# Patient Record
Sex: FEMALE | Race: WHITE | Employment: OTHER | URBAN - NONMETROPOLITAN AREA
[De-identification: names, ages, dates, MRNs, and addresses within clinical notes are randomized per-mention and may not be internally consistent; named-entity substitution may affect disease eponyms.]

---

## 2017-02-23 DIAGNOSIS — E11.9 TYPE 2 DIABETES MELLITUS WITHOUT COMPLICATION (HCC): ICD-10-CM

## 2017-02-23 DIAGNOSIS — F41.9 ANXIETY: ICD-10-CM

## 2017-02-23 DIAGNOSIS — I10 ESSENTIAL HYPERTENSION: ICD-10-CM

## 2017-02-23 DIAGNOSIS — F32.A DEPRESSION: ICD-10-CM

## 2017-02-24 RX ORDER — AMLODIPINE BESYLATE 10 MG/1
TABLET ORAL
Qty: 30 TABLET | Refills: 0 | Status: SHIPPED | OUTPATIENT
Start: 2017-02-24 | End: 2017-03-03 | Stop reason: SDUPTHER

## 2017-02-24 RX ORDER — PRAVASTATIN SODIUM 20 MG
TABLET ORAL
Qty: 30 TABLET | Refills: 0 | Status: SHIPPED | OUTPATIENT
Start: 2017-02-24 | End: 2017-03-03 | Stop reason: SDUPTHER

## 2017-02-24 RX ORDER — FLUOXETINE 10 MG/1
CAPSULE ORAL
Qty: 30 CAPSULE | Refills: 0 | Status: SHIPPED | OUTPATIENT
Start: 2017-02-24 | End: 2017-03-03 | Stop reason: SDUPTHER

## 2017-02-24 RX ORDER — LOSARTAN POTASSIUM 50 MG/1
TABLET ORAL
Qty: 30 TABLET | Refills: 0 | Status: SHIPPED | OUTPATIENT
Start: 2017-02-24 | End: 2017-03-03 | Stop reason: SDUPTHER

## 2017-03-03 ENCOUNTER — OFFICE VISIT (OUTPATIENT)
Dept: FAMILY MEDICINE CLINIC | Age: 57
End: 2017-03-03
Payer: MEDICARE

## 2017-03-03 VITALS
SYSTOLIC BLOOD PRESSURE: 122 MMHG | DIASTOLIC BLOOD PRESSURE: 78 MMHG | BODY MASS INDEX: 36.62 KG/M2 | RESPIRATION RATE: 16 BRPM | OXYGEN SATURATION: 98 % | HEART RATE: 117 BPM | TEMPERATURE: 98.4 F | HEIGHT: 62 IN | WEIGHT: 199 LBS

## 2017-03-03 DIAGNOSIS — E11.9 TYPE 2 DIABETES MELLITUS WITHOUT COMPLICATION, WITHOUT LONG-TERM CURRENT USE OF INSULIN (HCC): ICD-10-CM

## 2017-03-03 DIAGNOSIS — I10 ESSENTIAL HYPERTENSION: ICD-10-CM

## 2017-03-03 DIAGNOSIS — R74.8 ELEVATED LIVER ENZYMES: ICD-10-CM

## 2017-03-03 DIAGNOSIS — G62.9 NEUROPATHY: Primary | ICD-10-CM

## 2017-03-03 DIAGNOSIS — Z13.9 SCREENING: ICD-10-CM

## 2017-03-03 DIAGNOSIS — Z12.31 SCREENING MAMMOGRAM, ENCOUNTER FOR: ICD-10-CM

## 2017-03-03 DIAGNOSIS — F41.9 ANXIETY: ICD-10-CM

## 2017-03-03 DIAGNOSIS — F51.01 PRIMARY INSOMNIA: ICD-10-CM

## 2017-03-03 DIAGNOSIS — K21.9 GASTROESOPHAGEAL REFLUX DISEASE WITHOUT ESOPHAGITIS: ICD-10-CM

## 2017-03-03 DIAGNOSIS — E83.52 HYPERCALCEMIA: Primary | ICD-10-CM

## 2017-03-03 DIAGNOSIS — E53.8 B12 DEFICIENCY: ICD-10-CM

## 2017-03-03 DIAGNOSIS — R09.89 BILATERAL CAROTID BRUITS: ICD-10-CM

## 2017-03-03 LAB
ALBUMIN SERPL-MCNC: 5 G/DL (ref 3.5–5.2)
ALP BLD-CCNC: 123 U/L (ref 35–104)
ALT SERPL-CCNC: 138 U/L (ref 5–33)
ANION GAP SERPL CALCULATED.3IONS-SCNC: 15 MMOL/L (ref 7–19)
AST SERPL-CCNC: 62 U/L (ref 5–32)
BASOPHILS ABSOLUTE: 0 K/UL (ref 0–0.2)
BASOPHILS RELATIVE PERCENT: 0.4 % (ref 0–1)
BILIRUB SERPL-MCNC: 0.3 MG/DL (ref 0.2–1.2)
BILIRUBIN URINE: NEGATIVE
BLOOD, URINE: NEGATIVE
BUN BLDV-MCNC: 18 MG/DL (ref 6–20)
CALCIUM SERPL-MCNC: 12.2 MG/DL (ref 8.6–10)
CHLORIDE BLD-SCNC: 98 MMOL/L (ref 98–111)
CHOLESTEROL, TOTAL: 237 MG/DL (ref 160–199)
CLARITY: ABNORMAL
CO2: 25 MMOL/L (ref 22–29)
COLOR: YELLOW
CREAT SERPL-MCNC: 0.9 MG/DL (ref 0.5–0.9)
CREATININE URINE: 98.1 MG/DL (ref 4.2–622)
EOSINOPHILS ABSOLUTE: 0.2 K/UL (ref 0–0.6)
EOSINOPHILS RELATIVE PERCENT: 1.8 % (ref 0–5)
GFR NON-AFRICAN AMERICAN: >60
GLOBULIN: 3.2 G/DL
GLUCOSE BLD-MCNC: 138 MG/DL (ref 74–109)
GLUCOSE URINE: NEGATIVE MG/DL
HBA1C MFR BLD: 7.2 %
HCT VFR BLD CALC: 44.2 % (ref 37–47)
HDLC SERPL-MCNC: 41 MG/DL (ref 65–121)
HEMOGLOBIN: 14.3 G/DL (ref 12–16)
KETONES, URINE: NEGATIVE MG/DL
LDL CHOLESTEROL CALCULATED: ABNORMAL MG/DL
LDL CHOLESTEROL DIRECT: 126 MG/DL
LEUKOCYTE ESTERASE, URINE: NEGATIVE
LYMPHOCYTES ABSOLUTE: 3.3 K/UL (ref 1.1–4.5)
LYMPHOCYTES RELATIVE PERCENT: 36 % (ref 20–40)
MCH RBC QN AUTO: 30 PG (ref 27–31)
MCHC RBC AUTO-ENTMCNC: 32.4 G/DL (ref 33–37)
MCV RBC AUTO: 92.7 FL (ref 81–99)
MICROALBUMIN UR-MCNC: 5.7 MG/DL (ref 0–19)
MICROALBUMIN/CREAT UR-RTO: 58.1 MG/G
MONOCYTES ABSOLUTE: 0.7 K/UL (ref 0–0.9)
MONOCYTES RELATIVE PERCENT: 7.3 % (ref 0–10)
NEUTROPHILS ABSOLUTE: 5 K/UL (ref 1.5–7.5)
NEUTROPHILS RELATIVE PERCENT: 54.4 % (ref 50–65)
NITRITE, URINE: NEGATIVE
PDW BLD-RTO: 13.8 % (ref 11.5–14.5)
PH UA: 5.5
PLATELET # BLD: 220 K/UL (ref 130–400)
PMV BLD AUTO: 13 FL (ref 7.4–10.4)
POTASSIUM SERPL-SCNC: 4.8 MMOL/L (ref 3.5–5)
PROTEIN UA: NEGATIVE MG/DL
RBC # BLD: 4.77 M/UL (ref 4.2–5.4)
SODIUM BLD-SCNC: 138 MMOL/L (ref 136–145)
SPECIFIC GRAVITY UA: 1.02
TOTAL PROTEIN: 8.2 G/DL (ref 6.6–8.7)
TRIGL SERPL-MCNC: 511 MG/DL (ref 150–199)
TSH SERPL DL<=0.05 MIU/L-ACNC: 1.46 UIU/ML (ref 0.27–4.2)
UROBILINOGEN, URINE: 0.2 E.U./DL
WBC # BLD: 9.2 K/UL (ref 4.8–10.8)

## 2017-03-03 PROCEDURE — 3014F SCREEN MAMMO DOC REV: CPT | Performed by: NURSE PRACTITIONER

## 2017-03-03 PROCEDURE — G8484 FLU IMMUNIZE NO ADMIN: HCPCS | Performed by: NURSE PRACTITIONER

## 2017-03-03 PROCEDURE — 99214 OFFICE O/P EST MOD 30 MIN: CPT | Performed by: NURSE PRACTITIONER

## 2017-03-03 PROCEDURE — G8417 CALC BMI ABV UP PARAM F/U: HCPCS | Performed by: NURSE PRACTITIONER

## 2017-03-03 PROCEDURE — 3017F COLORECTAL CA SCREEN DOC REV: CPT | Performed by: NURSE PRACTITIONER

## 2017-03-03 PROCEDURE — G8427 DOCREV CUR MEDS BY ELIG CLIN: HCPCS | Performed by: NURSE PRACTITIONER

## 2017-03-03 PROCEDURE — 1036F TOBACCO NON-USER: CPT | Performed by: NURSE PRACTITIONER

## 2017-03-03 PROCEDURE — 3046F HEMOGLOBIN A1C LEVEL >9.0%: CPT | Performed by: NURSE PRACTITIONER

## 2017-03-03 RX ORDER — LOSARTAN POTASSIUM 50 MG/1
TABLET ORAL
Qty: 30 TABLET | Refills: 2 | Status: SHIPPED | OUTPATIENT
Start: 2017-03-03 | End: 2017-06-30 | Stop reason: SDUPTHER

## 2017-03-03 RX ORDER — PANTOPRAZOLE SODIUM 40 MG/1
40 TABLET, DELAYED RELEASE ORAL
Qty: 30 TABLET | Refills: 11 | Status: SHIPPED | OUTPATIENT
Start: 2017-03-03 | End: 2017-03-08 | Stop reason: ALTCHOICE

## 2017-03-03 RX ORDER — PRAVASTATIN SODIUM 20 MG
TABLET ORAL
Qty: 30 TABLET | Refills: 2 | Status: SHIPPED | OUTPATIENT
Start: 2017-03-03 | End: 2017-03-08 | Stop reason: ALTCHOICE

## 2017-03-03 RX ORDER — FLUOXETINE 10 MG/1
10 CAPSULE ORAL DAILY
Qty: 30 CAPSULE | Refills: 5 | Status: SHIPPED | OUTPATIENT
Start: 2017-03-03 | End: 2017-06-30 | Stop reason: DRUGHIGH

## 2017-03-03 RX ORDER — GABAPENTIN 100 MG/1
CAPSULE ORAL
Qty: 90 CAPSULE | Refills: 3 | Status: SHIPPED | OUTPATIENT
Start: 2017-03-03 | End: 2017-06-30 | Stop reason: SDUPTHER

## 2017-03-03 RX ORDER — AMLODIPINE BESYLATE 10 MG/1
TABLET ORAL
Qty: 30 TABLET | Refills: 2 | Status: SHIPPED | OUTPATIENT
Start: 2017-03-03 | End: 2017-06-30 | Stop reason: SDUPTHER

## 2017-03-03 RX ORDER — TRAZODONE HYDROCHLORIDE 50 MG/1
TABLET ORAL
Qty: 60 TABLET | Refills: 1 | Status: SHIPPED | OUTPATIENT
Start: 2017-03-03 | End: 2017-05-04 | Stop reason: SDUPTHER

## 2017-03-03 ASSESSMENT — ENCOUNTER SYMPTOMS
BACK PAIN: 1
SHORTNESS OF BREATH: 0
BLURRED VISION: 0

## 2017-03-06 ENCOUNTER — HOSPITAL ENCOUNTER (OUTPATIENT)
Dept: GENERAL RADIOLOGY | Age: 57
Discharge: HOME | End: 2017-03-06
Payer: MEDICARE

## 2017-03-06 DIAGNOSIS — E83.52 HYPERCALCEMIA: ICD-10-CM

## 2017-03-06 DIAGNOSIS — R09.89 BILATERAL CAROTID BRUITS: ICD-10-CM

## 2017-03-06 DIAGNOSIS — Z13.9 SCREENING: ICD-10-CM

## 2017-03-06 LAB
HAV IGM SER IA-ACNC: NORMAL
HEPATITIS B CORE IGM ANTIBODY: NORMAL
HEPATITIS B SURFACE ANTIGEN INTERPRETATION: NORMAL
HEPATITIS C ANTIBODY INTERPRETATION: NORMAL
PARATHYROID HORMONE INTACT: 49.1 PG/ML (ref 15–65)

## 2017-03-06 PROCEDURE — 93880 EXTRACRANIAL BILAT STUDY: CPT

## 2017-03-08 ENCOUNTER — OFFICE VISIT (OUTPATIENT)
Dept: FAMILY MEDICINE CLINIC | Age: 57
End: 2017-03-08
Payer: MEDICARE

## 2017-03-08 VITALS
DIASTOLIC BLOOD PRESSURE: 78 MMHG | HEART RATE: 100 BPM | SYSTOLIC BLOOD PRESSURE: 122 MMHG | OXYGEN SATURATION: 99 % | WEIGHT: 200 LBS | TEMPERATURE: 99 F | BODY MASS INDEX: 36.8 KG/M2 | RESPIRATION RATE: 20 BRPM | HEIGHT: 62 IN

## 2017-03-08 DIAGNOSIS — E78.2 MIXED HYPERLIPIDEMIA: ICD-10-CM

## 2017-03-08 DIAGNOSIS — K22.70 BARRETT'S ESOPHAGUS WITHOUT DYSPLASIA: ICD-10-CM

## 2017-03-08 DIAGNOSIS — E11.65 TYPE 2 DIABETES MELLITUS WITH HYPERGLYCEMIA, WITHOUT LONG-TERM CURRENT USE OF INSULIN (HCC): ICD-10-CM

## 2017-03-08 DIAGNOSIS — R74.8 ELEVATED LIVER ENZYMES: ICD-10-CM

## 2017-03-08 DIAGNOSIS — I65.22 LEFT CAROTID STENOSIS: Primary | ICD-10-CM

## 2017-03-08 DIAGNOSIS — E83.52 SERUM CALCIUM ELEVATED: ICD-10-CM

## 2017-03-08 DIAGNOSIS — K21.9 GASTROESOPHAGEAL REFLUX DISEASE WITHOUT ESOPHAGITIS: ICD-10-CM

## 2017-03-08 LAB — VITAMIN D 25-HYDROXY: 10.9 NG/ML

## 2017-03-08 PROCEDURE — 99214 OFFICE O/P EST MOD 30 MIN: CPT | Performed by: NURSE PRACTITIONER

## 2017-03-08 PROCEDURE — 1036F TOBACCO NON-USER: CPT | Performed by: NURSE PRACTITIONER

## 2017-03-08 PROCEDURE — G8417 CALC BMI ABV UP PARAM F/U: HCPCS | Performed by: NURSE PRACTITIONER

## 2017-03-08 PROCEDURE — 3017F COLORECTAL CA SCREEN DOC REV: CPT | Performed by: NURSE PRACTITIONER

## 2017-03-08 PROCEDURE — G8484 FLU IMMUNIZE NO ADMIN: HCPCS | Performed by: NURSE PRACTITIONER

## 2017-03-08 PROCEDURE — G8427 DOCREV CUR MEDS BY ELIG CLIN: HCPCS | Performed by: NURSE PRACTITIONER

## 2017-03-08 PROCEDURE — 3014F SCREEN MAMMO DOC REV: CPT | Performed by: NURSE PRACTITIONER

## 2017-03-08 PROCEDURE — 3045F PR MOST RECENT HEMOGLOBIN A1C LEVEL 7.0-9.0%: CPT | Performed by: NURSE PRACTITIONER

## 2017-03-08 PROCEDURE — G8599 NO ASA/ANTIPLAT THER USE RNG: HCPCS | Performed by: NURSE PRACTITIONER

## 2017-03-08 RX ORDER — RANITIDINE 150 MG/1
TABLET ORAL
Qty: 30 TABLET | Refills: 2 | Status: SHIPPED | OUTPATIENT
Start: 2017-03-08 | End: 2017-06-30 | Stop reason: SDUPTHER

## 2017-03-08 RX ORDER — ROSUVASTATIN CALCIUM 20 MG/1
20 TABLET, COATED ORAL NIGHTLY
Qty: 30 TABLET | Refills: 3 | Status: SHIPPED | OUTPATIENT
Start: 2017-03-08 | End: 2017-03-08 | Stop reason: CLARIF

## 2017-03-08 RX ORDER — RANITIDINE 150 MG/1
TABLET ORAL
Qty: 30 TABLET | Refills: 2 | Status: SHIPPED | OUTPATIENT
Start: 2017-03-08 | End: 2017-03-08 | Stop reason: ALTCHOICE

## 2017-03-08 RX ORDER — ROSUVASTATIN CALCIUM 20 MG/1
20 TABLET, COATED ORAL NIGHTLY
Qty: 30 TABLET | Refills: 3 | Status: SHIPPED | OUTPATIENT
Start: 2017-03-08 | End: 2017-12-06 | Stop reason: SDUPTHER

## 2017-03-08 RX ORDER — DEXLANSOPRAZOLE 60 MG/1
60 CAPSULE, DELAYED RELEASE ORAL
Qty: 30 CAPSULE | Refills: 2 | Status: SHIPPED | OUTPATIENT
Start: 2017-03-08 | End: 2018-05-03 | Stop reason: ALTCHOICE

## 2017-03-08 RX ORDER — DEXLANSOPRAZOLE 60 MG/1
60 CAPSULE, DELAYED RELEASE ORAL
Qty: 30 CAPSULE | Refills: 2 | Status: SHIPPED | OUTPATIENT
Start: 2017-03-08 | End: 2017-03-08 | Stop reason: ALTCHOICE

## 2017-03-08 ASSESSMENT — ENCOUNTER SYMPTOMS: TROUBLE SWALLOWING: 0

## 2017-03-11 ASSESSMENT — ENCOUNTER SYMPTOMS
SHORTNESS OF BREATH: 0
BLURRED VISION: 0
ABDOMINAL PAIN: 0

## 2017-03-14 RX ORDER — ERGOCALCIFEROL (VITAMIN D2) 1250 MCG
50000 CAPSULE ORAL WEEKLY
Qty: 4 CAPSULE | Refills: 1 | Status: SHIPPED | OUTPATIENT
Start: 2017-03-14 | End: 2017-03-15 | Stop reason: SDUPTHER

## 2017-03-15 RX ORDER — ERGOCALCIFEROL (VITAMIN D2) 1250 MCG
50000 CAPSULE ORAL WEEKLY
Qty: 4 CAPSULE | Refills: 1 | Status: SHIPPED | OUTPATIENT
Start: 2017-03-15 | End: 2017-05-04 | Stop reason: SDUPTHER

## 2017-03-20 ENCOUNTER — OFFICE VISIT (OUTPATIENT)
Dept: VASCULAR SURGERY | Age: 57
End: 2017-03-20
Payer: MEDICARE

## 2017-03-20 VITALS
HEART RATE: 77 BPM | OXYGEN SATURATION: 95 % | DIASTOLIC BLOOD PRESSURE: 78 MMHG | SYSTOLIC BLOOD PRESSURE: 128 MMHG | TEMPERATURE: 98.8 F | WEIGHT: 199.96 LBS | HEIGHT: 62 IN | BODY MASS INDEX: 36.8 KG/M2 | RESPIRATION RATE: 18 BRPM

## 2017-03-20 DIAGNOSIS — I65.23 BILATERAL CAROTID ARTERY STENOSIS: Primary | ICD-10-CM

## 2017-03-20 DIAGNOSIS — R74.8 ELEVATED LIVER ENZYMES: ICD-10-CM

## 2017-03-20 DIAGNOSIS — E83.52 SERUM CALCIUM ELEVATED: ICD-10-CM

## 2017-03-20 DIAGNOSIS — E11.65 TYPE 2 DIABETES MELLITUS WITH HYPERGLYCEMIA, WITHOUT LONG-TERM CURRENT USE OF INSULIN (HCC): ICD-10-CM

## 2017-03-20 LAB
ALBUMIN SERPL-MCNC: 4.6 G/DL (ref 3.5–5.2)
ALP BLD-CCNC: 107 U/L (ref 35–104)
ALT SERPL-CCNC: 54 U/L (ref 5–33)
ANION GAP SERPL CALCULATED.3IONS-SCNC: 17 MMOL/L (ref 7–19)
AST SERPL-CCNC: 38 U/L (ref 5–32)
BILIRUB SERPL-MCNC: <0.2 MG/DL (ref 0.2–1.2)
BILIRUBIN DIRECT: 0.1 MG/DL (ref 0–0.3)
BILIRUBIN, INDIRECT: 0.1 MG/DL (ref 0.1–1)
BUN BLDV-MCNC: 17 MG/DL (ref 6–20)
CALCIUM SERPL-MCNC: 9.8 MG/DL (ref 8.6–10)
CHLORIDE BLD-SCNC: 101 MMOL/L (ref 98–111)
CO2: 23 MMOL/L (ref 22–29)
CREAT SERPL-MCNC: 0.8 MG/DL (ref 0.5–0.9)
GFR NON-AFRICAN AMERICAN: >60
GLUCOSE BLD-MCNC: 118 MG/DL (ref 74–109)
POTASSIUM SERPL-SCNC: 4.2 MMOL/L (ref 3.5–5)
SODIUM BLD-SCNC: 141 MMOL/L (ref 136–145)
TOTAL PROTEIN: 8 G/DL (ref 6.6–8.7)
VITAMIN D 25-HYDROXY: 14.2 NG/ML

## 2017-03-20 PROCEDURE — G8484 FLU IMMUNIZE NO ADMIN: HCPCS | Performed by: SURGERY

## 2017-03-20 PROCEDURE — 99204 OFFICE O/P NEW MOD 45 MIN: CPT | Performed by: SURGERY

## 2017-03-20 PROCEDURE — 1036F TOBACCO NON-USER: CPT | Performed by: SURGERY

## 2017-03-20 PROCEDURE — G8417 CALC BMI ABV UP PARAM F/U: HCPCS | Performed by: SURGERY

## 2017-03-20 PROCEDURE — G8599 NO ASA/ANTIPLAT THER USE RNG: HCPCS | Performed by: SURGERY

## 2017-03-20 PROCEDURE — G8427 DOCREV CUR MEDS BY ELIG CLIN: HCPCS | Performed by: SURGERY

## 2017-03-20 PROCEDURE — 3014F SCREEN MAMMO DOC REV: CPT | Performed by: SURGERY

## 2017-03-20 PROCEDURE — 3017F COLORECTAL CA SCREEN DOC REV: CPT | Performed by: SURGERY

## 2017-03-20 RX ORDER — PANTOPRAZOLE SODIUM 40 MG/1
40 TABLET, DELAYED RELEASE ORAL DAILY
COMMUNITY
End: 2017-03-25 | Stop reason: SDUPTHER

## 2017-05-04 DIAGNOSIS — F51.01 PRIMARY INSOMNIA: ICD-10-CM

## 2017-05-04 RX ORDER — ERGOCALCIFEROL 1.25 MG/1
CAPSULE ORAL
Qty: 4 CAPSULE | Refills: 3 | Status: SHIPPED | OUTPATIENT
Start: 2017-05-04 | End: 2017-12-06 | Stop reason: SDUPTHER

## 2017-05-04 RX ORDER — TRAZODONE HYDROCHLORIDE 50 MG/1
TABLET ORAL
Qty: 60 TABLET | Refills: 3 | Status: SHIPPED | OUTPATIENT
Start: 2017-05-04 | End: 2017-12-06 | Stop reason: SDUPTHER

## 2017-05-18 ENCOUNTER — OFFICE VISIT (OUTPATIENT)
Dept: GASTROENTEROLOGY | Facility: CLINIC | Age: 57
End: 2017-05-18

## 2017-05-18 VITALS
HEIGHT: 63 IN | WEIGHT: 193 LBS | BODY MASS INDEX: 34.2 KG/M2 | HEART RATE: 75 BPM | SYSTOLIC BLOOD PRESSURE: 102 MMHG | TEMPERATURE: 97.5 F | OXYGEN SATURATION: 97 % | DIASTOLIC BLOOD PRESSURE: 68 MMHG

## 2017-05-18 DIAGNOSIS — K22.70 BARRETT'S ESOPHAGUS WITHOUT DYSPLASIA: ICD-10-CM

## 2017-05-18 DIAGNOSIS — K21.9 GASTROESOPHAGEAL REFLUX DISEASE WITHOUT ESOPHAGITIS: ICD-10-CM

## 2017-05-18 DIAGNOSIS — T28.1XXA ESOPHAGEAL BURN, INITIAL ENCOUNTER: ICD-10-CM

## 2017-05-18 DIAGNOSIS — K63.5 COLON POLYPS: Primary | ICD-10-CM

## 2017-05-18 PROBLEM — R12 HEARTBURN: Status: ACTIVE | Noted: 2017-05-18

## 2017-05-18 PROBLEM — R12 HEARTBURN: Status: RESOLVED | Noted: 2017-05-18 | Resolved: 2017-05-18

## 2017-05-18 PROCEDURE — 99203 OFFICE O/P NEW LOW 30 MIN: CPT | Performed by: NURSE PRACTITIONER

## 2017-05-18 RX ORDER — LOSARTAN POTASSIUM 50 MG/1
TABLET ORAL
Refills: 2 | Status: ON HOLD | COMMUNITY
Start: 2017-04-22 | End: 2018-03-28

## 2017-05-18 RX ORDER — FLUOXETINE 10 MG/1
CAPSULE ORAL
Refills: 2 | Status: ON HOLD | COMMUNITY
Start: 2017-04-22 | End: 2018-03-28

## 2017-05-18 RX ORDER — ROSUVASTATIN CALCIUM 20 MG/1
TABLET, COATED ORAL
Refills: 3 | Status: ON HOLD | COMMUNITY
Start: 2017-04-05 | End: 2018-03-28

## 2017-05-18 RX ORDER — RANITIDINE 150 MG/1
TABLET ORAL
Refills: 2 | Status: ON HOLD | COMMUNITY
Start: 2017-05-04 | End: 2018-03-28

## 2017-05-18 RX ORDER — AMLODIPINE BESYLATE 10 MG/1
TABLET ORAL
Refills: 2 | Status: ON HOLD | COMMUNITY
Start: 2017-04-22 | End: 2018-03-28

## 2017-05-18 RX ORDER — PANTOPRAZOLE SODIUM 40 MG/1
TABLET, DELAYED RELEASE ORAL
Refills: 5 | Status: ON HOLD | COMMUNITY
Start: 2017-04-22 | End: 2018-03-28

## 2017-05-18 RX ORDER — SODIUM, POTASSIUM,MAG SULFATES 17.5-3.13G
2 SOLUTION, RECONSTITUTED, ORAL ORAL ONCE
Qty: 2 BOTTLE | Refills: 0 | Status: SHIPPED | OUTPATIENT
Start: 2017-05-18 | End: 2017-05-18

## 2017-05-18 RX ORDER — GABAPENTIN 100 MG/1
400 CAPSULE ORAL NIGHTLY
Refills: 3 | Status: ON HOLD | COMMUNITY
Start: 2017-04-05 | End: 2020-08-21

## 2017-06-30 ENCOUNTER — OFFICE VISIT (OUTPATIENT)
Dept: FAMILY MEDICINE CLINIC | Age: 57
End: 2017-06-30
Payer: MEDICARE

## 2017-06-30 VITALS
TEMPERATURE: 98 F | OXYGEN SATURATION: 99 % | SYSTOLIC BLOOD PRESSURE: 130 MMHG | HEART RATE: 96 BPM | BODY MASS INDEX: 36.44 KG/M2 | DIASTOLIC BLOOD PRESSURE: 86 MMHG | RESPIRATION RATE: 16 BRPM | WEIGHT: 198 LBS

## 2017-06-30 DIAGNOSIS — I10 ESSENTIAL HYPERTENSION: ICD-10-CM

## 2017-06-30 DIAGNOSIS — F32.89 OTHER DEPRESSION: ICD-10-CM

## 2017-06-30 DIAGNOSIS — E78.2 MIXED HYPERLIPIDEMIA: ICD-10-CM

## 2017-06-30 DIAGNOSIS — K21.9 GASTROESOPHAGEAL REFLUX DISEASE WITHOUT ESOPHAGITIS: ICD-10-CM

## 2017-06-30 DIAGNOSIS — K22.70 BARRETT'S ESOPHAGUS WITHOUT DYSPLASIA: ICD-10-CM

## 2017-06-30 DIAGNOSIS — E11.9 TYPE 2 DIABETES MELLITUS WITHOUT COMPLICATION, WITHOUT LONG-TERM CURRENT USE OF INSULIN (HCC): ICD-10-CM

## 2017-06-30 DIAGNOSIS — F41.9 ANXIETY: ICD-10-CM

## 2017-06-30 DIAGNOSIS — E55.9 VITAMIN D DEFICIENCY: Primary | ICD-10-CM

## 2017-06-30 DIAGNOSIS — Z12.31 SCREENING MAMMOGRAM, ENCOUNTER FOR: ICD-10-CM

## 2017-06-30 DIAGNOSIS — G62.9 NEUROPATHY: ICD-10-CM

## 2017-06-30 DIAGNOSIS — Z28.21 REFUSED PNEUMOCOCCAL VACCINATION: ICD-10-CM

## 2017-06-30 PROCEDURE — 99214 OFFICE O/P EST MOD 30 MIN: CPT | Performed by: NURSE PRACTITIONER

## 2017-06-30 PROCEDURE — 3014F SCREEN MAMMO DOC REV: CPT | Performed by: NURSE PRACTITIONER

## 2017-06-30 PROCEDURE — 3046F HEMOGLOBIN A1C LEVEL >9.0%: CPT | Performed by: NURSE PRACTITIONER

## 2017-06-30 PROCEDURE — 1036F TOBACCO NON-USER: CPT | Performed by: NURSE PRACTITIONER

## 2017-06-30 PROCEDURE — G8427 DOCREV CUR MEDS BY ELIG CLIN: HCPCS | Performed by: NURSE PRACTITIONER

## 2017-06-30 PROCEDURE — 3017F COLORECTAL CA SCREEN DOC REV: CPT | Performed by: NURSE PRACTITIONER

## 2017-06-30 PROCEDURE — G8599 NO ASA/ANTIPLAT THER USE RNG: HCPCS | Performed by: NURSE PRACTITIONER

## 2017-06-30 PROCEDURE — G8417 CALC BMI ABV UP PARAM F/U: HCPCS | Performed by: NURSE PRACTITIONER

## 2017-06-30 RX ORDER — FLUOXETINE HYDROCHLORIDE 20 MG/1
CAPSULE ORAL
Qty: 30 CAPSULE | Refills: 5 | Status: SHIPPED | OUTPATIENT
Start: 2017-06-30 | End: 2017-12-06 | Stop reason: SDUPTHER

## 2017-06-30 RX ORDER — LOSARTAN POTASSIUM 50 MG/1
TABLET ORAL
Qty: 30 TABLET | Refills: 5 | Status: SHIPPED | OUTPATIENT
Start: 2017-06-30 | End: 2018-01-10 | Stop reason: SDUPTHER

## 2017-06-30 RX ORDER — AMLODIPINE BESYLATE 10 MG/1
TABLET ORAL
Qty: 30 TABLET | Refills: 5 | Status: SHIPPED | OUTPATIENT
Start: 2017-06-30 | End: 2017-12-06 | Stop reason: SDUPTHER

## 2017-06-30 RX ORDER — PHENTERMINE HYDROCHLORIDE 37.5 MG/1
37.5 TABLET ORAL
Qty: 30 TABLET | Refills: 0 | Status: SHIPPED | OUTPATIENT
Start: 2017-06-30 | End: 2017-07-30

## 2017-06-30 RX ORDER — DEXLANSOPRAZOLE 60 MG/1
60 CAPSULE, DELAYED RELEASE ORAL
Qty: 30 CAPSULE | Refills: 2 | Status: CANCELLED | OUTPATIENT
Start: 2017-06-30

## 2017-06-30 RX ORDER — ROSUVASTATIN CALCIUM 20 MG/1
20 TABLET, COATED ORAL NIGHTLY
Qty: 30 TABLET | Refills: 3 | Status: CANCELLED | OUTPATIENT
Start: 2017-06-30

## 2017-06-30 RX ORDER — RANITIDINE 150 MG/1
TABLET ORAL
Qty: 30 TABLET | Refills: 5 | Status: SHIPPED | OUTPATIENT
Start: 2017-06-30 | End: 2017-12-06 | Stop reason: SDUPTHER

## 2017-06-30 RX ORDER — PANTOPRAZOLE SODIUM 40 MG/1
TABLET, DELAYED RELEASE ORAL
Qty: 60 TABLET | Refills: 1 | Status: SHIPPED | OUTPATIENT
Start: 2017-06-30 | End: 2017-08-25 | Stop reason: SDUPTHER

## 2017-06-30 RX ORDER — LOSARTAN POTASSIUM 50 MG/1
TABLET ORAL
Qty: 30 TABLET | Refills: 5 | Status: SHIPPED | OUTPATIENT
Start: 2017-06-30 | End: 2017-12-06 | Stop reason: SDUPTHER

## 2017-06-30 RX ORDER — GABAPENTIN 100 MG/1
CAPSULE ORAL
Qty: 90 CAPSULE | Refills: 3 | Status: SHIPPED | OUTPATIENT
Start: 2017-06-30 | End: 2017-12-06 | Stop reason: SDUPTHER

## 2017-06-30 ASSESSMENT — ENCOUNTER SYMPTOMS
SHORTNESS OF BREATH: 0
NAUSEA: 0
TROUBLE SWALLOWING: 0
BLURRED VISION: 0
DIARRHEA: 0

## 2017-07-20 ENCOUNTER — HOSPITAL ENCOUNTER (OUTPATIENT)
Dept: WOMENS IMAGING | Age: 57
Discharge: HOME | End: 2017-07-20
Payer: MEDICARE

## 2017-07-20 DIAGNOSIS — Z12.31 SCREENING MAMMOGRAM, ENCOUNTER FOR: ICD-10-CM

## 2017-07-20 PROCEDURE — 77063 BREAST TOMOSYNTHESIS BI: CPT

## 2017-07-24 ENCOUNTER — ANESTHESIA (OUTPATIENT)
Dept: GASTROENTEROLOGY | Facility: HOSPITAL | Age: 57
End: 2017-07-24

## 2017-07-24 ENCOUNTER — ANESTHESIA EVENT (OUTPATIENT)
Dept: GASTROENTEROLOGY | Facility: HOSPITAL | Age: 57
End: 2017-07-24

## 2017-07-24 ENCOUNTER — HOSPITAL ENCOUNTER (OUTPATIENT)
Facility: HOSPITAL | Age: 57
Setting detail: HOSPITAL OUTPATIENT SURGERY
Discharge: HOME | End: 2017-07-24
Attending: INTERNAL MEDICINE | Admitting: INTERNAL MEDICINE

## 2017-07-24 VITALS
OXYGEN SATURATION: 97 % | WEIGHT: 183 LBS | TEMPERATURE: 98.1 F | SYSTOLIC BLOOD PRESSURE: 106 MMHG | HEIGHT: 62 IN | HEART RATE: 71 BPM | RESPIRATION RATE: 18 BRPM | DIASTOLIC BLOOD PRESSURE: 66 MMHG | BODY MASS INDEX: 33.68 KG/M2

## 2017-07-24 DIAGNOSIS — K22.70 BARRETT'S ESOPHAGUS WITHOUT DYSPLASIA: ICD-10-CM

## 2017-07-24 DIAGNOSIS — T28.1XXA ESOPHAGEAL BURN, INITIAL ENCOUNTER: ICD-10-CM

## 2017-07-24 DIAGNOSIS — K63.5 COLON POLYPS: ICD-10-CM

## 2017-07-24 DIAGNOSIS — K21.9 GASTROESOPHAGEAL REFLUX DISEASE WITHOUT ESOPHAGITIS: ICD-10-CM

## 2017-07-24 PROCEDURE — 43239 EGD BIOPSY SINGLE/MULTIPLE: CPT | Performed by: INTERNAL MEDICINE

## 2017-07-24 PROCEDURE — 88305 TISSUE EXAM BY PATHOLOGIST: CPT | Performed by: INTERNAL MEDICINE

## 2017-07-24 PROCEDURE — 45385 COLONOSCOPY W/LESION REMOVAL: CPT | Performed by: INTERNAL MEDICINE

## 2017-07-24 PROCEDURE — 25010000002 PROPOFOL 10 MG/ML EMULSION: Performed by: NURSE ANESTHETIST, CERTIFIED REGISTERED

## 2017-07-24 RX ORDER — SODIUM CHLORIDE 9 MG/ML
500 INJECTION, SOLUTION INTRAVENOUS CONTINUOUS PRN
Status: DISCONTINUED | OUTPATIENT
Start: 2017-07-24 | End: 2017-07-24 | Stop reason: HOSPADM

## 2017-07-24 RX ORDER — PROPOFOL 10 MG/ML
VIAL (ML) INTRAVENOUS AS NEEDED
Status: DISCONTINUED | OUTPATIENT
Start: 2017-07-24 | End: 2017-07-24 | Stop reason: SURG

## 2017-07-24 RX ORDER — SODIUM CHLORIDE 0.9 % (FLUSH) 0.9 %
3 SYRINGE (ML) INJECTION AS NEEDED
Status: DISCONTINUED | OUTPATIENT
Start: 2017-07-24 | End: 2017-07-24 | Stop reason: HOSPADM

## 2017-07-24 RX ORDER — LIDOCAINE HYDROCHLORIDE 20 MG/ML
INJECTION, SOLUTION INFILTRATION; PERINEURAL AS NEEDED
Status: DISCONTINUED | OUTPATIENT
Start: 2017-07-24 | End: 2017-07-24 | Stop reason: SURG

## 2017-07-24 RX ORDER — LIDOCAINE HYDROCHLORIDE 10 MG/ML
0.5 INJECTION, SOLUTION INFILTRATION; PERINEURAL ONCE AS NEEDED
Status: DISCONTINUED | OUTPATIENT
Start: 2017-07-24 | End: 2017-07-24 | Stop reason: HOSPADM

## 2017-07-24 RX ADMIN — SODIUM CHLORIDE 500 ML: 9 INJECTION, SOLUTION INTRAVENOUS at 07:56

## 2017-07-24 RX ADMIN — LIDOCAINE HYDROCHLORIDE 100 MG: 20 INJECTION, SOLUTION INFILTRATION; PERINEURAL at 08:43

## 2017-07-24 RX ADMIN — PROPOFOL 200 MG: 10 INJECTION, EMULSION INTRAVENOUS at 08:50

## 2017-07-24 RX ADMIN — PROPOFOL 200 MG: 10 INJECTION, EMULSION INTRAVENOUS at 08:43

## 2017-07-24 NOTE — H&P
"    Chief Complaint:   Barretts and colon cancer screening    Subjective     HPI:   Patient has also history of Garza's.  Her last endoscopy was October 2008.  She is on PPI therapy.    She also is here for colon cancer screening.  Her last colonoscopy was in October 2006.    Past Medical History:   Past Medical History:   Diagnosis Date   • Garza's esophagus    • Depression    • Diabetes mellitus    • GERD (gastroesophageal reflux disease)    • Hypertension    • Kidney stone        Past Surgical History:  [unfilled]    Family History:  History reviewed. No pertinent family history.    Social History:   reports that she has quit smoking. She has never used smokeless tobacco. She reports that she does not drink alcohol or use illicit drugs.    Medications:   Prescriptions Prior to Admission   Medication Sig Dispense Refill Last Dose   • amLODIPine (NORVASC) 10 MG tablet TK 1 T PO QD  2 7/23/2017 at pm   • FLUoxetine (PROzac) 10 MG capsule TK 1 C PO QD  2 7/23/2017 at Unknown time   • gabapentin (NEURONTIN) 100 MG capsule Daily.  3 7/23/2017 at Unknown time   • losartan (COZAAR) 50 MG tablet TK 1 T PO QD  2 7/23/2017 at Unknown time   • metFORMIN (GLUCOPHAGE) 500 MG tablet TK 1 T PO BID WF  2 7/23/2017 at Unknown time   • pantoprazole (PROTONIX) 40 MG EC tablet TK 1 T PO BEFORE BREAKFAST AND 1 T PO Q EVENING BEFORE SUPPER FOR 2 WEEKS THEN 1 T PO Q MORNING  5 7/23/2017 at Unknown time   • rosuvastatin (CRESTOR) 20 MG tablet TK 1 T PO Q NIGHT FOR CHOLESTEROL  3 7/23/2017 at Unknown time   • SITagliptin (JANUVIA) 100 MG tablet Take  by mouth.   7/23/2017 at Unknown time   • raNITIdine (ZANTAC) 150 MG tablet TK 1 T PO QD PRF BREAKTHROUGH REFLUX  2 7/20/2017       Allergies:  Sulfa antibiotics    ROS:    General: Weight stable  Resp: No SOA  Cardiovascular: No CP      Objective     /86 (Patient Position: Sitting)  Pulse 101  Temp 98.1 °F (36.7 °C) (Temporal Artery )   Resp 18  Ht 62\" (157.5 cm)  Wt 183 lb (83 " kg)  BMI 33.47 kg/m2    Physical Exam   Constitutional: Pt is oriented to person, place, and in no distress.  Eyes: No icterus  ENMT: Unremarkable   Cardiovascular: Normal rate, regular rhythm.    Pulmonary/Chest: No distress.  No audible wheezes  Abdominal: Soft.   Skin: Skin is warm and dry.   Psychiatric: Mood, memory, affect and judgment appear normal.     Assessment/Plan     Diagnosis:  There is esophagus  Colon cancer screening    Anticipated Surgical Procedure:  Endoscopy and colonoscopy    The risks, benefits, and alternatives of endoscopy were reviewed with the patient today.  Risks including perforation, with or without dilation, possibly requiring surgery.  Additional risks include risk of bleeding.  There is also the risk of a drug reaction or problems with anesthesia.  This will be discussed with the further by the anesthesia team on the day of the procedure. The benefits include the diagnosis and management of disease of the upper digestive tract.  Alternatives to endoscopy include upper GI series, laboratory testing, radiographic evaluation, or no intervention.  The patient verbalizes understanding and agrees.    The risks, benefits, and alternatives of colonoscopy were reviewed with the patient today.  Risks including perforation of the colon possibly requiring surgery or colostomy.  Additional risks include risk of bleeding from biopsies or removal of colon tissue.  There is also the risk of a drug reaction or problems with anesthesia.  This will be discussed with the further by the anesthesia team on the day of the procedure.  Lastly there is a possibility of missing a colon polyp or cancer.  The benefits include the diagnosis and management of disease of the colon and rectum.  Alternatives to colonoscopy include barium enema, laboratory testing, radiographic evaluation, or no intervention.  The patient verbalizes understanding and agrees.    Much of this encounter note is an electronic  transcription/translation of spoken language to printed text. The electronic translation of spoken language may permit erroneous, or at times, nonsensical words or phrases to be inadvertently transcribed; although I have reviewed the note for such errors, some may still exist.

## 2017-07-24 NOTE — PLAN OF CARE
Problem: Patient Care Overview (Adult)  Goal: Plan of Care Review  Outcome: Ongoing (interventions implemented as appropriate)    07/24/17 0910   Coping/Psychosocial Response Interventions   Plan Of Care Reviewed With patient   Patient Care Overview   Progress no change   Outcome Evaluation   Outcome Summary/Follow up Plan pt tlerated procedure well.

## 2017-07-24 NOTE — ANESTHESIA PREPROCEDURE EVALUATION
Anesthesia Evaluation     Patient summary reviewed   NPO Solid Status: > 8 hours  NPO Liquid Status: > 2 hours     Airway   Mallampati: II  TM distance: >3 FB  Neck ROM: full  no difficulty expected  Dental    (+) upper dentures and lower dentures    Pulmonary - negative pulmonary ROS and normal exam   Cardiovascular - normal exam  Exercise tolerance: good (4-7 METS)    (+) hypertension well controlled,       Neuro/Psych  GI/Hepatic/Renal/Endo    (+)  GERD, diabetes mellitus well controlled,     Musculoskeletal     Abdominal  - normal exam   Substance History      OB/GYN          Other                                        Anesthesia Plan    ASA 2     MAC     intravenous induction   Anesthetic plan and risks discussed with patient.    Plan discussed with CRNA.

## 2017-07-24 NOTE — PLAN OF CARE
Problem: Patient Care Overview (Adult)  Goal: Plan of Care Review  Outcome: Outcome(s) achieved Date Met:  07/24/17 07/24/17 0928   Coping/Psychosocial Response Interventions   Plan Of Care Reviewed With patient;family   Patient Care Overview   Progress progress toward functional goals as expected   Outcome Evaluation   Outcome Summary/Follow up Plan d/c criteria met

## 2017-07-24 NOTE — PLAN OF CARE
Problem: GI Endoscopy (Adult)  Goal: Signs and Symptoms of Listed Potential Problems Will be Absent or Manageable (GI Endoscopy)  Outcome: Outcome(s) achieved Date Met:  07/24/17

## 2017-07-24 NOTE — PLAN OF CARE
Problem: Patient Care Overview (Adult)  Goal: Adult Individualization and Mutuality  Outcome: Ongoing (interventions implemented as appropriate)    07/24/17 0715   Individualization   Patient Specific Preferences none

## 2017-07-24 NOTE — ANESTHESIA POSTPROCEDURE EVALUATION
Patient: Carlee Vee    Procedure Summary     Date Anesthesia Start Anesthesia Stop Room / Location    07/24/17 0835 0912 [facility] ENDOSCOPY 6 / [facility]       Procedure Diagnosis Surgeon Provider    ESOPHAGOGASTRODUODENOSCOPY WITH ANESTHESIA (N/A Esophagus); COLONOSCOPY WITH ANESTHESIA (N/A ) Colon polyps; Gastroesophageal reflux disease without esophagitis; Garza's esophagus without dysplasia; Esophageal burn, initial encounter  (Colon polyps [K63.5]; Gastroesophageal reflux disease without esophagitis [K21.9]; Garza's esophagus without dysplasia [K22.70]; Esophageal burn, initial encounter [T28.1XXA]) MD Chrissie Aragon CRNA          Anesthesia Type: MAC  Last vitals  BP   106/66 (07/24/17 0930)    Temp        Pulse   71 (07/24/17 0930)   Resp   18 (07/24/17 0930)    SpO2   97 % (07/24/17 0930)      Post Anesthesia Care and Evaluation    Patient location during evaluation: PHASE II  Patient participation: complete - patient participated  Level of consciousness: awake and alert  Pain management: adequate  Airway patency: patent  Anesthetic complications: No anesthetic complications  PONV Status: none  Cardiovascular status: acceptable  Respiratory status: acceptable  Hydration status: acceptable

## 2017-07-25 LAB
CYTO UR: NORMAL
LAB AP CASE REPORT: NORMAL
LAB AP CLINICAL INFORMATION: NORMAL
Lab: NORMAL
PATH REPORT.FINAL DX SPEC: NORMAL
PATH REPORT.GROSS SPEC: NORMAL

## 2017-07-28 ENCOUNTER — TELEPHONE (OUTPATIENT)
Dept: GASTROENTEROLOGY | Facility: CLINIC | Age: 57
End: 2017-07-28

## 2017-11-21 DIAGNOSIS — K21.9 GASTROESOPHAGEAL REFLUX DISEASE WITHOUT ESOPHAGITIS: ICD-10-CM

## 2017-11-21 RX ORDER — PANTOPRAZOLE SODIUM 40 MG/1
40 TABLET, DELAYED RELEASE ORAL DAILY
Qty: 30 TABLET | Refills: 0 | Status: SHIPPED | OUTPATIENT
Start: 2017-11-21 | End: 2017-12-06 | Stop reason: SDUPTHER

## 2017-12-06 ENCOUNTER — OFFICE VISIT (OUTPATIENT)
Dept: FAMILY MEDICINE CLINIC | Age: 57
End: 2017-12-06
Payer: MEDICARE

## 2017-12-06 VITALS
WEIGHT: 198 LBS | TEMPERATURE: 98 F | DIASTOLIC BLOOD PRESSURE: 82 MMHG | HEIGHT: 62 IN | OXYGEN SATURATION: 98 % | SYSTOLIC BLOOD PRESSURE: 128 MMHG | HEART RATE: 111 BPM | RESPIRATION RATE: 16 BRPM | BODY MASS INDEX: 36.44 KG/M2

## 2017-12-06 DIAGNOSIS — K22.70 BARRETT'S ESOPHAGUS WITHOUT DYSPLASIA: ICD-10-CM

## 2017-12-06 DIAGNOSIS — E55.9 VITAMIN D DEFICIENCY: ICD-10-CM

## 2017-12-06 DIAGNOSIS — F51.01 PRIMARY INSOMNIA: ICD-10-CM

## 2017-12-06 DIAGNOSIS — E78.2 MIXED HYPERLIPIDEMIA: ICD-10-CM

## 2017-12-06 DIAGNOSIS — F41.9 ANXIETY: ICD-10-CM

## 2017-12-06 DIAGNOSIS — E11.9 TYPE 2 DIABETES MELLITUS WITHOUT COMPLICATION, WITHOUT LONG-TERM CURRENT USE OF INSULIN (HCC): Primary | ICD-10-CM

## 2017-12-06 DIAGNOSIS — G62.9 NEUROPATHY: ICD-10-CM

## 2017-12-06 DIAGNOSIS — F32.89 OTHER DEPRESSION: ICD-10-CM

## 2017-12-06 DIAGNOSIS — I10 ESSENTIAL HYPERTENSION: ICD-10-CM

## 2017-12-06 DIAGNOSIS — K21.9 GASTROESOPHAGEAL REFLUX DISEASE WITHOUT ESOPHAGITIS: ICD-10-CM

## 2017-12-06 PROCEDURE — 3017F COLORECTAL CA SCREEN DOC REV: CPT | Performed by: NURSE PRACTITIONER

## 2017-12-06 PROCEDURE — 3045F PR MOST RECENT HEMOGLOBIN A1C LEVEL 7.0-9.0%: CPT | Performed by: NURSE PRACTITIONER

## 2017-12-06 PROCEDURE — G8599 NO ASA/ANTIPLAT THER USE RNG: HCPCS | Performed by: NURSE PRACTITIONER

## 2017-12-06 PROCEDURE — G8417 CALC BMI ABV UP PARAM F/U: HCPCS | Performed by: NURSE PRACTITIONER

## 2017-12-06 PROCEDURE — G8484 FLU IMMUNIZE NO ADMIN: HCPCS | Performed by: NURSE PRACTITIONER

## 2017-12-06 PROCEDURE — 1036F TOBACCO NON-USER: CPT | Performed by: NURSE PRACTITIONER

## 2017-12-06 PROCEDURE — 99214 OFFICE O/P EST MOD 30 MIN: CPT | Performed by: NURSE PRACTITIONER

## 2017-12-06 PROCEDURE — G8427 DOCREV CUR MEDS BY ELIG CLIN: HCPCS | Performed by: NURSE PRACTITIONER

## 2017-12-06 PROCEDURE — 3014F SCREEN MAMMO DOC REV: CPT | Performed by: NURSE PRACTITIONER

## 2017-12-06 RX ORDER — RANITIDINE 150 MG/1
TABLET ORAL
Qty: 30 TABLET | Refills: 5 | Status: SHIPPED | OUTPATIENT
Start: 2017-12-06 | End: 2018-05-03 | Stop reason: ALTCHOICE

## 2017-12-06 RX ORDER — PHENTERMINE HYDROCHLORIDE 37.5 MG/1
37.5 TABLET ORAL
Qty: 30 TABLET | Refills: 0 | Status: SHIPPED | OUTPATIENT
Start: 2017-12-06 | End: 2018-01-05

## 2017-12-06 RX ORDER — GLUCOSAMINE HCL/CHONDROITIN SU 500-400 MG
CAPSULE ORAL
Qty: 100 STRIP | Refills: 3 | Status: SHIPPED | OUTPATIENT
Start: 2017-12-06 | End: 2018-07-14 | Stop reason: SDUPTHER

## 2017-12-06 RX ORDER — DEXLANSOPRAZOLE 60 MG/1
60 CAPSULE, DELAYED RELEASE ORAL
Qty: 30 CAPSULE | Refills: 2 | Status: CANCELLED | OUTPATIENT
Start: 2017-12-06

## 2017-12-06 RX ORDER — LANCETS 30 GAUGE
1 EACH MISCELLANEOUS 2 TIMES DAILY
Qty: 100 EACH | Refills: 3 | Status: SHIPPED | OUTPATIENT
Start: 2017-12-06

## 2017-12-06 RX ORDER — TRAZODONE HYDROCHLORIDE 50 MG/1
TABLET ORAL
Qty: 60 TABLET | Refills: 5 | Status: SHIPPED | OUTPATIENT
Start: 2017-12-06 | End: 2018-07-14 | Stop reason: ALTCHOICE

## 2017-12-06 RX ORDER — BLOOD-GLUCOSE METER
KIT MISCELLANEOUS
Qty: 1 KIT | Refills: 0 | Status: SHIPPED | OUTPATIENT
Start: 2017-12-06

## 2017-12-06 RX ORDER — ROSUVASTATIN CALCIUM 20 MG/1
20 TABLET, COATED ORAL NIGHTLY
Qty: 30 TABLET | Refills: 5 | Status: SHIPPED | OUTPATIENT
Start: 2017-12-06 | End: 2018-07-07 | Stop reason: SDUPTHER

## 2017-12-06 RX ORDER — AMLODIPINE BESYLATE 10 MG/1
TABLET ORAL
Qty: 30 TABLET | Refills: 5 | Status: SHIPPED | OUTPATIENT
Start: 2017-12-06 | End: 2018-05-03 | Stop reason: ALTCHOICE

## 2017-12-06 RX ORDER — LOSARTAN POTASSIUM 50 MG/1
TABLET ORAL
Qty: 30 TABLET | Refills: 5 | Status: SHIPPED | OUTPATIENT
Start: 2017-12-06 | End: 2018-05-03 | Stop reason: ALTCHOICE

## 2017-12-06 RX ORDER — FLUOXETINE HYDROCHLORIDE 20 MG/1
CAPSULE ORAL
Qty: 30 CAPSULE | Refills: 5 | Status: SHIPPED | OUTPATIENT
Start: 2017-12-06 | End: 2018-06-28 | Stop reason: SDUPTHER

## 2017-12-06 RX ORDER — ERGOCALCIFEROL 1.25 MG/1
CAPSULE ORAL
Qty: 4 CAPSULE | Refills: 3 | Status: SHIPPED | OUTPATIENT
Start: 2017-12-06 | End: 2018-05-17 | Stop reason: SDUPTHER

## 2017-12-06 RX ORDER — GABAPENTIN 100 MG/1
CAPSULE ORAL
Qty: 120 CAPSULE | Refills: 2 | Status: SHIPPED | OUTPATIENT
Start: 2017-12-06 | End: 2018-03-12 | Stop reason: SDUPTHER

## 2017-12-06 RX ORDER — PANTOPRAZOLE SODIUM 40 MG/1
40 TABLET, DELAYED RELEASE ORAL DAILY
Qty: 30 TABLET | Refills: 5 | Status: SHIPPED | OUTPATIENT
Start: 2017-12-06 | End: 2018-06-28 | Stop reason: SDUPTHER

## 2017-12-06 RX ORDER — LOSARTAN POTASSIUM 50 MG/1
TABLET ORAL
Qty: 30 TABLET | Refills: 5 | Status: CANCELLED | OUTPATIENT
Start: 2017-12-06

## 2017-12-06 NOTE — PROGRESS NOTES
Subjective:      Patient ID: Deedee Singh is a 61 y.o. female. Chief Complaint   Patient presents with    Medication Refill     Patient is here today for medication refills. Patient is not fasting for labs.  Diabetes     Patient is requesting a new glucose monitor        HPI   Pt is here to f/u on chronic health conditions and for med refills. Pt states that she only completed 1mo of phentermine previously and lost over 15 pounds. She is hoping to restart this med tx plan to aide in wt loss goals. Treatment Adherence:   Medication compliance:  compliant all of the time  Diet compliance:  compliant most of the time  Weight trend: stable  Current exercise: no regular exercise  Barriers: time constraints    Diabetes Mellitus Type 2: Current symptoms/problems include none. Home blood sugar records: trend: stable  Any episodes of hypoglycemia? no  Eye exam current (within one year): no--ordered again  Tobacco history: She  reports that she quit smoking about 3 years ago. Her smoking use included Cigarettes. She started smoking about 34 years ago. She has a 10.00 pack-year smoking history. She has never used smokeless tobacco.   Daily Aspirin? Yes    Hypertension:  Home blood pressure monitoring: No.  She is not adherent to a low sodium diet. Patient denies chest pain. Antihypertensive medication side effects: no medication side effects noted. Use of agents associated with hypertension: none. Hyperlipidemia:  No new myalgias or GI upset on rosuvastatin (Crestor).        Lab Results   Component Value Date    LABA1C 7.2 (H) 03/03/2017    LABA1C 6.3 02/19/2016     Lab Results   Component Value Date    LABMICR 5.70 03/03/2017    CREATININE 0.8 03/20/2017     Lab Results   Component Value Date    ALT 54 (H) 03/20/2017    AST 38 (H) 03/20/2017     Lab Results   Component Value Date    CHOL 237 (H) 03/03/2017    TRIG 511 (H) 03/03/2017    HDL 41 (L) 03/03/2017    LDLCALC - (AA) 03/03/2017    LDLDIRECT 126 Constitutional: She is oriented to person, place, and time. She appears well-developed and well-nourished. No distress. overweight   HENT:   Head: Normocephalic and atraumatic. Right Ear: External ear normal.   Left Ear: External ear normal.   Nose: Nose normal.   Mouth/Throat: Oropharynx is clear and moist. No oropharyngeal exudate. Eyes: Conjunctivae and EOM are normal. Pupils are equal, round, and reactive to light. Neck: Normal range of motion. Neck supple. No tracheal deviation present. No thyromegaly present. Cardiovascular: Normal rate, regular rhythm and normal heart sounds. Pulmonary/Chest: Effort normal and breath sounds normal. No respiratory distress. Abdominal: Soft. Bowel sounds are normal. There is no tenderness. Lymphadenopathy:     She has no cervical adenopathy. Neurological: She is alert and oriented to person, place, and time. Skin: Skin is warm and dry. She is not diaphoretic. Psychiatric: She has a normal mood and affect. Her behavior is normal.   Nursing note and vitals reviewed. /82 (Site: Left Arm, Position: Sitting, Cuff Size: Small Adult)   Pulse 111   Temp 98 °F (36.7 °C) (Temporal)   Resp 16   Ht 5' 2.25\" (1.581 m)   Wt 198 lb (89.8 kg)   SpO2 98%   BMI 35.92 kg/m²     Assessment:      1. Type 2 diabetes mellitus without complication, without long-term current use of insulin ([facility])  metFORMIN (GLUCOPHAGE) 500 MG tablet    SITagliptin (JANUVIA) 100 MG tablet    Comprehensive Metabolic Panel    Hemoglobin A1C    Lipid Panel    Microalbumin / Creatinine Urine Ratio    External Referral To Ophthalmology   2. Gastroesophageal reflux disease without esophagitis  pantoprazole (PROTONIX) 40 MG tablet    ranitidine (ZANTAC) 150 MG tablet   3. Essential hypertension  amLODIPine (NORVASC) 10 MG tablet    losartan (COZAAR) 50 MG tablet    Urinalysis    CBC Auto Differential    TSH without Reflex    T4, Free   4. Anxiety  FLUoxetine (PROZAC) 20 MG capsule   5. Other depression  FLUoxetine (PROZAC) 20 MG capsule   6. Neuropathy (HCC)  gabapentin (NEURONTIN) 100 MG capsule   7. Primary insomnia  traZODone (DESYREL) 50 MG tablet   8. Hull's esophagus without dysplasia     9. Mixed hyperlipidemia  rosuvastatin (CRESTOR) 20 MG tablet   10. Vitamin D deficiency  Vitamin D 25 Hydroxy           Plan:    Waist circumference  [facility]  Fasting lab  Continue current med tx plan and ov in 1mo r/t wt loss med.

## 2017-12-07 ASSESSMENT — ENCOUNTER SYMPTOMS
BACK PAIN: 1
ABDOMINAL PAIN: 0
SHORTNESS OF BREATH: 0

## 2017-12-13 ENCOUNTER — TELEPHONE (OUTPATIENT)
Dept: FAMILY MEDICINE CLINIC | Age: 57
End: 2017-12-13

## 2017-12-13 NOTE — TELEPHONE ENCOUNTER
I have tried to obtain PA. Insurance wants patient to try Marie and Alden Moreno (both) first. Do you want to write a letter to insurance company?

## 2017-12-13 NOTE — TELEPHONE ENCOUNTER
Alicia Enriquez has been denied coverage by patient's prescription insurance (see medication denial in media).

## 2017-12-13 NOTE — TELEPHONE ENCOUNTER
With those 2 being approved, pt can stop Januvia and start Jardiance 10mg 1 po every morning #30R3. Pt may have increased urination, increased vaginal itching/yeast infections, and may note bp changes with this med. If she wishes to f/u to discuss prior to change, f/u ov needed specific to this med change. Pt needs to be checking glucose daily and call glucose log in approx 2wks of new treatment.

## 2017-12-26 ENCOUNTER — TELEPHONE (OUTPATIENT)
Dept: FAMILY MEDICINE CLINIC | Age: 57
End: 2017-12-26

## 2017-12-27 NOTE — TELEPHONE ENCOUNTER
I have changed Januvia to [place] on 12-13-17 note. Please see this and verify pt is made aware of change.

## 2018-01-02 DIAGNOSIS — E11.9 TYPE 2 DIABETES MELLITUS WITHOUT COMPLICATION, WITHOUT LONG-TERM CURRENT USE OF INSULIN (HCC): ICD-10-CM

## 2018-01-02 DIAGNOSIS — E55.9 VITAMIN D DEFICIENCY: ICD-10-CM

## 2018-01-02 DIAGNOSIS — I10 ESSENTIAL HYPERTENSION: ICD-10-CM

## 2018-01-02 LAB
ALBUMIN SERPL-MCNC: 4.7 G/DL (ref 3.5–5.2)
ALP BLD-CCNC: 132 U/L (ref 35–104)
ALT SERPL-CCNC: 59 U/L (ref 5–33)
ANION GAP SERPL CALCULATED.3IONS-SCNC: 18 MMOL/L (ref 7–19)
AST SERPL-CCNC: 32 U/L (ref 5–32)
BACTERIA: ABNORMAL /HPF
BASOPHILS ABSOLUTE: 0.1 K/UL (ref 0–0.2)
BASOPHILS RELATIVE PERCENT: 0.9 % (ref 0–1)
BILIRUB SERPL-MCNC: 0.3 MG/DL (ref 0.2–1.2)
BILIRUBIN URINE: ABNORMAL
BLOOD, URINE: ABNORMAL
BUN BLDV-MCNC: 17 MG/DL (ref 6–20)
CALCIUM SERPL-MCNC: 9.7 MG/DL (ref 8.6–10)
CHLORIDE BLD-SCNC: 97 MMOL/L (ref 98–111)
CHOLESTEROL, TOTAL: 210 MG/DL (ref 160–199)
CLARITY: ABNORMAL
CO2: 23 MMOL/L (ref 22–29)
COLOR: ABNORMAL
CREAT SERPL-MCNC: 1 MG/DL (ref 0.5–0.9)
CREATININE URINE: 238.1 MG/DL (ref 4.2–622)
CRYSTALS, UA: ABNORMAL /HPF
EOSINOPHILS ABSOLUTE: 0.2 K/UL (ref 0–0.6)
EOSINOPHILS RELATIVE PERCENT: 2.3 % (ref 0–5)
EPITHELIAL CELLS, UA: 5 /HPF (ref 0–5)
GFR NON-AFRICAN AMERICAN: 57
GLUCOSE BLD-MCNC: 315 MG/DL (ref 74–109)
GLUCOSE URINE: >=1000 MG/DL
HBA1C MFR BLD: 11.4 %
HCT VFR BLD CALC: 43.3 % (ref 37–47)
HDLC SERPL-MCNC: 44 MG/DL (ref 65–121)
HEMOGLOBIN: 14.4 G/DL (ref 12–16)
HYALINE CASTS: 7 /HPF (ref 0–8)
KETONES, URINE: NEGATIVE MG/DL
LDL CHOLESTEROL CALCULATED: 86 MG/DL
LEUKOCYTE ESTERASE, URINE: ABNORMAL
LYMPHOCYTES ABSOLUTE: 3.1 K/UL (ref 1.1–4.5)
LYMPHOCYTES RELATIVE PERCENT: 32.1 % (ref 20–40)
MCH RBC QN AUTO: 30.2 PG (ref 27–31)
MCHC RBC AUTO-ENTMCNC: 33.3 G/DL (ref 33–37)
MCV RBC AUTO: 90.8 FL (ref 81–99)
MICROALBUMIN UR-MCNC: 18.4 MG/DL (ref 0–19)
MICROALBUMIN/CREAT UR-RTO: 77.3 MG/G
MONOCYTES ABSOLUTE: 0.6 K/UL (ref 0–0.9)
MONOCYTES RELATIVE PERCENT: 6.5 % (ref 0–10)
NEUTROPHILS ABSOLUTE: 5.6 K/UL (ref 1.5–7.5)
NEUTROPHILS RELATIVE PERCENT: 58 % (ref 50–65)
NITRITE, URINE: NEGATIVE
PDW BLD-RTO: 13.4 % (ref 11.5–14.5)
PH UA: 5.5
PLATELET # BLD: 201 K/UL (ref 130–400)
PMV BLD AUTO: 12.8 FL (ref 9.4–12.3)
POTASSIUM SERPL-SCNC: 4 MMOL/L (ref 3.5–5)
PROTEIN UA: 30 MG/DL
RBC # BLD: 4.77 M/UL (ref 4.2–5.4)
RBC UA: ABNORMAL /HPF (ref 0–2)
SODIUM BLD-SCNC: 138 MMOL/L (ref 136–145)
SPECIFIC GRAVITY UA: 1.04
T4 FREE: 1.3 NG/DL (ref 0.9–1.7)
TOTAL PROTEIN: 8.1 G/DL (ref 6.6–8.7)
TRIGL SERPL-MCNC: 399 MG/DL (ref 0–149)
TSH SERPL DL<=0.05 MIU/L-ACNC: 3.44 UIU/ML (ref 0.27–4.2)
UROBILINOGEN, URINE: 0.2 E.U./DL
VITAMIN D 25-HYDROXY: 19.1 NG/ML
WBC # BLD: 9.7 K/UL (ref 4.8–10.8)
WBC UA: 307 /HPF (ref 0–5)

## 2018-01-04 LAB
ORGANISM: ABNORMAL
URINE CULTURE, ROUTINE: ABNORMAL
URINE CULTURE, ROUTINE: ABNORMAL

## 2018-01-09 DIAGNOSIS — R31.9 URINARY TRACT INFECTION WITH HEMATURIA, SITE UNSPECIFIED: Primary | ICD-10-CM

## 2018-01-09 DIAGNOSIS — N39.0 URINARY TRACT INFECTION WITH HEMATURIA, SITE UNSPECIFIED: Primary | ICD-10-CM

## 2018-01-09 RX ORDER — LEVOFLOXACIN 250 MG/1
250 TABLET ORAL DAILY
Qty: 3 TABLET | Refills: 0 | Status: SHIPPED | OUTPATIENT
Start: 2018-01-09 | End: 2018-01-12

## 2018-01-10 ENCOUNTER — OFFICE VISIT (OUTPATIENT)
Dept: FAMILY MEDICINE CLINIC | Age: 58
End: 2018-01-10
Payer: MEDICARE

## 2018-01-10 VITALS
HEART RATE: 120 BPM | OXYGEN SATURATION: 98 % | DIASTOLIC BLOOD PRESSURE: 82 MMHG | SYSTOLIC BLOOD PRESSURE: 130 MMHG | WEIGHT: 177 LBS | BODY MASS INDEX: 32.11 KG/M2 | TEMPERATURE: 98 F | RESPIRATION RATE: 18 BRPM

## 2018-01-10 DIAGNOSIS — R74.8 ELEVATED LIVER ENZYMES: ICD-10-CM

## 2018-01-10 DIAGNOSIS — E11.65 TYPE 2 DIABETES MELLITUS WITH HYPERGLYCEMIA, WITH LONG-TERM CURRENT USE OF INSULIN (HCC): Primary | ICD-10-CM

## 2018-01-10 DIAGNOSIS — N76.0 ACUTE VAGINITIS: ICD-10-CM

## 2018-01-10 DIAGNOSIS — Z79.4 TYPE 2 DIABETES MELLITUS WITH HYPERGLYCEMIA, WITH LONG-TERM CURRENT USE OF INSULIN (HCC): Primary | ICD-10-CM

## 2018-01-10 DIAGNOSIS — E55.9 VITAMIN D DEFICIENCY: ICD-10-CM

## 2018-01-10 PROCEDURE — 3046F HEMOGLOBIN A1C LEVEL >9.0%: CPT | Performed by: NURSE PRACTITIONER

## 2018-01-10 PROCEDURE — 3017F COLORECTAL CA SCREEN DOC REV: CPT | Performed by: NURSE PRACTITIONER

## 2018-01-10 PROCEDURE — 1036F TOBACCO NON-USER: CPT | Performed by: NURSE PRACTITIONER

## 2018-01-10 PROCEDURE — G8484 FLU IMMUNIZE NO ADMIN: HCPCS | Performed by: NURSE PRACTITIONER

## 2018-01-10 PROCEDURE — 99214 OFFICE O/P EST MOD 30 MIN: CPT | Performed by: NURSE PRACTITIONER

## 2018-01-10 PROCEDURE — G8417 CALC BMI ABV UP PARAM F/U: HCPCS | Performed by: NURSE PRACTITIONER

## 2018-01-10 PROCEDURE — 3014F SCREEN MAMMO DOC REV: CPT | Performed by: NURSE PRACTITIONER

## 2018-01-10 PROCEDURE — G8427 DOCREV CUR MEDS BY ELIG CLIN: HCPCS | Performed by: NURSE PRACTITIONER

## 2018-01-10 RX ORDER — FLUCONAZOLE 150 MG/1
TABLET ORAL
Qty: 5 TABLET | Refills: 0 | Status: SHIPPED | OUTPATIENT
Start: 2018-01-10 | End: 2018-03-16

## 2018-01-10 NOTE — PROGRESS NOTES
Subjective:      Patient ID: Stuart Fay is a 61 y.o. female. Chief Complaint   Patient presents with   [facility]     Patient is here today to discuss her lab results from 1/2/18. HPI    Pt is here today after receiving call about elevated glucose on her last lab. Pt is type 2 diabetic which was initially found on routine lab approx 2yrs ago. At that time, her glucose was found to be very high and on a weekend, she was sent to hospital.  Since that time she has been on insulin and then had wt loss and diet changes, improved a1c, stopped insulin and continued oral meds only with stable a1c. Quite some time had passed since pt had lab and now pt's lab is as follows:  No visits with results within 1 Day(s) from this visit.    Latest known visit with results is:   Orders Only on 01/02/2018   Component Date Value Ref Range Status    Color, UA 01/02/2018 DK YELLOW  Straw/Yellow Final    Clarity, UA 01/02/2018 TURBID* Clear Final    Glucose, Ur 01/02/2018 >=1000* Negative mg/dL Final    Bilirubin Urine 01/02/2018 SMALL* Negative Final    Ketones, Urine 01/02/2018 Negative  Negative mg/dL Final    Specific Gravity, UA 01/02/2018 1.036  1.005 - 1.030 Final    Blood, Urine 01/02/2018 MODERATE* Negative Final    pH, UA 01/02/2018 5.5  5.0 - 8.0 Final    Protein, UA 01/02/2018 30* Negative mg/dL Final    Urobilinogen, Urine 01/02/2018 0.2  <2.0 E.U./dL Final    Nitrite, Urine 01/02/2018 Negative  Negative Final    Leukocyte Esterase, Urine 01/02/2018 MODERATE* Negative Final    WBC 01/02/2018 9.7  4.8 - 10.8 K/uL Final    RBC 01/02/2018 4.77  4.20 - 5.40 M/uL Final    Hemoglobin 01/02/2018 14.4  12.0 - 16.0 g/dL Final    Hematocrit 01/02/2018 43.3  37.0 - 47.0 % Final    MCV 01/02/2018 90.8  81.0 - 99.0 fL Final    MCH 01/02/2018 30.2  27.0 - 31.0 pg Final    MCHC 01/02/2018 33.3  33.0 - 37.0 g/dL Final    RDW 01/02/2018 13.4  11.5 - 14.5 % Final    Platelets 16/17/1747 201  130 - 400 K/uL Final    MPV 01/02/2018 12.8* 9.4 - 12.3 fL Final    Neutrophils % 01/02/2018 58.0  50.0 - 65.0 % Final    Lymphocytes % 01/02/2018 32.1  20.0 - 40.0 % Final    Monocytes % 01/02/2018 6.5  0.0 - 10.0 % Final    Eosinophils % 01/02/2018 2.3  0.0 - 5.0 % Final    Basophils % 01/02/2018 0.9  0.0 - 1.0 % Final    Neutrophils # 01/02/2018 5.6  1.5 - 7.5 K/uL Final    Lymphocytes # 01/02/2018 3.1  1.1 - 4.5 K/uL Final    Monocytes # 01/02/2018 0.60  0.00 - 0.90 K/uL Final    Eosinophils # 01/02/2018 0.20  0.00 - 0.60 K/uL Final    Basophils # 01/02/2018 0.10  0.00 - 0.20 K/uL Final    Sodium 01/02/2018 138  136 - 145 mmol/L Final    Potassium 01/02/2018 4.0  3.5 - 5.0 mmol/L Final    Chloride 01/02/2018 97* 98 - 111 mmol/L Final    CO2 01/02/2018 23  22 - 29 mmol/L Final    Anion Gap 01/02/2018 18  7 - 19 mmol/L Final    Glucose 01/02/2018 315* 74 - 109 mg/dL Final    BUN 01/02/2018 17  6 - 20 mg/dL Final    CREATININE 01/02/2018 1.0* 0.5 - 0.9 mg/dL Final    GFR Non- 01/02/2018 57* >60 Final    Comment: This calculation may be inaccurate for patients under the age of 25 years. For ages 25 and older, a GFR >60 mL/min/1.73m2 (not corrected for weight) is  valid for stable renal function.  Calcium 01/02/2018 9.7  8.6 - 10.0 mg/dL Final    Total Protein 01/02/2018 8.1  6.6 - 8.7 g/dL Final    Alb 01/02/2018 4.7  3.5 - 5.2 g/dL Final    Total Bilirubin 01/02/2018 0.3  0.2 - 1.2 mg/dL Final    Alkaline Phosphatase 01/02/2018 132* 35 - 104 U/L Final    ALT 01/02/2018 59* 5 - 33 U/L Final    AST 01/02/2018 32  5 - 32 U/L Final    Hemoglobin A1C 01/02/2018 11.4* See comment % Final    Comment: HbA1c levels >6% are an indication of hyperglycemia during the preceding 2  to 3 months or longer. HbA1c levels may reach 20% or higher in poorly controlled diabetes. Therapeutic action is suggested at levels above 8%.     Diabetes patients with HbA1c levels below 7% meet the goal of the - 5 /HPF Final    Epi Cells 01/02/2018 5  0 - 5 /HPF Final    Comment: Urinalysis microscopic performed using the  automated methodology (AUWI analyzer). Lab reviewed with patient in detail     Pt denies having any low glucose episodes. She does state that cost of Januvia and lack of insurance coverage kept her from taking it regularly. Insurance approved Jardiance and pt was changed over the last 2wks. Pt states she has had vaginal itching and urinating more and has not been taking it regularly. We have spent a great deal of time discussing this med today. Pt feels she can restart DM diet,stating that she hasn't been following it. Risk factors--hyperlipidemia, htn      Pt is asking for refill on phentermine. I have declined this refill in relation to pt's unstable glucose, elevated liver enzymes and mildly changed renal function. Review of Systems   Constitutional: Negative for unexpected weight change. Respiratory: Negative for shortness of breath. Cardiovascular: Negative for chest pain and leg swelling. Endocrine: Positive for polydipsia and polyuria. Musculoskeletal: Positive for back pain. Neurological: Negative for dizziness. Psychiatric/Behavioral: Negative for sleep disturbance (controlled with med).      Past Medical History:   Diagnosis Date    Back pain 6/5/2014    Carotid artery stenosis     Depression 6/5/2014    Diabetes ([facility] [address]     GERD (gastroesophageal reflux disease)     Hyperlipidemia     Hypertension 6/5/2014    Type II or unspecified type diabetes mellitus without mention of complication, not stated as uncontrolled      Current Outpatient Prescriptions on File Prior to Visit   Medication Sig Dispense Refill    pantoprazole (PROTONIX) 40 MG tablet Take 1 tablet by mouth daily 30 tablet 5    FLUoxetine (PROZAC) 20 MG capsule TAKE 1 CAPSULE BY MOUTH EVERY DAY 30 capsule 5    ranitidine (ZANTAC) 150 MG tablet 1 po daily as needed for break through

## 2018-01-13 ASSESSMENT — ENCOUNTER SYMPTOMS
BACK PAIN: 1
SHORTNESS OF BREATH: 0

## 2018-01-22 ENCOUNTER — TELEPHONE (OUTPATIENT)
Dept: FAMILY MEDICINE CLINIC | Age: 58
End: 2018-01-22

## 2018-01-30 ENCOUNTER — TELEPHONE (OUTPATIENT)
Dept: FAMILY MEDICINE CLINIC | Age: 58
End: 2018-01-30

## 2018-03-12 DIAGNOSIS — G62.9 NEUROPATHY: ICD-10-CM

## 2018-03-12 DIAGNOSIS — I65.23 BILATERAL CAROTID ARTERY STENOSIS: Primary | ICD-10-CM

## 2018-03-12 RX ORDER — GABAPENTIN 100 MG/1
CAPSULE ORAL
Qty: 120 CAPSULE | Refills: 2 | OUTPATIENT
Start: 2018-03-12 | End: 2018-05-03 | Stop reason: SDUPTHER

## 2018-03-16 ENCOUNTER — HOSPITAL ENCOUNTER (OUTPATIENT)
Dept: GENERAL RADIOLOGY | Age: 58
Discharge: HOME | End: 2018-03-16
Payer: MEDICARE

## 2018-03-16 ENCOUNTER — OFFICE VISIT (OUTPATIENT)
Dept: FAMILY MEDICINE CLINIC | Age: 58
End: 2018-03-16
Payer: MEDICARE

## 2018-03-16 VITALS
OXYGEN SATURATION: 94 % | HEART RATE: 112 BPM | RESPIRATION RATE: 16 BRPM | DIASTOLIC BLOOD PRESSURE: 50 MMHG | SYSTOLIC BLOOD PRESSURE: 98 MMHG | BODY MASS INDEX: 32.11 KG/M2 | WEIGHT: 177 LBS | TEMPERATURE: 98 F

## 2018-03-16 DIAGNOSIS — Z79.4 UNCONTROLLED TYPE 2 DIABETES MELLITUS WITH HYPERGLYCEMIA, WITH LONG-TERM CURRENT USE OF INSULIN (HCC): ICD-10-CM

## 2018-03-16 DIAGNOSIS — R10.12 LEFT UPPER QUADRANT PAIN: ICD-10-CM

## 2018-03-16 DIAGNOSIS — R10.9 LEFT FLANK PAIN: ICD-10-CM

## 2018-03-16 DIAGNOSIS — J18.9 PNEUMONIA OF LEFT LOWER LOBE DUE TO INFECTIOUS ORGANISM: Primary | ICD-10-CM

## 2018-03-16 DIAGNOSIS — E11.65 UNCONTROLLED TYPE 2 DIABETES MELLITUS WITH HYPERGLYCEMIA, WITH LONG-TERM CURRENT USE OF INSULIN (HCC): ICD-10-CM

## 2018-03-16 DIAGNOSIS — E55.9 VITAMIN D DEFICIENCY: ICD-10-CM

## 2018-03-16 LAB
ALBUMIN SERPL-MCNC: 3.7 G/DL (ref 3.5–5.2)
ALP BLD-CCNC: 135 U/L (ref 35–104)
ALT SERPL-CCNC: 63 U/L (ref 5–33)
AMYLASE: 11 U/L (ref 28–100)
ANION GAP SERPL CALCULATED.3IONS-SCNC: 21 MMOL/L (ref 7–19)
AST SERPL-CCNC: 74 U/L (ref 5–32)
BASOPHILS ABSOLUTE: 0.1 K/UL (ref 0–0.2)
BASOPHILS RELATIVE PERCENT: 0.4 % (ref 0–1)
BILIRUB SERPL-MCNC: 0.5 MG/DL (ref 0.2–1.2)
BILIRUBIN URINE: NEGATIVE
BLOOD, URINE: NEGATIVE
BUN BLDV-MCNC: 11 MG/DL (ref 6–20)
CALCIUM SERPL-MCNC: 9.8 MG/DL (ref 8.6–10)
CHLORIDE BLD-SCNC: 89 MMOL/L (ref 98–111)
CLARITY: CLEAR
CO2: 21 MMOL/L (ref 22–29)
COLOR: YELLOW
CREAT SERPL-MCNC: 0.7 MG/DL (ref 0.5–0.9)
D DIMER: 1.03 UG/ML FEU (ref 0–0.48)
EOSINOPHILS ABSOLUTE: 0.2 K/UL (ref 0–0.6)
EOSINOPHILS RELATIVE PERCENT: 1.3 % (ref 0–5)
GFR NON-AFRICAN AMERICAN: >60
GLUCOSE BLD-MCNC: 538 MG/DL (ref 74–109)
GLUCOSE URINE: >=1000 MG/DL
HCT VFR BLD CALC: 37.3 % (ref 37–47)
HEMOGLOBIN: 12.3 G/DL (ref 12–16)
KETONES, URINE: ABNORMAL MG/DL
LEUKOCYTE ESTERASE, URINE: NEGATIVE
LIPASE: 14 U/L (ref 13–60)
LYMPHOCYTES ABSOLUTE: 1.6 K/UL (ref 1.1–4.5)
LYMPHOCYTES RELATIVE PERCENT: 14.1 % (ref 20–40)
MCH RBC QN AUTO: 30.4 PG (ref 27–31)
MCHC RBC AUTO-ENTMCNC: 33 G/DL (ref 33–37)
MCV RBC AUTO: 92.1 FL (ref 81–99)
MONOCYTES ABSOLUTE: 1 K/UL (ref 0–0.9)
MONOCYTES RELATIVE PERCENT: 8.6 % (ref 0–10)
NEUTROPHILS ABSOLUTE: 8.5 K/UL (ref 1.5–7.5)
NEUTROPHILS RELATIVE PERCENT: 75.1 % (ref 50–65)
NITRITE, URINE: NEGATIVE
PDW BLD-RTO: 12.9 % (ref 11.5–14.5)
PH UA: 6
PLATELET # BLD: 265 K/UL (ref 130–400)
PMV BLD AUTO: 12.6 FL (ref 9.4–12.3)
POTASSIUM SERPL-SCNC: 4.3 MMOL/L (ref 3.5–5)
PROTEIN UA: NEGATIVE MG/DL
RBC # BLD: 4.05 M/UL (ref 4.2–5.4)
SEDIMENTATION RATE, ERYTHROCYTE: 92 MM/HR (ref 0–25)
SODIUM BLD-SCNC: 131 MMOL/L (ref 136–145)
SPECIFIC GRAVITY UA: 1.04
TOTAL PROTEIN: 7.6 G/DL (ref 6.6–8.7)
UROBILINOGEN, URINE: 0.2 E.U./DL
VITAMIN D 25-HYDROXY: 24.5 NG/ML
WBC # BLD: 11.3 K/UL (ref 4.8–10.8)

## 2018-03-16 PROCEDURE — 1036F TOBACCO NON-USER: CPT | Performed by: NURSE PRACTITIONER

## 2018-03-16 PROCEDURE — 3014F SCREEN MAMMO DOC REV: CPT | Performed by: NURSE PRACTITIONER

## 2018-03-16 PROCEDURE — G8427 DOCREV CUR MEDS BY ELIG CLIN: HCPCS | Performed by: NURSE PRACTITIONER

## 2018-03-16 PROCEDURE — 96372 THER/PROPH/DIAG INJ SC/IM: CPT | Performed by: NURSE PRACTITIONER

## 2018-03-16 PROCEDURE — G8599 NO ASA/ANTIPLAT THER USE RNG: HCPCS | Performed by: NURSE PRACTITIONER

## 2018-03-16 PROCEDURE — 3046F HEMOGLOBIN A1C LEVEL >9.0%: CPT | Performed by: NURSE PRACTITIONER

## 2018-03-16 PROCEDURE — G8417 CALC BMI ABV UP PARAM F/U: HCPCS | Performed by: NURSE PRACTITIONER

## 2018-03-16 PROCEDURE — 3017F COLORECTAL CA SCREEN DOC REV: CPT | Performed by: NURSE PRACTITIONER

## 2018-03-16 PROCEDURE — G8484 FLU IMMUNIZE NO ADMIN: HCPCS | Performed by: NURSE PRACTITIONER

## 2018-03-16 PROCEDURE — 99214 OFFICE O/P EST MOD 30 MIN: CPT | Performed by: NURSE PRACTITIONER

## 2018-03-16 PROCEDURE — 71046 X-RAY EXAM CHEST 2 VIEWS: CPT

## 2018-03-16 RX ORDER — CEFTRIAXONE 1 G/1
1 INJECTION, POWDER, FOR SOLUTION INTRAMUSCULAR; INTRAVENOUS ONCE
Qty: 1 G | Refills: 0 | Status: SHIPPED | OUTPATIENT
Start: 2018-03-16 | End: 2018-03-16

## 2018-03-16 RX ORDER — HYDROCODONE BITARTRATE AND ACETAMINOPHEN 5; 325 MG/1; MG/1
1 TABLET ORAL EVERY 6 HOURS PRN
Qty: 20 TABLET | Refills: 0 | Status: SHIPPED | OUTPATIENT
Start: 2018-03-16 | End: 2018-03-21

## 2018-03-16 ASSESSMENT — ENCOUNTER SYMPTOMS
DIARRHEA: 0
VOMITING: 0
NAUSEA: 0
BACK PAIN: 1
ABDOMINAL PAIN: 1
COUGH: 1
SHORTNESS OF BREATH: 1

## 2018-03-16 NOTE — PROGRESS NOTES
breath. Cardiovascular: Positive for chest pain. Gastrointestinal: Positive for abdominal pain (LUQ). Negative for anorexia, diarrhea, nausea and vomiting. Genitourinary: Positive for flank pain. Negative for dysuria. Musculoskeletal: Positive for back pain and myalgias. Neurological: Negative for tingling, weakness, numbness and headaches. .  Allergies   Allergen Reactions    Sulfa Antibiotics Hives     Current Outpatient Prescriptions on File Prior to Visit   Medication Sig Dispense Refill    gabapentin (NEURONTIN) 100 MG capsule 3-4 po nightly for nerve pain  NEVER abruptly stop. 120 capsule 2    Insulin Pen Needle ([employer] PEN NEEDLES) 31G X 6 MM MISC 1 each by Does not apply route daily 100 each 3    insulin glargine (BASAGLAR KWIKPEN) 100 UNIT/ML injection pen 5 units nightly for glucose  Call glucose log in 1wk. 5 pen 3    Insulin Syringe-Needle U-100 ([employer] INSULIN SYRINGE .5CC/30G) 30G X 1/2\" 0.5 ML MISC 1 each by Does not apply route nightly 100 each 3    pantoprazole (PROTONIX) 40 MG tablet Take 1 tablet by mouth daily 30 tablet 5    FLUoxetine (PROZAC) 20 MG capsule TAKE 1 CAPSULE BY MOUTH EVERY DAY 30 capsule 5    ranitidine (ZANTAC) 150 MG tablet 1 po daily as needed for break through reflux 30 tablet 5    amLODIPine (NORVASC) 10 MG tablet TAKE 1 TABLET BY MOUTH EVERY DAY 30 tablet 5    vitamin D (ERGOCALCIFEROL) 96170 units CAPS capsule TAKE 1 CAPSULE BY MOUTH 1 TIME A WEEK 4 capsule 3    rosuvastatin (CRESTOR) 20 MG tablet Take 1 tablet by mouth nightly For cholesterol 30 tablet 5    Glucose Blood (BLOOD GLUCOSE TEST STRIPS) STRP For bid testing Type 2 Dm Dispense brand per patient request or insurance benefits.  100 strip 3    glucose monitoring kit (FREESTYLE) monitoring kit For bid testing Type 2 dm 1 kit 0    Lancets MISC 1 each by Does not apply route 2 times daily Dispense brand per insurance benefits and patient request. 100 each 3    dexlansoprazole (DEXILANT) 60 MG

## 2018-03-16 NOTE — PATIENT INSTRUCTIONS
Patient Education        Abdominal Pain: Care Instructions  Your Care Instructions    Abdominal pain has many possible causes. Some aren't serious and get better on their own in a few days. Others need more testing and treatment. If your pain continues or gets worse, you need to be rechecked and may need more tests to find out what is wrong. You may need surgery to correct the problem. Don't ignore new symptoms, such as fever, nausea and vomiting, urination problems, pain that gets worse, and dizziness. These may be signs of a more serious problem. Your doctor may have recommended a follow-up visit in the next 8 to 12 hours. If you are not getting better, you may need more tests or treatment. The doctor has checked you carefully, but problems can develop later. If you notice any problems or new symptoms, get medical treatment right away. Follow-up care is a key part of your treatment and safety. Be sure to make and go to all appointments, and call your doctor if you are having problems. It's also a good idea to know your test results and keep a list of the medicines you take. How can you care for yourself at home? · Rest until you feel better. · To prevent dehydration, drink plenty of fluids, enough so that your urine is light yellow or clear like water. Choose water and other caffeine-free clear liquids until you feel better. If you have kidney, heart, or liver disease and have to limit fluids, talk with your doctor before you increase the amount of fluids you drink. · If your stomach is upset, eat mild foods, such as rice, dry toast or crackers, bananas, and applesauce. Try eating several small meals instead of two or three large ones. · Wait until 48 hours after all symptoms have gone away before you have spicy foods, alcohol, and drinks that contain caffeine. · Do not eat foods that are high in fat. · Avoid anti-inflammatory medicines such as aspirin, ibuprofen (Advil, Motrin), and naproxen (Aleve). These can cause stomach upset. Talk to your doctor if you take daily aspirin for another health problem. When should you call for help? Call 911 anytime you think you may need emergency care. For example, call if:  ? · You passed out (lost consciousness). ? · You pass maroon or very bloody stools. ? · You vomit blood or what looks like coffee grounds. ? · You have new, severe belly pain. ?Call your doctor now or seek immediate medical care if:  ? · Your pain gets worse, especially if it becomes focused in one area of your belly. ? · You have a new or higher fever. ? · Your stools are black and look like tar, or they have streaks of blood. ? · You have unexpected vaginal bleeding. ? · You have symptoms of a urinary tract infection. These may include:  ¨ Pain when you urinate. ¨ Urinating more often than usual.  ¨ Blood in your urine. ? · You are dizzy or lightheaded, or you feel like you may faint. ? Watch closely for changes in your health, and be sure to contact your doctor if:  ? · You are not getting better after 1 day (24 hours). Where can you learn more? Go to [email or web address]. org and sign in to your [facility] account. Enter X383 in the [employer] box to learn more about \"Abdominal Pain: Care Instructions. \"     If you do not have an account, please click on the \"Sign Up Now\" link. Current as of: March 20, 2017  Content Version: 11.5  © 7051-5119 [employer]. Care instructions adapted under license by [facility] ([facility]). If you have questions about a medical condition or this instruction, always ask your healthcare professional. [place] [address] any warranty or liability for your use of this information. Patient Education        Pleurisy: Care Instructions  Your Care Instructions  Pleurisy is inflammation of the tissue that lines the inside of the chest and covers the lungs (pleura).  Pleurisy is often caused by an infection, usually a virus. It also can be caused by other health problems, such as pneumonia or lupus. Pleurisy can cause sharp chest pain that gets worse when you cough or take a deep breath. You may need more tests to find out what is causing your pleurisy. Treatment depends on the cause. Pleurisy may come and go for a few days, or it may continue if the cause has not been treated. Home treatment can help ease symptoms. Follow-up care is a key part of your treatment and safety. Be sure to make and go to all appointments, and call your doctor if you are having problems. It's also a good idea to know your test results and keep a list of the medicines you take. How can you care for yourself at home? · Take an over-the-counter pain medicine, such as acetaminophen (Tylenol), ibuprofen (Advil, Motrin), or naproxen (Aleve). Read and follow all instructions on the label. · Do not take two or more pain medicines at the same time unless the doctor told you to. Many pain medicines have acetaminophen, which is Tylenol. Too much acetaminophen (Tylenol) can be harmful. · If your doctor prescribed antibiotics, take them as directed. Do not stop taking them just because you feel better. You need to take the full course of antibiotics. · Take cough medicine as directed if your doctor recommends it. · Avoid activities that make the pain worse. When should you call for help? Call 911 anytime you think you may need emergency care. For example, call if:  ? · You have severe trouble breathing. ? · You have severe chest pain. ? · You passed out (lost consciousness). ?Call your doctor now or seek immediate medical care if:  ? · You have a new or higher fever. ? Watch closely for changes in your health, and be sure to contact your doctor if:  ? · You begin to cough up yellow or green mucus. ? · You cough up blood. ? · Your symptoms are not better in 3 or 4 days. Where can you learn more?   Go to or liver disease and have to limit fluids, talk with your doctor before you increase the amount of fluids you drink. · Take care of your cough so you can rest. A cough that brings up mucus from your lungs is common with pneumonia. It is one way your body gets rid of the infection. But if coughing keeps you from resting or causes severe fatigue and chest-wall pain, talk to your doctor. He or she may suggest that you take a medicine to reduce the cough. · Use a vaporizer or humidifier to add moisture to your bedroom. Follow the directions for cleaning the machine. · Do not smoke or allow others to smoke around you. Smoke will make your cough last longer. If you need help quitting, talk to your doctor about stop-smoking programs and medicines. These can increase your chances of quitting for good. · Take an over-the-counter pain medicine, such as acetaminophen (Tylenol), ibuprofen (Advil, Motrin), or naproxen (Aleve). Read and follow all instructions on the label. · Do not take two or more pain medicines at the same time unless the doctor told you to. Many pain medicines have acetaminophen, which is Tylenol. Too much acetaminophen (Tylenol) can be harmful. · If you were given a spirometer to measure how well your lungs are working, use it as instructed. This can help your doctor tell how your recovery is going. · To prevent pneumonia in the future, talk to your doctor about getting a flu vaccine (once a year) and a pneumococcal vaccine (one time only for most people). When should you call for help? Call 911 anytime you think you may need emergency care. For example, call if:  ? · You have severe trouble breathing. ?Call your doctor now or seek immediate medical care if:  ? · You cough up dark brown or bloody mucus (sputum). ? · You have new or worse trouble breathing. ? · You are dizzy or lightheaded, or you feel like you may faint. ? Watch closely for changes in your health, and be sure to contact your

## 2018-03-17 ENCOUNTER — NURSE ONLY (OUTPATIENT)
Dept: FAMILY MEDICINE CLINIC | Age: 58
End: 2018-03-17
Payer: MEDICARE

## 2018-03-17 DIAGNOSIS — J18.9 PNEUMONIA OF LEFT LOWER LOBE DUE TO INFECTIOUS ORGANISM: Primary | ICD-10-CM

## 2018-03-17 PROCEDURE — 96372 THER/PROPH/DIAG INJ SC/IM: CPT | Performed by: NURSE PRACTITIONER

## 2018-03-19 ENCOUNTER — TELEPHONE (OUTPATIENT)
Dept: FAMILY MEDICINE CLINIC | Age: 58
End: 2018-03-19

## 2018-03-19 ENCOUNTER — NURSE ONLY (OUTPATIENT)
Dept: FAMILY MEDICINE CLINIC | Age: 58
End: 2018-03-19
Payer: MEDICARE

## 2018-03-19 PROCEDURE — 96372 THER/PROPH/DIAG INJ SC/IM: CPT | Performed by: NURSE PRACTITIONER

## 2018-03-19 NOTE — TELEPHONE ENCOUNTER
----- Message from MOOK White sent at 3/19/2018 11:34 AM CDT -----  Please call and check on patient and recheck CMP and CBC at next visit.  Charu Nunez

## 2018-03-19 NOTE — PROGRESS NOTES
Rocephin injection given. Pt states is getting better. Pt aware to be seen if doesn't continue to get better.

## 2018-03-20 ENCOUNTER — TELEPHONE (OUTPATIENT)
Dept: FAMILY MEDICINE CLINIC | Age: 58
End: 2018-03-20

## 2018-03-20 ENCOUNTER — NURSE ONLY (OUTPATIENT)
Dept: FAMILY MEDICINE CLINIC | Age: 58
End: 2018-03-20
Payer: MEDICARE

## 2018-03-20 DIAGNOSIS — E11.65 TYPE 2 DIABETES MELLITUS WITH HYPERGLYCEMIA, WITH LONG-TERM CURRENT USE OF INSULIN (HCC): ICD-10-CM

## 2018-03-20 DIAGNOSIS — Z79.4 TYPE 2 DIABETES MELLITUS WITH HYPERGLYCEMIA, WITH LONG-TERM CURRENT USE OF INSULIN (HCC): ICD-10-CM

## 2018-03-20 DIAGNOSIS — J18.9 PNEUMONIA OF LEFT LOWER LOBE DUE TO INFECTIOUS ORGANISM: Primary | ICD-10-CM

## 2018-03-20 DIAGNOSIS — R74.8 ELEVATED LIVER ENZYMES: ICD-10-CM

## 2018-03-20 LAB
ALBUMIN SERPL-MCNC: 3.4 G/DL (ref 3.5–5.2)
ALP BLD-CCNC: 160 U/L (ref 35–104)
ALT SERPL-CCNC: 37 U/L (ref 5–33)
ANION GAP SERPL CALCULATED.3IONS-SCNC: 21 MMOL/L (ref 7–19)
AST SERPL-CCNC: 16 U/L (ref 5–32)
BILIRUB SERPL-MCNC: 0.3 MG/DL (ref 0.2–1.2)
BUN BLDV-MCNC: 10 MG/DL (ref 6–20)
CALCIUM SERPL-MCNC: 10.1 MG/DL (ref 8.6–10)
CHLORIDE BLD-SCNC: 89 MMOL/L (ref 98–111)
CO2: 23 MMOL/L (ref 22–29)
CREAT SERPL-MCNC: 0.6 MG/DL (ref 0.5–0.9)
GFR NON-AFRICAN AMERICAN: >60
GLUCOSE BLD-MCNC: 376 MG/DL (ref 74–109)
HBA1C MFR BLD: 13.8 %
POTASSIUM SERPL-SCNC: 4.7 MMOL/L (ref 3.5–5)
SODIUM BLD-SCNC: 133 MMOL/L (ref 136–145)
TOTAL PROTEIN: 7.9 G/DL (ref 6.6–8.7)

## 2018-03-20 PROCEDURE — 96372 THER/PROPH/DIAG INJ SC/IM: CPT | Performed by: FAMILY MEDICINE

## 2018-03-21 ENCOUNTER — OFFICE VISIT (OUTPATIENT)
Dept: FAMILY MEDICINE CLINIC | Age: 58
End: 2018-03-21
Payer: MEDICARE

## 2018-03-21 ENCOUNTER — HOSPITAL ENCOUNTER (OUTPATIENT)
Dept: GENERAL RADIOLOGY | Age: 58
Discharge: HOME | End: 2018-03-21
Payer: MEDICARE

## 2018-03-21 VITALS
RESPIRATION RATE: 16 BRPM | DIASTOLIC BLOOD PRESSURE: 72 MMHG | SYSTOLIC BLOOD PRESSURE: 118 MMHG | HEART RATE: 102 BPM | BODY MASS INDEX: 31.57 KG/M2 | TEMPERATURE: 98.7 F | OXYGEN SATURATION: 96 % | WEIGHT: 174 LBS

## 2018-03-21 DIAGNOSIS — J18.9 PNEUMONIA OF LEFT LOWER LOBE DUE TO INFECTIOUS ORGANISM: ICD-10-CM

## 2018-03-21 DIAGNOSIS — J18.9 PNEUMONIA OF LEFT LOWER LOBE DUE TO INFECTIOUS ORGANISM: Primary | ICD-10-CM

## 2018-03-21 PROCEDURE — 96372 THER/PROPH/DIAG INJ SC/IM: CPT | Performed by: NURSE PRACTITIONER

## 2018-03-21 PROCEDURE — 3046F HEMOGLOBIN A1C LEVEL >9.0%: CPT | Performed by: NURSE PRACTITIONER

## 2018-03-21 PROCEDURE — 3017F COLORECTAL CA SCREEN DOC REV: CPT | Performed by: NURSE PRACTITIONER

## 2018-03-21 PROCEDURE — G8484 FLU IMMUNIZE NO ADMIN: HCPCS | Performed by: NURSE PRACTITIONER

## 2018-03-21 PROCEDURE — G8417 CALC BMI ABV UP PARAM F/U: HCPCS | Performed by: NURSE PRACTITIONER

## 2018-03-21 PROCEDURE — 99213 OFFICE O/P EST LOW 20 MIN: CPT | Performed by: NURSE PRACTITIONER

## 2018-03-21 PROCEDURE — 71046 X-RAY EXAM CHEST 2 VIEWS: CPT

## 2018-03-21 PROCEDURE — 1036F TOBACCO NON-USER: CPT | Performed by: NURSE PRACTITIONER

## 2018-03-21 PROCEDURE — 3014F SCREEN MAMMO DOC REV: CPT | Performed by: NURSE PRACTITIONER

## 2018-03-21 PROCEDURE — G8427 DOCREV CUR MEDS BY ELIG CLIN: HCPCS | Performed by: NURSE PRACTITIONER

## 2018-03-21 PROCEDURE — G8599 NO ASA/ANTIPLAT THER USE RNG: HCPCS | Performed by: NURSE PRACTITIONER

## 2018-03-21 RX ORDER — DOXYCYCLINE HYCLATE 100 MG
100 TABLET ORAL 2 TIMES DAILY
Qty: 20 TABLET | Refills: 0 | Status: SHIPPED | OUTPATIENT
Start: 2018-03-21 | End: 2018-03-31

## 2018-03-21 RX ORDER — ALBUTEROL SULFATE 2.5 MG/3ML
2.5 SOLUTION RESPIRATORY (INHALATION) EVERY 6 HOURS PRN
Qty: 120 EACH | Refills: 0 | Status: SHIPPED | OUTPATIENT
Start: 2018-03-21 | End: 2018-05-03 | Stop reason: ALTCHOICE

## 2018-03-21 ASSESSMENT — ENCOUNTER SYMPTOMS
SHORTNESS OF BREATH: 1
DIARRHEA: 0
CONSTIPATION: 0

## 2018-03-21 NOTE — PROGRESS NOTES
Patient reports bg to be 201 fasting this morning.
deviation present. Cardiovascular: Normal rate, regular rhythm and normal heart sounds. Pulmonary/Chest: Effort normal. No respiratory distress. She has decreased breath sounds in the left lower field. She has no wheezes. She has no rales. Lymphadenopathy:     She has no cervical adenopathy. Neurological: She is alert and oriented to person, place, and time. Skin: Skin is warm and dry. She is not diaphoretic. Psychiatric: She has a normal mood and affect. Her behavior is normal.   Nursing note and vitals reviewed. /72 (Site: Left Arm, Position: Sitting, Cuff Size: Medium Adult)   Pulse 102   Temp 98.7 °F (37.1 °C) (Temporal)   Resp 16   Wt 174 lb (78.9 kg)   SpO2 96%   BMI 31.57 kg/m²      ASSESSMENT:      ICD-10-CM ICD-9-CM    1. Pneumonia of left lower lobe due to infectious organism (HCC) J18.1 486 XR CHEST STANDARD (2 VW)      doxycycline hyclate (VIBRA-TABS) 100 MG tablet      albuterol (PROVENTIL) (2.5 MG/3ML) 0.083% nebulizer solution   2. Uncontrolled type 2 diabetes mellitus without complication, with long-term current use of insulin ([facility] E11.65 250.02     Z79.4 V58.67        PLAN:    Viridiana Foreman: Pneumonia (Patient is here today for a follow up.  )  Pt will begin increasing Basaglar by 2units nightly and stop at 16 units nightly. Repeat CXR today and back to clinic area  Pt needs to complete Rocephin as ordered by Carrington Silva  Pt to start/add doxycycline today--sunburn risk with this med  Pt to go to ER if any worsened SOA or pain  F/u in office in 24hrs for eval if not feeling improvement (and pending review of cxr) and DM f/u with glucose log in 1wk. Diagnosis and orders for this visit are above.

## 2018-03-22 ENCOUNTER — NURSE ONLY (OUTPATIENT)
Dept: FAMILY MEDICINE CLINIC | Age: 58
End: 2018-03-22
Payer: MEDICARE

## 2018-03-22 VITALS
OXYGEN SATURATION: 96 % | WEIGHT: 174 LBS | TEMPERATURE: 97.5 F | RESPIRATION RATE: 20 BRPM | BODY MASS INDEX: 31.57 KG/M2

## 2018-03-22 DIAGNOSIS — J18.1 UNRESOLVED LOBAR PNEUMONIA (HCC): Primary | ICD-10-CM

## 2018-03-22 PROCEDURE — 96372 THER/PROPH/DIAG INJ SC/IM: CPT | Performed by: NURSE PRACTITIONER

## 2018-03-23 ENCOUNTER — HOSPITAL ENCOUNTER (OUTPATIENT)
Dept: GENERAL RADIOLOGY | Age: 58
Discharge: HOME | End: 2018-03-23
Payer: MEDICARE

## 2018-03-23 ENCOUNTER — APPOINTMENT (OUTPATIENT)
Dept: CT IMAGING | Facility: HOSPITAL | Age: 58
End: 2018-03-23

## 2018-03-23 ENCOUNTER — HOSPITAL ENCOUNTER (INPATIENT)
Facility: HOSPITAL | Age: 58
LOS: 13 days | Discharge: HOME | End: 2018-04-05
Attending: FAMILY MEDICINE | Admitting: INTERNAL MEDICINE

## 2018-03-23 ENCOUNTER — OFFICE VISIT (OUTPATIENT)
Dept: FAMILY MEDICINE CLINIC | Age: 58
End: 2018-03-23
Payer: MEDICARE

## 2018-03-23 VITALS
HEART RATE: 107 BPM | OXYGEN SATURATION: 96 % | SYSTOLIC BLOOD PRESSURE: 116 MMHG | DIASTOLIC BLOOD PRESSURE: 70 MMHG | WEIGHT: 173 LBS | TEMPERATURE: 98.2 F | RESPIRATION RATE: 16 BRPM | BODY MASS INDEX: 31.39 KG/M2

## 2018-03-23 DIAGNOSIS — J18.9 PNEUMONIA OF LEFT LOWER LOBE DUE TO INFECTIOUS ORGANISM: Primary | ICD-10-CM

## 2018-03-23 DIAGNOSIS — J18.9 PNEUMONIA OF LEFT LOWER LOBE DUE TO INFECTIOUS ORGANISM: ICD-10-CM

## 2018-03-23 DIAGNOSIS — J86.9 EMPYEMA OF PLEURA (HCC): Primary | ICD-10-CM

## 2018-03-23 DIAGNOSIS — R06.00 DYSPNEA, UNSPECIFIED TYPE: ICD-10-CM

## 2018-03-23 DIAGNOSIS — E11.65 TYPE 2 DIABETES MELLITUS WITH HYPERGLYCEMIA, WITH LONG-TERM CURRENT USE OF INSULIN (HCC): ICD-10-CM

## 2018-03-23 DIAGNOSIS — Z79.4 TYPE 2 DIABETES MELLITUS WITH HYPERGLYCEMIA, WITH LONG-TERM CURRENT USE OF INSULIN (HCC): ICD-10-CM

## 2018-03-23 DIAGNOSIS — Z74.09 IMPAIRED MOBILITY: ICD-10-CM

## 2018-03-23 DIAGNOSIS — R50.9 FEVER, UNSPECIFIED FEVER CAUSE: ICD-10-CM

## 2018-03-23 LAB
ALBUMIN SERPL-MCNC: 3.7 G/DL (ref 3.5–5)
ALBUMIN/GLOB SERPL: 1.1 G/DL (ref 1.1–2.5)
ALP SERPL-CCNC: 155 U/L (ref 24–120)
ALT SERPL W P-5'-P-CCNC: 39 U/L (ref 0–54)
ANION GAP SERPL CALCULATED.3IONS-SCNC: 14 MMOL/L (ref 4–13)
ARTERIAL PATENCY WRIST A: ABNORMAL
ARTICHOKE IGE QN: 157 MG/DL (ref 0–99)
AST SERPL-CCNC: 22 U/L (ref 7–45)
ATMOSPHERIC PRESS: 754 MMHG
BASE EXCESS BLDA CALC-SCNC: 5.3 MMOL/L (ref 0–2)
BASOPHILS # BLD AUTO: 0.06 10*3/MM3 (ref 0–0.2)
BASOPHILS NFR BLD AUTO: 0.5 % (ref 0–2)
BDY SITE: ABNORMAL
BILIRUB SERPL-MCNC: 0.2 MG/DL (ref 0.1–1)
BODY TEMPERATURE: 37 C
BUN BLD-MCNC: 16 MG/DL (ref 5–21)
BUN/CREAT SERPL: 22.2 (ref 7–25)
CALCIUM SPEC-SCNC: 9.3 MG/DL (ref 8.4–10.4)
CHLORIDE SERPL-SCNC: 93 MMOL/L (ref 98–110)
CHOLEST SERPL-MCNC: 221 MG/DL (ref 130–200)
CO2 SERPL-SCNC: 30 MMOL/L (ref 24–31)
CREAT BLD-MCNC: 0.72 MG/DL (ref 0.5–1.4)
D-LACTATE SERPL-SCNC: 1.2 MMOL/L (ref 0.5–2)
D-LACTATE SERPL-SCNC: 1.5 MMOL/L (ref 0.5–2)
DEPRECATED RDW RBC AUTO: 42.9 FL (ref 40–54)
EOSINOPHIL # BLD AUTO: 0.12 10*3/MM3 (ref 0–0.7)
EOSINOPHIL NFR BLD AUTO: 1 % (ref 0–4)
ERYTHROCYTE [DISTWIDTH] IN BLOOD BY AUTOMATED COUNT: 12.8 % (ref 12–15)
GFR SERPL CREATININE-BSD FRML MDRD: 83 ML/MIN/1.73
GLOBULIN UR ELPH-MCNC: 3.5 GM/DL
GLUCOSE BLD-MCNC: 265 MG/DL
GLUCOSE BLD-MCNC: 275 MG/DL (ref 70–100)
GLUCOSE BLDC GLUCOMTR-MCNC: 255 MG/DL (ref 70–130)
GLUCOSE BLDC GLUCOMTR-MCNC: 284 MG/DL (ref 70–130)
HCO3 BLDA-SCNC: 29.3 MMOL/L (ref 20–26)
HCT VFR BLD AUTO: 37.3 % (ref 37–47)
HDLC SERPL-MCNC: 31 MG/DL
HGB BLD-MCNC: 12.2 G/DL (ref 12–16)
IMM GRANULOCYTES # BLD: 0.12 10*3/MM3 (ref 0–0.03)
IMM GRANULOCYTES NFR BLD: 1 % (ref 0–5)
INR PPP: 1.07 (ref 0.91–1.09)
L PNEUMO1 AG UR QL IA: NEGATIVE
LDLC/HDLC SERPL: 4.05 {RATIO}
LYMPHOCYTES # BLD AUTO: 2.68 10*3/MM3 (ref 0.72–4.86)
LYMPHOCYTES NFR BLD AUTO: 22.1 % (ref 15–45)
Lab: ABNORMAL
MAGNESIUM SERPL-MCNC: 1.4 MG/DL (ref 1.4–2.2)
MCH RBC QN AUTO: 29.7 PG (ref 28–32)
MCHC RBC AUTO-ENTMCNC: 32.7 G/DL (ref 33–36)
MCV RBC AUTO: 90.8 FL (ref 82–98)
MODALITY: ABNORMAL
MONOCYTES # BLD AUTO: 0.74 10*3/MM3 (ref 0.19–1.3)
MONOCYTES NFR BLD AUTO: 6.1 % (ref 4–12)
NEUTROPHILS # BLD AUTO: 8.41 10*3/MM3 (ref 1.87–8.4)
NEUTROPHILS NFR BLD AUTO: 69.3 % (ref 39–78)
NRBC BLD MANUAL-RTO: 0 /100 WBC (ref 0–0)
PCO2 BLDA: 39.6 MM HG (ref 35–45)
PH BLDA: 7.48 PH UNITS (ref 7.35–7.45)
PLATELET # BLD AUTO: 350 10*3/MM3 (ref 130–400)
PMV BLD AUTO: 11.2 FL (ref 6–12)
PO2 BLDA: 58.5 MM HG (ref 83–108)
POTASSIUM BLD-SCNC: 3.9 MMOL/L (ref 3.5–5.3)
PROT SERPL-MCNC: 7.2 G/DL (ref 6.3–8.7)
PROTHROMBIN TIME: 14.2 SECONDS (ref 11.9–14.6)
RBC # BLD AUTO: 4.11 10*6/MM3 (ref 4.2–5.4)
S PNEUM AG SPEC QL LA: NEGATIVE
SAO2 % BLDCOA: 91.8 % (ref 94–99)
SODIUM BLD-SCNC: 137 MMOL/L (ref 135–145)
TRIGL SERPL-MCNC: 322 MG/DL (ref 0–149)
TROPONIN I SERPL-MCNC: <0.012 NG/ML (ref 0–0.03)
VENTILATOR MODE: ABNORMAL
WBC NRBC COR # BLD: 12.13 10*3/MM3 (ref 4.8–10.8)

## 2018-03-23 PROCEDURE — 94640 AIRWAY INHALATION TREATMENT: CPT

## 2018-03-23 PROCEDURE — 87899 AGENT NOS ASSAY W/OPTIC: CPT | Performed by: NURSE PRACTITIONER

## 2018-03-23 PROCEDURE — 25010000002 PIPERACILLIN SOD-TAZOBACTAM PER 1 G: Performed by: INTERNAL MEDICINE

## 2018-03-23 PROCEDURE — 99215 OFFICE O/P EST HI 40 MIN: CPT | Performed by: NURSE PRACTITIONER

## 2018-03-23 PROCEDURE — G8427 DOCREV CUR MEDS BY ELIG CLIN: HCPCS | Performed by: NURSE PRACTITIONER

## 2018-03-23 PROCEDURE — 83735 ASSAY OF MAGNESIUM: CPT | Performed by: NURSE PRACTITIONER

## 2018-03-23 PROCEDURE — 94799 UNLISTED PULMONARY SVC/PX: CPT

## 2018-03-23 PROCEDURE — 25010000002 VANCOMYCIN PER 500 MG: Performed by: INTERNAL MEDICINE

## 2018-03-23 PROCEDURE — 63710000001 INSULIN LISPRO (HUMAN) PER 5 UNITS: Performed by: NURSE PRACTITIONER

## 2018-03-23 PROCEDURE — 82803 BLOOD GASES ANY COMBINATION: CPT

## 2018-03-23 PROCEDURE — 85610 PROTHROMBIN TIME: CPT | Performed by: NURSE PRACTITIONER

## 2018-03-23 PROCEDURE — G8484 FLU IMMUNIZE NO ADMIN: HCPCS | Performed by: NURSE PRACTITIONER

## 2018-03-23 PROCEDURE — 82962 GLUCOSE BLOOD TEST: CPT

## 2018-03-23 PROCEDURE — 25010000002 IOPAMIDOL 61 % SOLUTION: Performed by: INTERNAL MEDICINE

## 2018-03-23 PROCEDURE — G8417 CALC BMI ABV UP PARAM F/U: HCPCS | Performed by: NURSE PRACTITIONER

## 2018-03-23 PROCEDURE — 85025 COMPLETE CBC W/AUTO DIFF WBC: CPT | Performed by: NURSE PRACTITIONER

## 2018-03-23 PROCEDURE — 71260 CT THORAX DX C+: CPT

## 2018-03-23 PROCEDURE — 3017F COLORECTAL CA SCREEN DOC REV: CPT | Performed by: NURSE PRACTITIONER

## 2018-03-23 PROCEDURE — 84484 ASSAY OF TROPONIN QUANT: CPT | Performed by: NURSE PRACTITIONER

## 2018-03-23 PROCEDURE — 80053 COMPREHEN METABOLIC PANEL: CPT | Performed by: NURSE PRACTITIONER

## 2018-03-23 PROCEDURE — G8599 NO ASA/ANTIPLAT THER USE RNG: HCPCS | Performed by: NURSE PRACTITIONER

## 2018-03-23 PROCEDURE — 82962 GLUCOSE BLOOD TEST: CPT | Performed by: NURSE PRACTITIONER

## 2018-03-23 PROCEDURE — 3014F SCREEN MAMMO DOC REV: CPT | Performed by: NURSE PRACTITIONER

## 2018-03-23 PROCEDURE — 36600 WITHDRAWAL OF ARTERIAL BLOOD: CPT

## 2018-03-23 PROCEDURE — 83605 ASSAY OF LACTIC ACID: CPT | Performed by: INTERNAL MEDICINE

## 2018-03-23 PROCEDURE — 80061 LIPID PANEL: CPT | Performed by: NURSE PRACTITIONER

## 2018-03-23 PROCEDURE — 1036F TOBACCO NON-USER: CPT | Performed by: NURSE PRACTITIONER

## 2018-03-23 PROCEDURE — 71046 X-RAY EXAM CHEST 2 VIEWS: CPT

## 2018-03-23 PROCEDURE — 63710000001 INSULIN DETEMIR PER 5 UNITS: Performed by: NURSE PRACTITIONER

## 2018-03-23 PROCEDURE — 3046F HEMOGLOBIN A1C LEVEL >9.0%: CPT | Performed by: NURSE PRACTITIONER

## 2018-03-23 PROCEDURE — 83605 ASSAY OF LACTIC ACID: CPT | Performed by: NURSE PRACTITIONER

## 2018-03-23 PROCEDURE — 25010000002 ENOXAPARIN PER 10 MG: Performed by: NURSE PRACTITIONER

## 2018-03-23 RX ORDER — VANCOMYCIN HYDROCHLORIDE 1 G/200ML
1000 INJECTION, SOLUTION INTRAVENOUS EVERY 12 HOURS
Status: DISCONTINUED | OUTPATIENT
Start: 2018-03-23 | End: 2018-03-26

## 2018-03-23 RX ORDER — HYDROCODONE BITARTRATE AND ACETAMINOPHEN 5; 325 MG/1; MG/1
1 TABLET ORAL EVERY 6 HOURS PRN
Status: DISCONTINUED | OUTPATIENT
Start: 2018-03-23 | End: 2018-03-27

## 2018-03-23 RX ORDER — DOXYCYCLINE HYCLATE 100 MG/1
100 CAPSULE ORAL 2 TIMES DAILY
Status: ON HOLD | COMMUNITY
End: 2018-03-28

## 2018-03-23 RX ORDER — ASPIRIN 81 MG/1
81 TABLET ORAL DAILY
COMMUNITY

## 2018-03-23 RX ORDER — DEXTROSE MONOHYDRATE 25 G/50ML
25 INJECTION, SOLUTION INTRAVENOUS
Status: DISCONTINUED | OUTPATIENT
Start: 2018-03-23 | End: 2018-04-05 | Stop reason: HOSPADM

## 2018-03-23 RX ORDER — BISACODYL 10 MG
10 SUPPOSITORY, RECTAL RECTAL DAILY PRN
Status: DISCONTINUED | OUTPATIENT
Start: 2018-03-23 | End: 2018-04-05 | Stop reason: HOSPADM

## 2018-03-23 RX ORDER — SODIUM CHLORIDE 9 MG/ML
75 INJECTION, SOLUTION INTRAVENOUS CONTINUOUS
Status: DISCONTINUED | OUTPATIENT
Start: 2018-03-23 | End: 2018-03-27

## 2018-03-23 RX ORDER — INSULIN GLARGINE 100 [IU]/ML
16 INJECTION, SOLUTION SUBCUTANEOUS NIGHTLY
COMMUNITY
End: 2018-04-05 | Stop reason: HOSPADM

## 2018-03-23 RX ORDER — GUAIFENESIN 600 MG/1
1200 TABLET, EXTENDED RELEASE ORAL 2 TIMES DAILY
Status: DISCONTINUED | OUTPATIENT
Start: 2018-03-23 | End: 2018-04-05 | Stop reason: HOSPADM

## 2018-03-23 RX ORDER — PANTOPRAZOLE SODIUM 40 MG/1
40 TABLET, DELAYED RELEASE ORAL
Status: DISCONTINUED | OUTPATIENT
Start: 2018-03-24 | End: 2018-04-05 | Stop reason: HOSPADM

## 2018-03-23 RX ORDER — FLUOXETINE 10 MG/1
10 CAPSULE ORAL DAILY
Status: DISCONTINUED | OUTPATIENT
Start: 2018-03-23 | End: 2018-04-05 | Stop reason: HOSPADM

## 2018-03-23 RX ORDER — SODIUM CHLORIDE 0.9 % (FLUSH) 0.9 %
1-10 SYRINGE (ML) INJECTION AS NEEDED
Status: DISCONTINUED | OUTPATIENT
Start: 2018-03-23 | End: 2018-03-27

## 2018-03-23 RX ORDER — AMLODIPINE BESYLATE 10 MG/1
10 TABLET ORAL DAILY
Status: DISCONTINUED | OUTPATIENT
Start: 2018-03-23 | End: 2018-03-29

## 2018-03-23 RX ORDER — HYDROCODONE BITARTRATE AND ACETAMINOPHEN 5; 325 MG/1; MG/1
1 TABLET ORAL EVERY 6 HOURS PRN
COMMUNITY
End: 2018-05-07

## 2018-03-23 RX ORDER — ROSUVASTATIN CALCIUM 10 MG/1
10 TABLET, COATED ORAL NIGHTLY
Status: DISCONTINUED | OUTPATIENT
Start: 2018-03-23 | End: 2018-04-05 | Stop reason: HOSPADM

## 2018-03-23 RX ORDER — IPRATROPIUM BROMIDE AND ALBUTEROL SULFATE 2.5; .5 MG/3ML; MG/3ML
3 SOLUTION RESPIRATORY (INHALATION)
Status: DISCONTINUED | OUTPATIENT
Start: 2018-03-23 | End: 2018-03-27 | Stop reason: SDUPTHER

## 2018-03-23 RX ORDER — TRAZODONE HYDROCHLORIDE 50 MG/1
100 TABLET ORAL NIGHTLY PRN
COMMUNITY
End: 2023-03-08

## 2018-03-23 RX ORDER — ACETAMINOPHEN 325 MG/1
650 TABLET ORAL EVERY 6 HOURS PRN
Status: DISCONTINUED | OUTPATIENT
Start: 2018-03-23 | End: 2018-04-05 | Stop reason: HOSPADM

## 2018-03-23 RX ORDER — NICOTINE POLACRILEX 4 MG
15 LOZENGE BUCCAL
Status: DISCONTINUED | OUTPATIENT
Start: 2018-03-23 | End: 2018-04-05 | Stop reason: HOSPADM

## 2018-03-23 RX ORDER — GABAPENTIN 400 MG/1
400 CAPSULE ORAL NIGHTLY
Status: DISCONTINUED | OUTPATIENT
Start: 2018-03-23 | End: 2018-04-05 | Stop reason: HOSPADM

## 2018-03-23 RX ORDER — TRAZODONE HYDROCHLORIDE 100 MG/1
100 TABLET ORAL NIGHTLY PRN
Status: DISCONTINUED | OUTPATIENT
Start: 2018-03-23 | End: 2018-04-05 | Stop reason: HOSPADM

## 2018-03-23 RX ORDER — LOSARTAN POTASSIUM 50 MG/1
50 TABLET ORAL
Status: DISCONTINUED | OUTPATIENT
Start: 2018-03-23 | End: 2018-03-30

## 2018-03-23 RX ORDER — HYDROCODONE BITARTRATE AND ACETAMINOPHEN 7.5; 325 MG/1; MG/1
1 TABLET ORAL EVERY 4 HOURS PRN
Status: DISCONTINUED | OUTPATIENT
Start: 2018-03-23 | End: 2018-03-27

## 2018-03-23 RX ADMIN — INSULIN DETEMIR 16 UNITS: 100 INJECTION, SOLUTION SUBCUTANEOUS at 21:58

## 2018-03-23 RX ADMIN — INSULIN LISPRO 6 UNITS: 100 INJECTION, SOLUTION INTRAVENOUS; SUBCUTANEOUS at 21:57

## 2018-03-23 RX ADMIN — IPRATROPIUM BROMIDE AND ALBUTEROL SULFATE 3 ML: 2.5; .5 SOLUTION RESPIRATORY (INHALATION) at 20:28

## 2018-03-23 RX ADMIN — LOSARTAN POTASSIUM 50 MG: 50 TABLET ORAL at 18:05

## 2018-03-23 RX ADMIN — GABAPENTIN 400 MG: 400 CAPSULE ORAL at 21:58

## 2018-03-23 RX ADMIN — ROSUVASTATIN CALCIUM 10 MG: 10 TABLET, FILM COATED ORAL at 22:47

## 2018-03-23 RX ADMIN — IOPAMIDOL 100 ML: 612 INJECTION, SOLUTION INTRAVENOUS at 18:15

## 2018-03-23 RX ADMIN — GUAIFENESIN 1200 MG: 600 TABLET, EXTENDED RELEASE ORAL at 22:47

## 2018-03-23 RX ADMIN — FLUOXETINE 10 MG: 10 CAPSULE ORAL at 18:05

## 2018-03-23 RX ADMIN — TRAZODONE HYDROCHLORIDE 100 MG: 100 TABLET, FILM COATED ORAL at 22:47

## 2018-03-23 RX ADMIN — AMLODIPINE BESYLATE 10 MG: 10 TABLET ORAL at 18:04

## 2018-03-23 RX ADMIN — ENOXAPARIN SODIUM 40 MG: 40 INJECTION SUBCUTANEOUS at 18:05

## 2018-03-23 RX ADMIN — VANCOMYCIN HYDROCHLORIDE 1000 MG: 1 INJECTION, SOLUTION INTRAVENOUS at 22:01

## 2018-03-23 RX ADMIN — SODIUM CHLORIDE 2346 ML: 9 INJECTION, SOLUTION INTRAVENOUS at 21:04

## 2018-03-23 RX ADMIN — INSULIN LISPRO 6 UNITS: 100 INJECTION, SOLUTION INTRAVENOUS; SUBCUTANEOUS at 18:05

## 2018-03-23 RX ADMIN — SODIUM CHLORIDE 75 ML/HR: 9 INJECTION, SOLUTION INTRAVENOUS at 16:50

## 2018-03-23 RX ADMIN — TAZOBACTAM SODIUM AND PIPERACILLIN SODIUM 3.38 G: 375; 3 INJECTION, SOLUTION INTRAVENOUS at 18:19

## 2018-03-23 NOTE — PROGRESS NOTES
SUBJECTIVE:    Patient ID: Pam Leonard is a 61 y.o. female. Pam Leonard is here today for Shortness of Breath (not any better) and Cough (some better)  . HPI:   HPI       Patient is here today for follow-up on left-sided pain, shortness of breath, and low-grade fevers. Patient was seen in the office approximately one week ago by another provider was diagnosed with left lung pneumonia and started on Rocephin 1 g IM ×7 days patient did have last Rocephin yesterday. Patient was seen as follow-up 2 days ago in the office and I did take care of her that day. Patient reported that that morning she was feeling better and was able to get up and get herself ready and then did verbalize improvement. Patient's vitals been stable and she did have a repeat chest x-ray that day which was received later after the visit not showing any improvement in lung status. Patient was started on doxycycline orally at that time and she is continuing it at this time. Patient is reporting that the left-sided pain had begun improving earlier in the week but it is not improving any further at this time. Chest x-ray has been performed today and is noted as follows:    Narrative   History:   26-year-old female with left lower lobe pneumonia. Reference:   Chest radiograph 2 days prior. Findings:   Frontal and lateral chest radiograph performed. There is persistent left lower lobe airspace disease which could be   atelectasis or pneumonia. There is somewhat loculated collection   versus mass in the posterior left thorax. A mass cannot be excluded.       Impression   Possible loculated left pleural effusion, unresolved for one week. Possibility of an underlying mass is not excluded. CT chest with   contrast is recommended.    Signed by Dr Meghan Santoyo on 3/23/2018 1:09 PM           Reports temp 100.x at home last night and every night since onset  low nicotine E-cigarette use daily  Pt reports she is using insulin as ordered but with her appetite down she has not been eating meals and is not using mealtime insulin related to that. Reports glucose this am was 500. Call has been placed to [facility] hospitalist while pt in office. See plan for details. Past Medical History:   Diagnosis Date    Back pain 6/5/2014    Carotid artery stenosis     Depression 6/5/2014    Diabetes ([address]     GERD (gastroesophageal reflux disease)     Hyperlipidemia     Hypertension 6/5/2014    Type II or unspecified type diabetes mellitus without mention of complication, not stated as uncontrolled      Prior to Visit Medications    Medication Sig Taking? Authorizing Provider   doxycycline hyclate (VIBRA-TABS) 100 MG tablet Take 1 tablet by mouth 2 times daily for 10 days Yes MOOK Diaz   albuterol (PROVENTIL) (2.5 MG/3ML) 0.083% nebulizer solution Take 3 mLs by nebulization every 6 hours as needed for Wheezing or Shortness of Breath Yes MOOK Diaz   insulin aspart (NOVOLOG) 100 UNIT/ML injection vial Per sliding scale Yes MOOK Kolb   gabapentin (NEURONTIN) 100 MG capsule 3-4 po nightly for nerve pain  NEVER abruptly stop. Yes MOOK Diaz   Insulin Pen Needle ([employer] PEN NEEDLES) 31G X 6 MM MISC 1 each by Does not apply route daily Yes MOOK Diaz   insulin glargine (BASAGLAR KWIKPEN) 100 UNIT/ML injection pen 5 units nightly for glucose  Call glucose log in 1wk. Patient taking differently: Inject 16 Units into the skin nightly 5 units nightly for glucose  Call glucose log in 1wk.  Yes MOOK Diaz   Insulin Syringe-Needle U-100 ([employer] INSULIN SYRINGE .5CC/30G) 30G X 1/2\" 0.5 ML MISC 1 each by Does not apply route nightly Yes MOOK Diaz   pantoprazole (PROTONIX) 40 MG tablet Take 1 tablet by mouth daily Yes MOOK Diaz   FLUoxetine (PROZAC) 20 MG capsule TAKE 1 CAPSULE BY MOUTH EVERY DAY Yes MOOK Alvarez   ranitidine (ZANTAC) 150 MG tablet 1 po daily as needed for break through

## 2018-03-24 ENCOUNTER — APPOINTMENT (OUTPATIENT)
Dept: CT IMAGING | Facility: HOSPITAL | Age: 58
End: 2018-03-24

## 2018-03-24 ENCOUNTER — APPOINTMENT (OUTPATIENT)
Dept: ULTRASOUND IMAGING | Facility: HOSPITAL | Age: 58
End: 2018-03-24

## 2018-03-24 LAB
ANION GAP SERPL CALCULATED.3IONS-SCNC: 11 MMOL/L (ref 4–13)
BASOPHILS # BLD AUTO: 0.05 10*3/MM3 (ref 0–0.2)
BASOPHILS NFR BLD AUTO: 0.5 % (ref 0–2)
BUN BLD-MCNC: 12 MG/DL (ref 5–21)
BUN/CREAT SERPL: 19.4 (ref 7–25)
CALCIUM SPEC-SCNC: 8.5 MG/DL (ref 8.4–10.4)
CHLORIDE SERPL-SCNC: 101 MMOL/L (ref 98–110)
CO2 SERPL-SCNC: 29 MMOL/L (ref 24–31)
CREAT BLD-MCNC: 0.62 MG/DL (ref 0.5–1.4)
DEPRECATED RDW RBC AUTO: 43.9 FL (ref 40–54)
EOSINOPHIL # BLD AUTO: 0.19 10*3/MM3 (ref 0–0.7)
EOSINOPHIL NFR BLD AUTO: 2 % (ref 0–4)
ERYTHROCYTE [DISTWIDTH] IN BLOOD BY AUTOMATED COUNT: 12.9 % (ref 12–15)
GFR SERPL CREATININE-BSD FRML MDRD: 99 ML/MIN/1.73
GLUCOSE BLD-MCNC: 189 MG/DL (ref 70–100)
GLUCOSE BLDC GLUCOMTR-MCNC: 202 MG/DL (ref 70–130)
GLUCOSE BLDC GLUCOMTR-MCNC: 270 MG/DL (ref 70–130)
GLUCOSE BLDC GLUCOMTR-MCNC: 315 MG/DL (ref 70–130)
GLUCOSE BLDC GLUCOMTR-MCNC: 344 MG/DL (ref 70–130)
HCT VFR BLD AUTO: 32.7 % (ref 37–47)
HGB BLD-MCNC: 10.6 G/DL (ref 12–16)
IMM GRANULOCYTES # BLD: 0.13 10*3/MM3 (ref 0–0.03)
IMM GRANULOCYTES NFR BLD: 1.4 % (ref 0–5)
INR PPP: 1.06 (ref 0.91–1.09)
LDH SERPL-CCNC: 339 U/L (ref 265–665)
LYMPHOCYTES # BLD AUTO: 2.27 10*3/MM3 (ref 0.72–4.86)
LYMPHOCYTES NFR BLD AUTO: 24.4 % (ref 15–45)
MCH RBC QN AUTO: 29.8 PG (ref 28–32)
MCHC RBC AUTO-ENTMCNC: 32.4 G/DL (ref 33–36)
MCV RBC AUTO: 91.9 FL (ref 82–98)
MONOCYTES # BLD AUTO: 0.62 10*3/MM3 (ref 0.19–1.3)
MONOCYTES NFR BLD AUTO: 6.7 % (ref 4–12)
NEUTROPHILS # BLD AUTO: 6.04 10*3/MM3 (ref 1.87–8.4)
NEUTROPHILS NFR BLD AUTO: 65 % (ref 39–78)
NRBC BLD MANUAL-RTO: 0 /100 WBC (ref 0–0)
PLATELET # BLD AUTO: 269 10*3/MM3 (ref 130–400)
PMV BLD AUTO: 11.5 FL (ref 6–12)
POTASSIUM BLD-SCNC: 3.6 MMOL/L (ref 3.5–5.3)
PROTHROMBIN TIME: 14.1 SECONDS (ref 11.9–14.6)
RBC # BLD AUTO: 3.56 10*6/MM3 (ref 4.2–5.4)
SODIUM BLD-SCNC: 141 MMOL/L (ref 135–145)
WBC NRBC COR # BLD: 9.3 10*3/MM3 (ref 4.8–10.8)

## 2018-03-24 PROCEDURE — 88112 CYTOPATH CELL ENHANCE TECH: CPT | Performed by: INTERNAL MEDICINE

## 2018-03-24 PROCEDURE — 63710000001 INSULIN LISPRO (HUMAN) PER 5 UNITS: Performed by: NURSE PRACTITIONER

## 2018-03-24 PROCEDURE — 75989 ABSCESS DRAINAGE UNDER X-RAY: CPT

## 2018-03-24 PROCEDURE — 25010000003 HYDROXYZINE PER 25 MG: Performed by: NURSE PRACTITIONER

## 2018-03-24 PROCEDURE — 83615 LACTATE (LD) (LDH) ENZYME: CPT | Performed by: INTERNAL MEDICINE

## 2018-03-24 PROCEDURE — 84157 ASSAY OF PROTEIN OTHER: CPT | Performed by: INTERNAL MEDICINE

## 2018-03-24 PROCEDURE — 63710000001 INSULIN DETEMIR PER 5 UNITS: Performed by: NURSE PRACTITIONER

## 2018-03-24 PROCEDURE — 83986 ASSAY PH BODY FLUID NOS: CPT | Performed by: INTERNAL MEDICINE

## 2018-03-24 PROCEDURE — 94799 UNLISTED PULMONARY SVC/PX: CPT

## 2018-03-24 PROCEDURE — 25010000002 PIPERACILLIN SOD-TAZOBACTAM PER 1 G: Performed by: INTERNAL MEDICINE

## 2018-03-24 PROCEDURE — 85610 PROTHROMBIN TIME: CPT | Performed by: NURSE PRACTITIONER

## 2018-03-24 PROCEDURE — 87015 SPECIMEN INFECT AGNT CONCNTJ: CPT | Performed by: INTERNAL MEDICINE

## 2018-03-24 PROCEDURE — 87205 SMEAR GRAM STAIN: CPT | Performed by: INTERNAL MEDICINE

## 2018-03-24 PROCEDURE — 80048 BASIC METABOLIC PNL TOTAL CA: CPT | Performed by: NURSE PRACTITIONER

## 2018-03-24 PROCEDURE — 88312 SPECIAL STAINS GROUP 1: CPT | Performed by: INTERNAL MEDICINE

## 2018-03-24 PROCEDURE — 25010000002 HYDROMORPHONE PER 4 MG: Performed by: NURSE PRACTITIONER

## 2018-03-24 PROCEDURE — 0W9B3ZZ DRAINAGE OF LEFT PLEURAL CAVITY, PERCUTANEOUS APPROACH: ICD-10-PCS | Performed by: INTERNAL MEDICINE

## 2018-03-24 PROCEDURE — 25010000002 ENOXAPARIN PER 10 MG: Performed by: NURSE PRACTITIONER

## 2018-03-24 PROCEDURE — 85025 COMPLETE CBC W/AUTO DIFF WBC: CPT | Performed by: NURSE PRACTITIONER

## 2018-03-24 PROCEDURE — 76604 US EXAM CHEST: CPT

## 2018-03-24 PROCEDURE — 82962 GLUCOSE BLOOD TEST: CPT

## 2018-03-24 PROCEDURE — 88305 TISSUE EXAM BY PATHOLOGIST: CPT | Performed by: INTERNAL MEDICINE

## 2018-03-24 PROCEDURE — 87070 CULTURE OTHR SPECIMN AEROBIC: CPT | Performed by: INTERNAL MEDICINE

## 2018-03-24 PROCEDURE — 25010000002 VANCOMYCIN PER 500 MG: Performed by: INTERNAL MEDICINE

## 2018-03-24 RX ORDER — HYDROMORPHONE HCL 110MG/55ML
0.5 PATIENT CONTROLLED ANALGESIA SYRINGE INTRAVENOUS
Status: DISCONTINUED | OUTPATIENT
Start: 2018-03-24 | End: 2018-03-27

## 2018-03-24 RX ORDER — HYDROXYZINE HYDROCHLORIDE 25 MG/1
25 TABLET, FILM COATED ORAL 3 TIMES DAILY PRN
Status: DISCONTINUED | OUTPATIENT
Start: 2018-03-24 | End: 2018-03-26

## 2018-03-24 RX ORDER — HYDROXYZINE HYDROCHLORIDE 50 MG/ML
50 INJECTION, SOLUTION INTRAMUSCULAR ONCE
Status: COMPLETED | OUTPATIENT
Start: 2018-03-24 | End: 2018-03-24

## 2018-03-24 RX ADMIN — TAZOBACTAM SODIUM AND PIPERACILLIN SODIUM 3.38 G: 375; 3 INJECTION, SOLUTION INTRAVENOUS at 17:49

## 2018-03-24 RX ADMIN — VANCOMYCIN HYDROCHLORIDE 1000 MG: 1 INJECTION, SOLUTION INTRAVENOUS at 19:57

## 2018-03-24 RX ADMIN — GABAPENTIN 400 MG: 400 CAPSULE ORAL at 21:14

## 2018-03-24 RX ADMIN — TAZOBACTAM SODIUM AND PIPERACILLIN SODIUM 3.38 G: 375; 3 INJECTION, SOLUTION INTRAVENOUS at 08:56

## 2018-03-24 RX ADMIN — IPRATROPIUM BROMIDE AND ALBUTEROL SULFATE 3 ML: 2.5; .5 SOLUTION RESPIRATORY (INHALATION) at 20:16

## 2018-03-24 RX ADMIN — HYDROMORPHONE HYDROCHLORIDE 0.5 MG: 2 INJECTION, SOLUTION INTRAMUSCULAR; INTRAVENOUS; SUBCUTANEOUS at 19:57

## 2018-03-24 RX ADMIN — GUAIFENESIN 1200 MG: 600 TABLET, EXTENDED RELEASE ORAL at 21:14

## 2018-03-24 RX ADMIN — TAZOBACTAM SODIUM AND PIPERACILLIN SODIUM 3.38 G: 375; 3 INJECTION, SOLUTION INTRAVENOUS at 01:36

## 2018-03-24 RX ADMIN — IPRATROPIUM BROMIDE AND ALBUTEROL SULFATE 3 ML: 2.5; .5 SOLUTION RESPIRATORY (INHALATION) at 11:53

## 2018-03-24 RX ADMIN — IPRATROPIUM BROMIDE AND ALBUTEROL SULFATE 3 ML: 2.5; .5 SOLUTION RESPIRATORY (INHALATION) at 08:28

## 2018-03-24 RX ADMIN — TAZOBACTAM SODIUM AND PIPERACILLIN SODIUM 3.38 G: 375; 3 INJECTION, SOLUTION INTRAVENOUS at 23:39

## 2018-03-24 RX ADMIN — INSULIN DETEMIR 16 UNITS: 100 INJECTION, SOLUTION SUBCUTANEOUS at 21:14

## 2018-03-24 RX ADMIN — LOSARTAN POTASSIUM 50 MG: 50 TABLET ORAL at 15:53

## 2018-03-24 RX ADMIN — HYDROXYZINE HYDROCHLORIDE 50 MG: 50 INJECTION, SOLUTION INTRAMUSCULAR at 12:22

## 2018-03-24 RX ADMIN — GUAIFENESIN 1200 MG: 600 TABLET, EXTENDED RELEASE ORAL at 15:52

## 2018-03-24 RX ADMIN — AMLODIPINE BESYLATE 10 MG: 10 TABLET ORAL at 15:52

## 2018-03-24 RX ADMIN — HYDROMORPHONE HYDROCHLORIDE 0.5 MG: 2 INJECTION, SOLUTION INTRAMUSCULAR; INTRAVENOUS; SUBCUTANEOUS at 11:26

## 2018-03-24 RX ADMIN — INSULIN LISPRO 7 UNITS: 100 INJECTION, SOLUTION INTRAVENOUS; SUBCUTANEOUS at 21:13

## 2018-03-24 RX ADMIN — FLUOXETINE 10 MG: 10 CAPSULE ORAL at 15:53

## 2018-03-24 RX ADMIN — VANCOMYCIN HYDROCHLORIDE 1000 MG: 1 INJECTION, SOLUTION INTRAVENOUS at 08:56

## 2018-03-24 RX ADMIN — IPRATROPIUM BROMIDE AND ALBUTEROL SULFATE 3 ML: 2.5; .5 SOLUTION RESPIRATORY (INHALATION) at 15:31

## 2018-03-24 RX ADMIN — ROSUVASTATIN CALCIUM 10 MG: 10 TABLET, FILM COATED ORAL at 21:14

## 2018-03-24 RX ADMIN — INSULIN LISPRO 7 UNITS: 100 INJECTION, SOLUTION INTRAVENOUS; SUBCUTANEOUS at 17:48

## 2018-03-24 RX ADMIN — SODIUM CHLORIDE 75 ML/HR: 9 INJECTION, SOLUTION INTRAVENOUS at 15:54

## 2018-03-24 RX ADMIN — ENOXAPARIN SODIUM 40 MG: 40 INJECTION SUBCUTANEOUS at 17:49

## 2018-03-25 LAB
ANION GAP SERPL CALCULATED.3IONS-SCNC: 10 MMOL/L (ref 4–13)
BASOPHILS # BLD MANUAL: 0.08 10*3/MM3 (ref 0–0.2)
BASOPHILS NFR BLD AUTO: 1 % (ref 0–2)
BUN BLD-MCNC: 11 MG/DL (ref 5–21)
BUN/CREAT SERPL: 15.7 (ref 7–25)
CALCIUM SPEC-SCNC: 8.6 MG/DL (ref 8.4–10.4)
CHLORIDE SERPL-SCNC: 101 MMOL/L (ref 98–110)
CO2 SERPL-SCNC: 28 MMOL/L (ref 24–31)
CREAT BLD-MCNC: 0.7 MG/DL (ref 0.5–1.4)
DEPRECATED RDW RBC AUTO: 45 FL (ref 40–54)
EOSINOPHIL # BLD MANUAL: 0.08 10*3/MM3 (ref 0–0.7)
EOSINOPHIL NFR BLD MANUAL: 1 % (ref 0–4)
ERYTHROCYTE [DISTWIDTH] IN BLOOD BY AUTOMATED COUNT: 13.2 % (ref 12–15)
GFR SERPL CREATININE-BSD FRML MDRD: 86 ML/MIN/1.73
GLUCOSE BLD-MCNC: 285 MG/DL (ref 70–100)
GLUCOSE BLDC GLUCOMTR-MCNC: 264 MG/DL (ref 70–130)
GLUCOSE BLDC GLUCOMTR-MCNC: 334 MG/DL (ref 70–130)
GLUCOSE BLDC GLUCOMTR-MCNC: 342 MG/DL (ref 70–130)
GLUCOSE BLDC GLUCOMTR-MCNC: 440 MG/DL (ref 70–130)
HCT VFR BLD AUTO: 30.7 % (ref 37–47)
HGB BLD-MCNC: 10 G/DL (ref 12–16)
LYMPHOCYTES # BLD MANUAL: 1.67 10*3/MM3 (ref 0.72–4.86)
LYMPHOCYTES NFR BLD MANUAL: 21 % (ref 15–45)
LYMPHOCYTES NFR BLD MANUAL: 7 % (ref 4–12)
MCH RBC QN AUTO: 30.3 PG (ref 28–32)
MCHC RBC AUTO-ENTMCNC: 32.6 G/DL (ref 33–36)
MCV RBC AUTO: 93 FL (ref 82–98)
MONOCYTES # BLD AUTO: 0.56 10*3/MM3 (ref 0.19–1.3)
MYELOCYTES NFR BLD MANUAL: 1 % (ref 0–0)
NEUTROPHILS # BLD AUTO: 5.33 10*3/MM3 (ref 1.87–8.4)
NEUTROPHILS NFR BLD MANUAL: 67 % (ref 39–78)
PLATELET # BLD AUTO: 271 10*3/MM3 (ref 130–400)
PMV BLD AUTO: 11.3 FL (ref 6–12)
POTASSIUM BLD-SCNC: 3.8 MMOL/L (ref 3.5–5.3)
RBC # BLD AUTO: 3.3 10*6/MM3 (ref 4.2–5.4)
RBC MORPH BLD: NORMAL
SMALL PLATELETS BLD QL SMEAR: ADEQUATE
SODIUM BLD-SCNC: 139 MMOL/L (ref 135–145)
VANCOMYCIN TROUGH SERPL-MCNC: 9.56 MCG/ML (ref 10–20)
VARIANT LYMPHS NFR BLD MANUAL: 2 % (ref 0–5)
WBC MORPH BLD: NORMAL
WBC NRBC COR # BLD: 7.95 10*3/MM3 (ref 4.8–10.8)

## 2018-03-25 PROCEDURE — 25010000002 HYDROMORPHONE PER 4 MG: Performed by: NURSE PRACTITIONER

## 2018-03-25 PROCEDURE — 63710000001 INSULIN LISPRO (HUMAN) PER 5 UNITS: Performed by: NURSE PRACTITIONER

## 2018-03-25 PROCEDURE — 94799 UNLISTED PULMONARY SVC/PX: CPT

## 2018-03-25 PROCEDURE — 80202 ASSAY OF VANCOMYCIN: CPT | Performed by: INTERNAL MEDICINE

## 2018-03-25 PROCEDURE — 85025 COMPLETE CBC W/AUTO DIFF WBC: CPT | Performed by: NURSE PRACTITIONER

## 2018-03-25 PROCEDURE — 25010000002 VANCOMYCIN PER 500 MG: Performed by: INTERNAL MEDICINE

## 2018-03-25 PROCEDURE — 82962 GLUCOSE BLOOD TEST: CPT

## 2018-03-25 PROCEDURE — 99223 1ST HOSP IP/OBS HIGH 75: CPT | Performed by: THORACIC SURGERY (CARDIOTHORACIC VASCULAR SURGERY)

## 2018-03-25 PROCEDURE — 63710000001 INSULIN LISPRO (HUMAN) PER 5 UNITS: Performed by: INTERNAL MEDICINE

## 2018-03-25 PROCEDURE — 85007 BL SMEAR W/DIFF WBC COUNT: CPT | Performed by: NURSE PRACTITIONER

## 2018-03-25 PROCEDURE — 80048 BASIC METABOLIC PNL TOTAL CA: CPT | Performed by: NURSE PRACTITIONER

## 2018-03-25 PROCEDURE — 25010000002 PIPERACILLIN SOD-TAZOBACTAM PER 1 G: Performed by: INTERNAL MEDICINE

## 2018-03-25 PROCEDURE — 63710000001 INSULIN DETEMIR PER 5 UNITS: Performed by: NURSE PRACTITIONER

## 2018-03-25 PROCEDURE — 25010000002 ENOXAPARIN PER 10 MG: Performed by: NURSE PRACTITIONER

## 2018-03-25 RX ADMIN — GABAPENTIN 400 MG: 400 CAPSULE ORAL at 21:01

## 2018-03-25 RX ADMIN — INSULIN DETEMIR 20 UNITS: 100 INJECTION, SOLUTION SUBCUTANEOUS at 21:01

## 2018-03-25 RX ADMIN — TAZOBACTAM SODIUM AND PIPERACILLIN SODIUM 3.38 G: 375; 3 INJECTION, SOLUTION INTRAVENOUS at 18:14

## 2018-03-25 RX ADMIN — ENOXAPARIN SODIUM 40 MG: 40 INJECTION SUBCUTANEOUS at 18:14

## 2018-03-25 RX ADMIN — INSULIN LISPRO 7 UNITS: 100 INJECTION, SOLUTION INTRAVENOUS; SUBCUTANEOUS at 18:14

## 2018-03-25 RX ADMIN — INSULIN LISPRO 7 UNITS: 100 INJECTION, SOLUTION INTRAVENOUS; SUBCUTANEOUS at 12:32

## 2018-03-25 RX ADMIN — AMLODIPINE BESYLATE 10 MG: 10 TABLET ORAL at 09:44

## 2018-03-25 RX ADMIN — HYDROMORPHONE HYDROCHLORIDE 0.5 MG: 2 INJECTION, SOLUTION INTRAMUSCULAR; INTRAVENOUS; SUBCUTANEOUS at 21:01

## 2018-03-25 RX ADMIN — PANTOPRAZOLE SODIUM 40 MG: 40 TABLET, DELAYED RELEASE ORAL at 06:18

## 2018-03-25 RX ADMIN — IPRATROPIUM BROMIDE AND ALBUTEROL SULFATE 3 ML: 2.5; .5 SOLUTION RESPIRATORY (INHALATION) at 12:12

## 2018-03-25 RX ADMIN — FLUOXETINE 10 MG: 10 CAPSULE ORAL at 09:44

## 2018-03-25 RX ADMIN — SODIUM CHLORIDE 75 ML/HR: 9 INJECTION, SOLUTION INTRAVENOUS at 11:25

## 2018-03-25 RX ADMIN — GUAIFENESIN 1200 MG: 600 TABLET, EXTENDED RELEASE ORAL at 09:44

## 2018-03-25 RX ADMIN — VANCOMYCIN HYDROCHLORIDE 1000 MG: 1 INJECTION, SOLUTION INTRAVENOUS at 09:45

## 2018-03-25 RX ADMIN — IPRATROPIUM BROMIDE AND ALBUTEROL SULFATE 3 ML: 2.5; .5 SOLUTION RESPIRATORY (INHALATION) at 19:44

## 2018-03-25 RX ADMIN — GUAIFENESIN 1200 MG: 600 TABLET, EXTENDED RELEASE ORAL at 21:01

## 2018-03-25 RX ADMIN — LOSARTAN POTASSIUM 50 MG: 50 TABLET ORAL at 09:44

## 2018-03-25 RX ADMIN — ROSUVASTATIN CALCIUM 10 MG: 10 TABLET, FILM COATED ORAL at 21:01

## 2018-03-25 RX ADMIN — IPRATROPIUM BROMIDE AND ALBUTEROL SULFATE 3 ML: 2.5; .5 SOLUTION RESPIRATORY (INHALATION) at 08:15

## 2018-03-25 RX ADMIN — INSULIN LISPRO 6 UNITS: 100 INJECTION, SOLUTION INTRAVENOUS; SUBCUTANEOUS at 09:44

## 2018-03-25 RX ADMIN — IPRATROPIUM BROMIDE AND ALBUTEROL SULFATE 3 ML: 2.5; .5 SOLUTION RESPIRATORY (INHALATION) at 16:26

## 2018-03-25 RX ADMIN — VANCOMYCIN HYDROCHLORIDE 1000 MG: 1 INJECTION, SOLUTION INTRAVENOUS at 20:58

## 2018-03-25 RX ADMIN — INSULIN LISPRO 24 UNITS: 100 INJECTION, SOLUTION INTRAVENOUS; SUBCUTANEOUS at 21:01

## 2018-03-25 RX ADMIN — TAZOBACTAM SODIUM AND PIPERACILLIN SODIUM 3.38 G: 375; 3 INJECTION, SOLUTION INTRAVENOUS at 09:45

## 2018-03-26 ENCOUNTER — APPOINTMENT (OUTPATIENT)
Dept: GENERAL RADIOLOGY | Facility: HOSPITAL | Age: 58
End: 2018-03-26

## 2018-03-26 PROBLEM — J86.9 EMPYEMA OF PLEURA (HCC): Status: ACTIVE | Noted: 2018-03-23

## 2018-03-26 LAB
ABO GROUP BLD: NORMAL
ALBUMIN SERPL-MCNC: 3.3 G/DL (ref 3.5–5)
ALBUMIN/GLOB SERPL: 1 G/DL (ref 1.1–2.5)
ALP SERPL-CCNC: 177 U/L (ref 24–120)
ALT SERPL W P-5'-P-CCNC: 42 U/L (ref 0–54)
ANION GAP SERPL CALCULATED.3IONS-SCNC: 12 MMOL/L (ref 4–13)
APTT PPP: 32 SECONDS (ref 24.1–34.8)
AST SERPL-CCNC: 48 U/L (ref 7–45)
BILIRUB SERPL-MCNC: 0.1 MG/DL (ref 0.1–1)
BLD GP AB SCN SERPL QL: NEGATIVE
BUN BLD-MCNC: 7 MG/DL (ref 5–21)
BUN/CREAT SERPL: 11.7 (ref 7–25)
CALCIUM SPEC-SCNC: 8.5 MG/DL (ref 8.4–10.4)
CHLORIDE SERPL-SCNC: 102 MMOL/L (ref 98–110)
CO2 SERPL-SCNC: 25 MMOL/L (ref 24–31)
CREAT BLD-MCNC: 0.6 MG/DL (ref 0.5–1.4)
DEPRECATED RDW RBC AUTO: 43.9 FL (ref 40–54)
ERYTHROCYTE [DISTWIDTH] IN BLOOD BY AUTOMATED COUNT: 13.1 % (ref 12–15)
GFR SERPL CREATININE-BSD FRML MDRD: 103 ML/MIN/1.73
GLOBULIN UR ELPH-MCNC: 3.4 GM/DL
GLUCOSE BLD-MCNC: 275 MG/DL (ref 70–100)
GLUCOSE BLDC GLUCOMTR-MCNC: 196 MG/DL (ref 70–130)
GLUCOSE BLDC GLUCOMTR-MCNC: 199 MG/DL (ref 70–130)
GLUCOSE BLDC GLUCOMTR-MCNC: 251 MG/DL (ref 70–130)
GLUCOSE BLDC GLUCOMTR-MCNC: 262 MG/DL (ref 70–130)
HCT VFR BLD AUTO: 31.5 % (ref 37–47)
HGB BLD-MCNC: 10.4 G/DL (ref 12–16)
INR PPP: 1.06 (ref 0.91–1.09)
MCH RBC QN AUTO: 30.3 PG (ref 28–32)
MCHC RBC AUTO-ENTMCNC: 33 G/DL (ref 33–36)
MCV RBC AUTO: 91.8 FL (ref 82–98)
PLATELET # BLD AUTO: 291 10*3/MM3 (ref 130–400)
PMV BLD AUTO: 11.6 FL (ref 6–12)
POTASSIUM BLD-SCNC: 3.4 MMOL/L (ref 3.5–5.3)
PROT SERPL-MCNC: 6.7 G/DL (ref 6.3–8.7)
PROTHROMBIN TIME: 14.1 SECONDS (ref 11.9–14.6)
RBC # BLD AUTO: 3.43 10*6/MM3 (ref 4.2–5.4)
RH BLD: POSITIVE
SODIUM BLD-SCNC: 139 MMOL/L (ref 135–145)
WBC NRBC COR # BLD: 8.05 10*3/MM3 (ref 4.8–10.8)

## 2018-03-26 PROCEDURE — 82962 GLUCOSE BLOOD TEST: CPT

## 2018-03-26 PROCEDURE — 93010 ELECTROCARDIOGRAM REPORT: CPT | Performed by: INTERNAL MEDICINE

## 2018-03-26 PROCEDURE — 80053 COMPREHEN METABOLIC PANEL: CPT | Performed by: NURSE PRACTITIONER

## 2018-03-26 PROCEDURE — 63710000001 INSULIN DETEMIR PER 5 UNITS: Performed by: NURSE PRACTITIONER

## 2018-03-26 PROCEDURE — 85730 THROMBOPLASTIN TIME PARTIAL: CPT | Performed by: NURSE PRACTITIONER

## 2018-03-26 PROCEDURE — 85610 PROTHROMBIN TIME: CPT | Performed by: NURSE PRACTITIONER

## 2018-03-26 PROCEDURE — 86900 BLOOD TYPING SEROLOGIC ABO: CPT | Performed by: NURSE PRACTITIONER

## 2018-03-26 PROCEDURE — 63710000001 INSULIN LISPRO (HUMAN) PER 5 UNITS: Performed by: INTERNAL MEDICINE

## 2018-03-26 PROCEDURE — 94799 UNLISTED PULMONARY SVC/PX: CPT

## 2018-03-26 PROCEDURE — 86920 COMPATIBILITY TEST SPIN: CPT

## 2018-03-26 PROCEDURE — 86850 RBC ANTIBODY SCREEN: CPT | Performed by: NURSE PRACTITIONER

## 2018-03-26 PROCEDURE — 85027 COMPLETE CBC AUTOMATED: CPT | Performed by: NURSE PRACTITIONER

## 2018-03-26 PROCEDURE — 93005 ELECTROCARDIOGRAM TRACING: CPT | Performed by: NURSE PRACTITIONER

## 2018-03-26 PROCEDURE — 25010000002 HYDROMORPHONE PER 4 MG: Performed by: NURSE PRACTITIONER

## 2018-03-26 PROCEDURE — 86901 BLOOD TYPING SEROLOGIC RH(D): CPT | Performed by: NURSE PRACTITIONER

## 2018-03-26 PROCEDURE — 86901 BLOOD TYPING SEROLOGIC RH(D): CPT

## 2018-03-26 PROCEDURE — 86900 BLOOD TYPING SEROLOGIC ABO: CPT

## 2018-03-26 PROCEDURE — 25010000002 PIPERACILLIN SOD-TAZOBACTAM PER 1 G: Performed by: INTERNAL MEDICINE

## 2018-03-26 PROCEDURE — 25010000002 VANCOMYCIN 10 G RECONSTITUTED SOLUTION

## 2018-03-26 PROCEDURE — 71045 X-RAY EXAM CHEST 1 VIEW: CPT

## 2018-03-26 RX ORDER — ALBUTEROL SULFATE 1.25 MG/3ML
1.25 SOLUTION RESPIRATORY (INHALATION)
Status: DISCONTINUED | OUTPATIENT
Start: 2018-03-26 | End: 2018-03-28

## 2018-03-26 RX ORDER — HYDROXYZINE HYDROCHLORIDE 25 MG/1
50 TABLET, FILM COATED ORAL 3 TIMES DAILY PRN
Status: DISCONTINUED | OUTPATIENT
Start: 2018-03-26 | End: 2018-04-05 | Stop reason: HOSPADM

## 2018-03-26 RX ADMIN — ROSUVASTATIN CALCIUM 10 MG: 10 TABLET, FILM COATED ORAL at 22:02

## 2018-03-26 RX ADMIN — HYDROMORPHONE HYDROCHLORIDE 0.5 MG: 2 INJECTION, SOLUTION INTRAMUSCULAR; INTRAVENOUS; SUBCUTANEOUS at 14:28

## 2018-03-26 RX ADMIN — INSULIN LISPRO 12 UNITS: 100 INJECTION, SOLUTION INTRAVENOUS; SUBCUTANEOUS at 22:03

## 2018-03-26 RX ADMIN — INSULIN LISPRO 4 UNITS: 100 INJECTION, SOLUTION INTRAVENOUS; SUBCUTANEOUS at 09:34

## 2018-03-26 RX ADMIN — LOSARTAN POTASSIUM 50 MG: 50 TABLET ORAL at 09:29

## 2018-03-26 RX ADMIN — TAZOBACTAM SODIUM AND PIPERACILLIN SODIUM 3.38 G: 375; 3 INJECTION, SOLUTION INTRAVENOUS at 17:54

## 2018-03-26 RX ADMIN — GUAIFENESIN 1200 MG: 600 TABLET, EXTENDED RELEASE ORAL at 22:02

## 2018-03-26 RX ADMIN — GABAPENTIN 400 MG: 400 CAPSULE ORAL at 22:03

## 2018-03-26 RX ADMIN — GUAIFENESIN 1200 MG: 600 TABLET, EXTENDED RELEASE ORAL at 09:29

## 2018-03-26 RX ADMIN — HYDROXYZINE HYDROCHLORIDE 50 MG: 25 TABLET ORAL at 22:09

## 2018-03-26 RX ADMIN — PANTOPRAZOLE SODIUM 40 MG: 40 TABLET, DELAYED RELEASE ORAL at 05:29

## 2018-03-26 RX ADMIN — INSULIN LISPRO 4 UNITS: 100 INJECTION, SOLUTION INTRAVENOUS; SUBCUTANEOUS at 17:54

## 2018-03-26 RX ADMIN — ALBUTEROL SULFATE 1.25 MG: 1.25 SOLUTION RESPIRATORY (INHALATION) at 22:15

## 2018-03-26 RX ADMIN — INSULIN DETEMIR 25 UNITS: 100 INJECTION, SOLUTION SUBCUTANEOUS at 22:03

## 2018-03-26 RX ADMIN — METFORMIN HYDROCHLORIDE 500 MG: 500 TABLET ORAL at 09:29

## 2018-03-26 RX ADMIN — SODIUM CHLORIDE 75 ML/HR: 9 INJECTION, SOLUTION INTRAVENOUS at 01:12

## 2018-03-26 RX ADMIN — ALBUTEROL SULFATE 1.25 MG: 1.25 SOLUTION RESPIRATORY (INHALATION) at 11:56

## 2018-03-26 RX ADMIN — INSULIN LISPRO 12 UNITS: 100 INJECTION, SOLUTION INTRAVENOUS; SUBCUTANEOUS at 12:20

## 2018-03-26 RX ADMIN — HYDROMORPHONE HYDROCHLORIDE 0.5 MG: 2 INJECTION, SOLUTION INTRAMUSCULAR; INTRAVENOUS; SUBCUTANEOUS at 22:03

## 2018-03-26 RX ADMIN — TAZOBACTAM SODIUM AND PIPERACILLIN SODIUM 3.38 G: 375; 3 INJECTION, SOLUTION INTRAVENOUS at 01:14

## 2018-03-26 RX ADMIN — VANCOMYCIN HYDROCHLORIDE 1250 MG: 1 INJECTION, POWDER, LYOPHILIZED, FOR SOLUTION INTRAVENOUS at 09:30

## 2018-03-26 RX ADMIN — FLUOXETINE 10 MG: 10 CAPSULE ORAL at 09:29

## 2018-03-26 RX ADMIN — VANCOMYCIN HYDROCHLORIDE 1250 MG: 1 INJECTION, POWDER, LYOPHILIZED, FOR SOLUTION INTRAVENOUS at 22:02

## 2018-03-26 RX ADMIN — TAZOBACTAM SODIUM AND PIPERACILLIN SODIUM 3.38 G: 375; 3 INJECTION, SOLUTION INTRAVENOUS at 09:30

## 2018-03-26 RX ADMIN — METFORMIN HYDROCHLORIDE 500 MG: 500 TABLET ORAL at 17:54

## 2018-03-26 RX ADMIN — IPRATROPIUM BROMIDE AND ALBUTEROL SULFATE 3 ML: 2.5; .5 SOLUTION RESPIRATORY (INHALATION) at 07:50

## 2018-03-26 RX ADMIN — ALBUTEROL SULFATE 1.25 MG: 1.25 SOLUTION RESPIRATORY (INHALATION) at 15:30

## 2018-03-26 RX ADMIN — AMLODIPINE BESYLATE 10 MG: 10 TABLET ORAL at 09:29

## 2018-03-26 RX ADMIN — SODIUM CHLORIDE 75 ML/HR: 9 INJECTION, SOLUTION INTRAVENOUS at 22:02

## 2018-03-27 ENCOUNTER — APPOINTMENT (OUTPATIENT)
Dept: GENERAL RADIOLOGY | Facility: HOSPITAL | Age: 58
End: 2018-03-27

## 2018-03-27 ENCOUNTER — ANESTHESIA (OUTPATIENT)
Dept: PERIOP | Facility: HOSPITAL | Age: 58
End: 2018-03-27

## 2018-03-27 ENCOUNTER — ANESTHESIA EVENT (OUTPATIENT)
Dept: PERIOP | Facility: HOSPITAL | Age: 58
End: 2018-03-27

## 2018-03-27 LAB
ALBUMIN SERPL-MCNC: 3.3 G/DL (ref 3.5–5)
ALBUMIN/GLOB SERPL: 0.9 G/DL (ref 1.1–2.5)
ALP SERPL-CCNC: 185 U/L (ref 24–120)
ALT SERPL W P-5'-P-CCNC: 45 U/L (ref 0–54)
ANION GAP SERPL CALCULATED.3IONS-SCNC: 10 MMOL/L (ref 4–13)
ANION GAP SERPL CALCULATED.3IONS-SCNC: 11 MMOL/L (ref 4–13)
ARTERIAL PATENCY WRIST A: ABNORMAL
AST SERPL-CCNC: 46 U/L (ref 7–45)
ATMOSPHERIC PRESS: 753 MMHG
BASE EXCESS BLDA CALC-SCNC: -0.4 MMOL/L (ref 0–2)
BASOPHILS # BLD AUTO: 0.05 10*3/MM3 (ref 0–0.2)
BASOPHILS NFR BLD AUTO: 0.6 % (ref 0–2)
BDY SITE: ABNORMAL
BILIRUB SERPL-MCNC: 0.1 MG/DL (ref 0.1–1)
BODY TEMPERATURE: 37 C
BUN BLD-MCNC: 7 MG/DL (ref 5–21)
BUN BLD-MCNC: 8 MG/DL (ref 5–21)
BUN/CREAT SERPL: 12.1 (ref 7–25)
BUN/CREAT SERPL: 14 (ref 7–25)
CA-I BLD-MCNC: 4.49 MG/DL (ref 4.6–5.4)
CALCIUM SPEC-SCNC: 8.3 MG/DL (ref 8.4–10.4)
CALCIUM SPEC-SCNC: 8.5 MG/DL (ref 8.4–10.4)
CHLORIDE SERPL-SCNC: 103 MMOL/L (ref 98–110)
CHLORIDE SERPL-SCNC: 103 MMOL/L (ref 98–110)
CO2 SERPL-SCNC: 25 MMOL/L (ref 24–31)
CO2 SERPL-SCNC: 28 MMOL/L (ref 24–31)
COHGB MFR BLD: 1.3 % (ref 0–5)
CREAT BLD-MCNC: 0.57 MG/DL (ref 0.5–1.4)
CREAT BLD-MCNC: 0.58 MG/DL (ref 0.5–1.4)
CYTO UR: NORMAL
DEPRECATED RDW RBC AUTO: 43.7 FL (ref 40–54)
DEPRECATED RDW RBC AUTO: 46.5 FL (ref 40–54)
EOSINOPHIL # BLD AUTO: 0.25 10*3/MM3 (ref 0–0.7)
EOSINOPHIL NFR BLD AUTO: 2.8 % (ref 0–4)
ERYTHROCYTE [DISTWIDTH] IN BLOOD BY AUTOMATED COUNT: 13.2 % (ref 12–15)
ERYTHROCYTE [DISTWIDTH] IN BLOOD BY AUTOMATED COUNT: 14 % (ref 12–15)
GFR SERPL CREATININE-BSD FRML MDRD: 107 ML/MIN/1.73
GFR SERPL CREATININE-BSD FRML MDRD: 109 ML/MIN/1.73
GLOBULIN UR ELPH-MCNC: 3.5 GM/DL
GLUCOSE BLD-MCNC: 163 MG/DL (ref 70–100)
GLUCOSE BLD-MCNC: 243 MG/DL (ref 70–100)
GLUCOSE BLDC GLUCOMTR-MCNC: 174 MG/DL (ref 70–130)
GLUCOSE BLDC GLUCOMTR-MCNC: 200 MG/DL (ref 70–130)
GLUCOSE BLDC GLUCOMTR-MCNC: 224 MG/DL (ref 70–130)
GLUCOSE BLDC GLUCOMTR-MCNC: 234 MG/DL (ref 70–130)
HCO3 BLDA-SCNC: 24.9 MMOL/L (ref 20–26)
HCT VFR BLD AUTO: 31.5 % (ref 37–47)
HCT VFR BLD AUTO: 35.6 % (ref 37–47)
HCT VFR BLD CALC: 30.4 % (ref 38–51)
HGB BLD-MCNC: 10.4 G/DL (ref 12–16)
HGB BLD-MCNC: 11.6 G/DL (ref 12–16)
HGB BLDA-MCNC: 9.9 G/DL (ref 13.5–17.5)
IMM GRANULOCYTES # BLD: 0.07 10*3/MM3 (ref 0–0.03)
IMM GRANULOCYTES NFR BLD: 0.8 % (ref 0–5)
INR PPP: 1.04 (ref 0.91–1.09)
LAB AP CASE REPORT: NORMAL
LDH FLD-CCNC: 1788 IU/L
LYMPHOCYTES # BLD AUTO: 2.73 10*3/MM3 (ref 0.72–4.86)
LYMPHOCYTES NFR BLD AUTO: 31.1 % (ref 15–45)
Lab: ABNORMAL
Lab: ABNORMAL
Lab: NORMAL
MCH RBC QN AUTO: 29.3 PG (ref 28–32)
MCH RBC QN AUTO: 30.1 PG (ref 28–32)
MCHC RBC AUTO-ENTMCNC: 32.6 G/DL (ref 33–36)
MCHC RBC AUTO-ENTMCNC: 33 G/DL (ref 33–36)
MCV RBC AUTO: 89.9 FL (ref 82–98)
MCV RBC AUTO: 91.3 FL (ref 82–98)
METHGB BLD QL: 1.1 % (ref 0–3)
MODALITY: ABNORMAL
MONOCYTES # BLD AUTO: 0.58 10*3/MM3 (ref 0.19–1.3)
MONOCYTES NFR BLD AUTO: 6.6 % (ref 4–12)
NEUTROPHILS # BLD AUTO: 5.11 10*3/MM3 (ref 1.87–8.4)
NEUTROPHILS NFR BLD AUTO: 58.1 % (ref 39–78)
NOTIFIED BY: ABNORMAL
NOTIFIED WHO: ABNORMAL
NRBC BLD MANUAL-RTO: 0 /100 WBC (ref 0–0)
OXYHGB MFR BLDV: 83.2 % (ref 94–99)
PATH REPORT.FINAL DX SPEC: NORMAL
PATH REPORT.GROSS SPEC: NORMAL
PCO2 BLDA: 43 MM HG (ref 35–45)
PH BLDA: 7.37 PH UNITS (ref 7.35–7.45)
PLATELET # BLD AUTO: 319 10*3/MM3 (ref 130–400)
PLATELET # BLD AUTO: 353 10*3/MM3 (ref 130–400)
PMV BLD AUTO: 11.3 FL (ref 6–12)
PMV BLD AUTO: 11.3 FL (ref 6–12)
PO2 BLDA: 50.5 MM HG (ref 83–108)
POTASSIUM BLD-SCNC: 3.3 MMOL/L (ref 3.5–5.3)
POTASSIUM BLD-SCNC: 4.1 MMOL/L (ref 3.5–5.3)
POTASSIUM BLDA-SCNC: 3.6 MMOL/L (ref 3.5–5.2)
PROT FLD-MCNC: 5.9 G/DL
PROT SERPL-MCNC: 6.8 G/DL (ref 6.3–8.7)
PROTHROMBIN TIME: 13.9 SECONDS (ref 11.9–14.6)
RBC # BLD AUTO: 3.45 10*6/MM3 (ref 4.2–5.4)
RBC # BLD AUTO: 3.96 10*6/MM3 (ref 4.2–5.4)
SAO2 % BLDCOA: 85.2 % (ref 94–99)
SODIUM BLD-SCNC: 138 MMOL/L (ref 135–145)
SODIUM BLD-SCNC: 142 MMOL/L (ref 135–145)
SODIUM BLDA-SCNC: 139 MMOL/L (ref 136–145)
VENTILATOR MODE: ABNORMAL
WBC NRBC COR # BLD: 14.82 10*3/MM3 (ref 4.8–10.8)
WBC NRBC COR # BLD: 8.79 10*3/MM3 (ref 4.8–10.8)

## 2018-03-27 PROCEDURE — 87102 FUNGUS ISOLATION CULTURE: CPT | Performed by: THORACIC SURGERY (CARDIOTHORACIC VASCULAR SURGERY)

## 2018-03-27 PROCEDURE — 85027 COMPLETE CBC AUTOMATED: CPT | Performed by: THORACIC SURGERY (CARDIOTHORACIC VASCULAR SURGERY)

## 2018-03-27 PROCEDURE — 36430 TRANSFUSION BLD/BLD COMPNT: CPT

## 2018-03-27 PROCEDURE — 25010000003 MORPHINE PER 100 MG: Performed by: THORACIC SURGERY (CARDIOTHORACIC VASCULAR SURGERY)

## 2018-03-27 PROCEDURE — 25010000002 SUCCINYLCHOLINE PER 20 MG: Performed by: NURSE ANESTHETIST, CERTIFIED REGISTERED

## 2018-03-27 PROCEDURE — 25010000002 PROPOFOL 10 MG/ML EMULSION: Performed by: NURSE ANESTHETIST, CERTIFIED REGISTERED

## 2018-03-27 PROCEDURE — C1751 CATH, INF, PER/CENT/MIDLINE: HCPCS | Performed by: NURSE ANESTHETIST, CERTIFIED REGISTERED

## 2018-03-27 PROCEDURE — 0BNL0ZZ RELEASE LEFT LUNG, OPEN APPROACH: ICD-10-PCS | Performed by: THORACIC SURGERY (CARDIOTHORACIC VASCULAR SURGERY)

## 2018-03-27 PROCEDURE — 94799 UNLISTED PULMONARY SVC/PX: CPT

## 2018-03-27 PROCEDURE — 25010000002 FUROSEMIDE PER 20 MG: Performed by: THORACIC SURGERY (CARDIOTHORACIC VASCULAR SURGERY)

## 2018-03-27 PROCEDURE — C1729 CATH, DRAINAGE: HCPCS | Performed by: THORACIC SURGERY (CARDIOTHORACIC VASCULAR SURGERY)

## 2018-03-27 PROCEDURE — 25010000002 MIDAZOLAM PER 1 MG: Performed by: ANESTHESIOLOGY

## 2018-03-27 PROCEDURE — 87070 CULTURE OTHR SPECIMN AEROBIC: CPT | Performed by: FAMILY MEDICINE

## 2018-03-27 PROCEDURE — 25010000002 PIPERACILLIN SOD-TAZOBACTAM PER 1 G: Performed by: INTERNAL MEDICINE

## 2018-03-27 PROCEDURE — 63710000001 INSULIN DETEMIR PER 5 UNITS: Performed by: NURSE PRACTITIONER

## 2018-03-27 PROCEDURE — 83050 HGB METHEMOGLOBIN QUAN: CPT

## 2018-03-27 PROCEDURE — 82805 BLOOD GASES W/O2 SATURATION: CPT

## 2018-03-27 PROCEDURE — 82375 ASSAY CARBOXYHB QUANT: CPT

## 2018-03-27 PROCEDURE — 80053 COMPREHEN METABOLIC PANEL: CPT | Performed by: NURSE PRACTITIONER

## 2018-03-27 PROCEDURE — 87075 CULTR BACTERIA EXCEPT BLOOD: CPT | Performed by: THORACIC SURGERY (CARDIOTHORACIC VASCULAR SURGERY)

## 2018-03-27 PROCEDURE — 25010000002 FUROSEMIDE PER 20 MG: Performed by: NURSE ANESTHETIST, CERTIFIED REGISTERED

## 2018-03-27 PROCEDURE — 88305 TISSUE EXAM BY PATHOLOGIST: CPT | Performed by: THORACIC SURGERY (CARDIOTHORACIC VASCULAR SURGERY)

## 2018-03-27 PROCEDURE — 82962 GLUCOSE BLOOD TEST: CPT

## 2018-03-27 PROCEDURE — 85025 COMPLETE CBC W/AUTO DIFF WBC: CPT | Performed by: NURSE PRACTITIONER

## 2018-03-27 PROCEDURE — 63710000001 INSULIN LISPRO (HUMAN) PER 5 UNITS: Performed by: INTERNAL MEDICINE

## 2018-03-27 PROCEDURE — 25010000002 HYDROMORPHONE PER 4 MG: Performed by: NURSE ANESTHETIST, CERTIFIED REGISTERED

## 2018-03-27 PROCEDURE — 25010000002 VANCOMYCIN 10 G RECONSTITUTED SOLUTION

## 2018-03-27 PROCEDURE — 71045 X-RAY EXAM CHEST 1 VIEW: CPT

## 2018-03-27 PROCEDURE — 85610 PROTHROMBIN TIME: CPT | Performed by: NURSE PRACTITIONER

## 2018-03-27 PROCEDURE — P9016 RBC LEUKOCYTES REDUCED: HCPCS

## 2018-03-27 PROCEDURE — 86900 BLOOD TYPING SEROLOGIC ABO: CPT

## 2018-03-27 PROCEDURE — 0B9L00Z DRAINAGE OF LEFT LUNG WITH DRAINAGE DEVICE, OPEN APPROACH: ICD-10-PCS | Performed by: THORACIC SURGERY (CARDIOTHORACIC VASCULAR SURGERY)

## 2018-03-27 PROCEDURE — 32100 EXPLORATION OF CHEST: CPT | Performed by: THORACIC SURGERY (CARDIOTHORACIC VASCULAR SURGERY)

## 2018-03-27 PROCEDURE — 25010000002 FENTANYL CITRATE (PF) 250 MCG/5ML SOLUTION: Performed by: NURSE ANESTHETIST, CERTIFIED REGISTERED

## 2018-03-27 PROCEDURE — 32220 RELEASE OF LUNG: CPT | Performed by: THORACIC SURGERY (CARDIOTHORACIC VASCULAR SURGERY)

## 2018-03-27 PROCEDURE — 87205 SMEAR GRAM STAIN: CPT | Performed by: FAMILY MEDICINE

## 2018-03-27 PROCEDURE — 25010000002 ONDANSETRON PER 1 MG: Performed by: NURSE ANESTHETIST, CERTIFIED REGISTERED

## 2018-03-27 PROCEDURE — 30233N1 TRANSFUSION OF NONAUTOLOGOUS RED BLOOD CELLS INTO PERIPHERAL VEIN, PERCUTANEOUS APPROACH: ICD-10-PCS | Performed by: THORACIC SURGERY (CARDIOTHORACIC VASCULAR SURGERY)

## 2018-03-27 RX ORDER — ROCURONIUM BROMIDE 10 MG/ML
INJECTION, SOLUTION INTRAVENOUS AS NEEDED
Status: DISCONTINUED | OUTPATIENT
Start: 2018-03-27 | End: 2018-03-27 | Stop reason: SURG

## 2018-03-27 RX ORDER — NALOXONE HCL 0.4 MG/ML
0.04 VIAL (ML) INJECTION AS NEEDED
Status: DISCONTINUED | OUTPATIENT
Start: 2018-03-27 | End: 2018-03-27 | Stop reason: HOSPADM

## 2018-03-27 RX ORDER — MAGNESIUM HYDROXIDE 1200 MG/15ML
LIQUID ORAL AS NEEDED
Status: DISCONTINUED | OUTPATIENT
Start: 2018-03-27 | End: 2018-03-27 | Stop reason: HOSPADM

## 2018-03-27 RX ORDER — FENTANYL CITRATE 50 UG/ML
INJECTION, SOLUTION INTRAMUSCULAR; INTRAVENOUS AS NEEDED
Status: DISCONTINUED | OUTPATIENT
Start: 2018-03-27 | End: 2018-03-27 | Stop reason: SURG

## 2018-03-27 RX ORDER — ONDANSETRON 2 MG/ML
4 INJECTION INTRAMUSCULAR; INTRAVENOUS AS NEEDED
Status: DISCONTINUED | OUTPATIENT
Start: 2018-03-27 | End: 2018-03-27 | Stop reason: HOSPADM

## 2018-03-27 RX ORDER — MIDAZOLAM HYDROCHLORIDE 1 MG/ML
1 INJECTION INTRAMUSCULAR; INTRAVENOUS
Status: DISCONTINUED | OUTPATIENT
Start: 2018-03-27 | End: 2018-03-27 | Stop reason: HOSPADM

## 2018-03-27 RX ORDER — HYDROCODONE BITARTRATE AND ACETAMINOPHEN 7.5; 325 MG/1; MG/1
1 TABLET ORAL EVERY 4 HOURS PRN
Status: DISCONTINUED | OUTPATIENT
Start: 2018-03-27 | End: 2018-04-05 | Stop reason: HOSPADM

## 2018-03-27 RX ORDER — PHENYLEPHRINE HCL IN 0.9% NACL 0.8MG/10ML
SYRINGE (ML) INTRAVENOUS AS NEEDED
Status: DISCONTINUED | OUTPATIENT
Start: 2018-03-27 | End: 2018-03-27 | Stop reason: SURG

## 2018-03-27 RX ORDER — SODIUM CHLORIDE, SODIUM LACTATE, POTASSIUM CHLORIDE, CALCIUM CHLORIDE 600; 310; 30; 20 MG/100ML; MG/100ML; MG/100ML; MG/100ML
INJECTION, SOLUTION INTRAVENOUS CONTINUOUS PRN
Status: DISCONTINUED | OUTPATIENT
Start: 2018-03-27 | End: 2018-03-27 | Stop reason: SURG

## 2018-03-27 RX ORDER — HYDROMORPHONE HCL 110MG/55ML
PATIENT CONTROLLED ANALGESIA SYRINGE INTRAVENOUS AS NEEDED
Status: DISCONTINUED | OUTPATIENT
Start: 2018-03-27 | End: 2018-03-27 | Stop reason: SURG

## 2018-03-27 RX ORDER — SODIUM CHLORIDE, SODIUM LACTATE, POTASSIUM CHLORIDE, CALCIUM CHLORIDE 600; 310; 30; 20 MG/100ML; MG/100ML; MG/100ML; MG/100ML
100 INJECTION, SOLUTION INTRAVENOUS CONTINUOUS
Status: DISCONTINUED | OUTPATIENT
Start: 2018-03-27 | End: 2018-03-27

## 2018-03-27 RX ORDER — HYDRALAZINE HYDROCHLORIDE 20 MG/ML
5 INJECTION INTRAMUSCULAR; INTRAVENOUS
Status: DISCONTINUED | OUTPATIENT
Start: 2018-03-27 | End: 2018-03-27 | Stop reason: HOSPADM

## 2018-03-27 RX ORDER — SCOLOPAMINE TRANSDERMAL SYSTEM 1 MG/1
1 PATCH, EXTENDED RELEASE TRANSDERMAL ONCE
Status: DISCONTINUED | OUTPATIENT
Start: 2018-03-27 | End: 2018-03-28

## 2018-03-27 RX ORDER — METOCLOPRAMIDE HYDROCHLORIDE 5 MG/ML
5 INJECTION INTRAMUSCULAR; INTRAVENOUS
Status: DISCONTINUED | OUTPATIENT
Start: 2018-03-27 | End: 2018-03-27 | Stop reason: HOSPADM

## 2018-03-27 RX ORDER — MIDAZOLAM HYDROCHLORIDE 1 MG/ML
2 INJECTION INTRAMUSCULAR; INTRAVENOUS
Status: DISCONTINUED | OUTPATIENT
Start: 2018-03-27 | End: 2018-03-27 | Stop reason: HOSPADM

## 2018-03-27 RX ORDER — LABETALOL HYDROCHLORIDE 5 MG/ML
5 INJECTION, SOLUTION INTRAVENOUS
Status: DISCONTINUED | OUTPATIENT
Start: 2018-03-27 | End: 2018-03-27 | Stop reason: HOSPADM

## 2018-03-27 RX ORDER — FUROSEMIDE 10 MG/ML
40 INJECTION INTRAMUSCULAR; INTRAVENOUS ONCE
Status: COMPLETED | OUTPATIENT
Start: 2018-03-27 | End: 2018-03-27

## 2018-03-27 RX ORDER — MORPHINE SULFATE 2 MG/ML
2 INJECTION, SOLUTION INTRAMUSCULAR; INTRAVENOUS AS NEEDED
Status: DISCONTINUED | OUTPATIENT
Start: 2018-03-27 | End: 2018-03-27 | Stop reason: HOSPADM

## 2018-03-27 RX ORDER — SODIUM CHLORIDE 0.9 % (FLUSH) 0.9 %
1-10 SYRINGE (ML) INJECTION AS NEEDED
Status: DISCONTINUED | OUTPATIENT
Start: 2018-03-27 | End: 2018-03-27 | Stop reason: HOSPADM

## 2018-03-27 RX ORDER — ONDANSETRON 2 MG/ML
INJECTION INTRAMUSCULAR; INTRAVENOUS AS NEEDED
Status: DISCONTINUED | OUTPATIENT
Start: 2018-03-27 | End: 2018-03-27 | Stop reason: SURG

## 2018-03-27 RX ORDER — MORPHINE SULFATE IN 0.9 % NACL 50 MG/50ML
PATIENT CONTROLLED ANALGESIA SYRINGE INTRAVENOUS CONTINUOUS
Status: DISCONTINUED | OUTPATIENT
Start: 2018-03-27 | End: 2018-04-02

## 2018-03-27 RX ORDER — LIDOCAINE HYDROCHLORIDE 40 MG/ML
SOLUTION TOPICAL AS NEEDED
Status: DISCONTINUED | OUTPATIENT
Start: 2018-03-27 | End: 2018-03-27

## 2018-03-27 RX ORDER — SUCCINYLCHOLINE CHLORIDE 20 MG/ML
INJECTION INTRAMUSCULAR; INTRAVENOUS AS NEEDED
Status: DISCONTINUED | OUTPATIENT
Start: 2018-03-27 | End: 2018-03-27 | Stop reason: SURG

## 2018-03-27 RX ORDER — FLUMAZENIL 0.1 MG/ML
0.2 INJECTION INTRAVENOUS AS NEEDED
Status: DISCONTINUED | OUTPATIENT
Start: 2018-03-27 | End: 2018-03-27 | Stop reason: HOSPADM

## 2018-03-27 RX ORDER — SODIUM CHLORIDE 9 MG/ML
125 INJECTION, SOLUTION INTRAVENOUS CONTINUOUS
Status: DISCONTINUED | OUTPATIENT
Start: 2018-03-27 | End: 2018-03-31

## 2018-03-27 RX ORDER — PROPOFOL 10 MG/ML
VIAL (ML) INTRAVENOUS AS NEEDED
Status: DISCONTINUED | OUTPATIENT
Start: 2018-03-27 | End: 2018-03-27 | Stop reason: SURG

## 2018-03-27 RX ORDER — POTASSIUM CHLORIDE 750 MG/1
40 CAPSULE, EXTENDED RELEASE ORAL ONCE
Status: COMPLETED | OUTPATIENT
Start: 2018-03-27 | End: 2018-03-27

## 2018-03-27 RX ORDER — IPRATROPIUM BROMIDE AND ALBUTEROL SULFATE 2.5; .5 MG/3ML; MG/3ML
3 SOLUTION RESPIRATORY (INHALATION) ONCE AS NEEDED
Status: DISCONTINUED | OUTPATIENT
Start: 2018-03-27 | End: 2018-03-27 | Stop reason: HOSPADM

## 2018-03-27 RX ORDER — LIDOCAINE HYDROCHLORIDE 20 MG/ML
INJECTION, SOLUTION INFILTRATION; PERINEURAL AS NEEDED
Status: DISCONTINUED | OUTPATIENT
Start: 2018-03-27 | End: 2018-03-27 | Stop reason: SURG

## 2018-03-27 RX ORDER — MEPERIDINE HYDROCHLORIDE 25 MG/ML
12.5 INJECTION INTRAMUSCULAR; INTRAVENOUS; SUBCUTANEOUS
Status: DISCONTINUED | OUTPATIENT
Start: 2018-03-27 | End: 2018-03-27 | Stop reason: HOSPADM

## 2018-03-27 RX ORDER — NALOXONE HCL 0.4 MG/ML
0.1 VIAL (ML) INJECTION
Status: DISCONTINUED | OUTPATIENT
Start: 2018-03-27 | End: 2018-04-05 | Stop reason: HOSPADM

## 2018-03-27 RX ORDER — FUROSEMIDE 10 MG/ML
INJECTION INTRAMUSCULAR; INTRAVENOUS AS NEEDED
Status: DISCONTINUED | OUTPATIENT
Start: 2018-03-27 | End: 2018-03-27 | Stop reason: SURG

## 2018-03-27 RX ADMIN — GUAIFENESIN 1200 MG: 600 TABLET, EXTENDED RELEASE ORAL at 21:39

## 2018-03-27 RX ADMIN — FENTANYL CITRATE 100 MCG: 50 INJECTION INTRAMUSCULAR; INTRAVENOUS at 08:07

## 2018-03-27 RX ADMIN — TAZOBACTAM SODIUM AND PIPERACILLIN SODIUM 3.38 G: 375; 3 INJECTION, SOLUTION INTRAVENOUS at 02:20

## 2018-03-27 RX ADMIN — INSULIN LISPRO 4 UNITS: 100 INJECTION, SOLUTION INTRAVENOUS; SUBCUTANEOUS at 21:38

## 2018-03-27 RX ADMIN — ALBUTEROL SULFATE 1.25 MG: 1.25 SOLUTION RESPIRATORY (INHALATION) at 20:22

## 2018-03-27 RX ADMIN — FUROSEMIDE 10 MG: 10 INJECTION, SOLUTION INTRAMUSCULAR; INTRAVENOUS at 11:08

## 2018-03-27 RX ADMIN — ROCURONIUM BROMIDE 20 MG: 10 INJECTION INTRAVENOUS at 09:29

## 2018-03-27 RX ADMIN — LIDOCAINE HYDROCHLORIDE 100 MG: 20 INJECTION, SOLUTION INFILTRATION; PERINEURAL at 07:27

## 2018-03-27 RX ADMIN — SODIUM CHLORIDE: 9 INJECTION, SOLUTION INTRAVENOUS at 10:00

## 2018-03-27 RX ADMIN — ROCURONIUM BROMIDE 45 MG: 10 INJECTION INTRAVENOUS at 07:33

## 2018-03-27 RX ADMIN — SODIUM CHLORIDE, POTASSIUM CHLORIDE, SODIUM LACTATE AND CALCIUM CHLORIDE 100 ML/HR: 600; 310; 30; 20 INJECTION, SOLUTION INTRAVENOUS at 07:02

## 2018-03-27 RX ADMIN — EPHEDRINE SULFATE 10 MG: 50 INJECTION INTRAMUSCULAR; INTRAVENOUS; SUBCUTANEOUS at 08:54

## 2018-03-27 RX ADMIN — ROSUVASTATIN CALCIUM 10 MG: 10 TABLET, FILM COATED ORAL at 21:39

## 2018-03-27 RX ADMIN — GABAPENTIN 400 MG: 400 CAPSULE ORAL at 21:39

## 2018-03-27 RX ADMIN — ALBUTEROL SULFATE 1.25 MG: 1.25 SOLUTION RESPIRATORY (INHALATION) at 15:11

## 2018-03-27 RX ADMIN — MIDAZOLAM HYDROCHLORIDE 2 MG: 1 INJECTION, SOLUTION INTRAMUSCULAR; INTRAVENOUS at 06:57

## 2018-03-27 RX ADMIN — SUGAMMADEX 200 MG: 100 INJECTION, SOLUTION INTRAVENOUS at 11:40

## 2018-03-27 RX ADMIN — SCOPALAMINE 1 PATCH: 1 PATCH, EXTENDED RELEASE TRANSDERMAL at 07:00

## 2018-03-27 RX ADMIN — Medication 1 G: at 11:23

## 2018-03-27 RX ADMIN — FENTANYL CITRATE 100 MCG: 50 INJECTION INTRAMUSCULAR; INTRAVENOUS at 07:27

## 2018-03-27 RX ADMIN — SUCCINYLCHOLINE CHLORIDE 120 MG: 20 INJECTION, SOLUTION INTRAMUSCULAR; INTRAVENOUS at 07:27

## 2018-03-27 RX ADMIN — ONDANSETRON HYDROCHLORIDE 4 MG: 2 SOLUTION INTRAMUSCULAR; INTRAVENOUS at 11:07

## 2018-03-27 RX ADMIN — FUROSEMIDE 40 MG: 10 INJECTION, SOLUTION INTRAMUSCULAR; INTRAVENOUS at 18:15

## 2018-03-27 RX ADMIN — PROPOFOL 100 MG: 10 INJECTION, EMULSION INTRAVENOUS at 08:07

## 2018-03-27 RX ADMIN — SODIUM CHLORIDE, POTASSIUM CHLORIDE, SODIUM LACTATE AND CALCIUM CHLORIDE: 600; 310; 30; 20 INJECTION, SOLUTION INTRAVENOUS at 08:49

## 2018-03-27 RX ADMIN — ROCURONIUM BROMIDE 20 MG: 10 INJECTION INTRAVENOUS at 10:05

## 2018-03-27 RX ADMIN — VANCOMYCIN HYDROCHLORIDE 1250 MG: 1 INJECTION, POWDER, LYOPHILIZED, FOR SOLUTION INTRAVENOUS at 18:15

## 2018-03-27 RX ADMIN — SODIUM CHLORIDE 30 ML/HR: 9 INJECTION, SOLUTION INTRAVENOUS at 18:16

## 2018-03-27 RX ADMIN — TAZOBACTAM SODIUM AND PIPERACILLIN SODIUM 3.38 G: 375; 3 INJECTION, SOLUTION INTRAVENOUS at 20:50

## 2018-03-27 RX ADMIN — FENTANYL CITRATE 50 MCG: 50 INJECTION INTRAMUSCULAR; INTRAVENOUS at 07:55

## 2018-03-27 RX ADMIN — Medication 80 MCG: at 09:29

## 2018-03-27 RX ADMIN — POTASSIUM CHLORIDE 40 MEQ: 750 CAPSULE, EXTENDED RELEASE ORAL at 18:14

## 2018-03-27 RX ADMIN — Medication 160 MCG: at 09:55

## 2018-03-27 RX ADMIN — INSULIN LISPRO 8 UNITS: 100 INJECTION, SOLUTION INTRAVENOUS; SUBCUTANEOUS at 18:15

## 2018-03-27 RX ADMIN — Medication 80 MCG: at 09:15

## 2018-03-27 RX ADMIN — HYDROMORPHONE HYDROCHLORIDE 1 MG: 2 INJECTION, SOLUTION INTRAMUSCULAR; INTRAVENOUS; SUBCUTANEOUS at 11:43

## 2018-03-27 RX ADMIN — METFORMIN HYDROCHLORIDE 500 MG: 500 TABLET ORAL at 18:14

## 2018-03-27 RX ADMIN — MORPHINE SULFATE: 25 INJECTION, SOLUTION, CONCENTRATE INTRAVENOUS at 14:58

## 2018-03-27 RX ADMIN — PROPOFOL 200 MG: 10 INJECTION, EMULSION INTRAVENOUS at 07:27

## 2018-03-27 RX ADMIN — Medication 160 MCG: at 09:46

## 2018-03-27 RX ADMIN — SODIUM CHLORIDE, POTASSIUM CHLORIDE, SODIUM LACTATE AND CALCIUM CHLORIDE: 600; 310; 30; 20 INJECTION, SOLUTION INTRAVENOUS at 07:18

## 2018-03-27 RX ADMIN — FENTANYL CITRATE 100 MCG: 50 INJECTION INTRAMUSCULAR; INTRAVENOUS at 08:38

## 2018-03-27 RX ADMIN — ROCURONIUM BROMIDE 5 MG: 10 INJECTION INTRAVENOUS at 07:27

## 2018-03-27 RX ADMIN — INSULIN DETEMIR 25 UNITS: 100 INJECTION, SOLUTION SUBCUTANEOUS at 21:38

## 2018-03-27 RX ADMIN — Medication 80 MCG: at 09:18

## 2018-03-27 RX ADMIN — Medication 2 G: at 07:43

## 2018-03-27 ASSESSMENT — ENCOUNTER SYMPTOMS
NAUSEA: 0
VOMITING: 0
DIARRHEA: 0
COUGH: 1
SHORTNESS OF BREATH: 1

## 2018-03-27 NOTE — ANESTHESIA PROCEDURE NOTES
Airway  Urgency: elective    Airway not difficult    General Information and Staff    Patient location during procedure: OR  CRNA: EYE, BRENDAN    Indications and Patient Condition  Indications for airway management: airway protection    Preoxygenated: yes  Mask difficulty assessment: 1 - vent by mask    Final Airway Details  Final airway type: endotracheal airway      Successful airway: ETT and ETT - double lumen left  Cuffed: yes   Successful intubation technique: direct laryngoscopy  Facilitating devices/methods: intubating stylet  Endotracheal tube insertion site: oral  Blade: Kyara  Blade size: 3.5.  ETT DL size: 37 fr  Cormack-Lehane Classification: grade I - full view of glottis  Placement verified by: chest auscultation and capnometry   Cuff volume (mL): 5  Measured from: lips  ETT to lips (cm): 31  Number of attempts at approach: 1

## 2018-03-27 NOTE — ANESTHESIA PROCEDURE NOTES
Arterial Line    Patient location during procedure: pre-op   Performed By   CRNA: BRENDAN COREA  Preanesthetic Checklist  Completed: patient identified, site marked, surgical consent, pre-op evaluation, timeout performed, IV checked, risks and benefits discussed and monitors and equipment checked  Arterial Line Prep   Sterile Tech: cap and gloves  Prep: alcohol swabs  Arterial Line Procedure   Location:  radial artery  Catheter size: 20 G   Guidance: ultrasound guided  PROCEDURE NOTE/ULTRASOUND INTERPRETATION.  Using ultrasound guidance the potential vascular sites for insertion of the catheter were visualized to determine the patency of the vessel to be used for vascular access.  After selecting the appropriate site for insertion, the needle was visualized under ultrasound being inserted into the ulnar artery, followed by ultrasound confirmation of wire and catheter placement. There were no abnormalities seen on ultrasound; an image was taken; and the patient tolerated the procedure with no complications.   Number of attempts: 1  Successful placement: yes          Post Assessment   Dressing Type: occlusive dressing applied, secured with tape and wrist guard applied.   Complications no  Circ/Move/Sens Assessment: unchanged.   Patient Tolerance: patient tolerated the procedure well with no apparent complications

## 2018-03-27 NOTE — ANESTHESIA POSTPROCEDURE EVALUATION
"Patient: Carlee Vee    Procedure Summary     Date:  03/27/18 Room / Location:   [facility] OR  /  [facility] OR    Anesthesia Start:  0720 Anesthesia Stop:  1209    Procedure:  THORACOTOMY, drainage of empyema and decortication (Left Chest) Diagnosis:       Empyema of pleura      (Empyema of pleura [J86.9])    Surgeon:  Abel Stark MD Provider:  Chrissie Stanton CRNA    Anesthesia Type:  general ASA Status:  3          Anesthesia Type: general  Last vitals  BP   110/61 (03/27/18 1413)   Temp   98 °F (36.7 °C) (03/27/18 1413)   Pulse   98 (03/27/18 1511)   Resp   15 (03/27/18 1511)     SpO2   90 % (03/27/18 1511)     Post Anesthesia Care and Evaluation    Patient location during evaluation: PACU  Patient participation: complete - patient participated  Level of consciousness: awake and alert  Pain management: adequate  Airway patency: patent  Anesthetic complications: No anesthetic complications  PONV Status: controlled  Cardiovascular status: acceptable and hemodynamically stable  Respiratory status: acceptable  Hydration status: acceptable    Comments: Patient discharged from PACU prior to anesthesia evaluation based on Adrian Score.  For details, see RN note.     /61   Pulse 98   Temp 98 °F (36.7 °C) (Temporal Artery )   Resp 15   Ht 160 cm (63\")   Wt 79.4 kg (175 lb)   SpO2 90%   Breastfeeding? No   BMI 31.00 kg/m²       "

## 2018-03-27 NOTE — ANESTHESIA PROCEDURE NOTES
Central Line    Patient location during procedure: OR  Start time: 3/27/2018 7:41 AM  Stop Time:3/27/2018 7:45 AM  Indications: MD/Surgeon request  Staff  Anesthesiologist: DAVIDE WEBER  Preanesthetic Checklist  Completed: patient identified, site marked, surgical consent, pre-op evaluation, timeout performed, IV checked, risks and benefits discussed and monitors and equipment checked  Central Line Prep  Sterile Tech:gloves, cap, gown, mask and sterile barriers  Prep: chloraprep  Patient monitoring: continuous pulse oximetry, blood pressure monitoring and EKG  Central Line Procedure  Laterality:left  Location:internal jugular  Catheter Type:triple lumen  Catheter Size:7 Fr  Guidance:ultrasound guided  PROCEDURE NOTE/ULTRASOUND INTERPRETATION.  Using ultrasound guidance the potential vascular sites for insertion of the catheter were visualized to determine the patency of the vessel to be used for vascular access.  After selecting the appropriate site for insertion, the needle was visualized under ultrasound being inserted into the internal jugular vein, followed by ultrasound confirmation of wire and catheter placement. There were no abnormalities seen on ultrasound; an image was taken; and the patient tolerated the procedure with no complications.   Assessment  Post procedure:biopatch applied, line sutured and secured with tape  Assessement:blood return through all ports and free fluid flow  Complications:no  Patient Tolerance:patient tolerated the procedure well with no apparent complications

## 2018-03-27 NOTE — ANESTHESIA PREPROCEDURE EVALUATION
Anesthesia Evaluation     no history of anesthetic complications:  NPO Solid Status: > 8 hours  NPO Liquid Status: > 8 hours           Airway   Mallampati: I  TM distance: >3 FB  Neck ROM: full  Dental    (+) edentulous    Pulmonary - normal exam    breath sounds clear to auscultation  (+) pneumonia , a smoker (quit 3 yrs ago) Former,   (-) COPD, asthma, recent URI, sleep apnea  Cardiovascular - normal exam  Exercise tolerance: good (4-7 METS)    ECG reviewed  Rhythm: regular  Rate: normal    (+) hypertension well controlled,   (-) pacemaker, past MI, angina, cardiac stents, CABG      Neuro/Psych  (-) seizures, TIA, CVA  GI/Hepatic/Renal/Endo    (+)  GERD well controlled,  diabetes mellitus,   (-) liver disease, no renal disease, hypothyroidism, hyperthyroidism    Musculoskeletal     Abdominal    Substance History      OB/GYN          Other                      Anesthesia Plan    ASA 3     general     intravenous induction   Anesthetic plan and risks discussed with patient.

## 2018-03-28 ENCOUNTER — APPOINTMENT (OUTPATIENT)
Dept: GENERAL RADIOLOGY | Facility: HOSPITAL | Age: 58
End: 2018-03-28

## 2018-03-28 LAB
ABO + RH BLD: NORMAL
ANION GAP SERPL CALCULATED.3IONS-SCNC: 9 MMOL/L (ref 4–13)
BASOPHILS # BLD AUTO: 0.04 10*3/MM3 (ref 0–0.2)
BASOPHILS NFR BLD AUTO: 0.3 % (ref 0–2)
BH BB BLOOD EXPIRATION DATE: NORMAL
BH BB BLOOD TYPE BARCODE: 7300
BH BB DISPENSE STATUS: NORMAL
BH BB PRODUCT CODE: NORMAL
BH BB UNIT NUMBER: NORMAL
BUN BLD-MCNC: 8 MG/DL (ref 5–21)
BUN/CREAT SERPL: 13.3 (ref 7–25)
CALCIUM SPEC-SCNC: 8.3 MG/DL (ref 8.4–10.4)
CHLORIDE SERPL-SCNC: 102 MMOL/L (ref 98–110)
CO2 SERPL-SCNC: 28 MMOL/L (ref 24–31)
CREAT BLD-MCNC: 0.6 MG/DL (ref 0.5–1.4)
CROSSMATCH INTERPRETATION: NORMAL
DEPRECATED RDW RBC AUTO: 47.8 FL (ref 40–54)
EOSINOPHIL # BLD AUTO: 0.04 10*3/MM3 (ref 0–0.7)
EOSINOPHIL NFR BLD AUTO: 0.3 % (ref 0–4)
ERYTHROCYTE [DISTWIDTH] IN BLOOD BY AUTOMATED COUNT: 14.6 % (ref 12–15)
GFR SERPL CREATININE-BSD FRML MDRD: 103 ML/MIN/1.73
GLUCOSE BLD-MCNC: 153 MG/DL (ref 70–100)
GLUCOSE BLDC GLUCOMTR-MCNC: 200 MG/DL (ref 70–130)
GLUCOSE BLDC GLUCOMTR-MCNC: 201 MG/DL (ref 70–130)
GLUCOSE BLDC GLUCOMTR-MCNC: 204 MG/DL (ref 70–130)
GLUCOSE BLDC GLUCOMTR-MCNC: 291 MG/DL (ref 70–130)
HCT VFR BLD AUTO: 34.4 % (ref 37–47)
HGB BLD-MCNC: 11.5 G/DL (ref 12–16)
IMM GRANULOCYTES # BLD: 0.08 10*3/MM3 (ref 0–0.03)
IMM GRANULOCYTES NFR BLD: 0.5 % (ref 0–5)
LYMPHOCYTES # BLD AUTO: 1.61 10*3/MM3 (ref 0.72–4.86)
LYMPHOCYTES NFR BLD AUTO: 10.7 % (ref 15–45)
MAGNESIUM SERPL-MCNC: 1.3 MG/DL (ref 1.4–2.2)
MCH RBC QN AUTO: 30 PG (ref 28–32)
MCHC RBC AUTO-ENTMCNC: 33.4 G/DL (ref 33–36)
MCV RBC AUTO: 89.8 FL (ref 82–98)
MONOCYTES # BLD AUTO: 1.02 10*3/MM3 (ref 0.19–1.3)
MONOCYTES NFR BLD AUTO: 6.8 % (ref 4–12)
NEUTROPHILS # BLD AUTO: 12.23 10*3/MM3 (ref 1.87–8.4)
NEUTROPHILS NFR BLD AUTO: 81.4 % (ref 39–78)
NRBC BLD MANUAL-RTO: 0 /100 WBC (ref 0–0)
PH FLD: 7.5 [PH]
PLATELET # BLD AUTO: 354 10*3/MM3 (ref 130–400)
PMV BLD AUTO: 11.1 FL (ref 6–12)
POTASSIUM BLD-SCNC: 4 MMOL/L (ref 3.5–5.3)
RBC # BLD AUTO: 3.83 10*6/MM3 (ref 4.2–5.4)
SODIUM BLD-SCNC: 139 MMOL/L (ref 135–145)
UNIT  ABO: NORMAL
UNIT  RH: NORMAL
VANCOMYCIN TROUGH SERPL-MCNC: 12.87 MCG/ML (ref 10–20)
WBC NRBC COR # BLD: 15.02 10*3/MM3 (ref 4.8–10.8)

## 2018-03-28 PROCEDURE — 80048 BASIC METABOLIC PNL TOTAL CA: CPT | Performed by: THORACIC SURGERY (CARDIOTHORACIC VASCULAR SURGERY)

## 2018-03-28 PROCEDURE — 94799 UNLISTED PULMONARY SVC/PX: CPT

## 2018-03-28 PROCEDURE — 85025 COMPLETE CBC W/AUTO DIFF WBC: CPT | Performed by: THORACIC SURGERY (CARDIOTHORACIC VASCULAR SURGERY)

## 2018-03-28 PROCEDURE — 63710000001 INSULIN LISPRO (HUMAN) PER 5 UNITS: Performed by: INTERNAL MEDICINE

## 2018-03-28 PROCEDURE — 25010000002 VANCOMYCIN 10 G RECONSTITUTED SOLUTION

## 2018-03-28 PROCEDURE — 63710000001 INSULIN DETEMIR PER 5 UNITS: Performed by: NURSE PRACTITIONER

## 2018-03-28 PROCEDURE — 25010000002 PIPERACILLIN SOD-TAZOBACTAM PER 1 G: Performed by: INTERNAL MEDICINE

## 2018-03-28 PROCEDURE — 99024 POSTOP FOLLOW-UP VISIT: CPT | Performed by: THORACIC SURGERY (CARDIOTHORACIC VASCULAR SURGERY)

## 2018-03-28 PROCEDURE — 82962 GLUCOSE BLOOD TEST: CPT

## 2018-03-28 PROCEDURE — 80202 ASSAY OF VANCOMYCIN: CPT | Performed by: FAMILY MEDICINE

## 2018-03-28 PROCEDURE — 25010000002 VANCOMYCIN

## 2018-03-28 PROCEDURE — 25010000002 ENOXAPARIN PER 10 MG: Performed by: THORACIC SURGERY (CARDIOTHORACIC VASCULAR SURGERY)

## 2018-03-28 PROCEDURE — 25010000003 MORPHINE PER 100 MG: Performed by: THORACIC SURGERY (CARDIOTHORACIC VASCULAR SURGERY)

## 2018-03-28 PROCEDURE — 71045 X-RAY EXAM CHEST 1 VIEW: CPT

## 2018-03-28 PROCEDURE — 83735 ASSAY OF MAGNESIUM: CPT | Performed by: NURSE PRACTITIONER

## 2018-03-28 RX ORDER — ROSUVASTATIN CALCIUM 10 MG/1
10 TABLET, COATED ORAL DAILY
COMMUNITY

## 2018-03-28 RX ORDER — RANITIDINE 150 MG/1
150 TABLET ORAL DAILY PRN
COMMUNITY
End: 2018-05-07

## 2018-03-28 RX ORDER — PANTOPRAZOLE SODIUM 40 MG/1
40 TABLET, DELAYED RELEASE ORAL DAILY
COMMUNITY
End: 2020-07-08 | Stop reason: SDUPTHER

## 2018-03-28 RX ORDER — IPRATROPIUM BROMIDE AND ALBUTEROL SULFATE 2.5; .5 MG/3ML; MG/3ML
3 SOLUTION RESPIRATORY (INHALATION)
Status: DISCONTINUED | OUTPATIENT
Start: 2018-03-28 | End: 2018-03-31

## 2018-03-28 RX ORDER — AMLODIPINE BESYLATE 10 MG/1
10 TABLET ORAL DAILY
COMMUNITY

## 2018-03-28 RX ORDER — FLUOXETINE HYDROCHLORIDE 20 MG/1
20 CAPSULE ORAL DAILY
COMMUNITY

## 2018-03-28 RX ORDER — ERGOCALCIFEROL 1.25 MG/1
50000 CAPSULE ORAL WEEKLY
COMMUNITY

## 2018-03-28 RX ORDER — LOSARTAN POTASSIUM 50 MG/1
50 TABLET ORAL DAILY
COMMUNITY
End: 2018-04-05 | Stop reason: HOSPADM

## 2018-03-28 RX ADMIN — VANCOMYCIN HYDROCHLORIDE 1250 MG: 1 INJECTION, POWDER, LYOPHILIZED, FOR SOLUTION INTRAVENOUS at 17:00

## 2018-03-28 RX ADMIN — IPRATROPIUM BROMIDE AND ALBUTEROL SULFATE 3 ML: 2.5; .5 SOLUTION RESPIRATORY (INHALATION) at 15:19

## 2018-03-28 RX ADMIN — MORPHINE SULFATE: 25 INJECTION, SOLUTION, CONCENTRATE INTRAVENOUS at 20:51

## 2018-03-28 RX ADMIN — VANCOMYCIN HYDROCHLORIDE 1250 MG: 1 INJECTION, POWDER, LYOPHILIZED, FOR SOLUTION INTRAVENOUS at 04:17

## 2018-03-28 RX ADMIN — METFORMIN HYDROCHLORIDE 500 MG: 500 TABLET ORAL at 08:52

## 2018-03-28 RX ADMIN — ENOXAPARIN SODIUM 30 MG: 30 INJECTION SUBCUTANEOUS at 20:53

## 2018-03-28 RX ADMIN — LOSARTAN POTASSIUM 50 MG: 50 TABLET ORAL at 08:52

## 2018-03-28 RX ADMIN — INSULIN LISPRO 2 UNITS: 100 INJECTION, SOLUTION INTRAVENOUS; SUBCUTANEOUS at 08:55

## 2018-03-28 RX ADMIN — ENOXAPARIN SODIUM 30 MG: 30 INJECTION SUBCUTANEOUS at 08:52

## 2018-03-28 RX ADMIN — TAZOBACTAM SODIUM AND PIPERACILLIN SODIUM 3.38 G: 375; 3 INJECTION, SOLUTION INTRAVENOUS at 06:24

## 2018-03-28 RX ADMIN — PANTOPRAZOLE SODIUM 40 MG: 40 TABLET, DELAYED RELEASE ORAL at 06:24

## 2018-03-28 RX ADMIN — IPRATROPIUM BROMIDE AND ALBUTEROL SULFATE 3 ML: 2.5; .5 SOLUTION RESPIRATORY (INHALATION) at 20:45

## 2018-03-28 RX ADMIN — FLUOXETINE 10 MG: 10 CAPSULE ORAL at 08:52

## 2018-03-28 RX ADMIN — INSULIN LISPRO 8 UNITS: 100 INJECTION, SOLUTION INTRAVENOUS; SUBCUTANEOUS at 17:19

## 2018-03-28 RX ADMIN — METFORMIN HYDROCHLORIDE 500 MG: 500 TABLET ORAL at 17:19

## 2018-03-28 RX ADMIN — IPRATROPIUM BROMIDE AND ALBUTEROL SULFATE 3 ML: 2.5; .5 SOLUTION RESPIRATORY (INHALATION) at 07:58

## 2018-03-28 RX ADMIN — INSULIN LISPRO 8 UNITS: 100 INJECTION, SOLUTION INTRAVENOUS; SUBCUTANEOUS at 21:07

## 2018-03-28 RX ADMIN — GUAIFENESIN 1200 MG: 600 TABLET, EXTENDED RELEASE ORAL at 08:52

## 2018-03-28 RX ADMIN — GUAIFENESIN 1200 MG: 600 TABLET, EXTENDED RELEASE ORAL at 20:52

## 2018-03-28 RX ADMIN — GABAPENTIN 400 MG: 400 CAPSULE ORAL at 20:52

## 2018-03-28 RX ADMIN — INSULIN LISPRO 12 UNITS: 100 INJECTION, SOLUTION INTRAVENOUS; SUBCUTANEOUS at 12:18

## 2018-03-28 RX ADMIN — ROSUVASTATIN CALCIUM 10 MG: 10 TABLET, FILM COATED ORAL at 20:52

## 2018-03-28 RX ADMIN — AMLODIPINE BESYLATE 10 MG: 10 TABLET ORAL at 08:52

## 2018-03-28 RX ADMIN — INSULIN DETEMIR 25 UNITS: 100 INJECTION, SOLUTION SUBCUTANEOUS at 21:07

## 2018-03-28 RX ADMIN — TAZOBACTAM SODIUM AND PIPERACILLIN SODIUM 3.38 G: 375; 3 INJECTION, SOLUTION INTRAVENOUS at 21:07

## 2018-03-28 RX ADMIN — IPRATROPIUM BROMIDE AND ALBUTEROL SULFATE 3 ML: 2.5; .5 SOLUTION RESPIRATORY (INHALATION) at 11:15

## 2018-03-28 RX ADMIN — ACETAMINOPHEN 650 MG: 325 TABLET, FILM COATED ORAL at 04:38

## 2018-03-28 RX ADMIN — TAZOBACTAM SODIUM AND PIPERACILLIN SODIUM 3.38 G: 375; 3 INJECTION, SOLUTION INTRAVENOUS at 12:20

## 2018-03-29 ENCOUNTER — APPOINTMENT (OUTPATIENT)
Dept: ULTRASOUND IMAGING | Facility: HOSPITAL | Age: 58
End: 2018-03-29

## 2018-03-29 ENCOUNTER — APPOINTMENT (OUTPATIENT)
Dept: GENERAL RADIOLOGY | Facility: HOSPITAL | Age: 58
End: 2018-03-29

## 2018-03-29 LAB
ANION GAP SERPL CALCULATED.3IONS-SCNC: 12 MMOL/L (ref 4–13)
BASOPHILS # BLD AUTO: 0.05 10*3/MM3 (ref 0–0.2)
BASOPHILS NFR BLD AUTO: 0.3 % (ref 0–2)
BUN BLD-MCNC: 18 MG/DL (ref 5–21)
BUN/CREAT SERPL: 10.1 (ref 7–25)
CALCIUM SPEC-SCNC: 8.2 MG/DL (ref 8.4–10.4)
CHLORIDE SERPL-SCNC: 102 MMOL/L (ref 98–110)
CO2 SERPL-SCNC: 24 MMOL/L (ref 24–31)
CREAT BLD-MCNC: 1.79 MG/DL (ref 0.5–1.4)
CREAT UR-MCNC: 58.8 MG/DL
DEPRECATED RDW RBC AUTO: 48 FL (ref 40–54)
EOSINOPHIL # BLD AUTO: 0.15 10*3/MM3 (ref 0–0.7)
EOSINOPHIL NFR BLD AUTO: 0.9 % (ref 0–4)
ERYTHROCYTE [DISTWIDTH] IN BLOOD BY AUTOMATED COUNT: 14.5 % (ref 12–15)
GFR SERPL CREATININE-BSD FRML MDRD: 29 ML/MIN/1.73
GLUCOSE BLD-MCNC: 214 MG/DL (ref 70–100)
GLUCOSE BLDC GLUCOMTR-MCNC: 170 MG/DL (ref 70–130)
GLUCOSE BLDC GLUCOMTR-MCNC: 174 MG/DL (ref 70–130)
GLUCOSE BLDC GLUCOMTR-MCNC: 242 MG/DL (ref 70–130)
GLUCOSE BLDC GLUCOMTR-MCNC: 282 MG/DL (ref 70–130)
HCT VFR BLD AUTO: 30.3 % (ref 37–47)
HGB BLD-MCNC: 10 G/DL (ref 12–16)
IMM GRANULOCYTES # BLD: 0.13 10*3/MM3 (ref 0–0.03)
IMM GRANULOCYTES NFR BLD: 0.8 % (ref 0–5)
LYMPHOCYTES # BLD AUTO: 1.91 10*3/MM3 (ref 0.72–4.86)
LYMPHOCYTES NFR BLD AUTO: 11.8 % (ref 15–45)
MCH RBC QN AUTO: 29.9 PG (ref 28–32)
MCHC RBC AUTO-ENTMCNC: 33 G/DL (ref 33–36)
MCV RBC AUTO: 90.4 FL (ref 82–98)
MONOCYTES # BLD AUTO: 1.29 10*3/MM3 (ref 0.19–1.3)
MONOCYTES NFR BLD AUTO: 8 % (ref 4–12)
NEUTROPHILS # BLD AUTO: 12.68 10*3/MM3 (ref 1.87–8.4)
NEUTROPHILS NFR BLD AUTO: 78.2 % (ref 39–78)
NRBC BLD MANUAL-RTO: 0 /100 WBC (ref 0–0)
PLATELET # BLD AUTO: 320 10*3/MM3 (ref 130–400)
PMV BLD AUTO: 11.4 FL (ref 6–12)
POTASSIUM BLD-SCNC: 3.7 MMOL/L (ref 3.5–5.3)
PROT UR-MCNC: 40 MG/DL (ref 0–13.5)
RBC # BLD AUTO: 3.35 10*6/MM3 (ref 4.2–5.4)
SODIUM BLD-SCNC: 138 MMOL/L (ref 135–145)
SODIUM UR-SCNC: 32 MMOL/L (ref 30–90)
UUN 24H UR-MCNC: 277 MG/DL
WBC NRBC COR # BLD: 16.21 10*3/MM3 (ref 4.8–10.8)

## 2018-03-29 PROCEDURE — 84300 ASSAY OF URINE SODIUM: CPT | Performed by: NURSE PRACTITIONER

## 2018-03-29 PROCEDURE — 63710000001 INSULIN DETEMIR PER 5 UNITS: Performed by: NURSE PRACTITIONER

## 2018-03-29 PROCEDURE — 94799 UNLISTED PULMONARY SVC/PX: CPT

## 2018-03-29 PROCEDURE — 25010000002 PIPERACILLIN SOD-TAZOBACTAM PER 1 G: Performed by: INTERNAL MEDICINE

## 2018-03-29 PROCEDURE — 84156 ASSAY OF PROTEIN URINE: CPT | Performed by: NURSE PRACTITIONER

## 2018-03-29 PROCEDURE — 25010000002 ENOXAPARIN PER 10 MG: Performed by: FAMILY MEDICINE

## 2018-03-29 PROCEDURE — 97161 PT EVAL LOW COMPLEX 20 MIN: CPT

## 2018-03-29 PROCEDURE — 82962 GLUCOSE BLOOD TEST: CPT

## 2018-03-29 PROCEDURE — 80048 BASIC METABOLIC PNL TOTAL CA: CPT | Performed by: THORACIC SURGERY (CARDIOTHORACIC VASCULAR SURGERY)

## 2018-03-29 PROCEDURE — 63710000001 INSULIN LISPRO (HUMAN) PER 5 UNITS: Performed by: INTERNAL MEDICINE

## 2018-03-29 PROCEDURE — G8979 MOBILITY GOAL STATUS: HCPCS

## 2018-03-29 PROCEDURE — 76775 US EXAM ABDO BACK WALL LIM: CPT

## 2018-03-29 PROCEDURE — 71045 X-RAY EXAM CHEST 1 VIEW: CPT

## 2018-03-29 PROCEDURE — 25010000002 VANCOMYCIN 10 G RECONSTITUTED SOLUTION

## 2018-03-29 PROCEDURE — 85025 COMPLETE CBC W/AUTO DIFF WBC: CPT | Performed by: THORACIC SURGERY (CARDIOTHORACIC VASCULAR SURGERY)

## 2018-03-29 PROCEDURE — G8978 MOBILITY CURRENT STATUS: HCPCS

## 2018-03-29 PROCEDURE — 84540 ASSAY OF URINE/UREA-N: CPT | Performed by: NURSE PRACTITIONER

## 2018-03-29 PROCEDURE — 99024 POSTOP FOLLOW-UP VISIT: CPT | Performed by: NURSE PRACTITIONER

## 2018-03-29 PROCEDURE — 87205 SMEAR GRAM STAIN: CPT | Performed by: INTERNAL MEDICINE

## 2018-03-29 PROCEDURE — 82570 ASSAY OF URINE CREATININE: CPT | Performed by: NURSE PRACTITIONER

## 2018-03-29 RX ORDER — VANCOMYCIN HYDROCHLORIDE 1 G/200ML
1000 INJECTION, SOLUTION INTRAVENOUS EVERY 24 HOURS
Status: DISCONTINUED | OUTPATIENT
Start: 2018-03-30 | End: 2018-03-29

## 2018-03-29 RX ADMIN — ENOXAPARIN SODIUM 30 MG: 30 INJECTION SUBCUTANEOUS at 21:42

## 2018-03-29 RX ADMIN — VANCOMYCIN HYDROCHLORIDE 1250 MG: 1 INJECTION, POWDER, LYOPHILIZED, FOR SOLUTION INTRAVENOUS at 04:20

## 2018-03-29 RX ADMIN — AMLODIPINE BESYLATE 10 MG: 10 TABLET ORAL at 09:28

## 2018-03-29 RX ADMIN — TAZOBACTAM SODIUM AND PIPERACILLIN SODIUM 3.38 G: 375; 3 INJECTION, SOLUTION INTRAVENOUS at 13:06

## 2018-03-29 RX ADMIN — GUAIFENESIN 1200 MG: 600 TABLET, EXTENDED RELEASE ORAL at 21:41

## 2018-03-29 RX ADMIN — TAZOBACTAM SODIUM AND PIPERACILLIN SODIUM 3.38 G: 375; 3 INJECTION, SOLUTION INTRAVENOUS at 21:44

## 2018-03-29 RX ADMIN — INSULIN DETEMIR 15 UNITS: 100 INJECTION, SOLUTION SUBCUTANEOUS at 09:28

## 2018-03-29 RX ADMIN — HYDROCODONE BITARTRATE AND ACETAMINOPHEN 1 TABLET: 7.5; 325 TABLET ORAL at 12:02

## 2018-03-29 RX ADMIN — IPRATROPIUM BROMIDE AND ALBUTEROL SULFATE 3 ML: 2.5; .5 SOLUTION RESPIRATORY (INHALATION) at 21:42

## 2018-03-29 RX ADMIN — SODIUM CHLORIDE 125 ML/HR: 9 INJECTION, SOLUTION INTRAVENOUS at 20:12

## 2018-03-29 RX ADMIN — TAZOBACTAM SODIUM AND PIPERACILLIN SODIUM 3.38 G: 375; 3 INJECTION, SOLUTION INTRAVENOUS at 04:20

## 2018-03-29 RX ADMIN — INSULIN LISPRO 8 UNITS: 100 INJECTION, SOLUTION INTRAVENOUS; SUBCUTANEOUS at 09:28

## 2018-03-29 RX ADMIN — GUAIFENESIN 1200 MG: 600 TABLET, EXTENDED RELEASE ORAL at 09:28

## 2018-03-29 RX ADMIN — INSULIN LISPRO 4 UNITS: 100 INJECTION, SOLUTION INTRAVENOUS; SUBCUTANEOUS at 13:06

## 2018-03-29 RX ADMIN — INSULIN LISPRO 12 UNITS: 100 INJECTION, SOLUTION INTRAVENOUS; SUBCUTANEOUS at 17:32

## 2018-03-29 RX ADMIN — IPRATROPIUM BROMIDE AND ALBUTEROL SULFATE 3 ML: 2.5; .5 SOLUTION RESPIRATORY (INHALATION) at 07:42

## 2018-03-29 RX ADMIN — ROSUVASTATIN CALCIUM 10 MG: 10 TABLET, FILM COATED ORAL at 21:42

## 2018-03-29 RX ADMIN — PANTOPRAZOLE SODIUM 40 MG: 40 TABLET, DELAYED RELEASE ORAL at 06:17

## 2018-03-29 RX ADMIN — IPRATROPIUM BROMIDE AND ALBUTEROL SULFATE 3 ML: 2.5; .5 SOLUTION RESPIRATORY (INHALATION) at 15:06

## 2018-03-29 RX ADMIN — GABAPENTIN 400 MG: 400 CAPSULE ORAL at 21:43

## 2018-03-29 RX ADMIN — FLUOXETINE 10 MG: 10 CAPSULE ORAL at 09:28

## 2018-03-29 RX ADMIN — IPRATROPIUM BROMIDE AND ALBUTEROL SULFATE 3 ML: 2.5; .5 SOLUTION RESPIRATORY (INHALATION) at 15:03

## 2018-03-29 RX ADMIN — IPRATROPIUM BROMIDE AND ALBUTEROL SULFATE 3 ML: 2.5; .5 SOLUTION RESPIRATORY (INHALATION) at 11:34

## 2018-03-29 RX ADMIN — LOSARTAN POTASSIUM 50 MG: 50 TABLET ORAL at 09:28

## 2018-03-29 RX ADMIN — HYDROCODONE BITARTRATE AND ACETAMINOPHEN 1 TABLET: 7.5; 325 TABLET ORAL at 22:18

## 2018-03-29 RX ADMIN — INSULIN LISPRO 4 UNITS: 100 INJECTION, SOLUTION INTRAVENOUS; SUBCUTANEOUS at 21:55

## 2018-03-29 RX ADMIN — INSULIN DETEMIR 15 UNITS: 100 INJECTION, SOLUTION SUBCUTANEOUS at 21:56

## 2018-03-29 RX ADMIN — SODIUM CHLORIDE 125 ML/HR: 9 INJECTION, SOLUTION INTRAVENOUS at 12:05

## 2018-03-30 ENCOUNTER — APPOINTMENT (OUTPATIENT)
Dept: GENERAL RADIOLOGY | Facility: HOSPITAL | Age: 58
End: 2018-03-30

## 2018-03-30 LAB
ANION GAP SERPL CALCULATED.3IONS-SCNC: 10 MMOL/L (ref 4–13)
BACTERIA FLD CULT: NORMAL
BILIRUB UR QL STRIP: NEGATIVE
BUN BLD-MCNC: 20 MG/DL (ref 5–21)
BUN/CREAT SERPL: 9.4 (ref 7–25)
CALCIUM SPEC-SCNC: 8.9 MG/DL (ref 8.4–10.4)
CHLORIDE SERPL-SCNC: 105 MMOL/L (ref 98–110)
CLARITY UR: ABNORMAL
CLUMPED PLATELETS: PRESENT
CO2 SERPL-SCNC: 25 MMOL/L (ref 24–31)
COLOR UR: YELLOW
CREAT BLD-MCNC: 2.12 MG/DL (ref 0.5–1.4)
CYTO UR: NORMAL
DEPRECATED RDW RBC AUTO: 48.6 FL (ref 40–54)
ERYTHROCYTE [DISTWIDTH] IN BLOOD BY AUTOMATED COUNT: 14.6 % (ref 12–15)
GFR SERPL CREATININE-BSD FRML MDRD: 24 ML/MIN/1.73
GLUCOSE BLD-MCNC: 97 MG/DL (ref 70–100)
GLUCOSE BLDC GLUCOMTR-MCNC: 124 MG/DL (ref 70–130)
GLUCOSE BLDC GLUCOMTR-MCNC: 174 MG/DL (ref 70–130)
GLUCOSE BLDC GLUCOMTR-MCNC: 213 MG/DL (ref 70–130)
GLUCOSE BLDC GLUCOMTR-MCNC: 283 MG/DL (ref 70–130)
GLUCOSE UR STRIP-MCNC: ABNORMAL MG/DL
GRAM STN SPEC: NORMAL
GRAM STN SPEC: NORMAL
HCT VFR BLD AUTO: 28.5 % (ref 37–47)
HGB BLD-MCNC: 9.3 G/DL (ref 12–16)
HGB UR QL STRIP.AUTO: NEGATIVE
IRON 24H UR-MRATE: 25 MCG/DL (ref 42–180)
IRON SATN MFR SERPL: 12 % (ref 20–45)
KETONES UR QL STRIP: NEGATIVE
LAB AP CASE REPORT: NORMAL
LARGE PLATELETS: ABNORMAL
LEUKOCYTE ESTERASE UR QL STRIP.AUTO: NEGATIVE
LYMPHOCYTES # BLD MANUAL: 1.65 10*3/MM3 (ref 0.72–4.86)
LYMPHOCYTES NFR BLD MANUAL: 15 % (ref 15–45)
LYMPHOCYTES NFR BLD MANUAL: 5 % (ref 4–12)
Lab: NORMAL
MCH RBC QN AUTO: 29.7 PG (ref 28–32)
MCHC RBC AUTO-ENTMCNC: 32.6 G/DL (ref 33–36)
MCV RBC AUTO: 91.1 FL (ref 82–98)
MONOCYTES # BLD AUTO: 0.55 10*3/MM3 (ref 0.19–1.3)
NEUTROPHILS # BLD AUTO: 8.81 10*3/MM3 (ref 1.87–8.4)
NEUTROPHILS NFR BLD MANUAL: 80 % (ref 39–78)
NITRITE UR QL STRIP: NEGATIVE
PATH REPORT.FINAL DX SPEC: NORMAL
PATH REPORT.GROSS SPEC: NORMAL
PH UR STRIP.AUTO: <=5 [PH] (ref 5–8)
PLATELET # BLD AUTO: 312 10*3/MM3 (ref 130–400)
PMV BLD AUTO: 11.4 FL (ref 6–12)
POTASSIUM BLD-SCNC: 3.9 MMOL/L (ref 3.5–5.3)
PROT UR QL STRIP: NEGATIVE
PROT UR-MCNC: 26 MG/DL (ref 0–13.5)
RBC # BLD AUTO: 3.13 10*6/MM3 (ref 4.2–5.4)
RBC MORPH BLD: NORMAL
SODIUM BLD-SCNC: 140 MMOL/L (ref 135–145)
SODIUM UR-SCNC: 40 MMOL/L (ref 30–90)
SP GR UR STRIP: 1.01 (ref 1–1.03)
TIBC SERPL-MCNC: 216 MCG/DL (ref 225–420)
UROBILINOGEN UR QL STRIP: ABNORMAL
WBC MORPH BLD: NORMAL
WBC NRBC COR # BLD: 11.01 10*3/MM3 (ref 4.8–10.8)

## 2018-03-30 PROCEDURE — 97530 THERAPEUTIC ACTIVITIES: CPT

## 2018-03-30 PROCEDURE — 85027 COMPLETE CBC AUTOMATED: CPT | Performed by: INTERNAL MEDICINE

## 2018-03-30 PROCEDURE — 83540 ASSAY OF IRON: CPT | Performed by: INTERNAL MEDICINE

## 2018-03-30 PROCEDURE — 25010000002 PIPERACILLIN SOD-TAZOBACTAM PER 1 G: Performed by: INTERNAL MEDICINE

## 2018-03-30 PROCEDURE — 82962 GLUCOSE BLOOD TEST: CPT

## 2018-03-30 PROCEDURE — 63710000001 INSULIN DETEMIR PER 5 UNITS: Performed by: NURSE PRACTITIONER

## 2018-03-30 PROCEDURE — 94799 UNLISTED PULMONARY SVC/PX: CPT

## 2018-03-30 PROCEDURE — 83550 IRON BINDING TEST: CPT | Performed by: INTERNAL MEDICINE

## 2018-03-30 PROCEDURE — 25010000002 ENOXAPARIN PER 10 MG: Performed by: FAMILY MEDICINE

## 2018-03-30 PROCEDURE — 97116 GAIT TRAINING THERAPY: CPT

## 2018-03-30 PROCEDURE — 94760 N-INVAS EAR/PLS OXIMETRY 1: CPT

## 2018-03-30 PROCEDURE — 63710000001 INSULIN LISPRO (HUMAN) PER 5 UNITS: Performed by: INTERNAL MEDICINE

## 2018-03-30 PROCEDURE — 84156 ASSAY OF PROTEIN URINE: CPT | Performed by: INTERNAL MEDICINE

## 2018-03-30 PROCEDURE — 84300 ASSAY OF URINE SODIUM: CPT | Performed by: INTERNAL MEDICINE

## 2018-03-30 PROCEDURE — 71045 X-RAY EXAM CHEST 1 VIEW: CPT

## 2018-03-30 PROCEDURE — 97110 THERAPEUTIC EXERCISES: CPT

## 2018-03-30 PROCEDURE — 99024 POSTOP FOLLOW-UP VISIT: CPT | Performed by: NURSE PRACTITIONER

## 2018-03-30 PROCEDURE — 80048 BASIC METABOLIC PNL TOTAL CA: CPT | Performed by: THORACIC SURGERY (CARDIOTHORACIC VASCULAR SURGERY)

## 2018-03-30 PROCEDURE — 81003 URINALYSIS AUTO W/O SCOPE: CPT | Performed by: INTERNAL MEDICINE

## 2018-03-30 RX ORDER — SODIUM CHLORIDE 9 MG/ML
100 INJECTION, SOLUTION INTRAVENOUS CONTINUOUS
Status: DISCONTINUED | OUTPATIENT
Start: 2018-03-30 | End: 2018-04-01

## 2018-03-30 RX ORDER — POLYETHYLENE GLYCOL 3350 17 G/17G
17 POWDER, FOR SOLUTION ORAL DAILY
Status: DISCONTINUED | OUTPATIENT
Start: 2018-03-30 | End: 2018-04-05 | Stop reason: HOSPADM

## 2018-03-30 RX ORDER — BISACODYL 5 MG/1
10 TABLET, DELAYED RELEASE ORAL 2 TIMES DAILY
Status: DISCONTINUED | OUTPATIENT
Start: 2018-03-30 | End: 2018-04-05 | Stop reason: HOSPADM

## 2018-03-30 RX ADMIN — ROSUVASTATIN CALCIUM 10 MG: 10 TABLET, FILM COATED ORAL at 20:32

## 2018-03-30 RX ADMIN — FLUOXETINE 10 MG: 10 CAPSULE ORAL at 09:30

## 2018-03-30 RX ADMIN — TAZOBACTAM SODIUM AND PIPERACILLIN SODIUM 3.38 G: 375; 3 INJECTION, SOLUTION INTRAVENOUS at 20:32

## 2018-03-30 RX ADMIN — IPRATROPIUM BROMIDE AND ALBUTEROL SULFATE 3 ML: 2.5; .5 SOLUTION RESPIRATORY (INHALATION) at 19:34

## 2018-03-30 RX ADMIN — INSULIN LISPRO 12 UNITS: 100 INJECTION, SOLUTION INTRAVENOUS; SUBCUTANEOUS at 17:36

## 2018-03-30 RX ADMIN — BISACODYL 10 MG: 5 TABLET ORAL at 11:39

## 2018-03-30 RX ADMIN — HYDROCODONE BITARTRATE AND ACETAMINOPHEN 1 TABLET: 7.5; 325 TABLET ORAL at 09:52

## 2018-03-30 RX ADMIN — ENOXAPARIN SODIUM 30 MG: 30 INJECTION SUBCUTANEOUS at 20:39

## 2018-03-30 RX ADMIN — POLYETHYLENE GLYCOL 3350 17 G: 17 POWDER, FOR SOLUTION ORAL at 11:40

## 2018-03-30 RX ADMIN — PANTOPRAZOLE SODIUM 40 MG: 40 TABLET, DELAYED RELEASE ORAL at 06:23

## 2018-03-30 RX ADMIN — IPRATROPIUM BROMIDE AND ALBUTEROL SULFATE 3 ML: 2.5; .5 SOLUTION RESPIRATORY (INHALATION) at 11:43

## 2018-03-30 RX ADMIN — SODIUM CHLORIDE 125 ML/HR: 9 INJECTION, SOLUTION INTRAVENOUS at 04:46

## 2018-03-30 RX ADMIN — HYDROCODONE BITARTRATE AND ACETAMINOPHEN 1 TABLET: 7.5; 325 TABLET ORAL at 14:08

## 2018-03-30 RX ADMIN — GABAPENTIN 400 MG: 400 CAPSULE ORAL at 20:39

## 2018-03-30 RX ADMIN — GUAIFENESIN 1200 MG: 600 TABLET, EXTENDED RELEASE ORAL at 20:32

## 2018-03-30 RX ADMIN — IPRATROPIUM BROMIDE AND ALBUTEROL SULFATE 3 ML: 2.5; .5 SOLUTION RESPIRATORY (INHALATION) at 15:53

## 2018-03-30 RX ADMIN — SODIUM CHLORIDE 125 ML/HR: 9 INJECTION, SOLUTION INTRAVENOUS at 14:43

## 2018-03-30 RX ADMIN — INSULIN DETEMIR 15 UNITS: 100 INJECTION, SOLUTION SUBCUTANEOUS at 09:31

## 2018-03-30 RX ADMIN — BISACODYL 10 MG: 5 TABLET ORAL at 20:39

## 2018-03-30 RX ADMIN — IPRATROPIUM BROMIDE AND ALBUTEROL SULFATE 3 ML: 2.5; .5 SOLUTION RESPIRATORY (INHALATION) at 07:45

## 2018-03-30 RX ADMIN — TAZOBACTAM SODIUM AND PIPERACILLIN SODIUM 3.38 G: 375; 3 INJECTION, SOLUTION INTRAVENOUS at 13:30

## 2018-03-30 RX ADMIN — INSULIN LISPRO 4 UNITS: 100 INJECTION, SOLUTION INTRAVENOUS; SUBCUTANEOUS at 20:39

## 2018-03-30 RX ADMIN — TAZOBACTAM SODIUM AND PIPERACILLIN SODIUM 3.38 G: 375; 3 INJECTION, SOLUTION INTRAVENOUS at 05:25

## 2018-03-30 RX ADMIN — GUAIFENESIN 1200 MG: 600 TABLET, EXTENDED RELEASE ORAL at 09:30

## 2018-03-30 RX ADMIN — INSULIN DETEMIR 15 UNITS: 100 INJECTION, SOLUTION SUBCUTANEOUS at 20:39

## 2018-03-30 RX ADMIN — INSULIN LISPRO 8 UNITS: 100 INJECTION, SOLUTION INTRAVENOUS; SUBCUTANEOUS at 11:40

## 2018-03-31 ENCOUNTER — APPOINTMENT (OUTPATIENT)
Dept: ULTRASOUND IMAGING | Facility: HOSPITAL | Age: 58
End: 2018-03-31

## 2018-03-31 ENCOUNTER — APPOINTMENT (OUTPATIENT)
Dept: GENERAL RADIOLOGY | Facility: HOSPITAL | Age: 58
End: 2018-03-31

## 2018-03-31 LAB
ALBUMIN SERPL-MCNC: 2.9 G/DL (ref 3.5–5)
ALBUMIN/GLOB SERPL: 0.8 G/DL (ref 1.1–2.5)
ALP SERPL-CCNC: 223 U/L (ref 24–120)
ALT SERPL W P-5'-P-CCNC: 55 U/L (ref 0–54)
ANION GAP SERPL CALCULATED.3IONS-SCNC: 12 MMOL/L (ref 4–13)
AST SERPL-CCNC: 65 U/L (ref 7–45)
BASOPHILS # BLD AUTO: 0.07 10*3/MM3 (ref 0–0.2)
BASOPHILS NFR BLD AUTO: 0.6 % (ref 0–2)
BILIRUB SERPL-MCNC: 0.1 MG/DL (ref 0.1–1)
BUN BLD-MCNC: 18 MG/DL (ref 5–21)
BUN/CREAT SERPL: 8.5 (ref 7–25)
CALCIUM SPEC-SCNC: 8.2 MG/DL (ref 8.4–10.4)
CHLORIDE SERPL-SCNC: 108 MMOL/L (ref 98–110)
CO2 SERPL-SCNC: 23 MMOL/L (ref 24–31)
CREAT BLD-MCNC: 2.12 MG/DL (ref 0.5–1.4)
DEPRECATED RDW RBC AUTO: 49.7 FL (ref 40–54)
EOSINOPHIL # BLD AUTO: 0.22 10*3/MM3 (ref 0–0.7)
EOSINOPHIL NFR BLD AUTO: 1.8 % (ref 0–4)
EOSINOPHIL SPEC QL WRIGHT STN: NORMAL %
ERYTHROCYTE [DISTWIDTH] IN BLOOD BY AUTOMATED COUNT: 14.6 % (ref 12–15)
GFR SERPL CREATININE-BSD FRML MDRD: 24 ML/MIN/1.73
GLOBULIN UR ELPH-MCNC: 3.5 GM/DL
GLUCOSE BLD-MCNC: 87 MG/DL (ref 70–100)
GLUCOSE BLDC GLUCOMTR-MCNC: 101 MG/DL (ref 70–130)
GLUCOSE BLDC GLUCOMTR-MCNC: 123 MG/DL (ref 70–130)
GLUCOSE BLDC GLUCOMTR-MCNC: 173 MG/DL (ref 70–130)
GLUCOSE BLDC GLUCOMTR-MCNC: 195 MG/DL (ref 70–130)
HCT VFR BLD AUTO: 31.3 % (ref 37–47)
HGB BLD-MCNC: 10.1 G/DL (ref 12–16)
IMM GRANULOCYTES # BLD: 0.05 10*3/MM3 (ref 0–0.03)
IMM GRANULOCYTES NFR BLD: 0.4 % (ref 0–5)
LYMPHOCYTES # BLD AUTO: 1.79 10*3/MM3 (ref 0.72–4.86)
LYMPHOCYTES NFR BLD AUTO: 14.4 % (ref 15–45)
MCH RBC QN AUTO: 29.6 PG (ref 28–32)
MCHC RBC AUTO-ENTMCNC: 32.3 G/DL (ref 33–36)
MCV RBC AUTO: 91.8 FL (ref 82–98)
MONOCYTES # BLD AUTO: 0.83 10*3/MM3 (ref 0.19–1.3)
MONOCYTES NFR BLD AUTO: 6.7 % (ref 4–12)
NEUTROPHILS # BLD AUTO: 9.49 10*3/MM3 (ref 1.87–8.4)
NEUTROPHILS NFR BLD AUTO: 76.1 % (ref 39–78)
NRBC BLD MANUAL-RTO: 0 /100 WBC (ref 0–0)
PLATELET # BLD AUTO: 412 10*3/MM3 (ref 130–400)
PMV BLD AUTO: 11 FL (ref 6–12)
POTASSIUM BLD-SCNC: 4 MMOL/L (ref 3.5–5.3)
PROT SERPL-MCNC: 6.4 G/DL (ref 6.3–8.7)
RBC # BLD AUTO: 3.41 10*6/MM3 (ref 4.2–5.4)
SODIUM BLD-SCNC: 143 MMOL/L (ref 135–145)
WBC NRBC COR # BLD: 12.45 10*3/MM3 (ref 4.8–10.8)

## 2018-03-31 PROCEDURE — 82962 GLUCOSE BLOOD TEST: CPT

## 2018-03-31 PROCEDURE — 97530 THERAPEUTIC ACTIVITIES: CPT

## 2018-03-31 PROCEDURE — 71045 X-RAY EXAM CHEST 1 VIEW: CPT

## 2018-03-31 PROCEDURE — 94799 UNLISTED PULMONARY SVC/PX: CPT

## 2018-03-31 PROCEDURE — 80053 COMPREHEN METABOLIC PANEL: CPT | Performed by: INTERNAL MEDICINE

## 2018-03-31 PROCEDURE — 25010000002 PIPERACILLIN SOD-TAZOBACTAM PER 1 G: Performed by: INTERNAL MEDICINE

## 2018-03-31 PROCEDURE — 63710000001 INSULIN LISPRO (HUMAN) PER 5 UNITS: Performed by: INTERNAL MEDICINE

## 2018-03-31 PROCEDURE — 85025 COMPLETE CBC W/AUTO DIFF WBC: CPT | Performed by: INTERNAL MEDICINE

## 2018-03-31 PROCEDURE — 97116 GAIT TRAINING THERAPY: CPT

## 2018-03-31 PROCEDURE — 25010000002 IRON SUCROSE PER 1 MG: Performed by: NURSE PRACTITIONER

## 2018-03-31 PROCEDURE — 25010000002 ENOXAPARIN PER 10 MG: Performed by: FAMILY MEDICINE

## 2018-03-31 PROCEDURE — 99024 POSTOP FOLLOW-UP VISIT: CPT | Performed by: THORACIC SURGERY (CARDIOTHORACIC VASCULAR SURGERY)

## 2018-03-31 PROCEDURE — 63710000001 INSULIN DETEMIR PER 5 UNITS: Performed by: NURSE PRACTITIONER

## 2018-03-31 RX ORDER — IPRATROPIUM BROMIDE AND ALBUTEROL SULFATE 2.5; .5 MG/3ML; MG/3ML
3 SOLUTION RESPIRATORY (INHALATION)
Status: DISCONTINUED | OUTPATIENT
Start: 2018-03-31 | End: 2018-04-05 | Stop reason: HOSPADM

## 2018-03-31 RX ORDER — ACETYLCYSTEINE 200 MG/ML
3 SOLUTION ORAL; RESPIRATORY (INHALATION)
Status: DISPENSED | OUTPATIENT
Start: 2018-03-31 | End: 2018-04-01

## 2018-03-31 RX ORDER — ACETYLCYSTEINE 200 MG/ML
3 SOLUTION ORAL; RESPIRATORY (INHALATION)
Status: DISCONTINUED | OUTPATIENT
Start: 2018-03-31 | End: 2018-03-31 | Stop reason: SDUPTHER

## 2018-03-31 RX ADMIN — GABAPENTIN 400 MG: 400 CAPSULE ORAL at 22:19

## 2018-03-31 RX ADMIN — IPRATROPIUM BROMIDE AND ALBUTEROL SULFATE 3 ML: 2.5; .5 SOLUTION RESPIRATORY (INHALATION) at 06:57

## 2018-03-31 RX ADMIN — TAZOBACTAM SODIUM AND PIPERACILLIN SODIUM 3.38 G: 375; 3 INJECTION, SOLUTION INTRAVENOUS at 13:47

## 2018-03-31 RX ADMIN — INSULIN DETEMIR 15 UNITS: 100 INJECTION, SOLUTION SUBCUTANEOUS at 22:19

## 2018-03-31 RX ADMIN — SODIUM CHLORIDE 100 ML/HR: 9 INJECTION, SOLUTION INTRAVENOUS at 00:23

## 2018-03-31 RX ADMIN — POLYETHYLENE GLYCOL 3350 17 G: 17 POWDER, FOR SOLUTION ORAL at 11:36

## 2018-03-31 RX ADMIN — GUAIFENESIN 1200 MG: 600 TABLET, EXTENDED RELEASE ORAL at 11:38

## 2018-03-31 RX ADMIN — IPRATROPIUM BROMIDE AND ALBUTEROL SULFATE 3 ML: 2.5; .5 SOLUTION RESPIRATORY (INHALATION) at 23:44

## 2018-03-31 RX ADMIN — ACETYLCYSTEINE 3 ML: 200 INHALANT RESPIRATORY (INHALATION) at 20:09

## 2018-03-31 RX ADMIN — ENOXAPARIN SODIUM 30 MG: 30 INJECTION SUBCUTANEOUS at 22:17

## 2018-03-31 RX ADMIN — PANTOPRAZOLE SODIUM 40 MG: 40 TABLET, DELAYED RELEASE ORAL at 05:25

## 2018-03-31 RX ADMIN — IPRATROPIUM BROMIDE AND ALBUTEROL SULFATE 3 ML: 2.5; .5 SOLUTION RESPIRATORY (INHALATION) at 10:25

## 2018-03-31 RX ADMIN — SODIUM CHLORIDE 100 ML/HR: 9 INJECTION, SOLUTION INTRAVENOUS at 22:16

## 2018-03-31 RX ADMIN — FLUOXETINE 10 MG: 10 CAPSULE ORAL at 11:37

## 2018-03-31 RX ADMIN — TAZOBACTAM SODIUM AND PIPERACILLIN SODIUM 3.38 G: 375; 3 INJECTION, SOLUTION INTRAVENOUS at 05:25

## 2018-03-31 RX ADMIN — GUAIFENESIN 1200 MG: 600 TABLET, EXTENDED RELEASE ORAL at 22:21

## 2018-03-31 RX ADMIN — INSULIN DETEMIR 15 UNITS: 100 INJECTION, SOLUTION SUBCUTANEOUS at 09:33

## 2018-03-31 RX ADMIN — ROSUVASTATIN CALCIUM 10 MG: 10 TABLET, FILM COATED ORAL at 22:20

## 2018-03-31 RX ADMIN — SODIUM CHLORIDE 100 ML/HR: 9 INJECTION, SOLUTION INTRAVENOUS at 11:52

## 2018-03-31 RX ADMIN — IPRATROPIUM BROMIDE AND ALBUTEROL SULFATE 3 ML: 2.5; .5 SOLUTION RESPIRATORY (INHALATION) at 20:09

## 2018-03-31 RX ADMIN — INSULIN LISPRO 4 UNITS: 100 INJECTION, SOLUTION INTRAVENOUS; SUBCUTANEOUS at 17:24

## 2018-03-31 RX ADMIN — INSULIN LISPRO 4 UNITS: 100 INJECTION, SOLUTION INTRAVENOUS; SUBCUTANEOUS at 22:18

## 2018-03-31 RX ADMIN — BISACODYL 10 MG: 5 TABLET ORAL at 11:37

## 2018-03-31 RX ADMIN — IPRATROPIUM BROMIDE AND ALBUTEROL SULFATE 3 ML: 2.5; .5 SOLUTION RESPIRATORY (INHALATION) at 14:17

## 2018-03-31 RX ADMIN — IRON SUCROSE 200 MG: 20 INJECTION, SOLUTION INTRAVENOUS at 09:36

## 2018-03-31 RX ADMIN — TAZOBACTAM SODIUM AND PIPERACILLIN SODIUM 3.38 G: 375; 3 INJECTION, SOLUTION INTRAVENOUS at 22:18

## 2018-04-01 ENCOUNTER — APPOINTMENT (OUTPATIENT)
Dept: GENERAL RADIOLOGY | Facility: HOSPITAL | Age: 58
End: 2018-04-01

## 2018-04-01 LAB
ALBUMIN SERPL-MCNC: 3 G/DL (ref 3.5–5)
ALBUMIN/GLOB SERPL: 0.9 G/DL (ref 1.1–2.5)
ALP SERPL-CCNC: 256 U/L (ref 24–120)
ALT SERPL W P-5'-P-CCNC: 57 U/L (ref 0–54)
ANION GAP SERPL CALCULATED.3IONS-SCNC: 13 MMOL/L (ref 4–13)
AST SERPL-CCNC: 57 U/L (ref 7–45)
BACTERIA SPEC AEROBE CULT: NORMAL
BACTERIA SPEC ANAEROBE CULT: NORMAL
BASOPHILS # BLD AUTO: 0.07 10*3/MM3 (ref 0–0.2)
BASOPHILS NFR BLD AUTO: 0.6 % (ref 0–2)
BILIRUB SERPL-MCNC: 0.2 MG/DL (ref 0.1–1)
BUN BLD-MCNC: 14 MG/DL (ref 5–21)
BUN/CREAT SERPL: 6.7 (ref 7–25)
CALCIUM SPEC-SCNC: 8.8 MG/DL (ref 8.4–10.4)
CHLORIDE SERPL-SCNC: 108 MMOL/L (ref 98–110)
CO2 SERPL-SCNC: 23 MMOL/L (ref 24–31)
CREAT BLD-MCNC: 2.09 MG/DL (ref 0.5–1.4)
DEPRECATED RDW RBC AUTO: 48.7 FL (ref 40–54)
EOSINOPHIL # BLD AUTO: 0.2 10*3/MM3 (ref 0–0.7)
EOSINOPHIL NFR BLD AUTO: 1.7 % (ref 0–4)
ERYTHROCYTE [DISTWIDTH] IN BLOOD BY AUTOMATED COUNT: 14.6 % (ref 12–15)
GFR SERPL CREATININE-BSD FRML MDRD: 24 ML/MIN/1.73
GLOBULIN UR ELPH-MCNC: 3.4 GM/DL
GLUCOSE BLD-MCNC: 90 MG/DL (ref 70–100)
GLUCOSE BLDC GLUCOMTR-MCNC: 141 MG/DL (ref 70–130)
GLUCOSE BLDC GLUCOMTR-MCNC: 149 MG/DL (ref 70–130)
GLUCOSE BLDC GLUCOMTR-MCNC: 192 MG/DL (ref 70–130)
GLUCOSE BLDC GLUCOMTR-MCNC: 94 MG/DL (ref 70–130)
GRAM STN SPEC: NORMAL
HCT VFR BLD AUTO: 28.7 % (ref 37–47)
HGB BLD-MCNC: 9.3 G/DL (ref 12–16)
IMM GRANULOCYTES # BLD: 0.06 10*3/MM3 (ref 0–0.03)
IMM GRANULOCYTES NFR BLD: 0.5 % (ref 0–5)
LYMPHOCYTES # BLD AUTO: 2.09 10*3/MM3 (ref 0.72–4.86)
LYMPHOCYTES NFR BLD AUTO: 17.9 % (ref 15–45)
MCH RBC QN AUTO: 29.5 PG (ref 28–32)
MCHC RBC AUTO-ENTMCNC: 32.4 G/DL (ref 33–36)
MCV RBC AUTO: 91.1 FL (ref 82–98)
MONOCYTES # BLD AUTO: 1.08 10*3/MM3 (ref 0.19–1.3)
MONOCYTES NFR BLD AUTO: 9.3 % (ref 4–12)
NEUTROPHILS # BLD AUTO: 8.16 10*3/MM3 (ref 1.87–8.4)
NEUTROPHILS NFR BLD AUTO: 70 % (ref 39–78)
NRBC BLD MANUAL-RTO: 0 /100 WBC (ref 0–0)
PLATELET # BLD AUTO: 379 10*3/MM3 (ref 130–400)
PMV BLD AUTO: 11 FL (ref 6–12)
POTASSIUM BLD-SCNC: 3.7 MMOL/L (ref 3.5–5.3)
PROT SERPL-MCNC: 6.4 G/DL (ref 6.3–8.7)
RBC # BLD AUTO: 3.15 10*6/MM3 (ref 4.2–5.4)
SODIUM BLD-SCNC: 144 MMOL/L (ref 135–145)
WBC NRBC COR # BLD: 11.66 10*3/MM3 (ref 4.8–10.8)

## 2018-04-01 PROCEDURE — 25010000002 IRON SUCROSE PER 1 MG: Performed by: NURSE PRACTITIONER

## 2018-04-01 PROCEDURE — 97116 GAIT TRAINING THERAPY: CPT

## 2018-04-01 PROCEDURE — 80053 COMPREHEN METABOLIC PANEL: CPT | Performed by: NURSE PRACTITIONER

## 2018-04-01 PROCEDURE — 99024 POSTOP FOLLOW-UP VISIT: CPT | Performed by: THORACIC SURGERY (CARDIOTHORACIC VASCULAR SURGERY)

## 2018-04-01 PROCEDURE — 71045 X-RAY EXAM CHEST 1 VIEW: CPT

## 2018-04-01 PROCEDURE — 25010000002 ENOXAPARIN PER 10 MG: Performed by: FAMILY MEDICINE

## 2018-04-01 PROCEDURE — 63710000001 INSULIN DETEMIR PER 5 UNITS: Performed by: NURSE PRACTITIONER

## 2018-04-01 PROCEDURE — 94799 UNLISTED PULMONARY SVC/PX: CPT

## 2018-04-01 PROCEDURE — 63710000001 INSULIN LISPRO (HUMAN) PER 5 UNITS: Performed by: INTERNAL MEDICINE

## 2018-04-01 PROCEDURE — 82962 GLUCOSE BLOOD TEST: CPT

## 2018-04-01 PROCEDURE — 85025 COMPLETE CBC W/AUTO DIFF WBC: CPT | Performed by: NURSE PRACTITIONER

## 2018-04-01 PROCEDURE — 25010000002 PIPERACILLIN SOD-TAZOBACTAM PER 1 G: Performed by: INTERNAL MEDICINE

## 2018-04-01 RX ADMIN — INSULIN LISPRO 4 UNITS: 100 INJECTION, SOLUTION INTRAVENOUS; SUBCUTANEOUS at 20:49

## 2018-04-01 RX ADMIN — NYSTATIN 500000 UNITS: 100000 SUSPENSION ORAL at 12:46

## 2018-04-01 RX ADMIN — HYDROCODONE BITARTRATE AND ACETAMINOPHEN 1 TABLET: 7.5; 325 TABLET ORAL at 17:44

## 2018-04-01 RX ADMIN — IPRATROPIUM BROMIDE AND ALBUTEROL SULFATE 3 ML: 2.5; .5 SOLUTION RESPIRATORY (INHALATION) at 06:30

## 2018-04-01 RX ADMIN — LIDOCAINE HYDROCHLORIDE 10 ML: 20 SOLUTION ORAL; TOPICAL at 20:59

## 2018-04-01 RX ADMIN — SODIUM CHLORIDE 100 ML/HR: 9 INJECTION, SOLUTION INTRAVENOUS at 08:04

## 2018-04-01 RX ADMIN — IPRATROPIUM BROMIDE AND ALBUTEROL SULFATE 3 ML: 2.5; .5 SOLUTION RESPIRATORY (INHALATION) at 23:41

## 2018-04-01 RX ADMIN — FLUOXETINE 10 MG: 10 CAPSULE ORAL at 08:07

## 2018-04-01 RX ADMIN — INSULIN DETEMIR 15 UNITS: 100 INJECTION, SOLUTION SUBCUTANEOUS at 20:49

## 2018-04-01 RX ADMIN — GABAPENTIN 400 MG: 400 CAPSULE ORAL at 20:48

## 2018-04-01 RX ADMIN — LIDOCAINE HYDROCHLORIDE 10 ML: 20 SOLUTION ORAL; TOPICAL at 12:46

## 2018-04-01 RX ADMIN — NYSTATIN 500000 UNITS: 100000 SUSPENSION ORAL at 17:39

## 2018-04-01 RX ADMIN — ROSUVASTATIN CALCIUM 10 MG: 10 TABLET, FILM COATED ORAL at 20:48

## 2018-04-01 RX ADMIN — IPRATROPIUM BROMIDE AND ALBUTEROL SULFATE 3 ML: 2.5; .5 SOLUTION RESPIRATORY (INHALATION) at 14:27

## 2018-04-01 RX ADMIN — IPRATROPIUM BROMIDE AND ALBUTEROL SULFATE 3 ML: 2.5; .5 SOLUTION RESPIRATORY (INHALATION) at 10:37

## 2018-04-01 RX ADMIN — IRON SUCROSE 200 MG: 20 INJECTION, SOLUTION INTRAVENOUS at 08:07

## 2018-04-01 RX ADMIN — TAZOBACTAM SODIUM AND PIPERACILLIN SODIUM 3.38 G: 375; 3 INJECTION, SOLUTION INTRAVENOUS at 06:41

## 2018-04-01 RX ADMIN — IPRATROPIUM BROMIDE AND ALBUTEROL SULFATE 3 ML: 2.5; .5 SOLUTION RESPIRATORY (INHALATION) at 19:28

## 2018-04-01 RX ADMIN — INSULIN DETEMIR 15 UNITS: 100 INJECTION, SOLUTION SUBCUTANEOUS at 08:09

## 2018-04-01 RX ADMIN — GUAIFENESIN 1200 MG: 600 TABLET, EXTENDED RELEASE ORAL at 20:48

## 2018-04-01 RX ADMIN — IPRATROPIUM BROMIDE AND ALBUTEROL SULFATE 3 ML: 2.5; .5 SOLUTION RESPIRATORY (INHALATION) at 03:13

## 2018-04-01 RX ADMIN — ENOXAPARIN SODIUM 30 MG: 30 INJECTION SUBCUTANEOUS at 20:53

## 2018-04-01 RX ADMIN — TAZOBACTAM SODIUM AND PIPERACILLIN SODIUM 3.38 G: 375; 3 INJECTION, SOLUTION INTRAVENOUS at 21:00

## 2018-04-01 RX ADMIN — GUAIFENESIN 1200 MG: 600 TABLET, EXTENDED RELEASE ORAL at 08:07

## 2018-04-01 RX ADMIN — TAZOBACTAM SODIUM AND PIPERACILLIN SODIUM 3.38 G: 375; 3 INJECTION, SOLUTION INTRAVENOUS at 14:17

## 2018-04-01 RX ADMIN — PANTOPRAZOLE SODIUM 40 MG: 40 TABLET, DELAYED RELEASE ORAL at 06:41

## 2018-04-01 RX ADMIN — LIDOCAINE HYDROCHLORIDE 10 ML: 20 SOLUTION ORAL; TOPICAL at 17:39

## 2018-04-01 RX ADMIN — NYSTATIN 500000 UNITS: 100000 SUSPENSION ORAL at 20:59

## 2018-04-01 RX ADMIN — POLYETHYLENE GLYCOL 3350 17 G: 17 POWDER, FOR SOLUTION ORAL at 08:07

## 2018-04-01 NOTE — PROGRESS NOTES
"Infectious Diseases Progress Note    Patient:  Carlee Vee  YOB: 1960  MRN: [id number]   Admit date: 3/23/2018   Admitting Physician: Sandra Saunders DO  Primary Care Physician: CLEMENTE East    Chief Complaint/Interval History: She is feeling better.  She feels she is gaining strength.  No fevers or chills.  No diarrhea.    Intake/Output Summary (Last 24 hours) at 04/01/18 1510  Last data filed at 04/01/18 1345   Gross per 24 hour   Intake             1967 ml   Output             3579 ml   Net            -1612 ml     Allergies:   Allergies   Allergen Reactions   • Sulfa Antibiotics Hives     Current Scheduled Medications:     bisacodyl 10 mg Oral BID   enoxaparin 30 mg Subcutaneous Q24H   FLUoxetine 10 mg Oral Daily   gabapentin 400 mg Oral Nightly   guaiFENesin 1,200 mg Oral BID   insulin detemir 15 Units Subcutaneous Q12H   insulin lispro 0-24 Units Subcutaneous 4x Daily With Meals & Nightly   ipratropium-albuterol 3 mL Nebulization Q4H - RT   lidocaine-Maalox-diphenhydramine 10 mL Oral 4x Daily   nystatin 5 mL Oral 4x Daily   pantoprazole 40 mg Oral Q AM   piperacillin-tazobactam 3.375 g Intravenous Q8H   polyethylene glycol 17 g Oral Daily   rosuvastatin 10 mg Oral Nightly     Current PRN Medications:  •  acetaminophen  •  bisacodyl  •  dextrose  •  dextrose  •  glucagon (human recombinant)  •  HYDROcodone-acetaminophen  •  hydrOXYzine  •  naloxone  •  traZODone    Review of Systems    Vital Signs:  /79 (BP Location: Left arm, Patient Position: Lying)   Pulse 95   Temp 98.4 °F (36.9 °C) (Oral)   Resp 20   Ht 160 cm (63\")   Wt 78 kg (172 lb)   SpO2 94%   Breastfeeding? No   BMI 30.47 kg/m²     Physical Exam  Vital signs reviewed.  Line/IV site: No erythema, warmth, induration, or tenderness.    Lab Results:  CBC:   Results from last 7 days  Lab Units 04/01/18  0451 03/31/18  0505 03/30/18  0305 03/29/18  0238 03/28/18  0204 03/27/18  1523 03/27/18  0432   WBC 10*3/mm3 " 11.66* 12.45* 11.01* 16.21* 15.02* 14.82* 8.79   HEMOGLOBIN g/dL 9.3* 10.1* 9.3* 10.0* 11.5* 11.6* 10.4*   HEMATOCRIT % 28.7* 31.3* 28.5* 30.3* 34.4* 35.6* 31.5*   PLATELETS 10*3/mm3 379 412* 312 320 354 353 319     BMP:  Results from last 7 days  Lab Units 04/01/18  0451 03/31/18  0505 03/30/18  0305 03/29/18  0238 03/28/18  0204 03/27/18  1523 03/27/18  0955 03/27/18  0432 03/26/18  0938   SODIUM mmol/L 144 143 140 138 139 138  --  142 139   SODIUM, ARTERIAL mmol/L  --   --   --   --   --   --  139  --   --    POTASSIUM mmol/L 3.7 4.0 3.9 3.7 4.0 4.1  --  3.3* 3.4*   CHLORIDE mmol/L 108 108 105 102 102 103  --  103 102   CO2 mmol/L 23.0* 23.0* 25.0 24.0 28.0 25.0  --  28.0 25.0   BUN mg/dL 14 18 20 18 8 7  --  8 7   CREATININE mg/dL 2.09* 2.12* 2.12* 1.79* 0.60 0.58  --  0.57 0.60   GLUCOSE mg/dL 90 87 97 214* 153* 243*  --  163* 275*   CALCIUM mg/dL 8.8 8.2* 8.9 8.2* 8.3* 8.3*  --  8.5 8.5   ALT (SGPT) U/L 57* 55*  --   --   --   --   --  45 42     Culture Results:   Wound Culture   Date Value Ref Range Status   03/27/2018 No growth at 5 days  Final     Operative cultures no growth to date    Radiology: None  Additional Studies Reviewed: None    Impression:   Pneumonia/empyema-improving with antibiotic treatment following decortication.  Stable to improving acute renal insufficiency    Recommendations:   Continue Zosyn  Continue to follow    Abel Hubbard MD

## 2018-04-01 NOTE — PLAN OF CARE
Problem: Patient Care Overview  Goal: Plan of Care Review  Outcome: Ongoing (interventions implemented as appropriate)   04/01/18 1519   Coping/Psychosocial   Plan of Care Reviewed With patient   Plan of Care Review   Progress improving   OTHER   Outcome Summary Morphine PCA available, minimal use, pt has declined PO pain meds so far this shift. Up with assist, voiding, BM today, ambulating in halls. O2@5L. CT's to -20cm dry suction. IVF d/c'd. Creatinine slightly improved.  IV zosyn continues.      Goal: Individualization and Mutuality  Outcome: Ongoing (interventions implemented as appropriate)    Goal: Discharge Needs Assessment  Outcome: Ongoing (interventions implemented as appropriate)      Problem: Pneumonia (Adult)  Goal: Signs and Symptoms of Listed Potential Problems Will be Absent, Minimized or Managed (Pneumonia)  Outcome: Ongoing (interventions implemented as appropriate)   03/31/18 1522 04/01/18 1519   Goal/Outcome Evaluation   Problems Assessed (Pneumonia) --  all   Problems Present (Pneumonia) respiratory compromise --        Problem: Chest Tube Drainage Device (Adult)  Goal: Signs and Symptoms of Listed Potential Problems Will be Absent, Minimized or Managed (Chest Tube Drainage Device)  Outcome: Ongoing (interventions implemented as appropriate)   04/01/18 1519   Goal/Outcome Evaluation   Problems Assessed (Chest Tube Drainage Device) all   Problems Assessed (Chest Tube Drain Dev) pain       Problem: Fall Risk (Adult)  Goal: Identify Related Risk Factors and Signs and Symptoms  Outcome: Ongoing (interventions implemented as appropriate)    Goal: Absence of Fall  Outcome: Ongoing (interventions implemented as appropriate)   04/01/18 1519   Fall Risk (Adult)   Absence of Fall making progress toward outcome

## 2018-04-01 NOTE — PROGRESS NOTES
[facility] Medicine Services  INPATIENT PROGRESS NOTE    Length of Stay: 9  Date of Admission: 3/23/2018  Primary Care Physician: CLEMENTE East    Subjective   Chief Complaint: follow up empyema  HPI   Chest tubes remain. Pt is seen up in the chair. States she feels pretty good. Bowels moved yesterday.     Review of Systems   All pertinent negatives and positives are as above. All other systems have been reviewed and are negative unless otherwise stated.     Objective    Temp:  [98 °F (36.7 °C)-98.5 °F (36.9 °C)] 98.5 °F (36.9 °C)  Heart Rate:  [] 101  Resp:  [18-24] 18  BP: (107-139)/(60-75) 123/62  Physical Exam   Constitutional: She is oriented to person, place, and time. She appears well-developed and well-nourished.   HENT:   Head: Normocephalic and atraumatic.   Eyes: Conjunctivae and EOM are normal. Pupils are equal, round, and reactive to light.   Neck: Neck supple. No JVD present. No thyromegaly present.   Cardiovascular: Normal rate, regular rhythm, normal heart sounds and intact distal pulses.  Exam reveals no gallop and no friction rub.    No murmur heard.  Pulmonary/Chest: Effort normal. No respiratory distress. She has no wheezes. She has no rales. She exhibits no tenderness.   Diminished in the bases.    Abdominal: Soft. Bowel sounds are normal. She exhibits no distension. There is no tenderness. There is no rebound and no guarding.   Musculoskeletal: Normal range of motion. She exhibits no edema, tenderness or deformity.   Lymphadenopathy:     She has no cervical adenopathy.   Neurological: She is alert and oriented to person, place, and time. She displays normal reflexes. No cranial nerve deficit. She exhibits normal muscle tone.   Skin: Skin is warm and dry. No rash noted.   Psychiatric: She has a normal mood and affect. Her behavior is normal. Judgment and thought content normal.     Results Review:  I have reviewed the labs, radiology results,  and diagnostic studies.    Laboratory Data:     Results from last 7 days  Lab Units 04/01/18  0451 03/31/18  0505 03/30/18  0305   WBC 10*3/mm3 11.66* 12.45* 11.01*   HEMOGLOBIN g/dL 9.3* 10.1* 9.3*   HEMATOCRIT % 28.7* 31.3* 28.5*   PLATELETS 10*3/mm3 379 412* 312       Results from last 7 days  Lab Units 04/01/18  0451 03/31/18  0505 03/30/18  0305  03/27/18  0432   SODIUM mmol/L 144 143 140  < > 142   SODIUM, ARTERIAL   --   --   --   < >  --    POTASSIUM mmol/L 3.7 4.0 3.9  < > 3.3*   CHLORIDE mmol/L 108 108 105  < > 103   CO2 mmol/L 23.0* 23.0* 25.0  < > 28.0   BUN mg/dL 14 18 20  < > 8   CREATININE mg/dL 2.09* 2.12* 2.12*  < > 0.57   CALCIUM mg/dL 8.8 8.2* 8.9  < > 8.5   BILIRUBIN mg/dL 0.2 0.1  --   --  0.1   ALK PHOS U/L 256* 223*  --   --  185*   ALT (SGPT) U/L 57* 55*  --   --  45   AST (SGOT) U/L 57* 65*  --   --  46*   GLUCOSE mg/dL 90 87 97  < > 163*   < > = values in this interval not displayed.    Culture Data:   Wound Culture   Date Value Ref Range Status   03/27/2018 No growth at 5 days  Final       Radiology Data:   Imaging Results (last 24 hours)     Procedure Component Value Units Date/Time    XR Chest 1 View [[id number]] Updated:  04/01/18 0400    XR Chest 1 View [[id number]] Collected:  03/31/18 0902     Updated:  03/31/18 0907    Narrative:       EXAMINATION: XR CHEST 1 VW-     3/31/2018 3:50 AM CDT     HISTORY: Post Op Lung Surgery; J86.9-Pyothorax without fistula;  Z74.09-Other reduced mobility.     One view chest x-ray compared with yesterday.        2. Left chest tubes remain in good position.     There is persistent consolidation of the left lung base.  No visible pneumothorax.     The right lung is adequately expanded.     There is a new finding of focal infiltrate or atelectasis within the  midportion of the lung adjacent to the hilum.     Summary:  1. Stable left lung with chest tubes and basilar infiltrate/atelectasis.  2. Developing focal infiltrate or atelectasis within the midportion  of  the right lung.  This report was finalized on 03/31/2018 09:03 by Dr. Jimmy Hernandez MD.          I have reviewed the patient current medications.     Assessment/Plan   Assessment:  1. Complicated left pleural effusion- s/p left thoracotomy, evacuation of empyema and decortication 3/27/2018  2. Left upper lobe inflammatory opacities-Pneumonia with failed outpatient therapy  3. Acute hypoxic respiratory failure secondary to above  4. Sepsis positive secondary to penumonia  5. Leukocytosis, worsening  6. Shortness of breath/dyspnea-secondary to #1  7. Uncontrolled diabetes mellitus-insulin dependent, A1C 13.8%  8. Hypertension  9. Hyperlipidemia  10. Chronic pain syndrome-no home pain medications for this  11. Anemia-normocytic, mild. Iron deficiency  12. Acute kidney injury  13. Elevated transaminases-elevated alkaline phosphatase.     Plan:  1. Zosyn day 9-No growth on surgical cultures at 4 days.   2. Encouraged activity.   3. Metformin/ARB on hold.   4. Repeat labs in am  5. Chest tubes per CTS    Discharge Planning: I expect the patient to be discharged to home in ? days.    Vanessa Menon, APRN   04/01/18   8:56 AM     Chart reviewed.  Patient examined.   Agree with assessment and plan.    Sandra Saunders, DO  04/01/18  2:29 PM

## 2018-04-01 NOTE — PLAN OF CARE
Problem: Patient Care Overview  Goal: Plan of Care Review  Outcome: Ongoing (interventions implemented as appropriate)   03/31/18 1522 03/31/18 8139   Coping/Psychosocial   Plan of Care Reviewed With --  patient   Plan of Care Review   Progress no change --      Goal: Individualization and Mutuality  Outcome: Ongoing (interventions implemented as appropriate)    Goal: Discharge Needs Assessment  Outcome: Ongoing (interventions implemented as appropriate)      Problem: Pneumonia (Adult)  Goal: Signs and Symptoms of Listed Potential Problems Will be Absent, Minimized or Managed (Pneumonia)  Outcome: Ongoing (interventions implemented as appropriate)      Problem: Chest Tube Drainage Device (Adult)  Goal: Signs and Symptoms of Listed Potential Problems Will be Absent, Minimized or Managed (Chest Tube Drainage Device)  Outcome: Ongoing (interventions implemented as appropriate)      Problem: Fall Risk (Adult)  Goal: Identify Related Risk Factors and Signs and Symptoms  Outcome: Ongoing (interventions implemented as appropriate)    Goal: Absence of Fall  Outcome: Ongoing (interventions implemented as appropriate)

## 2018-04-01 NOTE — PLAN OF CARE
Problem: Patient Care Overview  Goal: Plan of Care Review  Outcome: Ongoing (interventions implemented as appropriate)   04/01/18 1124   Coping/Psychosocial   Plan of Care Reviewed With patient   Plan of Care Review   Progress improving   OTHER   Outcome Summary PT tx completed. Pt sitting in chair, c/o shortness of breath. Exercise sat 85% on 6L during ambulation. Transfers S, amb 200' with r wx. Has 2 chest tubes and uses oxygen.

## 2018-04-01 NOTE — PROGRESS NOTES
Nephrology ([facility]) progress Note      Patient:  Carlee Vee  YOB: 1960  Date of Service: 4/1/2018  MRN: [id number]   Acct: [id number]   Primary Care Physician: CLEMENTE East  Advance Directive: Full Code  Admit Date: 3/23/2018       Hospital Day: 9  Referring Provider: Kyaw Roldan MD      Patient Seen, Chart, Consults, Notes, Labs, Radiology studies reviewed.        Subjective:  Carlee Vee is a 57 y.o. female  whom we were consulted for acute kidney injury.  Patient has no previous history of chronic kidney disease.  Patient was recently diagnosed with left lower lobe pneumonia which was treated initially as an outpatient.  She was admitted as her condition is deteriorated.  She was admitted with the diagnosis of empyema.  She underwent chest tube placement and drainage of empyema.  Hospital course remarkable for treatment with intravenous vancomycin and has worsening of renal function.  Vanco has been discontinued.     This morning she is feeling much better and has shown some improvement of renal function.  She has excellent urine output.    Allergies:  Sulfa antibiotics    Home Meds:  Prescriptions Prior to Admission   Medication Sig Dispense Refill Last Dose   • amLODIPine (NORVASC) 10 MG tablet Take 10 mg by mouth Daily.   Past Week at Unknown time   • aspirin 81 MG EC tablet Chew 81 mg Daily.   Past Week at Unknown time   • FLUoxetine (PROzac) 20 MG capsule Take 20 mg by mouth Daily.   Past Week at Unknown time   • gabapentin (NEURONTIN) 100 MG capsule Take 400 mg by mouth Every Night.  3 Past Week at Unknown time   • HYDROcodone-acetaminophen (NORCO) 5-325 MG per tablet Take 1 tablet by mouth Every 6 (Six) Hours As Needed for Moderate Pain .   Past Week at Unknown time   • insulin aspart (novoLOG) 100 UNIT/ML injection Inject  under the skin 3 (Three) Times a Day Before Meals. 150 - 199 = 4 units  200 - 249 = 8 units  250 - 299 = 12 units  300 - 349 =  16 units  349 - 400 = 20 units  > 400 = 24 units   Past Week at Unknown time   • insulin glargine (LANTUS) 100 UNIT/ML injection Inject 16 Units under the skin Every Night.   Past Week at Unknown time   • losartan (COZAAR) 50 MG tablet Take 50 mg by mouth Daily.   Past Week at Unknown time   • metFORMIN (GLUCOPHAGE) 500 MG tablet Take 500 mg by mouth 2 (Two) Times a Day With Meals.   Past Week at Unknown time   • pantoprazole (PROTONIX) 40 MG EC tablet Take 40 mg by mouth Daily.   Past Week at Unknown time   • raNITIdine (ZANTAC) 150 MG tablet Take 150 mg by mouth Daily As Needed (Breakthrough acid reflux).   Past Week at Unknown time   • rosuvastatin (CRESTOR) 10 MG tablet Take 10 mg by mouth Daily.   Past Week at Unknown time   • SITagliptin (JANUVIA) 100 MG tablet Take 100 mg by mouth Every Morning.   Past Week at Unknown time   • traZODone (DESYREL) 50 MG tablet Take 100 mg by mouth At Night As Needed for Sleep.   Past Week at Unknown time   • vitamin D (ERGOCALCIFEROL) 83092 units capsule capsule Take 50,000 Units by mouth 1 (One) Time Per Week. Fridays   Past Week at Unknown time       Medicines:  Current Facility-Administered Medications   Medication Dose Route Frequency Provider Last Rate Last Dose   • acetaminophen (TYLENOL) tablet 650 mg  650 mg Oral Q6H PRN Vanessa Menon APRN   650 mg at 03/28/18 0438   • bisacodyl (DULCOLAX) EC tablet 10 mg  10 mg Oral BID CLEMENTE Hernandez   10 mg at 03/31/18 1137   • bisacodyl (DULCOLAX) suppository 10 mg  10 mg Rectal Daily PRN CLEMENTE Franco       • dextrose (D50W) solution 25 g  25 g Intravenous Q15 Min PRN CLEMENTE Franco       • dextrose (GLUTOSE) oral gel 15 g  15 g Oral Q15 Min PRN CLEMENTE Franco       • enoxaparin (LOVENOX) syringe 30 mg  30 mg Subcutaneous Q24H Sandra Saunders DO   30 mg at 03/31/18 2217   • FLUoxetine (PROzac) capsule 10 mg  10 mg Oral Daily CLEMENTE Franco   10 mg at  04/01/18 0807   • gabapentin (NEURONTIN) capsule 400 mg  400 mg Oral Nightly Vanessa Menon APRN   400 mg at 03/31/18 2219   • glucagon (human recombinant) (GLUCAGEN DIAGNOSTIC) injection 1 mg  1 mg Subcutaneous PRN CLEMENTE Franco       • guaiFENesin (MUCINEX) 12 hr tablet 1,200 mg  1,200 mg Oral BID CLEMENTE Franco   1,200 mg at 04/01/18 0807   • HYDROcodone-acetaminophen (NORCO) 7.5-325 MG per tablet 1 tablet  1 tablet Oral Q4H PRN Abel Stark MD   1 tablet at 03/30/18 1408   • hydrOXYzine (ATARAX) tablet 50 mg  50 mg Oral TID PRN CLEMENTE Franco   50 mg at 03/26/18 2209   • insulin detemir (LEVEMIR) injection 15 Units  15 Units Subcutaneous Q12H CLEMENTE Mejias   15 Units at 04/01/18 0809   • insulin lispro (humaLOG) injection 0-24 Units  0-24 Units Subcutaneous 4x Daily With Meals & Nightly Mark Howell MD   4 Units at 03/31/18 2218   • ipratropium-albuterol (DUO-NEB) nebulizer solution 3 mL  3 mL Nebulization Q4H - RT CLEMENTE Mejias   3 mL at 04/01/18 1037   • morphine sulfate PCA 1 mg/mL 50 mL syringe   Intravenous Continuous Abel Stark MD       • naloxone (NARCAN) injection 0.1 mg  0.1 mg Intravenous Q5 Min PRN Abel Stark MD       • pantoprazole (PROTONIX) EC tablet 40 mg  40 mg Oral Q AM Vanessa Menon APRN   40 mg at 04/01/18 0641   • piperacillin-tazobactam (ZOSYN) 3.375 g in iso-osmotic dextrose 50 ml (premix)  3.375 g Intravenous Q8H Osiel Thompson MD 0 mL/hr at 03/31/18 0741 3.375 g at 04/01/18 0641   • polyethylene glycol (MIRALAX) powder 17 g  17 g Oral Daily CLEMENTE Hernandez   17 g at 04/01/18 0807   • rosuvastatin (CRESTOR) tablet 10 mg  10 mg Oral Nightly Vanessa Menon, APRN   10 mg at 03/31/18 2220   • traZODone (DESYREL) tablet 100 mg  100 mg Oral Nightly PRN Vanessa Menon, APRN   100 mg at 03/23/18 2247       Past Medical History:  Past Medical History:   Diagnosis  Date   • Garza's esophagus    • Depression    • Diabetes mellitus    • GERD (gastroesophageal reflux disease)    • Hypertension    • Kidney stone        Past Surgical History:  Past Surgical History:   Procedure Laterality Date   • BACK SURGERY     • COLONOSCOPY  10/18/2006    hyperplastic ascending colon polyp- recall in 7 years   • COLONOSCOPY N/A 7/24/2017    Procedure: COLONOSCOPY WITH ANESTHESIA;  Surgeon: Jenniffer Smith MD;  Location: [facility] ENDOSCOPY;  Service:    • ENDOSCOPY N/A 7/24/2017    Procedure: ESOPHAGOGASTRODUODENOSCOPY WITH ANESTHESIA;  Surgeon: Jenniffer Smith MD;  Location:  [facility] ENDOSCOPY;  Service:    • HYSTERECTOMY     • THORACOTOMY Left 3/27/2018    Procedure: THORACOTOMY, drainage of empyema and decortication;  Surgeon: Abel Stark MD;  Location: [facility] OR;  Service: Cardiothoracic   • UPPER GASTROINTESTINAL ENDOSCOPY  12/08/2011       Family History  History reviewed. No pertinent family history.    Social History  Social History     Social History   • Marital status:      Spouse name: N/A   • Number of children: N/A   • Years of education: N/A     Occupational History   • Not on file.     Social History Main Topics   • Smoking status: Former Smoker   • Smokeless tobacco: Never Used   • Alcohol use No   • Drug use: No   • Sexual activity: Not on file     Other Topics Concern   • Not on file     Social History Narrative   • No narrative on file         Review of Systems:  History obtained from chart review and the patient  General ROS: No fever or chills  Respiratory ROS: positive for - shortness of breath  Cardiovascular ROS: no chest pain or dyspnea on exertion  Gastrointestinal ROS: No abdominal pain or melena  Genito-Urinary ROS: No dysuria or hematuria  14 point ROS reviewed with the patient and negative except as noted above and in the HPI unless unable to obtain.    Objective:  /62 (BP Location: Left arm, Patient Position: Sitting)   Pulse 97   Temp 98.5 °F (36.9  "°C) (Oral)   Resp 20   Ht 160 cm (63\")   Wt 78 kg (172 lb)   SpO2 94%   Breastfeeding? No   BMI 30.47 kg/m²     Intake/Output Summary (Last 24 hours) at 04/01/18 1144  Last data filed at 04/01/18 0841   Gross per 24 hour   Intake             2979 ml   Output             2426 ml   Net              553 ml     General: awake/alert   HEENT: Normocephalic atraumatic head  Neck: Supple with no JVD or carotid bruits.  Chest:  Decreased breath sound at the left lung base with crackles.  CVS: regular rate and rhythm  Abdominal: soft, nontender, normal bowel sounds  Extremities: no cyanosis or edema  Skin: warm and dry without rash      Labs:    Results from last 7 days  Lab Units 04/01/18  0451 03/31/18  0505 03/30/18  0305   WBC 10*3/mm3 11.66* 12.45* 11.01*   HEMOGLOBIN g/dL 9.3* 10.1* 9.3*   HEMATOCRIT % 28.7* 31.3* 28.5*   PLATELETS 10*3/mm3 379 412* 312           Results from last 7 days  Lab Units 04/01/18  0451 03/31/18  0505 03/30/18  0305  03/27/18  0432   SODIUM mmol/L 144 143 140  < > 142   SODIUM, ARTERIAL   --   --   --   < >  --    POTASSIUM mmol/L 3.7 4.0 3.9  < > 3.3*   CHLORIDE mmol/L 108 108 105  < > 103   CO2 mmol/L 23.0* 23.0* 25.0  < > 28.0   BUN mg/dL 14 18 20  < > 8   CREATININE mg/dL 2.09* 2.12* 2.12*  < > 0.57   CALCIUM mg/dL 8.8 8.2* 8.9  < > 8.5   BILIRUBIN mg/dL 0.2 0.1  --   --  0.1   ALK PHOS U/L 256* 223*  --   --  185*   ALT (SGPT) U/L 57* 55*  --   --  45   AST (SGOT) U/L 57* 65*  --   --  46*   GLUCOSE mg/dL 90 87 97  < > 163*   < > = values in this interval not displayed.        Radiology:   Imaging Results (last 72 hours)     Procedure Component Value Units Date/Time    XR Chest 1 View [[id number]] Collected:  03/31/18 0902     Updated:  03/31/18 0907    Narrative:       EXAMINATION: XR CHEST 1 VW-     3/31/2018 3:50 AM CDT     HISTORY: Post Op Lung Surgery; J86.9-Pyothorax without fistula;  Z74.09-Other reduced mobility.     One view chest x-ray compared with yesterday.        2. " Left chest tubes remain in good position.     There is persistent consolidation of the left lung base.  No visible pneumothorax.     The right lung is adequately expanded.     There is a new finding of focal infiltrate or atelectasis within the  midportion of the lung adjacent to the hilum.     Summary:  1. Stable left lung with chest tubes and basilar infiltrate/atelectasis.  2. Developing focal infiltrate or atelectasis within the midportion of  the right lung.  This report was finalized on 03/31/2018 09:03 by Dr. Jimmy Hernandez MD.    XR Chest 1 View [[id number]] Collected:  03/30/18 0721     Updated:  03/30/18 0727    Narrative:       HISTORY: Chest tubes     CXR: Frontal view the chest obtained.     COMPARISON: 3/29/2018     FINDINGS: The 2 left-sided chest tubes are similar in their position.  There is stable left lung opacities with a left pleural fluid  collection. The right lung appears clear. The mediastinal contours are  similar.     The left internal jugular central line is been removed.       Impression:       1. Similar positioning of the 2 left-sided chest tubes with a stable  left pleural fluid collection but no appreciable pneumothorax. Left mid  and lower lung consolidation.  This report was finalized on 03/30/2018 07:24 by Dr. Vanessa Jones MD.    US Renal Bilateral [[id number]] Collected:  03/29/18 0903     Updated:  03/29/18 0908    Narrative:       EXAMINATION: US RENAL BILATERAL- 3/29/2018 9:03 AM CDT     HISTORY: Acute kidney injury; J86.9-Pyothorax without fistula.     REPORT: Sonographic images of kidneys were obtained bilaterally, there  are no comparison studies.     The proximal aorta measures 2.3 cm in diameter, normal. The IVC measures  2.1 cm, normal. The right kidney measures 13.4 x 6.4 x 6.7 cm and has  normal morphology and echogenicity. No mass or hydronephrosis is seen.  Color flow imaging demonstrates vascular flow within the right kidney.     The left kidney measures 12.1 x 6.4  x 6.8 cm and has normal morphology  and echogenicity. No mass or hydronephrosis is seen on the left. Color  flow imaging demonstrates vascular flow within the left kidney. The  bladder is within normal limits.       Impression:       Normal ultrasound of the kidneys.  This report was finalized on 03/29/2018 09:05 by Dr. Ok Campa MD.    XR Chest 1 View [[id number]] Collected:  03/29/18 0728     Updated:  03/29/18 0733    Narrative:       HISTORY: Left-sided chest tube     CXR: A frontal view the chest is obtained.     COMPARISON: 3/20/2018     FINDINGS: The 2 left-sided chest tubes are similar in position. There is  no appreciable pneumothorax. There is similar left pleural fluid  collection with left basilar consolidation. There are small right  basilar atelectasis. Mediastinal contours are similar.     The left internal jugular central line tip projects of the left  brachiocephalic vein.       Impression:       1. Similar positioning of the 2 left-sided chest tubes with no  appreciable pneumothorax. Similar left pleural fluid collection with  left basilar consolidation. Probable right basilar atelectasis.  2. Left IJ central line tip projects over the left brachiocephalic vein.  This report was finalized on 03/29/2018 07:30 by Dr. Vanessa Jones MD.          Culture:  No components found for: WOUNDCUL, 3  No components found for: CSFCUL, 3  No components found for: BC, 3  No components found for: URINECUL, 3      Assessment   1.  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  2.  Left lower lobe pneumonia with empyema.  3.  Acute respiratory failure now resolving.  4.  Status post left chest tube placement.  5.  Iron deficiency anemia.    Plan:  1.  Decrease IV fluid.  2.  Intravenous Venofer.  3.  Continue to monitor renal recovery.  4.  Continue IV antibiotics.      Keo Hurd MD  4/1/2018  11:44 AM

## 2018-04-01 NOTE — PROGRESS NOTES
"Left thoracotomy, evacuation of empyema and decortication    POD 5    Marginal oxygenation overnight.  I/S up to 1-1.5 L  Patient still on oxygen NC 5 L min    Visit Vitals  /62 (BP Location: Left arm, Patient Position: Sitting)   Pulse 97   Temp 98.5 °F (36.9 °C) (Oral)   Resp 20   Ht 160 cm (63\")   Wt 78 kg (172 lb)   SpO2 94%   Breastfeeding? No   BMI 30.47 kg/m²       Intake/Output Summary (Last 24 hours) at 04/01/18 1105  Last data filed at 04/01/18 0841   Gross per 24 hour   Intake             2979 ml   Output             3326 ml   Net             -347 ml     Anterior CT output: 13 ml/24 hours   Posterior CT output: 13 ml/24 hours  No air leak       Scheduled Meds:    acetylcysteine 3 mL Nebulization BID - RT   bisacodyl 10 mg Oral BID   enoxaparin 30 mg Subcutaneous Q24H   FLUoxetine 10 mg Oral Daily   gabapentin 400 mg Oral Nightly   guaiFENesin 1,200 mg Oral BID   insulin detemir 15 Units Subcutaneous Q12H   insulin lispro 0-24 Units Subcutaneous 4x Daily With Meals & Nightly   ipratropium-albuterol 3 mL Nebulization Q4H - RT   pantoprazole 40 mg Oral Q AM   piperacillin-tazobactam 3.375 g Intravenous Q8H   polyethylene glycol 17 g Oral Daily   rosuvastatin 10 mg Oral Nightly     Continuous Infusions:    Morphine     sodium chloride 100 mL/hr Last Rate: 100 mL/hr (04/01/18 0804)     PRN Meds:.•  acetaminophen  •  bisacodyl  •  dextrose  •  dextrose  •  glucagon (human recombinant)  •  HYDROcodone-acetaminophen  •  hydrOXYzine  •  naloxone  •  traZODone      Chest x ray: Left sided chest tubes present with left basilar consolidation, no appreciable pneumothorax. Left lung field appears to be clearing up.    Labs:             Tissue path:    Final Diagnosis   Pleural peel, decortication:  Fibrosis, granulation, and mixed inflammatory infiltrate consistent with pleural peel.  Negative for malignancy         Physical Exam:  General: No apparent distress. In good spirits. Up in chair.  Cardiovascular: " Regular rate and rhythm without murmur, rubs, or gallops.    Pulmonary: Clear breath sounds with left base diminished. No wheezing. Improved cough effort. On 5 liters nasal cannula.   Chest: Thoracic incisions clean, dry, and intact. Dressing removed and left open to air. Left sided chest tubes to -20 cm dry suction. No air leak. Fluid is serosanguinous.   Abdomen: Soft, non-distended, and non-tender.  Extremities: Warm, moves all extremities.   Neurologic: Grossly intact with no focal deficits.       Impression:   Left empyema status post open thoracotomy, drainage of empyema, and decortication  Pneumonia  Poorly controlled diabetes mellitus  Obesity  Former tobacco use  Suboptimal pulmonary toilet  TOBIAS    Plan:    Continue Zosyn per ID. Appreciate ID assistance.  Continue chest tube drainage.  Continue to push pulmonary toilet and ambulation  Routine post thoracic surgery regimen   Patient with persistent opacity in the LLL, improving some. Will monitor with CXR tomorrow.  Patient to continue I/S and respiratory toilet.  Creatinine improving slowly. Plan to d/c IVF, discussed with nephrology service.

## 2018-04-01 NOTE — PLAN OF CARE
Problem: Patient Care Overview  Goal: Plan of Care Review  Outcome: Ongoing (interventions implemented as appropriate)   04/01/18 9860   Coping/Psychosocial   Plan of Care Reviewed With patient   Plan of Care Review   Progress no change   OTHER   Outcome Summary Pt still requiring 02 at 5L will attempt to wean appropriately, anterior and posterior chest tube's to 20 cm dry suction per dr order, BM reported per pt, still ambulating well. Will cont to monitor.     Goal: Individualization and Mutuality  Outcome: Ongoing (interventions implemented as appropriate)    Goal: Discharge Needs Assessment  Outcome: Ongoing (interventions implemented as appropriate)      Problem: Pneumonia (Adult)  Goal: Signs and Symptoms of Listed Potential Problems Will be Absent, Minimized or Managed (Pneumonia)  Outcome: Ongoing (interventions implemented as appropriate)      Problem: Chest Tube Drainage Device (Adult)  Goal: Signs and Symptoms of Listed Potential Problems Will be Absent, Minimized or Managed (Chest Tube Drainage Device)  Outcome: Ongoing (interventions implemented as appropriate)      Problem: Fall Risk (Adult)  Goal: Identify Related Risk Factors and Signs and Symptoms  Outcome: Ongoing (interventions implemented as appropriate)    Goal: Absence of Fall  Outcome: Ongoing (interventions implemented as appropriate)
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
## 2018-04-02 ENCOUNTER — APPOINTMENT (OUTPATIENT)
Dept: GENERAL RADIOLOGY | Facility: HOSPITAL | Age: 58
End: 2018-04-02

## 2018-04-02 LAB
ALBUMIN SERPL-MCNC: 3.1 G/DL (ref 3.5–5)
ALBUMIN/GLOB SERPL: 0.9 G/DL (ref 1.1–2.5)
ALP SERPL-CCNC: 254 U/L (ref 24–120)
ALT SERPL W P-5'-P-CCNC: 56 U/L (ref 0–54)
ANION GAP SERPL CALCULATED.3IONS-SCNC: 12 MMOL/L (ref 4–13)
AST SERPL-CCNC: 39 U/L (ref 7–45)
BASOPHILS # BLD AUTO: 0.06 10*3/MM3 (ref 0–0.2)
BASOPHILS NFR BLD AUTO: 0.5 % (ref 0–2)
BILIRUB SERPL-MCNC: 0.2 MG/DL (ref 0.1–1)
BUN BLD-MCNC: 13 MG/DL (ref 5–21)
BUN/CREAT SERPL: 6.4 (ref 7–25)
CALCIUM SPEC-SCNC: 8.9 MG/DL (ref 8.4–10.4)
CHLORIDE SERPL-SCNC: 105 MMOL/L (ref 98–110)
CO2 SERPL-SCNC: 24 MMOL/L (ref 24–31)
CREAT BLD-MCNC: 2.02 MG/DL (ref 0.5–1.4)
DEPRECATED RDW RBC AUTO: 47.6 FL (ref 40–54)
EOSINOPHIL # BLD AUTO: 0.24 10*3/MM3 (ref 0–0.7)
EOSINOPHIL NFR BLD AUTO: 2.2 % (ref 0–4)
ERYTHROCYTE [DISTWIDTH] IN BLOOD BY AUTOMATED COUNT: 14.6 % (ref 12–15)
GFR SERPL CREATININE-BSD FRML MDRD: 25 ML/MIN/1.73
GLOBULIN UR ELPH-MCNC: 3.5 GM/DL
GLUCOSE BLD-MCNC: 164 MG/DL (ref 70–100)
GLUCOSE BLDC GLUCOMTR-MCNC: 128 MG/DL (ref 70–130)
GLUCOSE BLDC GLUCOMTR-MCNC: 130 MG/DL (ref 70–130)
GLUCOSE BLDC GLUCOMTR-MCNC: 168 MG/DL (ref 70–130)
GLUCOSE BLDC GLUCOMTR-MCNC: 186 MG/DL (ref 70–130)
HCT VFR BLD AUTO: 30.4 % (ref 37–47)
HGB BLD-MCNC: 9.8 G/DL (ref 12–16)
IMM GRANULOCYTES # BLD: 0.13 10*3/MM3 (ref 0–0.03)
IMM GRANULOCYTES NFR BLD: 1.2 % (ref 0–5)
LYMPHOCYTES # BLD AUTO: 1.67 10*3/MM3 (ref 0.72–4.86)
LYMPHOCYTES NFR BLD AUTO: 15.3 % (ref 15–45)
MCH RBC QN AUTO: 29 PG (ref 28–32)
MCHC RBC AUTO-ENTMCNC: 32.2 G/DL (ref 33–36)
MCV RBC AUTO: 89.9 FL (ref 82–98)
MONOCYTES # BLD AUTO: 0.98 10*3/MM3 (ref 0.19–1.3)
MONOCYTES NFR BLD AUTO: 9 % (ref 4–12)
NEUTROPHILS # BLD AUTO: 7.85 10*3/MM3 (ref 1.87–8.4)
NEUTROPHILS NFR BLD AUTO: 71.8 % (ref 39–78)
NRBC BLD MANUAL-RTO: 0 /100 WBC (ref 0–0)
PLATELET # BLD AUTO: 391 10*3/MM3 (ref 130–400)
PMV BLD AUTO: 10.7 FL (ref 6–12)
POTASSIUM BLD-SCNC: 3.8 MMOL/L (ref 3.5–5.3)
PROT SERPL-MCNC: 6.6 G/DL (ref 6.3–8.7)
RBC # BLD AUTO: 3.38 10*6/MM3 (ref 4.2–5.4)
SODIUM BLD-SCNC: 141 MMOL/L (ref 135–145)
WBC NRBC COR # BLD: 10.93 10*3/MM3 (ref 4.8–10.8)

## 2018-04-02 PROCEDURE — 94799 UNLISTED PULMONARY SVC/PX: CPT

## 2018-04-02 PROCEDURE — 63710000001 INSULIN DETEMIR PER 5 UNITS: Performed by: NURSE PRACTITIONER

## 2018-04-02 PROCEDURE — 97116 GAIT TRAINING THERAPY: CPT

## 2018-04-02 PROCEDURE — 25010000002 PIPERACILLIN SOD-TAZOBACTAM PER 1 G: Performed by: INTERNAL MEDICINE

## 2018-04-02 PROCEDURE — 63710000001 INSULIN LISPRO (HUMAN) PER 5 UNITS: Performed by: INTERNAL MEDICINE

## 2018-04-02 PROCEDURE — 85025 COMPLETE CBC W/AUTO DIFF WBC: CPT | Performed by: NURSE PRACTITIONER

## 2018-04-02 PROCEDURE — 99024 POSTOP FOLLOW-UP VISIT: CPT | Performed by: THORACIC SURGERY (CARDIOTHORACIC VASCULAR SURGERY)

## 2018-04-02 PROCEDURE — 71045 X-RAY EXAM CHEST 1 VIEW: CPT

## 2018-04-02 PROCEDURE — 25010000002 ENOXAPARIN PER 10 MG: Performed by: FAMILY MEDICINE

## 2018-04-02 PROCEDURE — 82962 GLUCOSE BLOOD TEST: CPT

## 2018-04-02 PROCEDURE — 80053 COMPREHEN METABOLIC PANEL: CPT | Performed by: INTERNAL MEDICINE

## 2018-04-02 RX ADMIN — NYSTATIN 500000 UNITS: 100000 SUSPENSION ORAL at 17:01

## 2018-04-02 RX ADMIN — ENOXAPARIN SODIUM 30 MG: 30 INJECTION SUBCUTANEOUS at 21:03

## 2018-04-02 RX ADMIN — IPRATROPIUM BROMIDE AND ALBUTEROL SULFATE 3 ML: 2.5; .5 SOLUTION RESPIRATORY (INHALATION) at 06:26

## 2018-04-02 RX ADMIN — TAZOBACTAM SODIUM AND PIPERACILLIN SODIUM 3.38 G: 375; 3 INJECTION, SOLUTION INTRAVENOUS at 21:35

## 2018-04-02 RX ADMIN — INSULIN LISPRO 4 UNITS: 100 INJECTION, SOLUTION INTRAVENOUS; SUBCUTANEOUS at 21:35

## 2018-04-02 RX ADMIN — INSULIN DETEMIR 15 UNITS: 100 INJECTION, SOLUTION SUBCUTANEOUS at 08:52

## 2018-04-02 RX ADMIN — IPRATROPIUM BROMIDE AND ALBUTEROL SULFATE 3 ML: 2.5; .5 SOLUTION RESPIRATORY (INHALATION) at 03:34

## 2018-04-02 RX ADMIN — NYSTATIN 500000 UNITS: 100000 SUSPENSION ORAL at 12:20

## 2018-04-02 RX ADMIN — INSULIN LISPRO 4 UNITS: 100 INJECTION, SOLUTION INTRAVENOUS; SUBCUTANEOUS at 17:00

## 2018-04-02 RX ADMIN — FLUOXETINE 10 MG: 10 CAPSULE ORAL at 08:46

## 2018-04-02 RX ADMIN — HYDROCODONE BITARTRATE AND ACETAMINOPHEN 1 TABLET: 7.5; 325 TABLET ORAL at 12:19

## 2018-04-02 RX ADMIN — NYSTATIN 500000 UNITS: 100000 SUSPENSION ORAL at 08:47

## 2018-04-02 RX ADMIN — LIDOCAINE HYDROCHLORIDE 10 ML: 20 SOLUTION ORAL; TOPICAL at 12:20

## 2018-04-02 RX ADMIN — TAZOBACTAM SODIUM AND PIPERACILLIN SODIUM 3.38 G: 375; 3 INJECTION, SOLUTION INTRAVENOUS at 13:26

## 2018-04-02 RX ADMIN — GUAIFENESIN 1200 MG: 600 TABLET, EXTENDED RELEASE ORAL at 08:46

## 2018-04-02 RX ADMIN — POLYETHYLENE GLYCOL 3350 17 G: 17 POWDER, FOR SOLUTION ORAL at 08:52

## 2018-04-02 RX ADMIN — HYDROCODONE BITARTRATE AND ACETAMINOPHEN 1 TABLET: 7.5; 325 TABLET ORAL at 16:47

## 2018-04-02 RX ADMIN — BISACODYL 10 MG: 5 TABLET ORAL at 08:46

## 2018-04-02 RX ADMIN — NYSTATIN 500000 UNITS: 100000 SUSPENSION ORAL at 21:06

## 2018-04-02 RX ADMIN — PANTOPRAZOLE SODIUM 40 MG: 40 TABLET, DELAYED RELEASE ORAL at 05:43

## 2018-04-02 RX ADMIN — IPRATROPIUM BROMIDE AND ALBUTEROL SULFATE 3 ML: 2.5; .5 SOLUTION RESPIRATORY (INHALATION) at 10:11

## 2018-04-02 RX ADMIN — GABAPENTIN 400 MG: 400 CAPSULE ORAL at 21:03

## 2018-04-02 RX ADMIN — HYDROCODONE BITARTRATE AND ACETAMINOPHEN 1 TABLET: 7.5; 325 TABLET ORAL at 05:43

## 2018-04-02 RX ADMIN — ROSUVASTATIN CALCIUM 10 MG: 10 TABLET, FILM COATED ORAL at 21:05

## 2018-04-02 RX ADMIN — BISACODYL 10 MG: 5 TABLET ORAL at 21:05

## 2018-04-02 RX ADMIN — INSULIN DETEMIR 15 UNITS: 100 INJECTION, SOLUTION SUBCUTANEOUS at 21:34

## 2018-04-02 RX ADMIN — IPRATROPIUM BROMIDE AND ALBUTEROL SULFATE 3 ML: 2.5; .5 SOLUTION RESPIRATORY (INHALATION) at 14:19

## 2018-04-02 RX ADMIN — TAZOBACTAM SODIUM AND PIPERACILLIN SODIUM 3.38 G: 375; 3 INJECTION, SOLUTION INTRAVENOUS at 05:43

## 2018-04-02 RX ADMIN — HYDROCODONE BITARTRATE AND ACETAMINOPHEN 1 TABLET: 7.5; 325 TABLET ORAL at 21:35

## 2018-04-02 RX ADMIN — GUAIFENESIN 1200 MG: 600 TABLET, EXTENDED RELEASE ORAL at 21:05

## 2018-04-02 RX ADMIN — LIDOCAINE HYDROCHLORIDE 10 ML: 20 SOLUTION ORAL; TOPICAL at 17:01

## 2018-04-02 RX ADMIN — IPRATROPIUM BROMIDE AND ALBUTEROL SULFATE 3 ML: 2.5; .5 SOLUTION RESPIRATORY (INHALATION) at 20:05

## 2018-04-02 RX ADMIN — LIDOCAINE HYDROCHLORIDE 10 ML: 20 SOLUTION ORAL; TOPICAL at 08:47

## 2018-04-02 RX ADMIN — LIDOCAINE HYDROCHLORIDE 10 ML: 20 SOLUTION ORAL; TOPICAL at 21:35

## 2018-04-02 NOTE — PLAN OF CARE
Problem: Patient Care Overview  Goal: Plan of Care Review  Outcome: Ongoing (interventions implemented as appropriate)   04/01/18 1519 04/01/18 2058   Coping/Psychosocial   Plan of Care Reviewed With --  patient   Plan of Care Review   Progress improving --      Goal: Individualization and Mutuality  Outcome: Ongoing (interventions implemented as appropriate)    Goal: Discharge Needs Assessment  Outcome: Ongoing (interventions implemented as appropriate)      Problem: Pneumonia (Adult)  Goal: Signs and Symptoms of Listed Potential Problems Will be Absent, Minimized or Managed (Pneumonia)  Outcome: Ongoing (interventions implemented as appropriate)      Problem: Chest Tube Drainage Device (Adult)  Goal: Signs and Symptoms of Listed Potential Problems Will be Absent, Minimized or Managed (Chest Tube Drainage Device)  Outcome: Ongoing (interventions implemented as appropriate)      Problem: Fall Risk (Adult)  Goal: Identify Related Risk Factors and Signs and Symptoms  Outcome: Ongoing (interventions implemented as appropriate)    Goal: Absence of Fall  Outcome: Ongoing (interventions implemented as appropriate)

## 2018-04-02 NOTE — PROGRESS NOTES
"Infectious Diseases Progress Note    Patient:  Carlee Vee  YOB: 1960  MRN: [id number]   Admit date: 3/23/2018   Admitting Physician: Osiel Thompson,*  Primary Care Physician: CLEMENTE East    Chief Complaint/Interval History: She is looking stronger each day.  She is breathing comfortably.  No productive cough or sputum production.    Intake/Output Summary (Last 24 hours) at 04/02/18 1319  Last data filed at 04/02/18 0900   Gross per 24 hour   Intake              109 ml   Output             2761 ml   Net            -2652 ml     Allergies:   Allergies   Allergen Reactions   • Sulfa Antibiotics Hives     Current Scheduled Medications:     bisacodyl 10 mg Oral BID   enoxaparin 30 mg Subcutaneous Q24H   FLUoxetine 10 mg Oral Daily   gabapentin 400 mg Oral Nightly   guaiFENesin 1,200 mg Oral BID   insulin detemir 15 Units Subcutaneous Q12H   insulin lispro 0-24 Units Subcutaneous 4x Daily With Meals & Nightly   ipratropium-albuterol 3 mL Nebulization Q4H - RT   lidocaine-Maalox-diphenhydramine 10 mL Oral 4x Daily   nystatin 5 mL Oral 4x Daily   pantoprazole 40 mg Oral Q AM   piperacillin-tazobactam 3.375 g Intravenous Q8H   polyethylene glycol 17 g Oral Daily   rosuvastatin 10 mg Oral Nightly     Current PRN Medications:  •  acetaminophen  •  bisacodyl  •  dextrose  •  dextrose  •  glucagon (human recombinant)  •  HYDROcodone-acetaminophen  •  hydrOXYzine  •  naloxone  •  traZODone    Review of Systems no nausea, diarrhea, rash.    Vital Signs:  /68 (BP Location: Right arm, Patient Position: Sitting)   Pulse 96   Temp 97.9 °F (36.6 °C) (Oral)   Resp 16   Ht 160 cm (63\")   Wt 78 kg (172 lb)   SpO2 92%   Breastfeeding? No   BMI 30.47 kg/m²     Physical Exam  Vital signs reviewed.  Lungs-good air movement.  No crackles or wheezes.  Line/IV left mid arm peripheral IV site: No erythema, warmth, induration, or tenderness.    Lab Results:  CBC:   Results from last 7 days  Lab " Units 04/02/18 0437 04/01/18 0451 03/31/18  0505 03/30/18  0305 03/29/18  0238 03/28/18  0204 03/27/18  1523   WBC 10*3/mm3 10.93* 11.66* 12.45* 11.01* 16.21* 15.02* 14.82*   HEMOGLOBIN g/dL 9.8* 9.3* 10.1* 9.3* 10.0* 11.5* 11.6*   HEMATOCRIT % 30.4* 28.7* 31.3* 28.5* 30.3* 34.4* 35.6*   PLATELETS 10*3/mm3 391 379 412* 312 320 354 353     BMP:  Results from last 7 days  Lab Units 04/02/18 0437 04/01/18 0451 03/31/18  0505 03/30/18  0305 03/29/18  0238 03/28/18  0204 03/27/18  1523  03/27/18  0432   SODIUM mmol/L 141 144 143 140 138 139 138  --  142   SODIUM, ARTERIAL   --   --   --   --   --   --   --   < >  --    POTASSIUM mmol/L 3.8 3.7 4.0 3.9 3.7 4.0 4.1  --  3.3*   CHLORIDE mmol/L 105 108 108 105 102 102 103  --  103   CO2 mmol/L 24.0 23.0* 23.0* 25.0 24.0 28.0 25.0  --  28.0   BUN mg/dL 13 14 18 20 18 8 7  --  8   CREATININE mg/dL 2.02* 2.09* 2.12* 2.12* 1.79* 0.60 0.58  --  0.57   GLUCOSE mg/dL 164* 90 87 97 214* 153* 243*  --  163*   CALCIUM mg/dL 8.9 8.8 8.2* 8.9 8.2* 8.3* 8.3*  --  8.5   ALT (SGPT) U/L 56* 57* 55*  --   --   --   --   --  45   < > = values in this interval not displayed.  Culture Results:   Wound Culture   Date Value Ref Range Status   03/27/2018 No growth at 5 days  Final     Operative cultures from left pleural space-no growth    Radiology: None  Additional Studies Reviewed: None    Impression:   Left lung pneumonia/empyema.  She is improving with intravenous Zosyn and recent decortication.  Acute renal insufficiency-stable to improving  Leukocytosis-resolving    Recommendations:   Likely transition to oral antibiotic treatment tomorrow  Continue to follow    Abel Hubbard MD

## 2018-04-02 NOTE — PROGRESS NOTES
Continued Stay Note   [place]     Patient Name: aCrlee Vee  MRN: [id number]  Today's Date: 4/2/2018    Admit Date: 3/23/2018          Discharge Plan     Row Name 04/02/18 1116       Plan    Plan HOME    Patient/Family in Agreement with Plan yes    Plan Comments REVIEWED CHART; NOTED POSSIBLE D/C CHEST TUBE TOMORROW.  PT. REQUIRED 6L O2 WHEN WALKING WITH P.T.  WILL CONT. TO FOLLOW FOR ANY D/C PLANNING NEEDS THAT MAY ARISE.  PLEASE ADVISE.  THANKS.                Discharge Codes    No documentation.           DVO Parker

## 2018-04-02 NOTE — PLAN OF CARE
Problem: Patient Care Overview  Goal: Plan of Care Review  Outcome: Ongoing (interventions implemented as appropriate)   04/02/18 9255   Coping/Psychosocial   Plan of Care Reviewed With patient   Plan of Care Review   Progress improving   OTHER   Outcome Summary Pt's O2 was decreased to 4 L per humidified NC. PCA dc'd and pain is controlled with po pain med. IV to IID. Continued on IV extended Zosyn.     Goal: Individualization and Mutuality  Outcome: Ongoing (interventions implemented as appropriate)   03/31/18 0223 04/02/18 1458   Individualization   Patient Specific Preferences none --    Patient Specific Goals (Include Timeframe) --  Stable O2 sats on room air if possible, possibly home on O2 if necessary.   Patient Specific Interventions --  IV antibiotics. PCA dc'd. On po pain med   Mutuality/Individual Preferences   What Anxieties, Fears, Concerns, or Questions Do You Have About Your Care? --  Concerned about going home with O2.    Mutuality/Individual Preferences   How to Address Anxieties/Fears --  Keep patient informed of new orders and care.     Goal: Discharge Needs Assessment   03/28/18 1008   Discharge Needs Assessment   Readmission Within the Last 30 Days no previous admission in last 30 days   Concerns to be Addressed denies needs/concerns at this time   Patient/Family Anticipates Transition to home with family   Patient/Family Anticipated Services at Transition none   Transportation Concerns car, none   Transportation Anticipated family or friend will provide   Anticipated Changes Related to Illness inability to care for self   Outpatient/Agency/Support Group Needs homecare agency   Discharge Facility/Level of Care Needs home with home health   Disability   Equipment Currently Used at Home nebulizer     Goal: Interprofessional Rounds/Family Conf  Outcome: Ongoing (interventions implemented as appropriate)   04/02/18 5568   Interdisciplinary Rounds/Family Conf   Participants nursing;patient;physician        Problem: Pneumonia (Adult)  Goal: Signs and Symptoms of Listed Potential Problems Will be Absent, Minimized or Managed (Pneumonia)  Outcome: Ongoing (interventions implemented as appropriate)   04/02/18 1458   Goal/Outcome Evaluation   Problems Assessed (Pneumonia) all   Problems Present (Pneumonia) respiratory compromise       Problem: Chest Tube Drainage Device (Adult)  Goal: Signs and Symptoms of Listed Potential Problems Will be Absent, Minimized or Managed (Chest Tube Drainage Device)  Outcome: Ongoing (interventions implemented as appropriate)   04/01/18 1519   Goal/Outcome Evaluation   Problems Assessed (Chest Tube Drainage Device) all   Problems Assessed (Chest Tube Drain Dev) pain       Problem: Fall Risk (Adult)  Goal: Identify Related Risk Factors and Signs and Symptoms  Outcome: Ongoing (interventions implemented as appropriate)   03/29/18 0338   Fall Risk (Adult)   Related Risk Factors (Fall Risk) environment unfamiliar;other (see comments)  (Chest tubes/IV pole/PCA)   Signs and Symptoms (Fall Risk) presence of risk factors     Goal: Absence of Fall  Outcome: Ongoing (interventions implemented as appropriate)   04/02/18 1458   Fall Risk (Adult)   Absence of Fall making progress toward outcome

## 2018-04-02 NOTE — PROGRESS NOTES
"Left thoracotomy, evacuation of empyema and decortication    POD 6    Marginal oxygenation, remains on 5L of [place], with sats 90-95%. Patient does not show major respiratory distress.  I/S up to 1-1.5 L  Patient ambulating and more active.    Visit Vitals  /57 (BP Location: Right arm, Patient Position: Sitting)   Pulse 85   Temp 98.2 °F (36.8 °C) (Oral)   Resp 16   Ht 160 cm (63\")   Wt 78 kg (172 lb)   SpO2 91%   Breastfeeding? No   BMI 30.47 kg/m²       Intake/Output Summary (Last 24 hours) at 04/02/18 1102  Last data filed at 04/02/18 0900   Gross per 24 hour   Intake              109 ml   Output             3461 ml   Net            -3352 ml     Anterior CT output: 14 ml/24 hours   Posterior CT output: 7 ml/24 hours  No air leak       Scheduled Meds:    bisacodyl 10 mg Oral BID   enoxaparin 30 mg Subcutaneous Q24H   FLUoxetine 10 mg Oral Daily   gabapentin 400 mg Oral Nightly   guaiFENesin 1,200 mg Oral BID   insulin detemir 15 Units Subcutaneous Q12H   insulin lispro 0-24 Units Subcutaneous 4x Daily With Meals & Nightly   ipratropium-albuterol 3 mL Nebulization Q4H - RT   lidocaine-Maalox-diphenhydramine 10 mL Oral 4x Daily   nystatin 5 mL Oral 4x Daily   pantoprazole 40 mg Oral Q AM   piperacillin-tazobactam 3.375 g Intravenous Q8H   polyethylene glycol 17 g Oral Daily   rosuvastatin 10 mg Oral Nightly     Continuous Infusions:    Morphine      PRN Meds:.•  acetaminophen  •  bisacodyl  •  dextrose  •  dextrose  •  glucagon (human recombinant)  •  HYDROcodone-acetaminophen  •  hydrOXYzine  •  naloxone  •  traZODone      Chest x ray:    IMPRESSION:  Groundglass opacities on the right may reflect pulmonary  edema or an infectious/inflammatory process. Correlate clinically.  Stable appearance of consolidation and pleural fluid on the left.  This report was finalized on 04/02/2018 07:11 by Dr. Slade Ocampo MD.    Labs:             Tissue path:    Final Diagnosis   Pleural peel, decortication:  Fibrosis, " granulation, and mixed inflammatory infiltrate consistent with pleural peel.  Negative for malignancy         Physical Exam:  General: No apparent distress. In good spirits. Up in chair.  Cardiovascular: Regular rate and rhythm without murmur, rubs, or gallops.    Pulmonary: Clear breath sounds with left base diminished. No wheezing. Improved cough effort. On 5 liters nasal cannula.   Chest: Thoracic incisions clean, dry, and intact. Dressing removed and left open to air. Left sided chest tubes to -20 cm dry suction. No air leak. Fluid is serosanguinous.   Abdomen: Soft, non-distended, and non-tender.  Extremities: Warm, moves all extremities.   Neurologic: Grossly intact with no focal deficits.       Impression:   Left empyema status post open thoracotomy, drainage of empyema, and decortication  Pneumonia  Poorly controlled diabetes mellitus  Obesity  Former tobacco use  Suboptimal pulmonary toilet  TOBIAS    Plan:    Continue Zosyn per ID. Appreciate ID assistance.  Continue chest tube drainage, may consider removing them tomorrow.  Patient with persistent opacity in the LLL, improving some. Will monitor with CXR tomorrow.  Patient to continue I/S and respiratory toilet.  Creatinine stable at 2.0

## 2018-04-02 NOTE — PROGRESS NOTES
Nephrology ([facility]) progress Note      Patient:  Carlee Vee  YOB: 1960  Date of Service: 4/2/2018  MRN: [id number]   Acct: [id number]   Primary Care Physician: CLEMENTE East  Advance Directive: Full Code  Admit Date: 3/23/2018       Hospital Day: 10  Referring Provider: Kyaw Roldan MD      Patient Seen, Chart, Consults, Notes, Labs, Radiology studies reviewed.        Subjective:  Carlee Vee is a 57 y.o. female  whom we were consulted for acute kidney injury.  Patient has no previous history of chronic kidney disease.  Patient was recently diagnosed with left lower lobe pneumonia which was treated initially as an outpatient.  She was admitted as her condition has deteriorated and has developed empyema.  She underwent chest tube placement and drainage of her empyema.  Hospital course remarkable for treatment with intravenous vancomycin and has worsening of renal function.  Vanco has been discontinued.  Today, she offers no new complaints and was still having some left lateral chest pain. Otherwise, she has good urine output.    Allergies:  Sulfa antibiotics    Home Meds:  Prescriptions Prior to Admission   Medication Sig Dispense Refill Last Dose   • amLODIPine (NORVASC) 10 MG tablet Take 10 mg by mouth Daily.   Past Week at Unknown time   • aspirin 81 MG EC tablet Chew 81 mg Daily.   Past Week at Unknown time   • FLUoxetine (PROzac) 20 MG capsule Take 20 mg by mouth Daily.   Past Week at Unknown time   • gabapentin (NEURONTIN) 100 MG capsule Take 400 mg by mouth Every Night.  3 Past Week at Unknown time   • HYDROcodone-acetaminophen (NORCO) 5-325 MG per tablet Take 1 tablet by mouth Every 6 (Six) Hours As Needed for Moderate Pain .   Past Week at Unknown time   • insulin aspart (novoLOG) 100 UNIT/ML injection Inject  under the skin 3 (Three) Times a Day Before Meals. 150 - 199 = 4 units  200 - 249 = 8 units  250 - 299 = 12 units  300 - 349 = 16 units  349 -  400 = 20 units  > 400 = 24 units   Past Week at Unknown time   • insulin glargine (LANTUS) 100 UNIT/ML injection Inject 16 Units under the skin Every Night.   Past Week at Unknown time   • losartan (COZAAR) 50 MG tablet Take 50 mg by mouth Daily.   Past Week at Unknown time   • metFORMIN (GLUCOPHAGE) 500 MG tablet Take 500 mg by mouth 2 (Two) Times a Day With Meals.   Past Week at Unknown time   • pantoprazole (PROTONIX) 40 MG EC tablet Take 40 mg by mouth Daily.   Past Week at Unknown time   • raNITIdine (ZANTAC) 150 MG tablet Take 150 mg by mouth Daily As Needed (Breakthrough acid reflux).   Past Week at Unknown time   • rosuvastatin (CRESTOR) 10 MG tablet Take 10 mg by mouth Daily.   Past Week at Unknown time   • SITagliptin (JANUVIA) 100 MG tablet Take 100 mg by mouth Every Morning.   Past Week at Unknown time   • traZODone (DESYREL) 50 MG tablet Take 100 mg by mouth At Night As Needed for Sleep.   Past Week at Unknown time   • vitamin D (ERGOCALCIFEROL) 25446 units capsule capsule Take 50,000 Units by mouth 1 (One) Time Per Week. Fridays   Past Week at Unknown time       Medicines:  Current Facility-Administered Medications   Medication Dose Route Frequency Provider Last Rate Last Dose   • acetaminophen (TYLENOL) tablet 650 mg  650 mg Oral Q6H PRN Vanessa Menon APRN   650 mg at 03/28/18 0438   • bisacodyl (DULCOLAX) EC tablet 10 mg  10 mg Oral BID CLEMENTE Hernandez   10 mg at 04/02/18 0846   • bisacodyl (DULCOLAX) suppository 10 mg  10 mg Rectal Daily PRN Vanessa Menon APRN       • dextrose (D50W) solution 25 g  25 g Intravenous Q15 Min PRN CLEMENTE Franco       • dextrose (GLUTOSE) oral gel 15 g  15 g Oral Q15 Min PRN CLEMENTE Franco       • enoxaparin (LOVENOX) syringe 30 mg  30 mg Subcutaneous Q24H Sandra Saunders DO   30 mg at 04/01/18 2053   • FLUoxetine (PROzac) capsule 10 mg  10 mg Oral Daily Vanessa Menon APRN   10 mg at 04/02/18 0846   •  gabapentin (NEURONTIN) capsule 400 mg  400 mg Oral Nightly Vanessa Menon APRN   400 mg at 04/01/18 2048   • glucagon (human recombinant) (GLUCAGEN DIAGNOSTIC) injection 1 mg  1 mg Subcutaneous PRN Vanessa Menon APRN       • guaiFENesin (MUCINEX) 12 hr tablet 1,200 mg  1,200 mg Oral BID Vanessa Menon APRN   1,200 mg at 04/02/18 0846   • HYDROcodone-acetaminophen (NORCO) 7.5-325 MG per tablet 1 tablet  1 tablet Oral Q4H PRN Abel Stark MD   1 tablet at 04/02/18 0543   • hydrOXYzine (ATARAX) tablet 50 mg  50 mg Oral TID PRN Vanessa Menon APRN   50 mg at 03/26/18 2209   • insulin detemir (LEVEMIR) injection 15 Units  15 Units Subcutaneous Q12H CLEMENTE Mejias   15 Units at 04/02/18 0852   • insulin lispro (humaLOG) injection 0-24 Units  0-24 Units Subcutaneous 4x Daily With Meals & Nightly Mark Howell MD   4 Units at 04/01/18 2049   • ipratropium-albuterol (DUO-NEB) nebulizer solution 3 mL  3 mL Nebulization Q4H - RT CLEMENTE Mejias   3 mL at 04/02/18 1011   • lidocaine-Maalox-diphenhydramine (MIRACLE MOUTHWASH) oral suspension 10 mL  10 mL Oral 4x Daily CLEMENTE Franco   10 mL at 04/02/18 0847   • naloxone (NARCAN) injection 0.1 mg  0.1 mg Intravenous Q5 Min PRN Abel Stark MD       • nystatin (MYCOSTATIN) 506526 UNIT/ML suspension 500,000 Units  5 mL Oral 4x Daily CLEMENTE Franco   500,000 Units at 04/02/18 0847   • pantoprazole (PROTONIX) EC tablet 40 mg  40 mg Oral Q AM Vanessa Menon APRN   40 mg at 04/02/18 0543   • piperacillin-tazobactam (ZOSYN) 3.375 g in iso-osmotic dextrose 50 ml (premix)  3.375 g Intravenous Q8H Osiel Thompson MD   Stopped at 04/02/18 0855   • polyethylene glycol (MIRALAX) powder 17 g  17 g Oral Daily CLEMENTE Hernandez   17 g at 04/02/18 0852   • rosuvastatin (CRESTOR) tablet 10 mg  10 mg Oral Nightly CLEMENTE Franco   10 mg at 04/01/18 2048   • traZODone  (DESYREL) tablet 100 mg  100 mg Oral Nightly PRN Vanessa Menon, APRN   100 mg at 03/23/18 2247       Past Medical History:  Past Medical History:   Diagnosis Date   • Garza's esophagus    • Depression    • Diabetes mellitus    • GERD (gastroesophageal reflux disease)    • Hypertension    • Kidney stone        Past Surgical History:  Past Surgical History:   Procedure Laterality Date   • BACK SURGERY     • COLONOSCOPY  10/18/2006    hyperplastic ascending colon polyp- recall in 7 years   • COLONOSCOPY N/A 7/24/2017    Procedure: COLONOSCOPY WITH ANESTHESIA;  Surgeon: Jenniffer Smith MD;  Location: [facility] ENDOSCOPY;  Service:    • ENDOSCOPY N/A 7/24/2017    Procedure: ESOPHAGOGASTRODUODENOSCOPY WITH ANESTHESIA;  Surgeon: Jenniffer Smith MD;  Location: [facility] ENDOSCOPY;  Service:    • HYSTERECTOMY     • THORACOTOMY Left 3/27/2018    Procedure: THORACOTOMY, drainage of empyema and decortication;  Surgeon: Abel Stark MD;  Location: [facility] OR;  Service: Cardiothoracic   • UPPER GASTROINTESTINAL ENDOSCOPY  12/08/2011       Family History  History reviewed. No pertinent family history.    Social History  Social History     Social History   • Marital status:      Spouse name: N/A   • Number of children: N/A   • Years of education: N/A     Occupational History   • Not on file.     Social History Main Topics   • Smoking status: Former Smoker   • Smokeless tobacco: Never Used   • Alcohol use No   • Drug use: No   • Sexual activity: Not on file     Other Topics Concern   • Not on file     Social History Narrative   • No narrative on file         Review of Systems:  History obtained from chart review and the patient  General ROS: No fever or chills  Respiratory ROS: positive for - shortness of breath  Cardiovascular ROS: no chest pain or dyspnea on exertion  Gastrointestinal ROS: No abdominal pain or melena  Genito-Urinary ROS: No dysuria or hematuria  14 point ROS reviewed with the patient and negative  "except as noted above and in the HPI unless unable to obtain.    Objective:  /68 (BP Location: Right arm, Patient Position: Sitting)   Pulse 96   Temp 97.9 °F (36.6 °C) (Oral)   Resp 16   Ht 160 cm (63\")   Wt 78 kg (172 lb)   SpO2 92%   Breastfeeding? No   BMI 30.47 kg/m²     Intake/Output Summary (Last 24 hours) at 04/02/18 1153  Last data filed at 04/02/18 0900   Gross per 24 hour   Intake              109 ml   Output             3461 ml   Net            -3352 ml     General: awake/alert   HEENT: Normocephalic atraumatic head  Neck: Supple with no JVD or carotid bruits.  Chest:  Decreased breath sound at the left lung base with crackles.  CVS: regular rate and rhythm  Abdominal: soft, nontender, normal bowel sounds  Extremities: no cyanosis or edema  Skin: warm and dry without rash      Labs:    Results from last 7 days  Lab Units 04/02/18  0437 04/01/18  0451 03/31/18  0505   WBC 10*3/mm3 10.93* 11.66* 12.45*   HEMOGLOBIN g/dL 9.8* 9.3* 10.1*   HEMATOCRIT % 30.4* 28.7* 31.3*   PLATELETS 10*3/mm3 391 379 412*           Results from last 7 days  Lab Units 04/02/18  0437 04/01/18  0451 03/31/18  0505   SODIUM mmol/L 141 144 143   POTASSIUM mmol/L 3.8 3.7 4.0   CHLORIDE mmol/L 105 108 108   CO2 mmol/L 24.0 23.0* 23.0*   BUN mg/dL 13 14 18   CREATININE mg/dL 2.02* 2.09* 2.12*   CALCIUM mg/dL 8.9 8.8 8.2*   BILIRUBIN mg/dL 0.2 0.2 0.1   ALK PHOS U/L 254* 256* 223*   ALT (SGPT) U/L 56* 57* 55*   AST (SGOT) U/L 39 57* 65*   GLUCOSE mg/dL 164* 90 87           Radiology:   Imaging Results (last 72 hours)     Procedure Component Value Units Date/Time    XR Chest 1 View [[id number]] Collected:  03/31/18 0902     Updated:  03/31/18 0907    Narrative:       EXAMINATION: XR CHEST 1 VW-     3/31/2018 3:50 AM CDT     HISTORY: Post Op Lung Surgery; J86.9-Pyothorax without fistula;  Z74.09-Other reduced mobility.     One view chest x-ray compared with yesterday.        2. Left chest tubes remain in good position.   "   There is persistent consolidation of the left lung base.  No visible pneumothorax.     The right lung is adequately expanded.     There is a new finding of focal infiltrate or atelectasis within the  midportion of the lung adjacent to the hilum.     Summary:  1. Stable left lung with chest tubes and basilar infiltrate/atelectasis.  2. Developing focal infiltrate or atelectasis within the midportion of  the right lung.  This report was finalized on 03/31/2018 09:03 by Dr. Jimmy Hernandez MD.    XR Chest 1 View [[id number]] Collected:  03/30/18 0721     Updated:  03/30/18 0727    Narrative:       HISTORY: Chest tubes     CXR: Frontal view the chest obtained.     COMPARISON: 3/29/2018     FINDINGS: The 2 left-sided chest tubes are similar in their position.  There is stable left lung opacities with a left pleural fluid  collection. The right lung appears clear. The mediastinal contours are  similar.     The left internal jugular central line is been removed.       Impression:       1. Similar positioning of the 2 left-sided chest tubes with a stable  left pleural fluid collection but no appreciable pneumothorax. Left mid  and lower lung consolidation.  This report was finalized on 03/30/2018 07:24 by Dr. Vanessa Jones MD.    US Renal Bilateral [[id number]] Collected:  03/29/18 0903     Updated:  03/29/18 0908    Narrative:       EXAMINATION: US RENAL BILATERAL- 3/29/2018 9:03 AM CDT     HISTORY: Acute kidney injury; J86.9-Pyothorax without fistula.     REPORT: Sonographic images of kidneys were obtained bilaterally, there  are no comparison studies.     The proximal aorta measures 2.3 cm in diameter, normal. The IVC measures  2.1 cm, normal. The right kidney measures 13.4 x 6.4 x 6.7 cm and has  normal morphology and echogenicity. No mass or hydronephrosis is seen.  Color flow imaging demonstrates vascular flow within the right kidney.     The left kidney measures 12.1 x 6.4 x 6.8 cm and has normal morphology  and  echogenicity. No mass or hydronephrosis is seen on the left. Color  flow imaging demonstrates vascular flow within the left kidney. The  bladder is within normal limits.       Impression:       Normal ultrasound of the kidneys.  This report was finalized on 03/29/2018 09:05 by Dr. Ok Campa MD.    XR Chest 1 View [[id number]] Collected:  03/29/18 0728     Updated:  03/29/18 0733    Narrative:       HISTORY: Left-sided chest tube     CXR: A frontal view the chest is obtained.     COMPARISON: 3/20/2018     FINDINGS: The 2 left-sided chest tubes are similar in position. There is  no appreciable pneumothorax. There is similar left pleural fluid  collection with left basilar consolidation. There are small right  basilar atelectasis. Mediastinal contours are similar.     The left internal jugular central line tip projects of the left  brachiocephalic vein.       Impression:       1. Similar positioning of the 2 left-sided chest tubes with no  appreciable pneumothorax. Similar left pleural fluid collection with  left basilar consolidation. Probable right basilar atelectasis.  2. Left IJ central line tip projects over the left brachiocephalic vein.  This report was finalized on 03/29/2018 07:30 by Dr. Vanessa Jones MD.          Culture:  No components found for: WOUNDCUL, 3  No components found for: CSFCUL, 3  No components found for: BC, 3  No components found for: URINECUL, 3      Assessment   1.  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  2.  Left lower lobe pneumonia with empyema.  3.  Acute respiratory failure now resolving.  4.  Status post left chest tube placement.  5.  Iron deficiency anemia.    Plan:  1.  Continue to avoid nephrotoxins  2.  Continue to monitor renal recovery. No signs of recovery yet.         Johan Brar MD  4/2/2018  11:53 AM

## 2018-04-02 NOTE — PROGRESS NOTES
[facility] Medicine Services  INPATIENT PROGRESS NOTE    Length of Stay: 10  Date of Admission: 3/23/2018  Primary Care Physician: CLEMENTE East    Subjective   Chief Complaint: follow up empyema  HPI   Pt ambulated 200 feet with therapy yesterday. States she feels good. States she is ready for chest tubes to be gone. Remains on 4-5L oxygen. Explained that she will likely need oxygen going home and she states she assumed as much. Bowels are moving. No additional complaints.     Review of Systems   All pertinent negatives and positives are as above. All other systems have been reviewed and are negative unless otherwise stated.     Objective    Temp:  [98.2 °F (36.8 °C)-98.7 °F (37.1 °C)] 98.2 °F (36.8 °C)  Heart Rate:  [] 92  Resp:  [16-22] 16  BP: (118-160)/(57-79) 118/57  Physical Exam   Constitutional: She is oriented to person, place, and time. She appears well-developed and well-nourished.   HENT:   Head: Normocephalic and atraumatic.   Eyes: Conjunctivae and EOM are normal. Pupils are equal, round, and reactive to light.   Neck: Neck supple. No JVD present. No thyromegaly present.   Cardiovascular: Normal rate, regular rhythm, normal heart sounds and intact distal pulses.  Exam reveals no gallop and no friction rub.    No murmur heard.  Pulmonary/Chest: Effort normal. No respiratory distress. She has no wheezes. She has no rales. She exhibits no tenderness.   Grossly diminished left greater than right.    Abdominal: Soft. Bowel sounds are normal. She exhibits no distension. There is no tenderness. There is no rebound and no guarding.   Musculoskeletal: Normal range of motion. She exhibits no edema, tenderness or deformity.   Lymphadenopathy:     She has no cervical adenopathy.   Neurological: She is alert and oriented to person, place, and time. She displays normal reflexes. No cranial nerve deficit. She exhibits normal muscle tone.   Skin: Skin is warm and  dry. No rash noted.   Psychiatric: She has a normal mood and affect. Her behavior is normal. Judgment and thought content normal.     Results Review:  I have reviewed the labs, radiology results, and diagnostic studies.    Laboratory Data:     Results from last 7 days  Lab Units 04/02/18  0437 04/01/18  0451 03/31/18  0505   WBC 10*3/mm3 10.93* 11.66* 12.45*   HEMOGLOBIN g/dL 9.8* 9.3* 10.1*   HEMATOCRIT % 30.4* 28.7* 31.3*   PLATELETS 10*3/mm3 391 379 412*        Results from last 7 days  Lab Units 04/02/18  0437 04/01/18  0451 03/31/18  0505   SODIUM mmol/L 141 144 143   POTASSIUM mmol/L 3.8 3.7 4.0   CHLORIDE mmol/L 105 108 108   CO2 mmol/L 24.0 23.0* 23.0*   BUN mg/dL 13 14 18   CREATININE mg/dL 2.02* 2.09* 2.12*   CALCIUM mg/dL 8.9 8.8 8.2*   BILIRUBIN mg/dL 0.2 0.2 0.1   ALK PHOS U/L 254* 256* 223*   ALT (SGPT) U/L 56* 57* 55*   AST (SGOT) U/L 39 57* 65*   GLUCOSE mg/dL 164* 90 87     Culture Data:   Wound Culture   Date Value Ref Range Status   03/27/2018 No growth at 5 days  Final     Radiology Data:   Imaging Results (last 24 hours)     Procedure Component Value Units Date/Time    XR Chest 1 View [[id number]] Collected:  04/02/18 0710     Updated:  04/02/18 0715    Narrative:       EXAMINATION: XR CHEST 1 VW- 4/2/2018 7:10 AM CDT     HISTORY: Postop lung surgery     COMPARISON: 4/1/2018     FINDINGS:  The heart appears normal in size. Aorta is mildly tortuous. Diffuse  groundglass opacities are seen throughout the right lung. Left-sided  chest tubes remain in place. There is consolidation at the left lung  base with some pleural fluid noted. There is no appreciable  pneumothorax.       Impression:       Groundglass opacities on the right may reflect pulmonary  edema or an infectious/inflammatory process. Correlate clinically.  Stable appearance of consolidation and pleural fluid on the left.  This report was finalized on 04/02/2018 07:11 by Dr. Slade Ocampo MD.    XR Chest 1 View [[id number]] Collected:   04/01/18 0921     Updated:  04/01/18 0926    Narrative:       EXAMINATION: XR CHEST 1 VW-     4/1/2018 3:50 AM CDT     HISTORY: Post Op Lung Surgery; J86.9-Pyothorax without fistula;  Z74.09-Other reduced mobility.     One view chest x-ray compared with 03/31/2018.     2 left chest tubes remain in good position.     Heart size is normal and unchanged.     There is no significant pneumothorax.     There is persistent left basilar atelectasis though there has been  partial clearing of left lower lobe.     There is developing right upper lobe infiltrate or atelectasis.     Summary:   1. Resolving left lower lobe and increasing right lower lobe  infiltrate/atelectasis.  This report was finalized on 04/01/2018 09:23 by Dr. Jimmy Hernandez MD.        I have reviewed the patient current medications.     Assessment/Plan   Assessment:  1. Empyema- s/p left thoracotomy, evacuation of empyema and decortication 3/27/2018  2. Left upper lobe inflammatory opacities-Pneumonia-failed outpatient therapy.   3. Acute hypoxic respiratory failure secondary to above  4. Sepsis positive secondary to penumonia  5. Leukocytosis, worsening  6. Shortness of breath/dyspnea-secondary to #1  7. Uncontrolled diabetes mellitus-insulin dependent, A1C 13.8%  8. Hypertension  9. Hyperlipidemia  10. Chronic pain syndrome-no home pain medications for this  11. Anemia-normocytic, mild. Iron deficiency  12. Acute kidney injury  13. Elevated transaminases-elevated alkaline phosphatase.     Plan:  1. Zosyn day 1-wound cultures showed no growth in 5 days.   2. Fungal cultures negative for 1 week.   3. Encouraged ambulation and incentive spirometry.   4. Chest tube management per CTS    Discharge Planning: I expect the patient to be discharged to home in ? days.    CLEMENTE Franco   04/02/18   8:41 AM  I personally evaluated and examined the patient in conjunction with vidhya CORNEJO and agree with the assessment, treatment plan, and disposition of  the patient as recorded by her. My history, exam, and further recommendations are:     Doing better.  NAD  Afebrile  Has chest tube on the left  Normal respiratory effort  Cpt.  Appreciate consultants    Osiel Thompson MD  04/02/18  12:56 PM

## 2018-04-02 NOTE — PLAN OF CARE
Problem: Patient Care Overview  Goal: Plan of Care Review  Outcome: Ongoing (interventions implemented as appropriate)   04/02/18 1027   Coping/Psychosocial   Plan of Care Reviewed With patient   Plan of Care Review   Progress improving   OTHER   Outcome Summary Pt. agreeable to therapy with only c/o 5/10 pain in Left flank. Pt. is supervision for all mobiltiy. Pt. walked 250' with rwx Pt's O2 sats dropped to 87-88% on 6L while up walking but increased to 92% once resting. Will continue to work with pt. on progressive ambulation.
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
## 2018-04-03 ENCOUNTER — APPOINTMENT (OUTPATIENT)
Dept: GENERAL RADIOLOGY | Facility: HOSPITAL | Age: 58
End: 2018-04-03

## 2018-04-03 LAB
ANION GAP SERPL CALCULATED.3IONS-SCNC: 11 MMOL/L (ref 4–13)
BASOPHILS # BLD AUTO: 0.06 10*3/MM3 (ref 0–0.2)
BASOPHILS NFR BLD AUTO: 0.5 % (ref 0–2)
BUN BLD-MCNC: 15 MG/DL (ref 5–21)
BUN/CREAT SERPL: 7.5 (ref 7–25)
CALCIUM SPEC-SCNC: 9 MG/DL (ref 8.4–10.4)
CHLORIDE SERPL-SCNC: 104 MMOL/L (ref 98–110)
CO2 SERPL-SCNC: 27 MMOL/L (ref 24–31)
CREAT BLD-MCNC: 2.01 MG/DL (ref 0.5–1.4)
DEPRECATED RDW RBC AUTO: 48 FL (ref 40–54)
EOSINOPHIL # BLD AUTO: 0.3 10*3/MM3 (ref 0–0.7)
EOSINOPHIL NFR BLD AUTO: 2.7 % (ref 0–4)
ERYTHROCYTE [DISTWIDTH] IN BLOOD BY AUTOMATED COUNT: 14.6 % (ref 12–15)
GFR SERPL CREATININE-BSD FRML MDRD: 25 ML/MIN/1.73
GLUCOSE BLD-MCNC: 105 MG/DL (ref 70–100)
GLUCOSE BLDC GLUCOMTR-MCNC: 163 MG/DL (ref 70–130)
GLUCOSE BLDC GLUCOMTR-MCNC: 253 MG/DL (ref 70–130)
HCT VFR BLD AUTO: 30.6 % (ref 37–47)
HGB BLD-MCNC: 9.7 G/DL (ref 12–16)
IMM GRANULOCYTES # BLD: 0.11 10*3/MM3 (ref 0–0.03)
IMM GRANULOCYTES NFR BLD: 1 % (ref 0–5)
LYMPHOCYTES # BLD AUTO: 1.91 10*3/MM3 (ref 0.72–4.86)
LYMPHOCYTES NFR BLD AUTO: 17.2 % (ref 15–45)
MCH RBC QN AUTO: 28.8 PG (ref 28–32)
MCHC RBC AUTO-ENTMCNC: 31.7 G/DL (ref 33–36)
MCV RBC AUTO: 90.8 FL (ref 82–98)
MONOCYTES # BLD AUTO: 0.98 10*3/MM3 (ref 0.19–1.3)
MONOCYTES NFR BLD AUTO: 8.8 % (ref 4–12)
NEUTROPHILS # BLD AUTO: 7.77 10*3/MM3 (ref 1.87–8.4)
NEUTROPHILS NFR BLD AUTO: 69.8 % (ref 39–78)
NRBC BLD MANUAL-RTO: 0 /100 WBC (ref 0–0)
PLATELET # BLD AUTO: 405 10*3/MM3 (ref 130–400)
PMV BLD AUTO: 10.6 FL (ref 6–12)
POTASSIUM BLD-SCNC: 3.7 MMOL/L (ref 3.5–5.3)
RBC # BLD AUTO: 3.37 10*6/MM3 (ref 4.2–5.4)
SODIUM BLD-SCNC: 142 MMOL/L (ref 135–145)
WBC NRBC COR # BLD: 11.13 10*3/MM3 (ref 4.8–10.8)

## 2018-04-03 PROCEDURE — 97116 GAIT TRAINING THERAPY: CPT

## 2018-04-03 PROCEDURE — 94760 N-INVAS EAR/PLS OXIMETRY 1: CPT

## 2018-04-03 PROCEDURE — 71045 X-RAY EXAM CHEST 1 VIEW: CPT

## 2018-04-03 PROCEDURE — 82962 GLUCOSE BLOOD TEST: CPT

## 2018-04-03 PROCEDURE — 63710000001 INSULIN DETEMIR PER 5 UNITS: Performed by: NURSE PRACTITIONER

## 2018-04-03 PROCEDURE — 94799 UNLISTED PULMONARY SVC/PX: CPT

## 2018-04-03 PROCEDURE — 25010000002 METHYLPREDNISOLONE PER 40 MG: Performed by: INTERNAL MEDICINE

## 2018-04-03 PROCEDURE — 25010000002 FUROSEMIDE PER 20 MG: Performed by: NURSE PRACTITIONER

## 2018-04-03 PROCEDURE — 85025 COMPLETE CBC W/AUTO DIFF WBC: CPT | Performed by: NURSE PRACTITIONER

## 2018-04-03 PROCEDURE — 99024 POSTOP FOLLOW-UP VISIT: CPT | Performed by: THORACIC SURGERY (CARDIOTHORACIC VASCULAR SURGERY)

## 2018-04-03 PROCEDURE — 25010000002 ENOXAPARIN PER 10 MG: Performed by: FAMILY MEDICINE

## 2018-04-03 PROCEDURE — 80048 BASIC METABOLIC PNL TOTAL CA: CPT | Performed by: THORACIC SURGERY (CARDIOTHORACIC VASCULAR SURGERY)

## 2018-04-03 PROCEDURE — 63710000001 INSULIN LISPRO (HUMAN) PER 5 UNITS: Performed by: INTERNAL MEDICINE

## 2018-04-03 RX ORDER — FUROSEMIDE 10 MG/ML
40 INJECTION INTRAMUSCULAR; INTRAVENOUS ONCE
Status: COMPLETED | OUTPATIENT
Start: 2018-04-03 | End: 2018-04-03

## 2018-04-03 RX ORDER — AMOXICILLIN AND CLAVULANATE POTASSIUM 600; 42.9 MG/5ML; MG/5ML
1200 POWDER, FOR SUSPENSION ORAL EVERY 12 HOURS SCHEDULED
Status: DISCONTINUED | OUTPATIENT
Start: 2018-04-03 | End: 2018-04-05 | Stop reason: HOSPADM

## 2018-04-03 RX ORDER — METHYLPREDNISOLONE SODIUM SUCCINATE 40 MG/ML
40 INJECTION, POWDER, LYOPHILIZED, FOR SOLUTION INTRAMUSCULAR; INTRAVENOUS EVERY 12 HOURS
Status: DISCONTINUED | OUTPATIENT
Start: 2018-04-03 | End: 2018-04-05 | Stop reason: HOSPADM

## 2018-04-03 RX ADMIN — LIDOCAINE HYDROCHLORIDE 10 ML: 20 SOLUTION ORAL; TOPICAL at 08:28

## 2018-04-03 RX ADMIN — INSULIN LISPRO 12 UNITS: 100 INJECTION, SOLUTION INTRAVENOUS; SUBCUTANEOUS at 22:15

## 2018-04-03 RX ADMIN — INSULIN DETEMIR 15 UNITS: 100 INJECTION, SOLUTION SUBCUTANEOUS at 22:15

## 2018-04-03 RX ADMIN — PANTOPRAZOLE SODIUM 40 MG: 40 TABLET, DELAYED RELEASE ORAL at 06:24

## 2018-04-03 RX ADMIN — METHYLPREDNISOLONE SODIUM SUCCINATE 40 MG: 40 INJECTION, POWDER, FOR SOLUTION INTRAMUSCULAR; INTRAVENOUS at 16:15

## 2018-04-03 RX ADMIN — HYDROCODONE BITARTRATE AND ACETAMINOPHEN 1 TABLET: 7.5; 325 TABLET ORAL at 11:39

## 2018-04-03 RX ADMIN — LIDOCAINE HYDROCHLORIDE 10 ML: 20 SOLUTION ORAL; TOPICAL at 16:15

## 2018-04-03 RX ADMIN — HYDROCODONE BITARTRATE AND ACETAMINOPHEN 1 TABLET: 7.5; 325 TABLET ORAL at 21:23

## 2018-04-03 RX ADMIN — NYSTATIN 500000 UNITS: 100000 SUSPENSION ORAL at 08:27

## 2018-04-03 RX ADMIN — NYSTATIN 500000 UNITS: 100000 SUSPENSION ORAL at 21:24

## 2018-04-03 RX ADMIN — LIDOCAINE HYDROCHLORIDE 10 ML: 20 SOLUTION ORAL; TOPICAL at 12:22

## 2018-04-03 RX ADMIN — IPRATROPIUM BROMIDE AND ALBUTEROL SULFATE 3 ML: 2.5; .5 SOLUTION RESPIRATORY (INHALATION) at 14:13

## 2018-04-03 RX ADMIN — ROSUVASTATIN CALCIUM 10 MG: 10 TABLET, FILM COATED ORAL at 21:27

## 2018-04-03 RX ADMIN — FLUOXETINE 10 MG: 10 CAPSULE ORAL at 08:27

## 2018-04-03 RX ADMIN — IPRATROPIUM BROMIDE AND ALBUTEROL SULFATE 3 ML: 2.5; .5 SOLUTION RESPIRATORY (INHALATION) at 06:25

## 2018-04-03 RX ADMIN — NYSTATIN 500000 UNITS: 100000 SUSPENSION ORAL at 12:21

## 2018-04-03 RX ADMIN — INSULIN LISPRO 4 UNITS: 100 INJECTION, SOLUTION INTRAVENOUS; SUBCUTANEOUS at 12:21

## 2018-04-03 RX ADMIN — GUAIFENESIN 1200 MG: 600 TABLET, EXTENDED RELEASE ORAL at 08:27

## 2018-04-03 RX ADMIN — BISACODYL 10 MG: 5 TABLET ORAL at 08:27

## 2018-04-03 RX ADMIN — ENOXAPARIN SODIUM 30 MG: 30 INJECTION SUBCUTANEOUS at 21:23

## 2018-04-03 RX ADMIN — LIDOCAINE HYDROCHLORIDE 10 ML: 20 SOLUTION ORAL; TOPICAL at 21:24

## 2018-04-03 RX ADMIN — IPRATROPIUM BROMIDE AND ALBUTEROL SULFATE 3 ML: 2.5; .5 SOLUTION RESPIRATORY (INHALATION) at 03:52

## 2018-04-03 RX ADMIN — IPRATROPIUM BROMIDE AND ALBUTEROL SULFATE 3 ML: 2.5; .5 SOLUTION RESPIRATORY (INHALATION) at 10:38

## 2018-04-03 RX ADMIN — GABAPENTIN 400 MG: 400 CAPSULE ORAL at 21:27

## 2018-04-03 RX ADMIN — NYSTATIN 500000 UNITS: 100000 SUSPENSION ORAL at 16:15

## 2018-04-03 RX ADMIN — IPRATROPIUM BROMIDE AND ALBUTEROL SULFATE 3 ML: 2.5; .5 SOLUTION RESPIRATORY (INHALATION) at 00:23

## 2018-04-03 RX ADMIN — BISACODYL 10 MG: 5 TABLET ORAL at 21:26

## 2018-04-03 RX ADMIN — HYDROCODONE BITARTRATE AND ACETAMINOPHEN 1 TABLET: 7.5; 325 TABLET ORAL at 08:36

## 2018-04-03 RX ADMIN — HYDROCODONE BITARTRATE AND ACETAMINOPHEN 1 TABLET: 7.5; 325 TABLET ORAL at 16:14

## 2018-04-03 RX ADMIN — INSULIN DETEMIR 15 UNITS: 100 INJECTION, SOLUTION SUBCUTANEOUS at 08:28

## 2018-04-03 RX ADMIN — IPRATROPIUM BROMIDE AND ALBUTEROL SULFATE 3 ML: 2.5; .5 SOLUTION RESPIRATORY (INHALATION) at 19:15

## 2018-04-03 RX ADMIN — GUAIFENESIN 1200 MG: 600 TABLET, EXTENDED RELEASE ORAL at 21:27

## 2018-04-03 RX ADMIN — POLYETHYLENE GLYCOL 3350 17 G: 17 POWDER, FOR SOLUTION ORAL at 08:31

## 2018-04-03 RX ADMIN — AMOXICILLIN AND CLAVULANATE POTASSIUM 1200 MG: 600; 42.9 SUSPENSION ORAL at 12:28

## 2018-04-03 RX ADMIN — FUROSEMIDE 40 MG: 10 INJECTION, SOLUTION INTRAMUSCULAR; INTRAVENOUS at 10:29

## 2018-04-03 RX ADMIN — IPRATROPIUM BROMIDE AND ALBUTEROL SULFATE 3 ML: 2.5; .5 SOLUTION RESPIRATORY (INHALATION) at 22:47

## 2018-04-03 RX ADMIN — AMOXICILLIN AND CLAVULANATE POTASSIUM 1200 MG: 600; 42.9 SUSPENSION ORAL at 21:27

## 2018-04-03 RX ADMIN — HYDROCODONE BITARTRATE AND ACETAMINOPHEN 1 TABLET: 7.5; 325 TABLET ORAL at 04:11

## 2018-04-03 NOTE — PROGRESS NOTES
Nephrology ([facility]) progress Note      Patient:  Carlee Vee  YOB: 1960  Date of Service: 4/3/2018  MRN: [id number]   Acct: [id number]   Primary Care Physician: CLEMENTE East  Advance Directive: Full Code  Admit Date: 3/23/2018       Hospital Day: 11  Referring Provider: Kyaw Roldan MD      Patient Seen, Chart, Consults, Notes, Labs, Radiology studies reviewed.        Subjective:  Carlee Vee is a 57 y.o. female  whom we were consulted for acute kidney injury.  Patient has no previous history of chronic kidney disease.  Patient was recently diagnosed with left lower lobe pneumonia which was treated initially as an outpatient.  She was admitted as her condition has deteriorated and has developed empyema.  She underwent chest tube placement and drainage of her empyema.  Hospital course remarkable for treatment with intravenous vancomycin and has worsening of renal function.  Vanco has been discontinued. She just had chest tubes removed. She does have some lower extremity edema and Lasix 40mg IV has been ordered by admitting. She is a little anxious today. She has no other complaints.     Allergies:  Sulfa antibiotics    Home Meds:  Prescriptions Prior to Admission   Medication Sig Dispense Refill Last Dose   • amLODIPine (NORVASC) 10 MG tablet Take 10 mg by mouth Daily.   Past Week at Unknown time   • aspirin 81 MG EC tablet Chew 81 mg Daily.   Past Week at Unknown time   • FLUoxetine (PROzac) 20 MG capsule Take 20 mg by mouth Daily.   Past Week at Unknown time   • gabapentin (NEURONTIN) 100 MG capsule Take 400 mg by mouth Every Night.  3 Past Week at Unknown time   • HYDROcodone-acetaminophen (NORCO) 5-325 MG per tablet Take 1 tablet by mouth Every 6 (Six) Hours As Needed for Moderate Pain .   Past Week at Unknown time   • insulin aspart (novoLOG) 100 UNIT/ML injection Inject  under the skin 3 (Three) Times a Day Before Meals. 150 - 199 = 4 units  200 - 249 = 8  units  250 - 299 = 12 units  300 - 349 = 16 units  349 - 400 = 20 units  > 400 = 24 units   Past Week at Unknown time   • insulin glargine (LANTUS) 100 UNIT/ML injection Inject 16 Units under the skin Every Night.   Past Week at Unknown time   • losartan (COZAAR) 50 MG tablet Take 50 mg by mouth Daily.   Past Week at Unknown time   • metFORMIN (GLUCOPHAGE) 500 MG tablet Take 500 mg by mouth 2 (Two) Times a Day With Meals.   Past Week at Unknown time   • pantoprazole (PROTONIX) 40 MG EC tablet Take 40 mg by mouth Daily.   Past Week at Unknown time   • raNITIdine (ZANTAC) 150 MG tablet Take 150 mg by mouth Daily As Needed (Breakthrough acid reflux).   Past Week at Unknown time   • rosuvastatin (CRESTOR) 10 MG tablet Take 10 mg by mouth Daily.   Past Week at Unknown time   • SITagliptin (JANUVIA) 100 MG tablet Take 100 mg by mouth Every Morning.   Past Week at Unknown time   • traZODone (DESYREL) 50 MG tablet Take 100 mg by mouth At Night As Needed for Sleep.   Past Week at Unknown time   • vitamin D (ERGOCALCIFEROL) 67528 units capsule capsule Take 50,000 Units by mouth 1 (One) Time Per Week. Fridays   Past Week at Unknown time       Medicines:  Current Facility-Administered Medications   Medication Dose Route Frequency Provider Last Rate Last Dose   • acetaminophen (TYLENOL) tablet 650 mg  650 mg Oral Q6H PRN CLEMENTE Franco   650 mg at 03/28/18 0438   • bisacodyl (DULCOLAX) EC tablet 10 mg  10 mg Oral BID CLEMENTE Hernandez   10 mg at 04/03/18 0827   • bisacodyl (DULCOLAX) suppository 10 mg  10 mg Rectal Daily PRN CLEMENTE Franco       • dextrose (D50W) solution 25 g  25 g Intravenous Q15 Min PRN CLEMENTE Franco       • dextrose (GLUTOSE) oral gel 15 g  15 g Oral Q15 Min PRN CLEMENTE Franco       • enoxaparin (LOVENOX) syringe 30 mg  30 mg Subcutaneous Q24H Sandra Saunders DO   30 mg at 04/02/18 2103   • FLUoxetine (PROzac) capsule 10 mg  10 mg Oral Daily  Vanessa Menon, APRN   10 mg at 04/03/18 0827   • furosemide (LASIX) injection 40 mg  40 mg Intravenous Once Vanessa Menon, APRN       • gabapentin (NEURONTIN) capsule 400 mg  400 mg Oral Nightly Vanessa Menon APRN   400 mg at 04/02/18 2103   • glucagon (human recombinant) (GLUCAGEN DIAGNOSTIC) injection 1 mg  1 mg Subcutaneous PRN Vanessa Menon APRN       • guaiFENesin (MUCINEX) 12 hr tablet 1,200 mg  1,200 mg Oral BID Vanessa Menon APRN   1,200 mg at 04/03/18 0827   • HYDROcodone-acetaminophen (NORCO) 7.5-325 MG per tablet 1 tablet  1 tablet Oral Q4H PRN Abel Stark MD   1 tablet at 04/03/18 0836   • hydrOXYzine (ATARAX) tablet 50 mg  50 mg Oral TID PRN Vanessa Menon APRN   50 mg at 03/26/18 2209   • insulin detemir (LEVEMIR) injection 15 Units  15 Units Subcutaneous Q12H Awa Neumann APRN   15 Units at 04/03/18 0828   • insulin lispro (humaLOG) injection 0-24 Units  0-24 Units Subcutaneous 4x Daily With Meals & Nightly Mark Howell MD   4 Units at 04/02/18 2135   • ipratropium-albuterol (DUO-NEB) nebulizer solution 3 mL  3 mL Nebulization Q4H - RT Awa Neumann APRN   3 mL at 04/03/18 0625   • lidocaine-Maalox-diphenhydramine (MIRACLE MOUTHWASH) oral suspension 10 mL  10 mL Oral 4x Daily Vanessa Menon APRN   10 mL at 04/03/18 0828   • naloxone (NARCAN) injection 0.1 mg  0.1 mg Intravenous Q5 Min PRN Abel Stark MD       • nystatin (MYCOSTATIN) 141135 UNIT/ML suspension 500,000 Units  5 mL Oral 4x Daily Vanessa Menon APRN   500,000 Units at 04/03/18 0827   • pantoprazole (PROTONIX) EC tablet 40 mg  40 mg Oral Q AM Vanessa Menon APRN   40 mg at 04/03/18 0624   • polyethylene glycol (MIRALAX) powder 17 g  17 g Oral Daily CLEMENTE Hernandez   17 g at 04/03/18 0831   • rosuvastatin (CRESTOR) tablet 10 mg  10 mg Oral Nightly CLEMENTE Franco   10 mg at 04/02/18 2101   • traZODone (DESYREL)  tablet 100 mg  100 mg Oral Nightly PRN Vanessa Menon, APRN   100 mg at 03/23/18 2247       Past Medical History:  Past Medical History:   Diagnosis Date   • Garza's esophagus    • Depression    • Diabetes mellitus    • GERD (gastroesophageal reflux disease)    • Hypertension    • Kidney stone        Past Surgical History:  Past Surgical History:   Procedure Laterality Date   • BACK SURGERY     • COLONOSCOPY  10/18/2006    hyperplastic ascending colon polyp- recall in 7 years   • COLONOSCOPY N/A 7/24/2017    Procedure: COLONOSCOPY WITH ANESTHESIA;  Surgeon: Jenniffer Smith MD;  Location: [facility] ENDOSCOPY;  Service:    • ENDOSCOPY N/A 7/24/2017    Procedure: ESOPHAGOGASTRODUODENOSCOPY WITH ANESTHESIA;  Surgeon: Jenniffer Smith MD;  Location: [facility] ENDOSCOPY;  Service:    • HYSTERECTOMY     • THORACOTOMY Left 3/27/2018    Procedure: THORACOTOMY, drainage of empyema and decortication;  Surgeon: Abel Stark MD;  Location: [facility] OR;  Service: Cardiothoracic   • UPPER GASTROINTESTINAL ENDOSCOPY  12/08/2011       Family History  History reviewed. No pertinent family history.    Social History  Social History     Social History   • Marital status:      Spouse name: N/A   • Number of children: N/A   • Years of education: N/A     Occupational History   • Not on file.     Social History Main Topics   • Smoking status: Former Smoker   • Smokeless tobacco: Never Used   • Alcohol use No   • Drug use: No   • Sexual activity: Not on file     Other Topics Concern   • Not on file     Social History Narrative   • No narrative on file         Review of Systems:  History obtained from chart review and the patient  General ROS: No fever or chills  Respiratory ROS: positive for - shortness of breath  Cardiovascular ROS: no chest pain or dyspnea on exertion  Gastrointestinal ROS: No abdominal pain or melena  Genito-Urinary ROS: No dysuria or hematuria  14 point ROS reviewed with the patient and negative except as noted  "above and in the HPI unless unable to obtain.    Objective:  BP (P) 138/66   Pulse (P) 89   Temp (P) 97.7 °F (36.5 °C) (Oral)   Resp (P) 18   Ht 160 cm (63\")   Wt 89.8 kg (198 lb)   SpO2 92%   Breastfeeding? No   BMI 35.07 kg/m²     Intake/Output Summary (Last 24 hours) at 04/03/18 1021  Last data filed at 04/03/18 0617   Gross per 24 hour   Intake              780 ml   Output             2000 ml   Net            -1220 ml     General: awake/alert up in chair  HEENT: Normocephalic atraumatic head  Neck: Supple with no JVD or carotid bruits.  Chest:  Diminished breath sounds scattered rhonchi  CVS: regular rate and rhythm  Abdominal: soft, nontender, normal bowel sounds  Extremities: Trace BLE edema  Skin: warm and dry without rash      Labs:    Results from last 7 days  Lab Units 04/03/18  0320 04/02/18  0437 04/01/18  0451   WBC 10*3/mm3 11.13* 10.93* 11.66*   HEMOGLOBIN g/dL 9.7* 9.8* 9.3*   HEMATOCRIT % 30.6* 30.4* 28.7*   PLATELETS 10*3/mm3 405* 391 379           Results from last 7 days  Lab Units 04/03/18  0320 04/02/18  0437 04/01/18  0451 03/31/18  0505   SODIUM mmol/L 142 141 144 143   POTASSIUM mmol/L 3.7 3.8 3.7 4.0   CHLORIDE mmol/L 104 105 108 108   CO2 mmol/L 27.0 24.0 23.0* 23.0*   BUN mg/dL 15 13 14 18   CREATININE mg/dL 2.01* 2.02* 2.09* 2.12*   CALCIUM mg/dL 9.0 8.9 8.8 8.2*   BILIRUBIN mg/dL  --  0.2 0.2 0.1   ALK PHOS U/L  --  254* 256* 223*   ALT (SGPT) U/L  --  56* 57* 55*   AST (SGOT) U/L  --  39 57* 65*   GLUCOSE mg/dL 105* 164* 90 87           Radiology:   Imaging Results (last 72 hours)     Procedure Component Value Units Date/Time    XR Chest 1 View [[id number]] Collected:  03/31/18 0902     Updated:  03/31/18 0907    Narrative:       EXAMINATION: XR CHEST 1 VW-     3/31/2018 3:50 AM CDT     HISTORY: Post Op Lung Surgery; J86.9-Pyothorax without fistula;  Z74.09-Other reduced mobility.     One view chest x-ray compared with yesterday.        2. Left chest tubes remain in good " position.     There is persistent consolidation of the left lung base.  No visible pneumothorax.     The right lung is adequately expanded.     There is a new finding of focal infiltrate or atelectasis within the  midportion of the lung adjacent to the hilum.     Summary:  1. Stable left lung with chest tubes and basilar infiltrate/atelectasis.  2. Developing focal infiltrate or atelectasis within the midportion of  the right lung.  This report was finalized on 03/31/2018 09:03 by Dr. Jimmy Hernandez MD.    XR Chest 1 View [[id number]] Collected:  03/30/18 0721     Updated:  03/30/18 0727    Narrative:       HISTORY: Chest tubes     CXR: Frontal view the chest obtained.     COMPARISON: 3/29/2018     FINDINGS: The 2 left-sided chest tubes are similar in their position.  There is stable left lung opacities with a left pleural fluid  collection. The right lung appears clear. The mediastinal contours are  similar.     The left internal jugular central line is been removed.       Impression:       1. Similar positioning of the 2 left-sided chest tubes with a stable  left pleural fluid collection but no appreciable pneumothorax. Left mid  and lower lung consolidation.  This report was finalized on 03/30/2018 07:24 by Dr. Vanessa Jones MD.    US Renal Bilateral [[id number]] Collected:  03/29/18 0903     Updated:  03/29/18 0908    Narrative:       EXAMINATION: US RENAL BILATERAL- 3/29/2018 9:03 AM CDT     HISTORY: Acute kidney injury; J86.9-Pyothorax without fistula.     REPORT: Sonographic images of kidneys were obtained bilaterally, there  are no comparison studies.     The proximal aorta measures 2.3 cm in diameter, normal. The IVC measures  2.1 cm, normal. The right kidney measures 13.4 x 6.4 x 6.7 cm and has  normal morphology and echogenicity. No mass or hydronephrosis is seen.  Color flow imaging demonstrates vascular flow within the right kidney.     The left kidney measures 12.1 x 6.4 x 6.8 cm and has normal  morphology  and echogenicity. No mass or hydronephrosis is seen on the left. Color  flow imaging demonstrates vascular flow within the left kidney. The  bladder is within normal limits.       Impression:       Normal ultrasound of the kidneys.  This report was finalized on 03/29/2018 09:05 by Dr. Ok Campa MD.    XR Chest 1 View [[id number]] Collected:  03/29/18 0728     Updated:  03/29/18 0733    Narrative:       HISTORY: Left-sided chest tube     CXR: A frontal view the chest is obtained.     COMPARISON: 3/20/2018     FINDINGS: The 2 left-sided chest tubes are similar in position. There is  no appreciable pneumothorax. There is similar left pleural fluid  collection with left basilar consolidation. There are small right  basilar atelectasis. Mediastinal contours are similar.     The left internal jugular central line tip projects of the left  brachiocephalic vein.       Impression:       1. Similar positioning of the 2 left-sided chest tubes with no  appreciable pneumothorax. Similar left pleural fluid collection with  left basilar consolidation. Probable right basilar atelectasis.  2. Left IJ central line tip projects over the left brachiocephalic vein.  This report was finalized on 03/29/2018 07:30 by Dr. Vanessa Jones MD.          Culture:  No components found for: WOUNDCUL, 3  No components found for: CSFCUL, 3  No components found for: BC, 3  No components found for: URINECUL, 3      Assessment   1.  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  2.  Left lower lobe pneumonia with empyema.  3.  Acute respiratory failure now resolving.  4.  Iron deficiency anemia.    Plan:  1.  Continue to avoid nephrotoxins  2.  Continue to monitor renal recovery. Creatinine down slightly today at 2.01.           Rosa Lu, APRN  4/3/2018  10:21 AM

## 2018-04-03 NOTE — PROGRESS NOTES
"Left thoracotomy, evacuation of empyema and decortication    POD 7    Marginal oxygenation, remains on 4L of [place], with sats 90-95%. Patient does not show major respiratory distress.  I/S up to 1-1.5 L  Patient ambulating well and moving around.    Visit Vitals  BP (P) 138/66   Pulse (P) 89   Temp (P) 97.7 °F (36.5 °C) (Oral)   Resp (P) 18   Ht 160 cm (63\")   Wt 89.8 kg (198 lb)   SpO2 92%   Breastfeeding? No   BMI 35.07 kg/m²       Intake/Output Summary (Last 24 hours) at 04/03/18 1023  Last data filed at 04/03/18 0617   Gross per 24 hour   Intake              780 ml   Output             2000 ml   Net            -1220 ml     Anterior CT output: 5 ml/24 hours   Posterior CT output: 35 ml/24 hours  No air leak       Scheduled Meds:    bisacodyl 10 mg Oral BID   enoxaparin 30 mg Subcutaneous Q24H   FLUoxetine 10 mg Oral Daily   furosemide 40 mg Intravenous Once   gabapentin 400 mg Oral Nightly   guaiFENesin 1,200 mg Oral BID   insulin detemir 15 Units Subcutaneous Q12H   insulin lispro 0-24 Units Subcutaneous 4x Daily With Meals & Nightly   ipratropium-albuterol 3 mL Nebulization Q4H - RT   lidocaine-Maalox-diphenhydramine 10 mL Oral 4x Daily   nystatin 5 mL Oral 4x Daily   pantoprazole 40 mg Oral Q AM   polyethylene glycol 17 g Oral Daily   rosuvastatin 10 mg Oral Nightly     Continuous Infusions:     PRN Meds:.•  acetaminophen  •  bisacodyl  •  dextrose  •  dextrose  •  glucagon (human recombinant)  •  HYDROcodone-acetaminophen  •  hydrOXYzine  •  naloxone  •  traZODone      Chest x ray:    IMPRESSION:  Groundglass opacities on the right may reflect pulmonary  edema or an infectious/inflammatory process. Correlate clinically.  Stable appearance of consolidation and pleural fluid on the left.  This report was finalized on 04/02/2018 07:11 by Dr. Slade Ocampo MD.    Labs:             Tissue path:    Final Diagnosis   Pleural peel, decortication:  Fibrosis, granulation, and mixed inflammatory infiltrate consistent with " pleural peel.  Negative for malignancy         Physical Exam:  General: No apparent distress. In good spirits. Up in chair.  Cardiovascular: Regular rate and rhythm without murmur, rubs, or gallops.    Pulmonary: Clear breath sounds with left base diminished. No wheezing. Improved cough effort. On 4 liters nasal cannula.   Chest: Thoracic incisions clean, dry, and intact. Dressing removed and left open to air. Left sided chest tubes to -20 cm dry suction. No air leak. Fluid is serosanguinous.   Abdomen: Soft, non-distended, and non-tender.  Extremities: Warm, moves all extremities.   Neurologic: Grossly intact with no focal deficits.       Impression:   Left empyema status post open thoracotomy, drainage of empyema, and decortication  Pneumonia  Poorly controlled diabetes mellitus  Obesity  Former tobacco use  Suboptimal pulmonary toilet  TOBIAS    Plan:    Continue Zosyn per ID. Appreciate ID assistance.  D/C CTs  Patient to continue I/S and respiratory toilet.  Creatinine stable at 2.01  CXR

## 2018-04-03 NOTE — PLAN OF CARE
Problem: Patient Care Overview  Goal: Plan of Care Review  Outcome: Ongoing (interventions implemented as appropriate)   04/03/18 1012   Coping/Psychosocial   Plan of Care Reviewed With patient   Plan of Care Review   Progress improving   OTHER   Outcome Summary Pt sitting up in chair c/o pain 4/10 from chest tube incisions. Sit-stand supervision amb 250' supervision. Pt has decreased endurance monitor o2 on 4L dropped to 85% w/ activity but quickly came back up to 90

## 2018-04-03 NOTE — PROGRESS NOTES
"Infectious Diseases Progress Note    Patient:  Carlee Vee  YOB: 1960  MRN: [id number]   Admit date: 3/23/2018   Admitting Physician: Osiel Thompson,*  Primary Care Physician: CLEMENTE East    Chief Complaint/Interval History: She is feeling better. Continues to gain strength. No productive cough. She is breathing comfortably. No nausea, diarrhea, rash. She does feel she has some lower extremity edema.    Intake/Output Summary (Last 24 hours) at 04/03/18 0557  Last data filed at 04/02/18 2042   Gross per 24 hour   Intake              782 ml   Output             1440 ml   Net             -658 ml     Allergies:   Allergies   Allergen Reactions   • Sulfa Antibiotics Hives     Current Scheduled Medications:     bisacodyl 10 mg Oral BID   enoxaparin 30 mg Subcutaneous Q24H   FLUoxetine 10 mg Oral Daily   gabapentin 400 mg Oral Nightly   guaiFENesin 1,200 mg Oral BID   insulin detemir 15 Units Subcutaneous Q12H   insulin lispro 0-24 Units Subcutaneous 4x Daily With Meals & Nightly   ipratropium-albuterol 3 mL Nebulization Q4H - RT   lidocaine-Maalox-diphenhydramine 10 mL Oral 4x Daily   nystatin 5 mL Oral 4x Daily   pantoprazole 40 mg Oral Q AM   polyethylene glycol 17 g Oral Daily   rosuvastatin 10 mg Oral Nightly     Current PRN Medications:  •  acetaminophen  •  bisacodyl  •  dextrose  •  dextrose  •  glucagon (human recombinant)  •  HYDROcodone-acetaminophen  •  hydrOXYzine  •  naloxone  •  traZODone    Review of Systems see HPI    Vital Signs:  /67 (BP Location: Right arm, Patient Position: Sitting)   Pulse 88   Temp 98.4 °F (36.9 °C) (Oral)   Resp 18   Ht 160 cm (63\")   Wt 89.8 kg (198 lb)   SpO2 92%   Breastfeeding? No   BMI 35.07 kg/m²     Physical Exam  Vital signs reviewed.    Line/IV(Left arm peripheral) IV site: No erythema, warmth, induration, or tenderness.    Lab Results:  CBC:   Results from last 7 days  Lab Units 04/03/18  0320 04/02/18  0437 04/01/18  0451 " 03/31/18  0505 03/30/18  0305 03/29/18  0238 03/28/18  0204   WBC 10*3/mm3 11.13* 10.93* 11.66* 12.45* 11.01* 16.21* 15.02*   HEMOGLOBIN g/dL 9.7* 9.8* 9.3* 10.1* 9.3* 10.0* 11.5*   HEMATOCRIT % 30.6* 30.4* 28.7* 31.3* 28.5* 30.3* 34.4*   PLATELETS 10*3/mm3 405* 391 379 412* 312 320 354     BMP:  Results from last 7 days  Lab Units 04/03/18  0320 04/02/18  0437 04/01/18  0451 03/31/18  0505 03/30/18  0305 03/29/18  0238 03/28/18  0204   SODIUM mmol/L 142 141 144 143 140 138 139   POTASSIUM mmol/L 3.7 3.8 3.7 4.0 3.9 3.7 4.0   CHLORIDE mmol/L 104 105 108 108 105 102 102   CO2 mmol/L 27.0 24.0 23.0* 23.0* 25.0 24.0 28.0   BUN mg/dL 15 13 14 18 20 18 8   CREATININE mg/dL 2.01* 2.02* 2.09* 2.12* 2.12* 1.79* 0.60   GLUCOSE mg/dL 105* 164* 90 87 97 214* 153*   CALCIUM mg/dL 9.0 8.9 8.8 8.2* 8.9 8.2* 8.3*   ALT (SGPT) U/L  --  56* 57* 55*  --   --   --      Urine eosinophils-no eosinophils seen    Culture Results:   Wound Culture   Date Value Ref Range Status   03/27/2018 No growth at 5 days  Final     Operative cultures no growth    Radiology: None  Additional Studies Reviewed: None    Impression:   1. Left-sided pneumonia/empyema. Cultures negative. Clinically she has improved with decortication and antibiotic treatment. She is postoperative day #7.  2. Renal insufficiency had improved slightly-now stable    Recommendations:   She had been on treatment with Zosyn  Feel we can transition to oral antibiotic treatment  Suggest Augmentin ES 10 mL orally every 12 hours  Continue to encourage activity  Continue to follow    Abel Hubbard MD

## 2018-04-03 NOTE — PLAN OF CARE
Problem: Patient Care Overview  Goal: Plan of Care Review  Outcome: Ongoing (interventions implemented as appropriate)  Pt had ct dc'd per md this am no s/s of SOA o2 at 4l cont. Telemetry dc'd cont. Pulse ox dc'd. PT walked patient in [place], nursing staff walked three times. Patient c/o mild pain po pain meds given as ordered.     Problem: Chest Tube Drainage Device (Adult)  Goal: Signs and Symptoms of Listed Potential Problems Will be Absent, Minimized or Managed (Chest Tube Drainage Device)  Outcome: Revised

## 2018-04-03 NOTE — PLAN OF CARE
Problem: Patient Care Overview  Goal: Plan of Care Review  Outcome: Ongoing (interventions implemented as appropriate)   04/03/18 0133   Coping/Psychosocial   Plan of Care Reviewed With patient   Plan of Care Review   Progress improving     Goal: Individualization and Mutuality  Outcome: Ongoing (interventions implemented as appropriate)    Goal: Discharge Needs Assessment  Outcome: Ongoing (interventions implemented as appropriate)    Goal: Interprofessional Rounds/Family Conf  Outcome: Ongoing (interventions implemented as appropriate)      Problem: Pneumonia (Adult)  Goal: Signs and Symptoms of Listed Potential Problems Will be Absent, Minimized or Managed (Pneumonia)  Outcome: Ongoing (interventions implemented as appropriate)      Problem: Chest Tube Drainage Device (Adult)  Goal: Signs and Symptoms of Listed Potential Problems Will be Absent, Minimized or Managed (Chest Tube Drainage Device)  Outcome: Ongoing (interventions implemented as appropriate)      Problem: Fall Risk (Adult)  Goal: Identify Related Risk Factors and Signs and Symptoms  Outcome: Ongoing (interventions implemented as appropriate)    Goal: Absence of Fall  Outcome: Ongoing (interventions implemented as appropriate)

## 2018-04-03 NOTE — PROGRESS NOTES
[facility] Medicine Services  INPATIENT PROGRESS NOTE    Length of Stay: 11  Date of Admission: 3/23/2018  Primary Care Physician: CLEMENTE East    Subjective   Chief Complaint: follow up   HPI   Pt states she feels some better. Frustrated that she isn't home yet. States had a small bowel movement yesterday. Fair appetite. No vomiting. No chest pain. Today complains of lower extremity edema. TEDs are in place.     Review of Systems   All pertinent negatives and positives are as above. All other systems have been reviewed and are negative unless otherwise stated.     Objective    Temp:  [97.7 °F (36.5 °C)-98.8 °F (37.1 °C)] (P) 97.7 °F (36.5 °C)  Heart Rate:  [81-96] (P) 89  Resp:  [16-18] (P) 18  BP: (124-155)/(64-68) (P) 138/66  Physical Exam   Constitutional: She is oriented to person, place, and time. She appears well-developed and well-nourished.   HENT:   Head: Normocephalic and atraumatic.   Eyes: Conjunctivae and EOM are normal. Pupils are equal, round, and reactive to light.   Neck: Neck supple. No JVD present. No thyromegaly present.   Cardiovascular: Normal rate, regular rhythm, normal heart sounds and intact distal pulses.  Exam reveals no gallop and no friction rub.    No murmur heard.  Pulmonary/Chest: Effort normal and breath sounds normal. No respiratory distress. She has no wheezes. She has no rales. She exhibits no tenderness.   Diminished bilaterally.    Abdominal: Soft. Bowel sounds are normal. She exhibits no distension. There is no tenderness. There is no rebound and no guarding.   Musculoskeletal: Normal range of motion. She exhibits edema (1+ bilateral lower extremities. ). She exhibits no tenderness or deformity.   Lymphadenopathy:     She has no cervical adenopathy.   Neurological: She is alert and oriented to person, place, and time. She displays normal reflexes. No cranial nerve deficit. She exhibits normal muscle tone.   Skin: Skin is warm and  dry. No rash noted.   Psychiatric: She has a normal mood and affect. Her behavior is normal. Judgment and thought content normal.     Results Review:  I have reviewed the labs, radiology results, and diagnostic studies.    Laboratory Data:     Results from last 7 days  Lab Units 04/03/18  0320 04/02/18 0437 04/01/18  0451   WBC 10*3/mm3 11.13* 10.93* 11.66*   HEMOGLOBIN g/dL 9.7* 9.8* 9.3*   HEMATOCRIT % 30.6* 30.4* 28.7*   PLATELETS 10*3/mm3 405* 391 379       Results from last 7 days  Lab Units 04/03/18  0320 04/02/18  0437 04/01/18  0451 03/31/18  0505   SODIUM mmol/L 142 141 144 143   POTASSIUM mmol/L 3.7 3.8 3.7 4.0   CHLORIDE mmol/L 104 105 108 108   CO2 mmol/L 27.0 24.0 23.0* 23.0*   BUN mg/dL 15 13 14 18   CREATININE mg/dL 2.01* 2.02* 2.09* 2.12*   CALCIUM mg/dL 9.0 8.9 8.8 8.2*   BILIRUBIN mg/dL  --  0.2 0.2 0.1   ALK PHOS U/L  --  254* 256* 223*   ALT (SGPT) U/L  --  56* 57* 55*   AST (SGOT) U/L  --  39 57* 65*   GLUCOSE mg/dL 105* 164* 90 87       Culture Data:   Wound Culture   Date Value Ref Range Status   03/27/2018 No growth at 5 days  Final       Radiology Data:   Imaging Results (last 24 hours)     Procedure Component Value Units Date/Time    XR Chest 1 View [[id number]] Collected:  04/03/18 0712     Updated:  04/03/18 0716    Narrative:       EXAMINATION: XR CHEST 1 VW-     4/3/2018 3:45 AM CDT     HISTORY: Post Op Lung Surgery; J86.9-Pyothorax without fistula;  Z74.09-Other reduced mobility.     One view chest x-ray compared with yesterday.     Slightly improved lung expansion.     Partial clearing of right-sided infiltrate with persistent mild  infiltrate throughout the upper lobe.     Stable heart size.     The left chest tube remains in good position.  No pneumothorax.     There is persistent left basilar infiltrate or atelectasis with partial  clearing.     Summary:  1. Slightly improved expansion with partial clearing of the lungs.  2. No new abnormality.  This report was finalized on  04/03/2018 07:12 by Dr. Jimmy Hernandez MD.          I have reviewed the patient current medications.     Assessment/Plan   Assessment:  1. Empyema- s/p left thoracotomy, evacuation of empyema and decortication 3/27/2018  2. Left upper lobe inflammatory opacities-Pneumonia-failed outpatient therapy.   3. Acute hypoxic respiratory failure secondary to above  4. Sepsis positive secondary to penumonia  5. Leukocytosis, worsening  6. Shortness of breath/dyspnea-secondary to #1  7. Uncontrolled diabetes mellitus-insulin dependent, A1C 13.8%  8. Hypertension  9. Hyperlipidemia  10. Chronic pain syndrome-no home pain medications for this  11. Anemia-normocytic, mild. Iron deficiency  12. Acute kidney injury  13. Elevated transaminases-elevated alkaline phosphatase.     Plan:  1. Continue to monitor for renal recovery  2. Has received 8 units lispro in past 24 hours.   3. Zosyn day 11-hopefully to transition to oral antibiotics today.   4. She ultimately need diuresed. Will need to discuss with nephrology first.   5. Repeat labs in am.     Discharge Planning: I expect the patient to be discharged to home in ? days.    CLEMENTE Franco   04/03/18   8:19 AM   I personally evaluated and examined the patient in conjunction with CLEMENTE Vuong and agree with the assessment, treatment plan, and disposition of the patient as recorded by her. My history, exam, and further recommendations are:     She has been transitioned to oral antibiotic (Augmentin).  Trial of steroid is started for acute interstitial nephritis.  Serum creatinine remains elevated at 2  Over all she is looking better.  Vitals:    04/03/18 1514   BP: 143/67   Pulse: 95   Resp: 18   Temp: 97.7 °F (36.5 °C)   SpO2: 90%   doing well.  Feeling better.  Chest tube is out.    Home when okay from nephrology.    Osiel Thompson MD  04/03/18  5:58 PM

## 2018-04-04 ENCOUNTER — APPOINTMENT (OUTPATIENT)
Dept: GENERAL RADIOLOGY | Facility: HOSPITAL | Age: 58
End: 2018-04-04

## 2018-04-04 LAB
ANION GAP SERPL CALCULATED.3IONS-SCNC: 13 MMOL/L (ref 4–13)
BUN BLD-MCNC: 17 MG/DL (ref 5–21)
BUN/CREAT SERPL: 8.4 (ref 7–25)
CALCIUM SPEC-SCNC: 9 MG/DL (ref 8.4–10.4)
CHLORIDE SERPL-SCNC: 101 MMOL/L (ref 98–110)
CO2 SERPL-SCNC: 28 MMOL/L (ref 24–31)
CREAT BLD-MCNC: 2.02 MG/DL (ref 0.5–1.4)
DEPRECATED RDW RBC AUTO: 47.7 FL (ref 40–54)
ERYTHROCYTE [DISTWIDTH] IN BLOOD BY AUTOMATED COUNT: 14.5 % (ref 12–15)
GFR SERPL CREATININE-BSD FRML MDRD: 25 ML/MIN/1.73
GLUCOSE BLD-MCNC: 106 MG/DL (ref 70–100)
GLUCOSE BLDC GLUCOMTR-MCNC: 154 MG/DL (ref 70–130)
GLUCOSE BLDC GLUCOMTR-MCNC: 293 MG/DL (ref 70–130)
GLUCOSE BLDC GLUCOMTR-MCNC: 394 MG/DL (ref 70–130)
GLUCOSE BLDC GLUCOMTR-MCNC: 394 MG/DL (ref 70–130)
HCT VFR BLD AUTO: 32 % (ref 37–47)
HGB BLD-MCNC: 10.1 G/DL (ref 12–16)
MCH RBC QN AUTO: 28.5 PG (ref 28–32)
MCHC RBC AUTO-ENTMCNC: 31.6 G/DL (ref 33–36)
MCV RBC AUTO: 90.1 FL (ref 82–98)
PLATELET # BLD AUTO: 441 10*3/MM3 (ref 130–400)
PMV BLD AUTO: 10.3 FL (ref 6–12)
POTASSIUM BLD-SCNC: 3.3 MMOL/L (ref 3.5–5.3)
RBC # BLD AUTO: 3.55 10*6/MM3 (ref 4.2–5.4)
SODIUM BLD-SCNC: 142 MMOL/L (ref 135–145)
WBC NRBC COR # BLD: 11.28 10*3/MM3 (ref 4.8–10.8)

## 2018-04-04 PROCEDURE — 63710000001 INSULIN DETEMIR PER 5 UNITS: Performed by: NURSE PRACTITIONER

## 2018-04-04 PROCEDURE — 94799 UNLISTED PULMONARY SVC/PX: CPT

## 2018-04-04 PROCEDURE — 85027 COMPLETE CBC AUTOMATED: CPT | Performed by: NURSE PRACTITIONER

## 2018-04-04 PROCEDURE — 99024 POSTOP FOLLOW-UP VISIT: CPT | Performed by: NURSE PRACTITIONER

## 2018-04-04 PROCEDURE — 25010000002 FUROSEMIDE PER 20 MG: Performed by: NURSE PRACTITIONER

## 2018-04-04 PROCEDURE — 25010000002 ENOXAPARIN PER 10 MG: Performed by: FAMILY MEDICINE

## 2018-04-04 PROCEDURE — 97116 GAIT TRAINING THERAPY: CPT

## 2018-04-04 PROCEDURE — 25010000002 METHYLPREDNISOLONE PER 40 MG: Performed by: INTERNAL MEDICINE

## 2018-04-04 PROCEDURE — 82962 GLUCOSE BLOOD TEST: CPT

## 2018-04-04 PROCEDURE — 94760 N-INVAS EAR/PLS OXIMETRY 1: CPT

## 2018-04-04 PROCEDURE — 94618 PULMONARY STRESS TESTING: CPT

## 2018-04-04 PROCEDURE — 71046 X-RAY EXAM CHEST 2 VIEWS: CPT

## 2018-04-04 PROCEDURE — 80048 BASIC METABOLIC PNL TOTAL CA: CPT | Performed by: THORACIC SURGERY (CARDIOTHORACIC VASCULAR SURGERY)

## 2018-04-04 PROCEDURE — 63710000001 INSULIN LISPRO (HUMAN) PER 5 UNITS: Performed by: INTERNAL MEDICINE

## 2018-04-04 RX ORDER — POTASSIUM CHLORIDE 750 MG/1
40 CAPSULE, EXTENDED RELEASE ORAL ONCE
Status: COMPLETED | OUTPATIENT
Start: 2018-04-04 | End: 2018-04-04

## 2018-04-04 RX ORDER — FUROSEMIDE 10 MG/ML
20 INJECTION INTRAMUSCULAR; INTRAVENOUS ONCE
Status: COMPLETED | OUTPATIENT
Start: 2018-04-04 | End: 2018-04-04

## 2018-04-04 RX ORDER — AMLODIPINE BESYLATE 10 MG/1
10 TABLET ORAL
Status: DISCONTINUED | OUTPATIENT
Start: 2018-04-04 | End: 2018-04-05 | Stop reason: HOSPADM

## 2018-04-04 RX ADMIN — METHYLPREDNISOLONE SODIUM SUCCINATE 40 MG: 40 INJECTION, POWDER, FOR SOLUTION INTRAMUSCULAR; INTRAVENOUS at 04:47

## 2018-04-04 RX ADMIN — IPRATROPIUM BROMIDE AND ALBUTEROL SULFATE 3 ML: 2.5; .5 SOLUTION RESPIRATORY (INHALATION) at 02:47

## 2018-04-04 RX ADMIN — IPRATROPIUM BROMIDE AND ALBUTEROL SULFATE 3 ML: 2.5; .5 SOLUTION RESPIRATORY (INHALATION) at 23:56

## 2018-04-04 RX ADMIN — INSULIN LISPRO 20 UNITS: 100 INJECTION, SOLUTION INTRAVENOUS; SUBCUTANEOUS at 17:57

## 2018-04-04 RX ADMIN — INSULIN LISPRO 12 UNITS: 100 INJECTION, SOLUTION INTRAVENOUS; SUBCUTANEOUS at 12:39

## 2018-04-04 RX ADMIN — LIDOCAINE HYDROCHLORIDE 10 ML: 20 SOLUTION ORAL; TOPICAL at 22:03

## 2018-04-04 RX ADMIN — PANTOPRAZOLE SODIUM 40 MG: 40 TABLET, DELAYED RELEASE ORAL at 06:09

## 2018-04-04 RX ADMIN — HYDROCODONE BITARTRATE AND ACETAMINOPHEN 1 TABLET: 7.5; 325 TABLET ORAL at 13:25

## 2018-04-04 RX ADMIN — HYDROCODONE BITARTRATE AND ACETAMINOPHEN 1 TABLET: 7.5; 325 TABLET ORAL at 22:00

## 2018-04-04 RX ADMIN — HYDROCODONE BITARTRATE AND ACETAMINOPHEN 1 TABLET: 7.5; 325 TABLET ORAL at 04:47

## 2018-04-04 RX ADMIN — AMOXICILLIN AND CLAVULANATE POTASSIUM 1200 MG: 600; 42.9 SUSPENSION ORAL at 10:57

## 2018-04-04 RX ADMIN — HYDROCODONE BITARTRATE AND ACETAMINOPHEN 1 TABLET: 7.5; 325 TABLET ORAL at 09:12

## 2018-04-04 RX ADMIN — INSULIN DETEMIR 15 UNITS: 100 INJECTION, SOLUTION SUBCUTANEOUS at 22:00

## 2018-04-04 RX ADMIN — GUAIFENESIN 1200 MG: 600 TABLET, EXTENDED RELEASE ORAL at 08:20

## 2018-04-04 RX ADMIN — INSULIN DETEMIR 15 UNITS: 100 INJECTION, SOLUTION SUBCUTANEOUS at 08:19

## 2018-04-04 RX ADMIN — NYSTATIN 500000 UNITS: 100000 SUSPENSION ORAL at 12:39

## 2018-04-04 RX ADMIN — NYSTATIN 500000 UNITS: 100000 SUSPENSION ORAL at 17:56

## 2018-04-04 RX ADMIN — NYSTATIN 500000 UNITS: 100000 SUSPENSION ORAL at 22:05

## 2018-04-04 RX ADMIN — IPRATROPIUM BROMIDE AND ALBUTEROL SULFATE 3 ML: 2.5; .5 SOLUTION RESPIRATORY (INHALATION) at 06:01

## 2018-04-04 RX ADMIN — IPRATROPIUM BROMIDE AND ALBUTEROL SULFATE 3 ML: 2.5; .5 SOLUTION RESPIRATORY (INHALATION) at 10:42

## 2018-04-04 RX ADMIN — POLYETHYLENE GLYCOL 3350 17 G: 17 POWDER, FOR SOLUTION ORAL at 08:18

## 2018-04-04 RX ADMIN — FLUOXETINE 10 MG: 10 CAPSULE ORAL at 08:21

## 2018-04-04 RX ADMIN — BISACODYL 10 MG: 5 TABLET ORAL at 08:20

## 2018-04-04 RX ADMIN — GABAPENTIN 400 MG: 400 CAPSULE ORAL at 22:00

## 2018-04-04 RX ADMIN — METHYLPREDNISOLONE SODIUM SUCCINATE 40 MG: 40 INJECTION, POWDER, FOR SOLUTION INTRAMUSCULAR; INTRAVENOUS at 16:35

## 2018-04-04 RX ADMIN — ENOXAPARIN SODIUM 30 MG: 30 INJECTION SUBCUTANEOUS at 22:00

## 2018-04-04 RX ADMIN — LIDOCAINE HYDROCHLORIDE 10 ML: 20 SOLUTION ORAL; TOPICAL at 08:19

## 2018-04-04 RX ADMIN — ROSUVASTATIN CALCIUM 10 MG: 10 TABLET, FILM COATED ORAL at 22:00

## 2018-04-04 RX ADMIN — INSULIN LISPRO 20 UNITS: 100 INJECTION, SOLUTION INTRAVENOUS; SUBCUTANEOUS at 22:00

## 2018-04-04 RX ADMIN — IPRATROPIUM BROMIDE AND ALBUTEROL SULFATE 3 ML: 2.5; .5 SOLUTION RESPIRATORY (INHALATION) at 20:43

## 2018-04-04 RX ADMIN — AMOXICILLIN AND CLAVULANATE POTASSIUM 1200 MG: 600; 42.9 SUSPENSION ORAL at 22:03

## 2018-04-04 RX ADMIN — AMLODIPINE BESYLATE 10 MG: 10 TABLET ORAL at 16:34

## 2018-04-04 RX ADMIN — LIDOCAINE HYDROCHLORIDE 10 ML: 20 SOLUTION ORAL; TOPICAL at 12:39

## 2018-04-04 RX ADMIN — HYDROCODONE BITARTRATE AND ACETAMINOPHEN 1 TABLET: 7.5; 325 TABLET ORAL at 17:55

## 2018-04-04 RX ADMIN — IPRATROPIUM BROMIDE AND ALBUTEROL SULFATE 3 ML: 2.5; .5 SOLUTION RESPIRATORY (INHALATION) at 14:52

## 2018-04-04 RX ADMIN — GUAIFENESIN 1200 MG: 600 TABLET, EXTENDED RELEASE ORAL at 22:00

## 2018-04-04 RX ADMIN — INSULIN LISPRO 4 UNITS: 100 INJECTION, SOLUTION INTRAVENOUS; SUBCUTANEOUS at 08:20

## 2018-04-04 RX ADMIN — LIDOCAINE HYDROCHLORIDE 10 ML: 20 SOLUTION ORAL; TOPICAL at 17:58

## 2018-04-04 RX ADMIN — FUROSEMIDE 20 MG: 10 INJECTION, SOLUTION INTRAVENOUS at 10:58

## 2018-04-04 RX ADMIN — POTASSIUM CHLORIDE 40 MEQ: 750 CAPSULE, EXTENDED RELEASE ORAL at 10:58

## 2018-04-04 RX ADMIN — NYSTATIN 500000 UNITS: 100000 SUSPENSION ORAL at 08:19

## 2018-04-04 NOTE — PLAN OF CARE
Problem: Patient Care Overview  Goal: Plan of Care Review  Outcome: Ongoing (interventions implemented as appropriate)   04/04/18 2868   Coping/Psychosocial   Plan of Care Reviewed With patient   OTHER   Outcome Summary Follow-up with pt. Intake has improved, now at 63%. This dietetic intern asked pt to discuss diabetes nutrition education. Pt was not interested. Educational handout and RD contact info has been provided. Encouraged pt to call if any questions came up. May need diabetes education reinforcement. Will cont to follow.

## 2018-04-04 NOTE — PROGRESS NOTES
"Left thoracotomy, evacuation of empyema and decortication    POD 8    Up in chair this morning. On 4.5 liters nasal cannula with O2 sats 92%. Weight down 9 pounds today. Received IV lasix yesterday. On Augmentin per ID.     Visit Vitals  /71 (BP Location: Right arm, Patient Position: Sitting)   Pulse 94   Temp 98.1 °F (36.7 °C) (Oral)   Resp 16   Ht 160 cm (63\")   Wt 85.8 kg (189 lb 3.2 oz)   SpO2 92%   Breastfeeding? No   BMI 33.52 kg/m²     No intake or output data in the 24 hours ending 04/04/18 0806        Chest x ray: essentially unchanged with removal of left sided chest tubes, no appreciable pneumothorax    Physical Exam:  General: No apparent distress. In good spirits.  Cardiovascular: Regular rate and rhythm without murmur, rubs, or gallops.    Pulmonary: Clear to auscultation with left base diminished. No wheezing, rubs, or rales. On 4.5 liters nasal cannula.   Chest: Thoracic incisions clean, dry, and intact.   Abdomen: Soft, non-distended, and non-tender.  Extremities: Warm, moves all extremities. Lower extremity edema improved.   Neurologic: Grossly intact with no focal deficits.       Impression:  Left empyema status post open thoracotomy, drainage of empyema, and decortication  Pneumonia  Poorly controlled diabetes mellitus  Obesity  Former tobacco use  TOBIAS    Plan:  Continue oral antibiotics per ID  Continue to encourage pulmonary toilet and ambulation  Wean O2 as tolerated  Routine post thoracic surgery regimen  "

## 2018-04-04 NOTE — PLAN OF CARE
Problem: Patient Care Overview  Goal: Plan of Care Review  Outcome: Ongoing (interventions implemented as appropriate)   04/04/18 0154   Coping/Psychosocial   Plan of Care Reviewed With patient   Plan of Care Review   Progress improving     Goal: Individualization and Mutuality  Outcome: Ongoing (interventions implemented as appropriate)    Goal: Discharge Needs Assessment  Outcome: Ongoing (interventions implemented as appropriate)    Goal: Interprofessional Rounds/Family Conf  Outcome: Ongoing (interventions implemented as appropriate)      Problem: Pneumonia (Adult)  Goal: Signs and Symptoms of Listed Potential Problems Will be Absent, Minimized or Managed (Pneumonia)  Outcome: Ongoing (interventions implemented as appropriate)      Problem: Fall Risk (Adult)  Goal: Identify Related Risk Factors and Signs and Symptoms  Outcome: Ongoing (interventions implemented as appropriate)    Goal: Absence of Fall  Outcome: Ongoing (interventions implemented as appropriate)

## 2018-04-04 NOTE — PROGRESS NOTES
Nephrology ([facility]) progress Note      Patient:  Carlee Vee  YOB: 1960  Date of Service: 4/4/2018  MRN: [id number]   Acct: [id number]   Primary Care Physician: CLEMENTE East  Advance Directive: Full Code  Admit Date: 3/23/2018       Hospital Day: 12  Referring Provider: Kyaw Roldan MD      Patient Seen, Chart, Consults, Notes, Labs, Radiology studies reviewed.        Subjective:  Carlee Vee is a 57 y.o. female  whom we were consulted for acute kidney injury.  Patient has no previous history of chronic kidney disease.  Patient was recently diagnosed with left lower lobe pneumonia which was treated initially as an outpatient.  She was admitted as her condition has deteriorated and has developed empyema.  She underwent chest tube placement and drainage of her empyema.  Hospital course remarkable for treatment with intravenous vancomycin and has worsening of renal function.  Vanco has been discontinued. She was started on Solumedrol yesterday for treatment of AIN.   Renal function unchanged.   Lower extremity edema improved significantly. She states overall she is feeling much better.   Allergies:  Sulfa antibiotics    Home Meds:  Prescriptions Prior to Admission   Medication Sig Dispense Refill Last Dose   • amLODIPine (NORVASC) 10 MG tablet Take 10 mg by mouth Daily.   Past Week at Unknown time   • aspirin 81 MG EC tablet Chew 81 mg Daily.   Past Week at Unknown time   • FLUoxetine (PROzac) 20 MG capsule Take 20 mg by mouth Daily.   Past Week at Unknown time   • gabapentin (NEURONTIN) 100 MG capsule Take 400 mg by mouth Every Night.  3 Past Week at Unknown time   • HYDROcodone-acetaminophen (NORCO) 5-325 MG per tablet Take 1 tablet by mouth Every 6 (Six) Hours As Needed for Moderate Pain .   Past Week at Unknown time   • insulin aspart (novoLOG) 100 UNIT/ML injection Inject  under the skin 3 (Three) Times a Day Before Meals. 150 - 199 = 4 units  200 - 249 = 8  units  250 - 299 = 12 units  300 - 349 = 16 units  349 - 400 = 20 units  > 400 = 24 units   Past Week at Unknown time   • insulin glargine (LANTUS) 100 UNIT/ML injection Inject 16 Units under the skin Every Night.   Past Week at Unknown time   • losartan (COZAAR) 50 MG tablet Take 50 mg by mouth Daily.   Past Week at Unknown time   • metFORMIN (GLUCOPHAGE) 500 MG tablet Take 500 mg by mouth 2 (Two) Times a Day With Meals.   Past Week at Unknown time   • pantoprazole (PROTONIX) 40 MG EC tablet Take 40 mg by mouth Daily.   Past Week at Unknown time   • raNITIdine (ZANTAC) 150 MG tablet Take 150 mg by mouth Daily As Needed (Breakthrough acid reflux).   Past Week at Unknown time   • rosuvastatin (CRESTOR) 10 MG tablet Take 10 mg by mouth Daily.   Past Week at Unknown time   • SITagliptin (JANUVIA) 100 MG tablet Take 100 mg by mouth Every Morning.   Past Week at Unknown time   • traZODone (DESYREL) 50 MG tablet Take 100 mg by mouth At Night As Needed for Sleep.   Past Week at Unknown time   • vitamin D (ERGOCALCIFEROL) 43348 units capsule capsule Take 50,000 Units by mouth 1 (One) Time Per Week. Fridays   Past Week at Unknown time       Medicines:  Current Facility-Administered Medications   Medication Dose Route Frequency Provider Last Rate Last Dose   • acetaminophen (TYLENOL) tablet 650 mg  650 mg Oral Q6H PRN CLEMENTE Franco   650 mg at 03/28/18 0438   • amoxicillin-clavulanate (AUGMENTIN) 600-42.9 MG/5ML suspension 1,200 mg  1,200 mg Oral Q12H Abel Chang MD   1,200 mg at 04/04/18 1057   • bisacodyl (DULCOLAX) EC tablet 10 mg  10 mg Oral BID CLEMENTE Hernandez   10 mg at 04/04/18 0820   • bisacodyl (DULCOLAX) suppository 10 mg  10 mg Rectal Daily PRN CLEMENTE Franco       • dextrose (D50W) solution 25 g  25 g Intravenous Q15 Min PRN CLEMENTE Franco       • dextrose (GLUTOSE) oral gel 15 g  15 g Oral Q15 Min PRN CLEMENTE Franco       • enoxaparin (LOVENOX)  syringe 30 mg  30 mg Subcutaneous Q24H Sandramonserrat Saunders,    30 mg at 04/03/18 2123   • FLUoxetine (PROzac) capsule 10 mg  10 mg Oral Daily Vanessa Menon APRN   10 mg at 04/04/18 0821   • gabapentin (NEURONTIN) capsule 400 mg  400 mg Oral Nightly Vanessa Menon APRN   400 mg at 04/03/18 2127   • glucagon (human recombinant) (GLUCAGEN DIAGNOSTIC) injection 1 mg  1 mg Subcutaneous PRN Vanessa Menon APRN       • guaiFENesin (MUCINEX) 12 hr tablet 1,200 mg  1,200 mg Oral BID Vanessa Menon APRN   1,200 mg at 04/04/18 0820   • HYDROcodone-acetaminophen (NORCO) 7.5-325 MG per tablet 1 tablet  1 tablet Oral Q4H PRN Abel Stark MD   1 tablet at 04/04/18 0912   • hydrOXYzine (ATARAX) tablet 50 mg  50 mg Oral TID PRN Vanessa Menon APRN   50 mg at 03/26/18 2209   • insulin detemir (LEVEMIR) injection 15 Units  15 Units Subcutaneous Q12H CLEMENTE Mejias   15 Units at 04/04/18 0819   • insulin lispro (humaLOG) injection 0-24 Units  0-24 Units Subcutaneous 4x Daily With Meals & Nightly Mark Howell MD   4 Units at 04/04/18 0820   • ipratropium-albuterol (DUO-NEB) nebulizer solution 3 mL  3 mL Nebulization Q4H - RT CLEMENTE Mejias   3 mL at 04/04/18 1042   • lidocaine-Maalox-diphenhydramine (MIRACLE MOUTHWASH) oral suspension 10 mL  10 mL Oral 4x Daily Vanessa Menon APRN   10 mL at 04/04/18 0819   • methylPREDNISolone sodium succinate (SOLU-Medrol) injection 40 mg  40 mg Intravenous Q12H Johan Brar MD   40 mg at 04/04/18 0447   • naloxone (NARCAN) injection 0.1 mg  0.1 mg Intravenous Q5 Min PRN Abel Stark MD       • nystatin (MYCOSTATIN) 575088 UNIT/ML suspension 500,000 Units  5 mL Oral 4x Daily Vanessa Franny Menon, APRN   500,000 Units at 04/04/18 0819   • pantoprazole (PROTONIX) EC tablet 40 mg  40 mg Oral Q AM CLEMENTE Franco   40 mg at 04/04/18 0609   • polyethylene glycol (MIRALAX) powder 17 g  17 g Oral  Daily iDamante Wisdom, APRN   17 g at 04/04/18 0818   • rosuvastatin (CRESTOR) tablet 10 mg  10 mg Oral Nightly Vanessa Franny Menon, APRN   10 mg at 04/03/18 2127   • traZODone (DESYREL) tablet 100 mg  100 mg Oral Nightly PRN Vanessa Villanuevamarcus Menon, APRN   100 mg at 03/23/18 2247       Past Medical History:  Past Medical History:   Diagnosis Date   • Garza's esophagus    • Depression    • Diabetes mellitus    • GERD (gastroesophageal reflux disease)    • Hypertension    • Kidney stone        Past Surgical History:  Past Surgical History:   Procedure Laterality Date   • BACK SURGERY     • COLONOSCOPY  10/18/2006    hyperplastic ascending colon polyp- recall in 7 years   • COLONOSCOPY N/A 7/24/2017    Procedure: COLONOSCOPY WITH ANESTHESIA;  Surgeon: Jenniffer Smith MD;  Location: [facility] ENDOSCOPY;  Service:    • ENDOSCOPY N/A 7/24/2017    Procedure: ESOPHAGOGASTRODUODENOSCOPY WITH ANESTHESIA;  Surgeon: Jenniffer Smith MD;  Location: [facility] ENDOSCOPY;  Service:    • HYSTERECTOMY     • THORACOTOMY Left 3/27/2018    Procedure: THORACOTOMY, drainage of empyema and decortication;  Surgeon: Abel Stark MD;  Location: [facility] OR;  Service: Cardiothoracic   • UPPER GASTROINTESTINAL ENDOSCOPY  12/08/2011       Family History  History reviewed. No pertinent family history.    Social History  Social History     Social History   • Marital status:      Spouse name: N/A   • Number of children: N/A   • Years of education: N/A     Occupational History   • Not on file.     Social History Main Topics   • Smoking status: Former Smoker   • Smokeless tobacco: Never Used   • Alcohol use No   • Drug use: No   • Sexual activity: Not on file     Other Topics Concern   • Not on file     Social History Narrative   • No narrative on file         Review of Systems:  History obtained from chart review and the patient  General ROS: No fever or chills  Respiratory ROS: positive for - shortness of breath  Cardiovascular ROS: no chest  "pain or dyspnea on exertion  Gastrointestinal ROS: No abdominal pain or melena  Genito-Urinary ROS: No dysuria or hematuria  14 point ROS reviewed with the patient and negative except as noted above and in the HPI unless unable to obtain.    Objective:  /71 (BP Location: Right arm, Patient Position: Sitting)   Pulse 98   Temp 98.1 °F (36.7 °C) (Oral)   Resp 16   Ht 160 cm (63\")   Wt 85.8 kg (189 lb 3.2 oz)   SpO2 94%   Breastfeeding? No   BMI 33.52 kg/m²   No intake or output data in the 24 hours ending 04/04/18 1143  General: awake/alert up in chair  HEENT: Normocephalic atraumatic head  Neck: Supple with no JVD or carotid bruits.  Chest:  Diminished breath sounds   CVS: regular rate and rhythm  Abdominal: soft, nontender, normal bowel sounds  Extremities: no cyanosis or edema  Skin: warm and dry without rash      Labs:    Results from last 7 days  Lab Units 04/04/18 0318 04/03/18  0320 04/02/18  0437   WBC 10*3/mm3 11.28* 11.13* 10.93*   HEMOGLOBIN g/dL 10.1* 9.7* 9.8*   HEMATOCRIT % 32.0* 30.6* 30.4*   PLATELETS 10*3/mm3 441* 405* 391           Results from last 7 days  Lab Units 04/04/18 0318 04/03/18  0320 04/02/18  0437 04/01/18  0451 03/31/18  0505   SODIUM mmol/L 142 142 141 144 143   POTASSIUM mmol/L 3.3* 3.7 3.8 3.7 4.0   CHLORIDE mmol/L 101 104 105 108 108   CO2 mmol/L 28.0 27.0 24.0 23.0* 23.0*   BUN mg/dL 17 15 13 14 18   CREATININE mg/dL 2.02* 2.01* 2.02* 2.09* 2.12*   CALCIUM mg/dL 9.0 9.0 8.9 8.8 8.2*   BILIRUBIN mg/dL  --   --  0.2 0.2 0.1   ALK PHOS U/L  --   --  254* 256* 223*   ALT (SGPT) U/L  --   --  56* 57* 55*   AST (SGOT) U/L  --   --  39 57* 65*   GLUCOSE mg/dL 106* 105* 164* 90 87           Radiology:   Imaging Results (last 72 hours)     Procedure Component Value Units Date/Time    XR Chest 1 View [[id number]] Collected:  03/31/18 0902     Updated:  03/31/18 0907    Narrative:       EXAMINATION: XR CHEST 1 VW-     3/31/2018 3:50 AM CDT     HISTORY: Post Op Lung Surgery; " J86.9-Pyothorax without fistula;  Z74.09-Other reduced mobility.     One view chest x-ray compared with yesterday.        2. Left chest tubes remain in good position.     There is persistent consolidation of the left lung base.  No visible pneumothorax.     The right lung is adequately expanded.     There is a new finding of focal infiltrate or atelectasis within the  midportion of the lung adjacent to the hilum.     Summary:  1. Stable left lung with chest tubes and basilar infiltrate/atelectasis.  2. Developing focal infiltrate or atelectasis within the midportion of  the right lung.  This report was finalized on 03/31/2018 09:03 by Dr. Jimmy Hernandez MD.    XR Chest 1 View [[id number]] Collected:  03/30/18 0721     Updated:  03/30/18 0727    Narrative:       HISTORY: Chest tubes     CXR: Frontal view the chest obtained.     COMPARISON: 3/29/2018     FINDINGS: The 2 left-sided chest tubes are similar in their position.  There is stable left lung opacities with a left pleural fluid  collection. The right lung appears clear. The mediastinal contours are  similar.     The left internal jugular central line is been removed.       Impression:       1. Similar positioning of the 2 left-sided chest tubes with a stable  left pleural fluid collection but no appreciable pneumothorax. Left mid  and lower lung consolidation.  This report was finalized on 03/30/2018 07:24 by Dr. Vanessa Jones MD.    US Renal Bilateral [[id number]] Collected:  03/29/18 0903     Updated:  03/29/18 0908    Narrative:       EXAMINATION: US RENAL BILATERAL- 3/29/2018 9:03 AM CDT     HISTORY: Acute kidney injury; J86.9-Pyothorax without fistula.     REPORT: Sonographic images of kidneys were obtained bilaterally, there  are no comparison studies.     The proximal aorta measures 2.3 cm in diameter, normal. The IVC measures  2.1 cm, normal. The right kidney measures 13.4 x 6.4 x 6.7 cm and has  normal morphology and echogenicity. No mass or  hydronephrosis is seen.  Color flow imaging demonstrates vascular flow within the right kidney.     The left kidney measures 12.1 x 6.4 x 6.8 cm and has normal morphology  and echogenicity. No mass or hydronephrosis is seen on the left. Color  flow imaging demonstrates vascular flow within the left kidney. The  bladder is within normal limits.       Impression:       Normal ultrasound of the kidneys.  This report was finalized on 03/29/2018 09:05 by Dr. Ok Campa MD.    XR Chest 1 View [[id number]] Collected:  03/29/18 0728     Updated:  03/29/18 0733    Narrative:       HISTORY: Left-sided chest tube     CXR: A frontal view the chest is obtained.     COMPARISON: 3/20/2018     FINDINGS: The 2 left-sided chest tubes are similar in position. There is  no appreciable pneumothorax. There is similar left pleural fluid  collection with left basilar consolidation. There are small right  basilar atelectasis. Mediastinal contours are similar.     The left internal jugular central line tip projects of the left  brachiocephalic vein.       Impression:       1. Similar positioning of the 2 left-sided chest tubes with no  appreciable pneumothorax. Similar left pleural fluid collection with  left basilar consolidation. Probable right basilar atelectasis.  2. Left IJ central line tip projects over the left brachiocephalic vein.  This report was finalized on 03/29/2018 07:30 by Dr. Vanessa Jones MD.          Culture:  No components found for: WOUNDCUL, 3  No components found for: CSFCUL, 3  No components found for: BC, 3  No components found for: URINECUL, 3      Assessment   1.  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 Started on Solumedrol yesterday. Creatinine unchanged.   2.  Left lower lobe pneumonia with empyema.  3.  Acute respiratory failure now resolving.  4.  Iron deficiency anemia.    Plan:  1.  Continue to avoid nephrotoxins  2.  Continue to monitor renal recovery.  Continue Solumedrol.   3. Am labs         Rosa Lu, APRN  4/4/2018  11:43 AM

## 2018-04-04 NOTE — PROGRESS NOTES
[facility] Medicine Services  INPATIENT PROGRESS NOTE    Length of Stay: 12  Date of Admission: 3/23/2018  Primary Care Physician: CLEMENTE East    Subjective   Chief Complaint: follow up empyema  HPI   Chest tube is out. She has been walking. States she feels good and is less anxious. Really wants to go home. Is on 4L nasal cannula. Creatinine is stable after lasix. She states she thinks she needs more lasix still feels puffy. Bowels are moving. Started on steroids yesterday by nephrology. Sugars are running slightly high secondary to steroids.     Review of Systems   All pertinent negatives and positives are as above. All other systems have been reviewed and are negative unless otherwise stated.     Objective    Temp:  [97.7 °F (36.5 °C)-98.9 °F (37.2 °C)] 98.1 °F (36.7 °C)  Heart Rate:  [84-97] 94  Resp:  [16-18] 16  BP: (141-167)/(55-71) 167/71  Physical Exam   Constitutional: She is oriented to person, place, and time. She appears well-developed and well-nourished.   HENT:   Head: Normocephalic and atraumatic.   Eyes: Conjunctivae and EOM are normal. Pupils are equal, round, and reactive to light.   Neck: Neck supple. No JVD present. No thyromegaly present.   Cardiovascular: Normal rate, regular rhythm, normal heart sounds and intact distal pulses.  Exam reveals no gallop and no friction rub.    No murmur heard.  Sinus rhythm.    Pulmonary/Chest: Effort normal. No respiratory distress. She has no wheezes. She has rales (right lung). She exhibits no tenderness.   Abdominal: Soft. Bowel sounds are normal. She exhibits no distension. There is no tenderness. There is no rebound and no guarding.   Musculoskeletal: Normal range of motion. She exhibits no edema, tenderness or deformity.   Lymphadenopathy:     She has no cervical adenopathy.   Neurological: She is alert and oriented to person, place, and time. She displays normal reflexes. No cranial nerve deficit. She  exhibits normal muscle tone.   Skin: Skin is warm and dry. No rash noted.   Psychiatric: She has a normal mood and affect. Her behavior is normal. Judgment and thought content normal.       Results Review:  I have reviewed the labs, radiology results, and diagnostic studies.    Laboratory Data:     Results from last 7 days  Lab Units 04/04/18 0318 04/03/18  0320 04/02/18  0437   WBC 10*3/mm3 11.28* 11.13* 10.93*   HEMOGLOBIN g/dL 10.1* 9.7* 9.8*   HEMATOCRIT % 32.0* 30.6* 30.4*   PLATELETS 10*3/mm3 441* 405* 391       Results from last 7 days  Lab Units 04/04/18 0318 04/03/18  0320 04/02/18  0437 04/01/18  0451 03/31/18  0505   SODIUM mmol/L 142 142 141 144 143   POTASSIUM mmol/L 3.3* 3.7 3.8 3.7 4.0   CHLORIDE mmol/L 101 104 105 108 108   CO2 mmol/L 28.0 27.0 24.0 23.0* 23.0*   BUN mg/dL 17 15 13 14 18   CREATININE mg/dL 2.02* 2.01* 2.02* 2.09* 2.12*   CALCIUM mg/dL 9.0 9.0 8.9 8.8 8.2*   BILIRUBIN mg/dL  --   --  0.2 0.2 0.1   ALK PHOS U/L  --   --  254* 256* 223*   ALT (SGPT) U/L  --   --  56* 57* 55*   AST (SGOT) U/L  --   --  39 57* 65*   GLUCOSE mg/dL 106* 105* 164* 90 87     Radiology Data:   Imaging Results (last 24 hours)     Procedure Component Value Units Date/Time    XR Chest PA & Lateral [[id number]] Collected:  04/04/18 0708     Updated:  04/04/18 0713    Narrative:       EXAMINATION:   XR CHEST PA AND LATERAL-  4/4/2018 7:08 AM CDT     HISTORY: Left empyema     Prior exams April 3, 2018.     PA and lateral views the chest are obtained. Left chest tubes been  removed. Postsurgical change from left thoracotomy is noted.  Interstitial infiltrate persists in the right lung. This may be an  interstitial pneumonia or possibly mild pulmonary vascular congestion     Cardiac silhouettes normal.       Impression:       Removal of left chest tube is noted. Resection of the left  posterior sixth rib is noted.  2. Persistent interstitial infiltrate right lung unchanged from April 3  This report was finalized on  04/04/2018 07:10 by Dr. Romaine Carias MD.          I have reviewed the patient current medications.     Assessment/Plan   Assessment:  1. Empyema- s/p left thoracotomy, evacuation of empyema and decortication 3/27/2018  2. Left upper lobe inflammatory opacities-Pneumonia-failed outpatient therapy.   3. Acute hypoxic respiratory failure secondary to above  4. Sepsis positive secondary to penumonia  5. Leukocytosis  6. Shortness of breath/dyspnea-secondary to #1  7. Uncontrolled diabetes mellitus-insulin dependent, A1C 13.8%  8. Hypertension  9. Hyperlipidemia  10. Chronic pain syndrome-no home pain medications for this  11. Anemia-normocytic, mild. Iron deficiency  12. Acute kidney injury  13. Elevated transaminases-elevated alkaline phosphatase.  14. Hypokalemia    Plan:  1. Will give lasix 20 mg IV today only.   2. On augmentin per ID  3. Will assess need for home oxygen  4. Encouraged ambulation frequently. Explained to  and other family at bedside how to work portable oxygen tank to encourage ambulation  5. Maybe home tomorrow if ok with others.   6. Will replace potassium again today. Check magnesium.     Discharge Planning: I expect the patient to be discharged to home in ? days.    CLEMENTE Franco   04/04/18   9:47 AM   I personally evaluated and examined the patient in conjunction with  CLEMENTE kee and agree with the assessment, treatment plan, and disposition of the patient as recorded by her. My history, exam, and further recommendations are:   Anticipate switch to oral steroid tomorrow.   Vitals:    04/04/18 1500   BP: Blood pressure 150/71    Pulse: 93   Resp: 16    Temp: 97.8    SpO2:      Doing well  Will require home oxygen upon discharge.  Noted that her oxygen saturation dropped to 84- 85% during ambulation.  This was on 4 L nasal cannula.  Diminished breath sounds on the  Left.  Few fine crackles b/l.  The creatinine remains stable.  If remains stable tomorrow anticipate  home  Chest  x-ray reviewed: Appears to me that this is better expanded compared to prior exam  Cpt        Osiel Thompson MD  04/04/18  3:54 PM

## 2018-04-04 NOTE — PROGRESS NOTES
Exercise Oximetry    Patient Name:Carlee Vee   MRN: [id number]   Date: 04/04/18             ROOM AIR BASELINE   SpO2% 86   Heart Rate    Blood Pressure      EXERCISE ON ROOM AIR SpO2% EXERCISE ON O2 @  4 LPM SpO2%   1 MINUTE  1 MINUTE 90   2 MINUTES  2 MINUTES 88   3 MINUTES  3 MINUTES 87   4 MINUTES  4 MINUTES increased to 5 lpm 90   5 MINUTES  5 MINUTES 91   6 MINUTES  6 MINUTES 90              Distance Walked  Hallway x 2 Distance Walked   Dyspnea (Radha Scale)   Dyspnea (Radha Scale)   Fatigue (Radha Scale)   Fatigue (Radha Scale)   SpO2% Post Exercise   SpO2% Post Exercise   HR Post Exercise   HR Post Exercise   Time to Recovery   Time to Recovery     Comments: pt went to restroom on 4.5 lpm before going for a walk and was 78%.  Waited for spo2 to   Increase to 90% before walking.  Started walking on 4 lpm with spo2 falling so I increased to 5 lpm and spo2  Remained between 88% and 91%.  Encouraged breathing technique during walk and encouraged slower pace.  Pt never stated she was SOB

## 2018-04-04 NOTE — PLAN OF CARE
Problem: Patient Care Overview  Goal: Plan of Care Review  Outcome: Ongoing (interventions implemented as appropriate)   04/04/18 8621   Coping/Psychosocial   Plan of Care Reviewed With patient;spouse   Plan of Care Review   Progress improving   OTHER   Outcome Summary Pt sitting up in chair sit-stand Independent Pt able to up/down 9 steps supervision standing rest at top of stairs. Monitor o2 amb on 4L at rest 96% after activity it dropped to 85% but quickly came back up to 91% w/ pursed lip breathing. Pt was able to amb 275' x2 no AD sitting rest x1 Independently. Pt is safe from PT standpoint will d/c at this time all goals met. Pt will cont to amb w/ nsg and RT.

## 2018-04-04 NOTE — THERAPY DISCHARGE NOTE
Acute Care - Physical Therapy Discharge Summary  [facility]       Patient Name: Carlee Vee  : 1960  MRN: [id number]    Today's Date: 2018  Onset of Illness/Injury or Date of Surgery: 18    Date of Referral to PT: 18  Referring Physician: Dr. Stark      Admit Date: 3/23/2018      PT Recommendation and Plan    Visit Dx:    ICD-10-CM ICD-9-CM   1. Empyema of pleura J86.9 510.9   2. Impaired mobility Z74.09 799.89             Outcome Measures     Row Name 18 1400 18 1000 18 1027       How much help from another person do you currently need...    Turning from your back to your side while in flat bed without using bedrails? 4  -NW 4  -NW 3  -MF    Moving from lying on back to sitting on the side of a flat bed without bedrails? 4  -NW 3  -NW 3  -MF    Moving to and from a bed to a chair (including a wheelchair)? 4  -NW 3  -NW 3  -MF    Standing up from a chair using your arms (e.g., wheelchair, bedside chair)? 4  -NW 3  -NW 3  -MF    Climbing 3-5 steps with a railing? 3  -NW 3  -NW 3  -MF    To walk in hospital room? 4  -NW 3  -NW 3  -MF    AM-PAC 6 Clicks Score 23  -NW 19  -NW 18  -MF       Functional Assessment    Outcome Measure Options AM-PAC 6 Clicks Basic Mobility (PT)  -NW AM-PAC 6 Clicks Basic Mobility (PT)  -NW AM-PAC 6 Clicks Basic Mobility (PT)  -MF      User Key  (r) = Recorded By, (t) = Taken By, (c) = Cosigned By    Initials Name Provider Type    COBY Love PTA Physical Therapy Assistant     Lilian Pike PTA Physical Therapy Assistant                PT Charges     Row Name 18 1411             Time Calculation    Start Time 1400  -NW      Stop Time 1412  -NW      Time Calculation (min) 12 min  -NW      PT Received On 18  -NW      PT Goal Re-Cert Due Date 18  -NW         Time Calculation- PT    Total Timed Code Minutes- PT 12 minute(s)  -NW        User Key  (r) = Recorded By, (t) = Taken By, (c) = Cosigned By    Initials Name  Provider Type     Morena Love PTA Physical Therapy Assistant                  PT Rehab Goals     Row Name 04/04/18 1400 03/29/18 4615          Bed Mobility Goal 1 (PT)    Activity/Assistive Device (Bed Mobility Goal 1, PT)  -- bed mobility activities, all  -     Citrus Level/Cues Needed (Bed Mobility Goal 1, PT)  -- independent  -LH     Time Frame (Bed Mobility Goal 1, PT)  -- by discharge  -     Progress/Outcomes (Bed Mobility Goal 1, PT) goal met  -NW goal ongoing  -        Transfer Goal 1 (PT)    Activity/Assistive Device (Transfer Goal 1, PT)  -- sit-to-stand/stand-to-sit;bed-to-chair/chair-to-bed  -LH     Citrus Level/Cues Needed (Transfer Goal 1, PT)  -- independent  -LH     Time Frame (Transfer Goal 1, PT)  -- by discharge  -     Progress/Outcome (Transfer Goal 1, PT) goal met  -NW goal ongoing  -        Gait Training Goal 1 (PT)    Activity/Assistive Device (Gait Training Goal 1, PT)  -- gait (walking locomotion)  -     Citrus Level (Gait Training Goal 1, PT)  -- independent   may use RW prn  -LH     Distance (Gait Goal 1, PT)  -- 200  -LH     Time Frame (Gait Training Goal 1, PT)  -- by discharge  -     Progress/Outcome (Gait Training Goal 1, PT) goal met  -NW goal ongoing  -        Stairs Goal 1 (PT)    Activity/Assistive Device (Stairs Goal 1, PT)  -- ascending stairs;descending stairs  -     Citrus Level/Cues Needed (Stairs Goal 1, PT)  -- contact guard assist  -     Number of Stairs (Stairs Goal 1, PT)  -- 3  -LH     Time Frame (Stairs Goal 1, PT)  -- by discharge  -     Progress/Outcome (Stairs Goal 1, PT) goal met  -NW goal ongoing  -       User Key  (r) = Recorded By, (t) = Taken By, (c) = Cosigned By    Initials Name Provider Type     Edgar Mims, PT Physical Therapist    NW Morena Love PTA Physical Therapy Assistant          Therapy Charges for Today     Code Description Service Date Service Provider Modifiers Qty    12963664806 HC GAIT  TRAINING EA 15 MIN 4/3/2018 Morena Love PTA GP 1    [id number] HC GAIT TRAINING EA 15 MIN 4/4/2018 Morena Love PTA GP 1          PT Discharge Summary  Anticipated Discharge Disposition (PT): home or self care  Reason for Discharge: Independent  Outcomes Achieved: Refer to plan of care for updates on goals achieved  Discharge Destination: Home      Morena Love PTA   4/4/2018

## 2018-04-05 VITALS
WEIGHT: 189.2 LBS | OXYGEN SATURATION: 98 % | TEMPERATURE: 97.8 F | SYSTOLIC BLOOD PRESSURE: 160 MMHG | HEART RATE: 92 BPM | HEIGHT: 63 IN | DIASTOLIC BLOOD PRESSURE: 74 MMHG | RESPIRATION RATE: 16 BRPM | BODY MASS INDEX: 33.52 KG/M2

## 2018-04-05 LAB
ANION GAP SERPL CALCULATED.3IONS-SCNC: 13 MMOL/L (ref 4–13)
BUN BLD-MCNC: 23 MG/DL (ref 5–21)
BUN/CREAT SERPL: 12.4 (ref 7–25)
CALCIUM SPEC-SCNC: 9.1 MG/DL (ref 8.4–10.4)
CHLORIDE SERPL-SCNC: 99 MMOL/L (ref 98–110)
CO2 SERPL-SCNC: 30 MMOL/L (ref 24–31)
CREAT BLD-MCNC: 1.85 MG/DL (ref 0.5–1.4)
GFR SERPL CREATININE-BSD FRML MDRD: 28 ML/MIN/1.73
GLUCOSE BLD-MCNC: 183 MG/DL (ref 70–100)
GLUCOSE BLDC GLUCOMTR-MCNC: 296 MG/DL (ref 70–130)
GLUCOSE BLDC GLUCOMTR-MCNC: 300 MG/DL (ref 70–130)
POTASSIUM BLD-SCNC: 3.7 MMOL/L (ref 3.5–5.3)
SODIUM BLD-SCNC: 142 MMOL/L (ref 135–145)

## 2018-04-05 PROCEDURE — 25010000002 METHYLPREDNISOLONE PER 40 MG: Performed by: INTERNAL MEDICINE

## 2018-04-05 PROCEDURE — 63710000001 INSULIN LISPRO (HUMAN) PER 5 UNITS: Performed by: INTERNAL MEDICINE

## 2018-04-05 PROCEDURE — 94799 UNLISTED PULMONARY SVC/PX: CPT

## 2018-04-05 PROCEDURE — 99024 POSTOP FOLLOW-UP VISIT: CPT | Performed by: THORACIC SURGERY (CARDIOTHORACIC VASCULAR SURGERY)

## 2018-04-05 PROCEDURE — 80048 BASIC METABOLIC PNL TOTAL CA: CPT | Performed by: THORACIC SURGERY (CARDIOTHORACIC VASCULAR SURGERY)

## 2018-04-05 PROCEDURE — 82962 GLUCOSE BLOOD TEST: CPT

## 2018-04-05 PROCEDURE — 94618 PULMONARY STRESS TESTING: CPT

## 2018-04-05 PROCEDURE — 63710000001 INSULIN DETEMIR PER 5 UNITS: Performed by: NURSE PRACTITIONER

## 2018-04-05 RX ORDER — POLYETHYLENE GLYCOL 3350 17 G/17G
17 POWDER, FOR SOLUTION ORAL DAILY
Qty: 30 EACH | Refills: 0 | Status: SHIPPED | OUTPATIENT
Start: 2018-04-05 | End: 2023-03-08

## 2018-04-05 RX ORDER — AMOXICILLIN AND CLAVULANATE POTASSIUM 600; 42.9 MG/5ML; MG/5ML
1200 POWDER, FOR SUSPENSION ORAL EVERY 12 HOURS SCHEDULED
Qty: 420 ML | Refills: 0 | Status: SHIPPED | OUTPATIENT
Start: 2018-04-05 | End: 2018-04-26

## 2018-04-05 RX ORDER — PREDNISONE 20 MG/1
40 TABLET ORAL DAILY
Qty: 60 TABLET | Refills: 0 | Status: SHIPPED | OUTPATIENT
Start: 2018-04-05 | End: 2018-05-07

## 2018-04-05 RX ADMIN — LIDOCAINE HYDROCHLORIDE 10 ML: 20 SOLUTION ORAL; TOPICAL at 09:10

## 2018-04-05 RX ADMIN — IPRATROPIUM BROMIDE AND ALBUTEROL SULFATE 3 ML: 2.5; .5 SOLUTION RESPIRATORY (INHALATION) at 10:44

## 2018-04-05 RX ADMIN — AMLODIPINE BESYLATE 10 MG: 10 TABLET ORAL at 09:20

## 2018-04-05 RX ADMIN — NYSTATIN 500000 UNITS: 100000 SUSPENSION ORAL at 09:11

## 2018-04-05 RX ADMIN — FLUOXETINE 10 MG: 10 CAPSULE ORAL at 10:30

## 2018-04-05 RX ADMIN — AMOXICILLIN AND CLAVULANATE POTASSIUM 1200 MG: 600; 42.9 SUSPENSION ORAL at 09:11

## 2018-04-05 RX ADMIN — INSULIN LISPRO 16 UNITS: 100 INJECTION, SOLUTION INTRAVENOUS; SUBCUTANEOUS at 09:12

## 2018-04-05 RX ADMIN — METHYLPREDNISOLONE SODIUM SUCCINATE 40 MG: 40 INJECTION, POWDER, FOR SOLUTION INTRAMUSCULAR; INTRAVENOUS at 05:13

## 2018-04-05 RX ADMIN — IPRATROPIUM BROMIDE AND ALBUTEROL SULFATE 3 ML: 2.5; .5 SOLUTION RESPIRATORY (INHALATION) at 03:43

## 2018-04-05 RX ADMIN — HYDROCODONE BITARTRATE AND ACETAMINOPHEN 1 TABLET: 7.5; 325 TABLET ORAL at 02:28

## 2018-04-05 RX ADMIN — INSULIN LISPRO 12 UNITS: 100 INJECTION, SOLUTION INTRAVENOUS; SUBCUTANEOUS at 11:47

## 2018-04-05 RX ADMIN — BISACODYL 10 MG: 5 TABLET ORAL at 09:11

## 2018-04-05 RX ADMIN — INSULIN DETEMIR 15 UNITS: 100 INJECTION, SOLUTION SUBCUTANEOUS at 09:13

## 2018-04-05 RX ADMIN — GUAIFENESIN 1200 MG: 600 TABLET, EXTENDED RELEASE ORAL at 09:11

## 2018-04-05 RX ADMIN — POLYETHYLENE GLYCOL 3350 17 G: 17 POWDER, FOR SOLUTION ORAL at 09:12

## 2018-04-05 RX ADMIN — PANTOPRAZOLE SODIUM 40 MG: 40 TABLET, DELAYED RELEASE ORAL at 05:13

## 2018-04-05 RX ADMIN — IPRATROPIUM BROMIDE AND ALBUTEROL SULFATE 3 ML: 2.5; .5 SOLUTION RESPIRATORY (INHALATION) at 06:30

## 2018-04-05 RX ADMIN — HYDROCODONE BITARTRATE AND ACETAMINOPHEN 1 TABLET: 7.5; 325 TABLET ORAL at 09:19

## 2018-04-05 NOTE — PLAN OF CARE
Problem: Patient Care Overview  Goal: Plan of Care Review  Outcome: Outcome(s) achieved Date Met: 04/05/18 04/05/18 1201   Coping/Psychosocial   Plan of Care Reviewed With patient   Plan of Care Review   Progress improving   OTHER   Outcome Summary Patient d/c home to the care of spouse. Denies nausea. Tolerating diet. Reports pain however, well controlled w/ po meds. Patient provided appts w/ all provider for post d/c. Home o2 delivered to room, set up, and d/c w/ patient. Verbalizes feeling ready for d/c.      Goal: Individualization and Mutuality  Outcome: Outcome(s) achieved Date Met: 04/05/18    Goal: Discharge Needs Assessment  Outcome: Outcome(s) achieved Date Met: 04/05/18    Goal: Interprofessional Rounds/Family Conf  Outcome: Outcome(s) achieved Date Met: 04/05/18      Problem: Pneumonia (Adult)  Goal: Signs and Symptoms of Listed Potential Problems Will be Absent, Minimized or Managed (Pneumonia)  Outcome: Outcome(s) achieved Date Met: 04/05/18 04/05/18 1201   Goal/Outcome Evaluation   Problems Assessed (Pneumonia) all   Problems Present (Pneumonia) none       Problem: Fall Risk (Adult)  Goal: Identify Related Risk Factors and Signs and Symptoms  Outcome: Outcome(s) achieved Date Met: 04/05/18    Goal: Absence of Fall  Outcome: Outcome(s) achieved Date Met: 04/05/18

## 2018-04-05 NOTE — PLAN OF CARE
Problem: Patient Care Overview  Goal: Plan of Care Review  Outcome: Ongoing (interventions implemented as appropriate)   04/04/18 1408 04/04/18 1525 04/05/18 0149   Coping/Psychosocial   Plan of Care Reviewed With --  patient --    Plan of Care Review   Progress improving --  --    OTHER   Outcome Summary --  --  O2 on at 3L, O2 sats remain stable. Patient up in chair most of night, ambulated in room. Pain somewhat controlled with PO pain medication.     Goal: Individualization and Mutuality  Outcome: Ongoing (interventions implemented as appropriate)   03/31/18 0223 04/02/18 1458 04/05/18 0149   Individualization   Patient Specific Preferences none --  --    Patient Specific Goals (Include Timeframe) --  Stable O2 sats on room air if possible, possibly home on O2 if necessary. --    Patient Specific Interventions --  --  PO pain meds, IV steroids, PO abx     Goal: Discharge Needs Assessment  Outcome: Ongoing (interventions implemented as appropriate)   03/28/18 1008 04/03/18 0133 04/04/18 0154   Discharge Needs Assessment   Readmission Within the Last 30 Days --  no previous admission in last 30 days --    Concerns to be Addressed --  --  denies needs/concerns at this time   Patient/Family Anticipates Transition to home with family --  --    Patient/Family Anticipated Services at Transition none --  --    Transportation Concerns car, none --  --    Transportation Anticipated family or friend will provide --  --    Anticipated Changes Related to Illness inability to care for self --  --    Outpatient/Agency/Support Group Needs homecare agency --  --    Discharge Facility/Level of Care Needs home with home health --  --    Disability   Equipment Currently Used at Home nebulizer --  --        Problem: Pneumonia (Adult)  Goal: Signs and Symptoms of Listed Potential Problems Will be Absent, Minimized or Managed (Pneumonia)  Outcome: Ongoing (interventions implemented as appropriate)   04/04/18 0154   Goal/Outcome  Evaluation   Problems Assessed (Pneumonia) all   Problems Present (Pneumonia) respiratory compromise       Problem: Fall Risk (Adult)  Goal: Identify Related Risk Factors and Signs and Symptoms  Outcome: Ongoing (interventions implemented as appropriate)   04/04/18 0154   Fall Risk (Adult)   Related Risk Factors (Fall Risk) environment unfamiliar   Signs and Symptoms (Fall Risk) presence of risk factors     Goal: Absence of Fall  Outcome: Ongoing (interventions implemented as appropriate)   04/04/18 0154   Fall Risk (Adult)   Absence of Fall making progress toward outcome

## 2018-04-05 NOTE — PROGRESS NOTES
Received request to speak with patient about a transition visit from [facility] and she declined at this time. Lisandra Groves RN, [facility]

## 2018-04-05 NOTE — PROGRESS NOTES
Walk oxExercise Oximetry    Patient Name:Carlee Vee   MRN: [id number]   Date: 04/05/18             ROOM AIR BASELINE   SpO2% 86   At rest HR  88   Blood Pressure      EXERCISE ON ROOM AIR SpO2% EXERCISE ON O2 @  LPM SpO2%   1 MINUTE 86 1 MINUTE    2 MINUTES 85 2 MINUTES    3 MINUTES 84 3 MINUTES    4 MINUTES 82 4 MINUTES    5 MINUTES 81 5 MINUTES    6 MINUTES  6 MINUTES               Distance Walked  100 Distance Walked   Dyspnea (Radha Scale)   Dyspnea (Radha Scale)   Fatigue (Radha Scale)   Fatigue (Radha Scale)   SpO2% Post Exercise  86 SpO2% Post Exercise   HR Post Exercise  115 HR Post Exercise   Time to Recovery  5 Time to Recovery     Comments: walked on room air.  o2 back on @ 3 lpm post walk, recovery to 97-98% o2sat on 3 lpm nc approx 3-5 minutes.

## 2018-04-05 NOTE — DISCHARGE SUMMARY
[facility]  DISCHARGE SUMMARY       Date of Admission: 3/23/2018  Date of Discharge:  4/5/2018  Primary Care Physician: CLEMENTE East    Presenting Problem/History of Present Illness:  Shortness of breath    Final Discharge Diagnoses:  1. Empyema- s/p left thoracotomy, evacuation of empyema and decortication 3/27/2018  2. Left upper lobe inflammatory opacities-Pneumonia-failed outpatient therapy.   3. Acute hypoxic respiratory failure secondary to above  4. Sepsis positive secondary to penumonia  5. Leukocytosis  6. Shortness of breath/dyspnea-secondary to #1  7. Uncontrolled diabetes mellitus-insulin dependent, A1C 13.8%  8. Hypertension  9. Hyperlipidemia  10. Chronic pain syndrome-no home pain medications for this  11. Anemia-normocytic, mild. Iron deficiency  12. Acute kidney injury  13. Elevated transaminases-elevated alkaline phosphatase.  14. Hypokalemia    Consults:   1. Dr. Dino Wright/Dr. Abel Stark-cardiothoracic Surgery  2. Dr. Helen Roldan-infectious disease  3. Dr. Hurd-nephrology    Procedures Performed:   1. Left thoracotomy with evacuation of empyema and decortication 3/27/2018    Pertinent Test Results:   Imaging Results (last 7 days)     Procedure Component Value Units Date/Time    XR Chest PA & Lateral [[id number]] Collected:  04/04/18 0708     Updated:  04/04/18 0713    Narrative:       EXAMINATION:   XR CHEST PA AND LATERAL-  4/4/2018 7:08 AM CDT     HISTORY: Left empyema     Prior exams April 3, 2018.     PA and lateral views the chest are obtained. Left chest tubes been  removed. Postsurgical change from left thoracotomy is noted.  Interstitial infiltrate persists in the right lung. This may be an  interstitial pneumonia or possibly mild pulmonary vascular congestion     Cardiac silhouettes normal.       Impression:       Removal of left chest tube is noted. Resection of the left  posterior sixth rib is noted.  2.  Persistent interstitial infiltrate right lung unchanged from April 3  This report was finalized on 04/04/2018 07:10 by Dr. Romaine Carias MD.    XR Chest 1 View [[id number]] Collected:  04/03/18 0712     Updated:  04/03/18 0716    Narrative:       EXAMINATION: XR CHEST 1 VW-     4/3/2018 3:45 AM CDT     HISTORY: Post Op Lung Surgery; J86.9-Pyothorax without fistula;  Z74.09-Other reduced mobility.     One view chest x-ray compared with yesterday.     Slightly improved lung expansion.     Partial clearing of right-sided infiltrate with persistent mild  infiltrate throughout the upper lobe.     Stable heart size.     The left chest tube remains in good position.  No pneumothorax.     There is persistent left basilar infiltrate or atelectasis with partial  clearing.     Summary:  1. Slightly improved expansion with partial clearing of the lungs.  2. No new abnormality.  This report was finalized on 04/03/2018 07:12 by Dr. Jimmy Hernandez MD.    XR Chest 1 View [[id number]] Collected:  04/02/18 0710     Updated:  04/02/18 0715    Narrative:       EXAMINATION: XR CHEST 1 VW- 4/2/2018 7:10 AM CDT     HISTORY: Postop lung surgery     COMPARISON: 4/1/2018     FINDINGS:  The heart appears normal in size. Aorta is mildly tortuous. Diffuse  groundglass opacities are seen throughout the right lung. Left-sided  chest tubes remain in place. There is consolidation at the left lung  base with some pleural fluid noted. There is no appreciable  pneumothorax.       Impression:       Groundglass opacities on the right may reflect pulmonary  edema or an infectious/inflammatory process. Correlate clinically.  Stable appearance of consolidation and pleural fluid on the left.  This report was finalized on 04/02/2018 07:11 by Dr. Slade Ocampo MD.    XR Chest 1 View [[id number]] Collected:  04/01/18 0921     Updated:  04/01/18 0926    Narrative:       EXAMINATION: XR CHEST 1 VW-     4/1/2018 3:50 AM CDT     HISTORY: Post Op Lung Surgery;  J86.9-Pyothorax without fistula;  Z74.09-Other reduced mobility.     One view chest x-ray compared with 03/31/2018.     2 left chest tubes remain in good position.     Heart size is normal and unchanged.     There is no significant pneumothorax.     There is persistent left basilar atelectasis though there has been  partial clearing of left lower lobe.     There is developing right upper lobe infiltrate or atelectasis.     Summary:   1. Resolving left lower lobe and increasing right lower lobe  infiltrate/atelectasis.  This report was finalized on 04/01/2018 09:23 by Dr. Jimmy Hernandez MD.    XR Chest 1 View [[id number]] Collected:  03/31/18 0902     Updated:  03/31/18 0907    Narrative:       EXAMINATION: XR CHEST 1 VW-     3/31/2018 3:50 AM CDT     HISTORY: Post Op Lung Surgery; J86.9-Pyothorax without fistula;  Z74.09-Other reduced mobility.     One view chest x-ray compared with yesterday.        2. Left chest tubes remain in good position.     There is persistent consolidation of the left lung base.  No visible pneumothorax.     The right lung is adequately expanded.     There is a new finding of focal infiltrate or atelectasis within the  midportion of the lung adjacent to the hilum.     Summary:  1. Stable left lung with chest tubes and basilar infiltrate/atelectasis.  2. Developing focal infiltrate or atelectasis within the midportion of  the right lung.  This report was finalized on 03/31/2018 09:03 by Dr. Jimmy Hernandez MD.    XR Chest 1 View [[id number]] Collected:  03/30/18 0721     Updated:  03/30/18 0727    Narrative:       HISTORY: Chest tubes     CXR: Frontal view the chest obtained.     COMPARISON: 3/29/2018     FINDINGS: The 2 left-sided chest tubes are similar in their position.  There is stable left lung opacities with a left pleural fluid  collection. The right lung appears clear. The mediastinal contours are  similar.     The left internal jugular central line is been removed.       Impression:        1. Similar positioning of the 2 left-sided chest tubes with a stable  left pleural fluid collection but no appreciable pneumothorax. Left mid  and lower lung consolidation.  This report was finalized on 03/30/2018 07:24 by Dr. Vanessa Jones MD.        Lab Results (last 7 days)     Procedure Component Value Units Date/Time    POC Glucose Once [[id number]]  (Abnormal) Collected:  04/05/18 0757    Specimen:  Blood Updated:  04/05/18 0809     Glucose 300 (H) mg/dL      Comment: : [id number] Brijesh RosarioferMeter ID: [id number]       Basic Metabolic Panel [[id number]]  (Abnormal) Collected:  04/05/18 0223    Specimen:  Blood Updated:  04/05/18 0330     Glucose 183 (H) mg/dL      BUN 23 (H) mg/dL      Creatinine 1.85 (H) mg/dL      Sodium 142 mmol/L      Potassium 3.7 mmol/L      Chloride 99 mmol/L      CO2 30.0 mmol/L      Calcium 9.1 mg/dL      eGFR Non African Amer 28 (L) mL/min/1.73      BUN/Creatinine Ratio 12.4     Anion Gap 13.0 mmol/L     Narrative:       GFR Normal >60  Chronic Kidney Disease <60  Kidney Failure <15    POC Glucose Once [[id number]]  (Abnormal) Collected:  04/04/18 2137    Specimen:  Blood Updated:  04/04/18 2148     Glucose 394 (H) mg/dL      Comment: : [id number] Stuart Conwayeter ID: [id number]       POC Glucose Once [[id number]]  (Abnormal) Collected:  04/04/18 1635    Specimen:  Blood Updated:  04/04/18 1657     Glucose 394 (H) mg/dL      Comment: : [id number] Brijesh RosarioferMeter ID: [id number]       POC Glucose Once [[id number]]  (Abnormal) Collected:  04/04/18 1231    Specimen:  Blood Updated:  04/04/18 1242     Glucose 293 (H) mg/dL      Comment: : [id number] Brijesh MendezMeter ID: [id number]       POC Glucose Once [[id number]]  (Abnormal) Collected:  04/04/18 0739    Specimen:  Blood Updated:  04/04/18 0750     Glucose 154 (H) mg/dL      Comment: : [id number] Brijesh MendezMeter ID: [id number]       Basic Metabolic Panel [[id number]]  (Abnormal) Collected:  04/04/18 031     Specimen:  Blood Updated:  04/04/18 0346     Glucose 106 (H) mg/dL      BUN 17 mg/dL      Creatinine 2.02 (H) mg/dL      Sodium 142 mmol/L      Potassium 3.3 (L) mmol/L      Chloride 101 mmol/L      CO2 28.0 mmol/L      Calcium 9.0 mg/dL      eGFR Non African Amer 25 (L) mL/min/1.73      BUN/Creatinine Ratio 8.4     Anion Gap 13.0 mmol/L     Narrative:       GFR Normal >60  Chronic Kidney Disease <60  Kidney Failure <15    CBC (No Diff) [[id number]]  (Abnormal) Collected:  04/04/18 0318    Specimen:  Blood Updated:  04/04/18 0335     WBC 11.28 (H) 10*3/mm3      RBC 3.55 (L) 10*6/mm3      Hemoglobin 10.1 (L) g/dL      Hematocrit 32.0 (L) %      MCV 90.1 fL      MCH 28.5 pg      MCHC 31.6 (L) g/dL      RDW 14.5 %      RDW-SD 47.7 fl      MPV 10.3 fL      Platelets 441 (H) 10*3/mm3     POC Glucose Once [[id number]]  (Abnormal) Collected:  04/03/18 2121    Specimen:  Blood Updated:  04/03/18 2142     Glucose 253 (H) mg/dL      Comment: : [id number] Juan CalderónthiaMeter ID: [id number]       POC Glucose Once [[id number]]  (Abnormal) Collected:  04/03/18 1205    Specimen:  Blood Updated:  04/03/18 1219     Glucose 163 (H) mg/dL      Comment: : [id number] Reji CoecyMeter ID: [id number]       Fungus Culture - Body Fluid, Lung, L [[id number]] Collected:  03/27/18 0829    Specimen:  Body Fluid from Lung, L Updated:  04/03/18 0916     Fungus Culture No fungus isolated at 1 week    Basic Metabolic Panel [[id number]]  (Abnormal) Collected:  04/03/18 0320    Specimen:  Blood Updated:  04/03/18 0418     Glucose 105 (H) mg/dL      BUN 15 mg/dL      Creatinine 2.01 (H) mg/dL      Sodium 142 mmol/L      Potassium 3.7 mmol/L      Chloride 104 mmol/L      CO2 27.0 mmol/L      Calcium 9.0 mg/dL      eGFR Non African Amer 25 (L) mL/min/1.73      BUN/Creatinine Ratio 7.5     Anion Gap 11.0 mmol/L     Narrative:       GFR Normal >60  Chronic Kidney Disease <60  Kidney Failure <15    CBC & Differential [[id number]] Collected:  04/03/18  0320    Specimen:  Blood Updated:  04/03/18 0408    Narrative:       The following orders were created for panel order CBC & Differential.  Procedure                               Abnormality         Status                     ---------                               -----------         ------                     CBC Auto Differential[[id number]]        Abnormal            Final result                 Please view results for these tests on the individual orders.    CBC Auto Differential [[id number]]  (Abnormal) Collected:  04/03/18 0320    Specimen:  Blood Updated:  04/03/18 0408     WBC 11.13 (H) 10*3/mm3      RBC 3.37 (L) 10*6/mm3      Hemoglobin 9.7 (L) g/dL      Hematocrit 30.6 (L) %      MCV 90.8 fL      MCH 28.8 pg      MCHC 31.7 (L) g/dL      RDW 14.6 %      RDW-SD 48.0 fl      MPV 10.6 fL      Platelets 405 (H) 10*3/mm3      Neutrophil % 69.8 %      Lymphocyte % 17.2 %      Monocyte % 8.8 %      Eosinophil % 2.7 %      Basophil % 0.5 %      Immature Grans % 1.0 %      Neutrophils, Absolute 7.77 10*3/mm3      Lymphocytes, Absolute 1.91 10*3/mm3      Monocytes, Absolute 0.98 10*3/mm3      Eosinophils, Absolute 0.30 10*3/mm3      Basophils, Absolute 0.06 10*3/mm3      Immature Grans, Absolute 0.11 (H) 10*3/mm3      nRBC 0.0 /100 WBC     POC Glucose Once [[id number]]  (Abnormal) Collected:  04/02/18 2109    Specimen:  Blood Updated:  04/02/18 2127     Glucose 186 (H) mg/dL      Comment: : [id number] Juan DuganaMebetsy ID: [id number]       POC Glucose Once [[id number]]  (Abnormal) Collected:  04/02/18 1646    Specimen:  Blood Updated:  04/02/18 1658     Glucose 168 (H) mg/dL      Comment: : [id number] Jerod Limon  LMeter ID: [id number]       POC Glucose Once [[id number]]  (Normal) Collected:  04/02/18 1138    Specimen:  Blood Updated:  04/02/18 1149     Glucose 128 mg/dL      Comment: : [id number] Jerod Limon  LMeter ID: [id number]       POC Glucose Once [[id number]]  (Normal) Collected:  04/02/18 0744     Specimen:  Blood Updated:  04/02/18 0756     Glucose 130 mg/dL      Comment: : [id number] Jerod Limon  LMeter ID: [id number]       Comprehensive Metabolic Panel [[id number]]  (Abnormal) Collected:  04/02/18 0437    Specimen:  Blood Updated:  04/02/18 0530     Glucose 164 (H) mg/dL      BUN 13 mg/dL      Creatinine 2.02 (H) mg/dL      Sodium 141 mmol/L      Potassium 3.8 mmol/L      Chloride 105 mmol/L      CO2 24.0 mmol/L      Calcium 8.9 mg/dL      Total Protein 6.6 g/dL      Albumin 3.10 (L) g/dL      ALT (SGPT) 56 (H) U/L      AST (SGOT) 39 U/L      Alkaline Phosphatase 254 (H) U/L      Total Bilirubin 0.2 mg/dL      eGFR Non African Amer 25 (L) mL/min/1.73      Globulin 3.5 gm/dL      A/G Ratio 0.9 (L) g/dL      BUN/Creatinine Ratio 6.4 (L)     Anion Gap 12.0 mmol/L     CBC & Differential [[id number]] Collected:  04/02/18 0437    Specimen:  Blood Updated:  04/02/18 0514    Narrative:       The following orders were created for panel order CBC & Differential.  Procedure                               Abnormality         Status                     ---------                               -----------         ------                     CBC Auto Differential[[id number]]        Abnormal            Final result                 Please view results for these tests on the individual orders.    CBC Auto Differential [[id number]]  (Abnormal) Collected:  04/02/18 0437    Specimen:  Blood Updated:  04/02/18 0514     WBC 10.93 (H) 10*3/mm3      RBC 3.38 (L) 10*6/mm3      Hemoglobin 9.8 (L) g/dL      Hematocrit 30.4 (L) %      MCV 89.9 fL      MCH 29.0 pg      MCHC 32.2 (L) g/dL      RDW 14.6 %      RDW-SD 47.6 fl      MPV 10.7 fL      Platelets 391 10*3/mm3      Neutrophil % 71.8 %      Lymphocyte % 15.3 %      Monocyte % 9.0 %      Eosinophil % 2.2 %      Basophil % 0.5 %      Immature Grans % 1.2 %      Neutrophils, Absolute 7.85 10*3/mm3      Lymphocytes, Absolute 1.67 10*3/mm3      Monocytes, Absolute 0.98 10*3/mm3       Eosinophils, Absolute 0.24 10*3/mm3      Basophils, Absolute 0.06 10*3/mm3      Immature Grans, Absolute 0.13 (H) 10*3/mm3      nRBC 0.0 /100 WBC     POC Glucose Once [[id number]]  (Abnormal) Collected:  04/01/18 2038    Specimen:  Blood Updated:  04/01/18 2053     Glucose 192 (H) mg/dL      Comment: : [id number] Aaron SingletarylouannnMeter ID: [id number]       POC Glucose Once [[id number]]  (Abnormal) Collected:  04/01/18 1657    Specimen:  Blood Updated:  04/01/18 1709     Glucose 141 (H) mg/dL      Comment: : [id number] [place] KristieMeter ID: [id number]       POC Glucose Once [[id number]]  (Abnormal) Collected:  04/01/18 1130    Specimen:  Blood Updated:  04/01/18 1141     Glucose 149 (H) mg/dL      Comment: : [id number] Jacquelyn KristieMeter ID: [id number]       Anaerobic Culture - Body Fluid, Lung, L [[id number]]  (Normal) Collected:  03/27/18 0829    Specimen:  Body Fluid from Lung, L Updated:  04/01/18 1053     Culture No anaerobes isolated    POC Glucose Once [[id number]]  (Normal) Collected:  04/01/18 0750    Specimen:  Blood Updated:  04/01/18 0802     Glucose 94 mg/dL      Comment: : [id number] Jacquelyn KristieMeter ID: [id number]       Wound Culture - Wound, Lung [[id number]]  (Normal) Collected:  03/27/18 0838    Specimen:  Wound from Lung Updated:  04/01/18 0801     Wound Culture No growth at 5 days     Gram Stain Result No WBCs or organisms seen    Comprehensive Metabolic Panel [[id number]]  (Abnormal) Collected:  04/01/18 0451    Specimen:  Blood Updated:  04/01/18 0636     Glucose 90 mg/dL      BUN 14 mg/dL      Creatinine 2.09 (H) mg/dL      Sodium 144 mmol/L      Potassium 3.7 mmol/L      Chloride 108 mmol/L      CO2 23.0 (L) mmol/L      Calcium 8.8 mg/dL      Total Protein 6.4 g/dL      Albumin 3.00 (L) g/dL      ALT (SGPT) 57 (H) U/L      AST (SGOT) 57 (H) U/L      Alkaline Phosphatase 256 (H) U/L      Total Bilirubin 0.2 mg/dL      eGFR Non African Amer 24 (L) mL/min/1.73      Globulin 3.4 gm/dL       A/G Ratio 0.9 (L) g/dL      BUN/Creatinine Ratio 6.7 (L)     Anion Gap 13.0 mmol/L     CBC & Differential [[id number]] Collected:  04/01/18 0451    Specimen:  Blood Updated:  04/01/18 0619    Narrative:       The following orders were created for panel order CBC & Differential.  Procedure                               Abnormality         Status                     ---------                               -----------         ------                     CBC Auto Differential[[id number]]        Abnormal            Final result                 Please view results for these tests on the individual orders.    CBC Auto Differential [[id number]]  (Abnormal) Collected:  04/01/18 0451    Specimen:  Blood Updated:  04/01/18 0619     WBC 11.66 (H) 10*3/mm3      RBC 3.15 (L) 10*6/mm3      Hemoglobin 9.3 (L) g/dL      Hematocrit 28.7 (L) %      MCV 91.1 fL      MCH 29.5 pg      MCHC 32.4 (L) g/dL      RDW 14.6 %      RDW-SD 48.7 fl      MPV 11.0 fL      Platelets 379 10*3/mm3      Neutrophil % 70.0 %      Lymphocyte % 17.9 %      Monocyte % 9.3 %      Eosinophil % 1.7 %      Basophil % 0.6 %      Immature Grans % 0.5 %      Neutrophils, Absolute 8.16 10*3/mm3      Lymphocytes, Absolute 2.09 10*3/mm3      Monocytes, Absolute 1.08 10*3/mm3      Eosinophils, Absolute 0.20 10*3/mm3      Basophils, Absolute 0.07 10*3/mm3      Immature Grans, Absolute 0.06 (H) 10*3/mm3      nRBC 0.0 /100 WBC     POC Glucose Once [[id number]]  (Abnormal) Collected:  03/31/18 2211    Specimen:  Blood Updated:  03/31/18 2231     Glucose 173 (H) mg/dL      Comment: : [id number] Aaron Friedman ID: [id number]       POC Glucose Once [[id number]]  (Abnormal) Collected:  03/31/18 1716    Specimen:  Blood Updated:  03/31/18 1727     Glucose 195 (H) mg/dL      Comment: : [id number] Jacquelyn AndersonieMeter ID: [id number]       POC Glucose Once [[id number]]  (Normal) Collected:  03/31/18 1134    Specimen:  Blood Updated:  03/31/18 1146     Glucose 123 mg/dL       Comment: : [id number] Jacquelyn CunninghamistieMeter ID: [id number]       Eosinophil Smear - Urine, Urine, Clean Catch [[id number]] Collected:  03/29/18 1907    Specimen:  Urine from Urine, Clean Catch Updated:  03/31/18 1117     Eosinophil, Urine No Eosinophils Seen %      Comment:                                   <5% few or none seen       Narrative:       Performed at:  [address] [place] [address]  [id number]  : Shawn Stone PhD, Phone:  [phone]    POC Glucose Once [[id number]]  (Normal) Collected:  03/31/18 0749    Specimen:  Blood Updated:  03/31/18 0800     Glucose 101 mg/dL      Comment: : [id number] Jacquelyn KristieMeter ID: [id number]       Comprehensive Metabolic Panel [[id number]]  (Abnormal) Collected:  03/31/18 0505    Specimen:  Blood Updated:  03/31/18 0546     Glucose 87 mg/dL      BUN 18 mg/dL      Creatinine 2.12 (H) mg/dL      Sodium 143 mmol/L      Potassium 4.0 mmol/L      Chloride 108 mmol/L      CO2 23.0 (L) mmol/L      Calcium 8.2 (L) mg/dL      Total Protein 6.4 g/dL      Albumin 2.90 (L) g/dL      ALT (SGPT) 55 (H) U/L      AST (SGOT) 65 (H) U/L      Alkaline Phosphatase 223 (H) U/L      Total Bilirubin 0.1 mg/dL      eGFR Non African Amer 24 (L) mL/min/1.73      Globulin 3.5 gm/dL      A/G Ratio 0.8 (L) g/dL      BUN/Creatinine Ratio 8.5     Anion Gap 12.0 mmol/L     CBC & Differential [[id number]] Collected:  03/31/18 0505    Specimen:  Blood Updated:  03/31/18 0536    Narrative:       The following orders were created for panel order CBC & Differential.  Procedure                               Abnormality         Status                     ---------                               -----------         ------                     CBC Auto Differential[[id number]]        Abnormal            Final result                 Please view results for these tests on the individual orders.    CBC Auto Differential [[id number]]  (Abnormal) Collected:  03/31/18 0505     Specimen:  Blood Updated:  03/31/18 0536     WBC 12.45 (H) 10*3/mm3      RBC 3.41 (L) 10*6/mm3      Hemoglobin 10.1 (L) g/dL      Hematocrit 31.3 (L) %      MCV 91.8 fL      MCH 29.6 pg      MCHC 32.3 (L) g/dL      RDW 14.6 %      RDW-SD 49.7 fl      MPV 11.0 fL      Platelets 412 (H) 10*3/mm3      Neutrophil % 76.1 %      Lymphocyte % 14.4 (L) %      Monocyte % 6.7 %      Eosinophil % 1.8 %      Basophil % 0.6 %      Immature Grans % 0.4 %      Neutrophils, Absolute 9.49 (H) 10*3/mm3      Lymphocytes, Absolute 1.79 10*3/mm3      Monocytes, Absolute 0.83 10*3/mm3      Eosinophils, Absolute 0.22 10*3/mm3      Basophils, Absolute 0.07 10*3/mm3      Immature Grans, Absolute 0.05 (H) 10*3/mm3      nRBC 0.0 /100 WBC     POC Glucose Once [[id number]]  (Abnormal) Collected:  03/30/18 2034    Specimen:  Blood Updated:  03/30/18 2104     Glucose 174 (H) mg/dL      Comment: : [id number] Advidson DawnMeter ID: [id number]       Iron Profile [[id number]]  (Abnormal) Collected:  03/30/18 1719    Specimen:  Blood Updated:  03/30/18 1911     Iron 25 (L) mcg/dL      TIBC 216 (L) mcg/dL      Iron Saturation 12 (L) %     Protein, Urine, Random - Urine, Clean Catch [[id number]]  (Abnormal) Collected:  03/30/18 1740    Specimen:  Urine from Urine, Clean Catch Updated:  03/30/18 1805     Total Protein, Urine 26.0 (H) mg/dL     Sodium, Urine, Random - Urine, Clean Catch [[id number]]  (Normal) Collected:  03/30/18 1740    Specimen:  Urine from Urine, Clean Catch Updated:  03/30/18 1805     Sodium, Urine 40 mmol/L     Urinalysis - Urine, Clean Catch [[id number]]  (Abnormal) Collected:  03/30/18 1740    Specimen:  Urine from Urine, Clean Catch Updated:  03/30/18 1756     Color, UA Yellow     Appearance, UA Cloudy (A)     pH, UA <=5.0     Specific Gravity, UA 1.014     Glucose,  mg/dL (1+) (A)     Ketones, UA Negative     Bilirubin, UA Negative     Blood, UA Negative     Protein, UA Negative     Leuk Esterase, UA Negative     Nitrite, UA  "Negative     Urobilinogen, UA 0.2 E.U./dL    POC Glucose Once [[id number]]  (Abnormal) Collected:  03/30/18 1648    Specimen:  Blood Updated:  03/30/18 1659     Glucose 283 (H) mg/dL      Comment: : [id number] EsquivelMeter ID: [id number]       Tissue Pathology Exam [[id number]] Collected:  03/27/18 1028    Specimen:  Tissue from Pleura Updated:  03/30/18 1638     Case Report --     Surgical Pathology Report                         Case: [id number]                                  Authorizing Provider:  Abel Stark MD         Collected:           03/27/2018 10:28 AM          Ordering Location:     [facility] OR  Received:            03/27/2018 01:24 PM          Pathologist:           Irene Rizzo MD                                                          Specimen:    Pleura, Pleural peel for permanent                                                          Final Diagnosis --     Pleural peel, decortication:  Fibrosis, granulation, and mixed inflammatory infiltrate consistent with pleural peel.  Negative for malignancy.       Gross Description --     Specimen #1 is received in a formalin filled container, labeled with the patient's name, date of birth, and \"pleural peel\".  The specimen consists of multiple platelike fragments of red-tan soft tissue admixed with platelike fragments of yellow-pink purulent material.  The fragments aggregate to 9.3 x 6.8 x 1.5 cm.  The external surfaces of the soft tissue fragments show pink-blue smooth surfaces with disruption and a small amount of ragged adhesions.  The external surface of one fragment appears thickened and wrinkled.  The cut surface of the soft tissue fragments are red-gray and spongy.  Fragments of the remaining material is yellow-tan soft and necrotic.  Representative sections are submitted in block 1A.       Microscopic Description --     Histologic sections demonstrate benign pleura with fibrosis and granulation tissue.  A small " fragment of benign skeletal muscle is present.  Macrophages are identified within the alveolar spaces adjacent to the pleura.  There is no evidence of malignancy.       Embedded Images --    POC Glucose Once [[id number]]  (Abnormal) Collected:  03/30/18 1127    Specimen:  Blood Updated:  03/30/18 1138     Glucose 213 (H) mg/dL      Comment: : [id number] Jacquelyn KristieMeter ID: [id number]       POC Glucose Once [[id number]]  (Normal) Collected:  03/30/18 0756    Specimen:  Blood Updated:  03/30/18 0807     Glucose 124 mg/dL      Comment: : [id number] Jacquelyn KristieMeter ID: [id number]       Body Fluid Culture - Body Fluid, Pleural Cavity [[id number]]  (Normal) Collected:  03/24/18 1438    Specimen:  Body Fluid from Pleural Cavity Updated:  03/30/18 0726     BF Culture No growth at 5 days     Gram Stain Result Few (2+) WBCs seen      No organisms seen    Narrative:       There was approximate 18 hour delay in transporting this specimen to the laboratory. Culture quality may suffer. Specimen was sent to lab unlabeled. Interpret results with caution. Safe report has been filed.     CBC & Differential [[id number]] Collected:  03/30/18 0305    Specimen:  Blood Updated:  03/30/18 0408    Narrative:       The following orders were created for panel order CBC & Differential.  Procedure                               Abnormality         Status                     ---------                               -----------         ------                     Manual Differential[[id number]]                                                         CBC Auto Differential[[id number]]        Abnormal            Final result                 Please view results for these tests on the individual orders.    CBC Auto Differential [[id number]]  (Abnormal) Collected:  03/30/18 0305    Specimen:  Blood Updated:  03/30/18 0408     WBC 11.01 (H) 10*3/mm3      RBC 3.13 (L) 10*6/mm3      Hemoglobin 9.3 (L) g/dL      Hematocrit 28.5 (L) %      MCV 91.1 fL       MCH 29.7 pg      MCHC 32.6 (L) g/dL      RDW 14.6 %      RDW-SD 48.6 fl      MPV 11.4 fL      Platelets 312 10*3/mm3     Manual Differential [[id number]]  (Abnormal) Collected:  03/30/18 0305    Specimen:  Blood Updated:  03/30/18 0408     Neutrophil % 80.0 (H) %      Lymphocyte % 15.0 %      Monocyte % 5.0 %      Neutrophils Absolute 8.81 (H) 10*3/mm3      Lymphocytes Absolute 1.65 10*3/mm3      Monocytes Absolute 0.55 10*3/mm3      RBC Morphology Normal     WBC Morphology Normal     Clumped Platelets Present     Large Platelets Slight/1+    Basic Metabolic Panel [[id number]]  (Abnormal) Collected:  03/30/18 0305    Specimen:  Blood Updated:  03/30/18 0352     Glucose 97 mg/dL      BUN 20 mg/dL      Creatinine 2.12 (H) mg/dL      Sodium 140 mmol/L      Potassium 3.9 mmol/L      Chloride 105 mmol/L      CO2 25.0 mmol/L      Calcium 8.9 mg/dL      eGFR Non African Amer 24 (L) mL/min/1.73      BUN/Creatinine Ratio 9.4     Anion Gap 10.0 mmol/L     Narrative:       GFR Normal >60  Chronic Kidney Disease <60  Kidney Failure <15    POC Glucose Once [[id number]]  (Abnormal) Collected:  03/29/18 2148    Specimen:  Blood Updated:  03/29/18 2159     Glucose 174 (H) mg/dL      Comment: : [id number] Nilesh TeresaMeter ID: [id number]       POC Glucose Once [[id number]]  (Abnormal) Collected:  03/29/18 1709    Specimen:  Blood Updated:  03/29/18 1732     Glucose 282 (H) mg/dL      Comment: : [id number] Rex PamelaMeter ID: [id number]       POC Glucose Once [[id number]]  (Abnormal) Collected:  03/29/18 1220    Specimen:  Blood Updated:  03/29/18 1238     Glucose 170 (H) mg/dL      Comment: : [id number] Jeffry KimMeter ID: [id number]           Hospital Course:  The patient is a 57 y.o. female who follows with Elinor Aragon for her primary care. She has a past medical history significant for poorly controlled diabetes, GERD, hypertension, and remote tobacco abuse. She presents as a direct admit from Dr. Landrum  Tony's office on 3/23 for pneumonia that has failed outpatient therapy. Patient was noted to have a left lower lobe pneumonia and had been receiving intramuscular Rocephin and doxycycline orally.  Repeat chest x-ray did not reveal any improvement as well as a loculated pleural effusion with possible mass.  Patient was admitted to the hospitalist service was given Zosyn and vancomycin given the fact that she was appropriately treated outpatient for community acquired pneumonia.  She was given some IV fluids initially we did plan for thoracentesis.  She underwent CT-guided thoracentesis on 3/24/18 with only 75 mL of fluid returned.  This was sent for culture not reveal any growth.  Being as they were not able to drain all the loculated pleural effusion we did consult CT surgery.  She also only went for a thoracotomy with evacuation of empyema and decortication on 3/26/18.  Postoperatively, she had very poor pulmonary toilet and was not ambulating appropriately.  This did improve however.  She did develop some acute kidney injury secondary to either acute tubular necrosis or acute interstitial nephritis.  This triggered a consultation to Dr. Hurd with the nephrology service.  He received significant IV fluids.  Dr. Helen infante was consult as well.  Patient continued to show no growth and her pathology did not reveal any evidence of malignancy.  In [place] slowly postoperatively to improve.  Ultimately she work ostomy tubes discontinued she did have a persistent infiltrate on the right.  She was continued on Zosyn and also transitioned over to Augmentin per infectious disease.  They did recommend an additional 3 weeks of therapy with a follow-up in 2 weeks.    She was started on Solu-Medrol for possible acute interstitial nephritis and her creatinine did decrease from 2-1.85 today.  She is concerned continue on steroids for the next 7-10 days with follow-up in Dr. Hurd's office.  Prompted worsening  "hyperglycemia.  She is going to take 17 units of Levemir twice daily and if she continues to be hyperglycemic to increase to 20 twice daily.  This with her as well as her  in detail.    Patient continued to have significant hypoxia but not really significant shortness of breath.  She was assessed for home oxygen yesterday and required 4 L at rest and 5 L with activity.  She received additional Lasix for diuresis and today she is able to tolerate activity on 3 L.  She will require 3 L of nasal cannula oxygen at this time.  He is aware that she needs to continue her pulmonary toilet and be active at home.  She does decline any home health needs at this time.  We will continue to hold all other anti-diabetic medications secondary to her renal function.  The goal will be to restart her metformin as well as her Januvia and decrease her Levemir ingestion.  Ideally as she continues to improve postoperatively, she will be able to get her diabetes back under control.  Please note that her angiotensin blocker is on hold as well.    Physical Exam on Discharge:  /74 (BP Location: Right arm, Patient Position: Lying) Comment: nurse notified  Pulse 92   Temp 97.8 °F (36.6 °C) (Oral)   Resp 16   Ht 160 cm (63\")   Wt 85.8 kg (189 lb 3.2 oz)   SpO2 91%   Breastfeeding? No   BMI 33.52 kg/m²   Physical Exam   Constitutional: She is oriented to person, place, and time. She appears well-developed and well-nourished.   HENT:   Head: Normocephalic and atraumatic.   Eyes: Conjunctivae and EOM are normal. Pupils are equal, round, and reactive to light.   Neck: Neck supple. No JVD present. No thyromegaly present.   Cardiovascular: Normal rate, regular rhythm, normal heart sounds and intact distal pulses.  Exam reveals no gallop and no friction rub.    No murmur heard.  Pulmonary/Chest: Effort normal and breath sounds normal. No respiratory distress. She has no wheezes. She has no rales. She exhibits no tenderness. "   Coarse on the right. Diminished on the left base.    Abdominal: Soft. Bowel sounds are normal. She exhibits no distension. There is no tenderness. There is no rebound and no guarding.   Musculoskeletal: Normal range of motion. She exhibits no edema, tenderness or deformity.   Lymphadenopathy:     She has no cervical adenopathy.   Neurological: She is alert and oriented to person, place, and time. She displays normal reflexes. No cranial nerve deficit. She exhibits normal muscle tone.   Skin: Skin is warm and dry. No rash noted.   Psychiatric: She has a normal mood and affect. Her behavior is normal. Judgment and thought content normal.     Condition on Discharge: stable    Discharge Disposition:  Home or Self Care    Discharge Medications:   Carlee Vee   Home Medication Instructions LALO:[id number]    Printed on:04/05/18 1000   Medication Information                      amLODIPine (NORVASC) 10 MG tablet  Take 10 mg by mouth Daily.             amoxicillin-clavulanate (AUGMENTIN) 600-42.9 MG/5ML suspension  Take 10 mL by mouth Every 12 (Twelve) Hours for 21 days.             aspirin 81 MG EC tablet  Chew 81 mg Daily.             FLUoxetine (PROzac) 20 MG capsule  Take 20 mg by mouth Daily.             gabapentin (NEURONTIN) 100 MG capsule  Take 400 mg by mouth Every Night.             HYDROcodone-acetaminophen (NORCO) 5-325 MG per tablet  Take 1 tablet by mouth Every 6 (Six) Hours As Needed for Moderate Pain .             insulin aspart (novoLOG) 100 UNIT/ML injection  Inject  under the skin 3 (Three) Times a Day Before Meals. 150 - 199 = 4 units  200 - 249 = 8 units  250 - 299 = 12 units  300 - 349 = 16 units  349 - 400 = 20 units  > 400 = 24 units             insulin detemir (LEVEMIR) 100 UNIT/ML injection  Inject 17 Units under the skin Every 12 (Twelve) Hours.             pantoprazole (PROTONIX) 40 MG EC tablet  Take 40 mg by mouth Daily.             polyethylene glycol (MIRALAX) packet  Take 17 g by mouth  Daily.             predniSONE (DELTASONE) 20 MG tablet  Take 2 tablets by mouth Daily.             raNITIdine (ZANTAC) 150 MG tablet  Take 150 mg by mouth Daily As Needed (Breakthrough acid reflux).             rosuvastatin (CRESTOR) 10 MG tablet  Take 10 mg by mouth Daily.             traZODone (DESYREL) 50 MG tablet  Take 100 mg by mouth At Night As Needed for Sleep.             vitamin D (ERGOCALCIFEROL) 61692 units capsule capsule  Take 50,000 Units by mouth 1 (One) Time Per Week. Fridays               Discharge Diet:   Diet Instructions     Diet: Consistent Carbohydrate, Cardiac; Thin       Discharge Diet:   Consistent Carbohydrate  Cardiac       Fluid Consistency:  Thin        Activity at Discharge:   Activity Instructions     Activity as Tolerated             Follow-up Appointments:   Future Appointments  Date Time Provider Department Center   5/7/2018 1:00 PM Abel Stark MD [facility]  [facility] None   1. [facility]in[facility] Alfaro-1 week  2. Dr. Chang in 2 weeks  3. Dr. Hurd in 1 week    Test Results Pending at Discharge: none    CLEMENTE Franco  04/05/18  10:06 AM    Time: 50 minutes.     Agree.  Discussed with Carolann. I saw and examined the patient at the time of discharge.  I spoke to Dr. Brar at the time of discharge  who had said to continue Prednisone 40 mg daily until seen in their clinic in 1 wk to follow up her kidney function.  She will be continued on Augmentin.  She is hemodynamically stable and appropriate for discharge. She needs home oxygen.    Other plans as outlined above.     This is a late entry from encounter at the time of d/c  Osiel Thompson MD  04/07/18  7:30 AM

## 2018-04-05 NOTE — PROGRESS NOTES
Continued Stay Note   [place]     Patient Name: Carlee Vee  MRN: [id number]  Today's Date: 4/5/2018    Admit Date: 3/23/2018          Discharge Plan     Row Name 04/05/18 1024       Plan    Plan PT IS BEING DCD HOME TODAY. PT QUALIFIES FOR HOME OXYGEN. PT CHOSE [facility]. FAXED REFERRAL FOR HOME 02 -5125. PORTABLE TANK WILL BE DELIVERED TO PT ROOM. [facility] AND SPOKE TO PHARMACIST. LIQUID AUGMENTIN IS COVERED AND THEY DO HAVE IT IN STOCK. NO OTHER DC NEEDS PER PT.     Patient/Family in Agreement with Plan yes    Final Note DC HOME TODAY WITH HOME 02 [facility]RU [facility]              Discharge Codes    No documentation.       Expected Discharge Date and Time     Expected Discharge Date Expected Discharge Time    Apr 5, 2018             KHAI Pearson

## 2018-04-05 NOTE — PROGRESS NOTES
"Patient: Carlee Vee  : 1960     Procedure: Procedure(s):  THORACOTOMY, drainage of empyema and decortication    Procedure Date: 3/23/2018 - 3/27/2018    POD: 9 Days Post-Op      Subjective   She is alert and oriented this morning.  He feels much better with thoracostomy tube out.  No respiratory distress but still on nasal oxygen.     Objective   Visit Vitals  /72 (BP Location: Right arm, Patient Position: Lying)   Pulse 95   Temp 98.2 °F (36.8 °C) (Oral)   Resp 16   Ht 160 cm (63\")   Wt 85.8 kg (189 lb 3.2 oz)   SpO2 92%   Breastfeeding? No   BMI 33.52 kg/m²       Intake/Output Summary (Last 24 hours) at 18  Last data filed at 18 1300   Gross per 24 hour   Intake              120 ml   Output                0 ml   Net              120 ml                                        Lab Results:    CBC:   Results from last 7 days  Lab Units 18  0318 18  0320 18  0437   WBC 10*3/mm3 11.28* 11.13* 10.93*   HEMATOCRIT % 32.0* 30.6* 30.4*   PLATELETS 10*3/mm3 441* 405* 391     BMP:   Results from last 7 days  Lab Units 18  0223 18  0318 18  0320   SODIUM mmol/L 142 142 142   POTASSIUM mmol/L 3.7 3.3* 3.7   CHLORIDE mmol/L 99 101 104   CO2 mmol/L 30.0 28.0 27.0   GLUCOSE mg/dL 183* 106* 105*   BUN mg/dL 23* 17 15   CREATININE mg/dL 1.85* 2.02* 2.01*     Coag:       Invalid input(s): PT    Images:  Imaging Results (last 24 hours)     Procedure Component Value Units Date/Time    XR Chest PA & Lateral [[id number]] Collected:  18     Updated:  18    Narrative:       EXAMINATION:   XR CHEST PA AND LATERAL-  2018 7:08 AM CDT     HISTORY: Left empyema     Prior exams April 3, 2018.     PA and lateral views the chest are obtained. Left chest tubes been  removed. Postsurgical change from left thoracotomy is noted.  Interstitial infiltrate persists in the right lung. This may be an  interstitial pneumonia or possibly mild pulmonary vascular " congestion     Cardiac silhouettes normal.       Impression:       Removal of left chest tube is noted. Resection of the left  posterior sixth rib is noted.  2. Persistent interstitial infiltrate right lung unchanged from April 3  This report was finalized on 04/04/2018 07:10 by Dr. Romaine Carias MD.        Images Today: None     Physical Exam:   Physical Exam:  General: Alert and oriented.  No respiratory distress.  Chest: Slightly decreased breath sounds on the operative side.  Chest tubes removed yesterday.  Dressing in place.  Wound looks good   Heart: Regular rate and rhythm without murmur gallop or rub.      Impression: Doing well status post left thoracotomy for empyema.  Okay for discharge with me when okay with other physicians.  Antibiotics per infectious disease.  I would like to see her back in 1 month with a PA and lateral chest x-ray.    Plan: Per attending.    Abel Stark MD  04/05/18  5:18 AM

## 2018-04-05 NOTE — PROGRESS NOTES
Nephrology ([facility]) progress Note      Patient:  Carlee Vee  YOB: 1960  Date of Service: 4/5/2018  MRN: [id number]   Acct: [id number]   Primary Care Physician: CLEMENTE East  Advance Directive: Full Code  Admit Date: 3/23/2018       Hospital Day: 13  Referring Provider: Kyaw Roldan MD      Patient Seen, Chart, Consults, Notes, Labs, Radiology studies reviewed.        Subjective:  Carlee Vee is a 57 y.o. female  whom we were consulted for acute kidney injury.  Patient has no previous history of chronic kidney disease.  Patient was recently diagnosed with left lower lobe pneumonia which was treated initially as an outpatient.  She was admitted as her condition has deteriorated and has developed empyema.  She underwent chest tube placement and drainage of her empyema.  Hospital course remarkable for treatment with intravenous vancomycin and has worsening of renal function.  Vanco has been discontinued.     She was started on Solumedrol X 2 days ago. Creatinine down to 1.85. Patient is actually being discharged home today. States feeling better overall.   Allergies:  Sulfa antibiotics    Home Meds:  Prescriptions Prior to Admission   Medication Sig Dispense Refill Last Dose   • amLODIPine (NORVASC) 10 MG tablet Take 10 mg by mouth Daily.   Past Week at Unknown time   • aspirin 81 MG EC tablet Chew 81 mg Daily.   Past Week at Unknown time   • FLUoxetine (PROzac) 20 MG capsule Take 20 mg by mouth Daily.   Past Week at Unknown time   • gabapentin (NEURONTIN) 100 MG capsule Take 400 mg by mouth Every Night.  3 Past Week at Unknown time   • HYDROcodone-acetaminophen (NORCO) 5-325 MG per tablet Take 1 tablet by mouth Every 6 (Six) Hours As Needed for Moderate Pain .   Past Week at Unknown time   • insulin aspart (novoLOG) 100 UNIT/ML injection Inject  under the skin 3 (Three) Times a Day Before Meals. 150 - 199 = 4 units  200 - 249 = 8 units  250 - 299 = 12 units  300  - 349 = 16 units  349 - 400 = 20 units  > 400 = 24 units   Past Week at Unknown time   • insulin glargine (LANTUS) 100 UNIT/ML injection Inject 16 Units under the skin Every Night.   Past Week at Unknown time   • losartan (COZAAR) 50 MG tablet Take 50 mg by mouth Daily.   Past Week at Unknown time   • metFORMIN (GLUCOPHAGE) 500 MG tablet Take 500 mg by mouth 2 (Two) Times a Day With Meals.   Past Week at Unknown time   • pantoprazole (PROTONIX) 40 MG EC tablet Take 40 mg by mouth Daily.   Past Week at Unknown time   • raNITIdine (ZANTAC) 150 MG tablet Take 150 mg by mouth Daily As Needed (Breakthrough acid reflux).   Past Week at Unknown time   • rosuvastatin (CRESTOR) 10 MG tablet Take 10 mg by mouth Daily.   Past Week at Unknown time   • SITagliptin (JANUVIA) 100 MG tablet Take 100 mg by mouth Every Morning.   Past Week at Unknown time   • traZODone (DESYREL) 50 MG tablet Take 100 mg by mouth At Night As Needed for Sleep.   Past Week at Unknown time   • vitamin D (ERGOCALCIFEROL) 17723 units capsule capsule Take 50,000 Units by mouth 1 (One) Time Per Week. Fridays   Past Week at Unknown time       Medicines:  Current Facility-Administered Medications   Medication Dose Route Frequency Provider Last Rate Last Dose   • acetaminophen (TYLENOL) tablet 650 mg  650 mg Oral Q6H PRN CLEMENTE Franco   650 mg at 03/28/18 0438   • amLODIPine (NORVASC) tablet 10 mg  10 mg Oral Q24H Osiel Thompson MD   10 mg at 04/05/18 0920   • amoxicillin-clavulanate (AUGMENTIN) 600-42.9 MG/5ML suspension 1,200 mg  1,200 mg Oral Q12H Abel Chang MD   1,200 mg at 04/05/18 0911   • bisacodyl (DULCOLAX) EC tablet 10 mg  10 mg Oral BID CLEMENTE Hernandez   10 mg at 04/05/18 0911   • bisacodyl (DULCOLAX) suppository 10 mg  10 mg Rectal Daily PRN CLEMENTE Franco       • dextrose (D50W) solution 25 g  25 g Intravenous Q15 Min PRN CLEMENTE Franco       • dextrose (GLUTOSE) oral gel 15 g  15 g  Oral Q15 Min PRN Vanessa Menon, APRN       • enoxaparin (LOVENOX) syringe 30 mg  30 mg Subcutaneous Q24H Sandra Saunders DO   30 mg at 04/04/18 2200   • FLUoxetine (PROzac) capsule 10 mg  10 mg Oral Daily Vanessa Menon, APRN   10 mg at 04/04/18 0821   • gabapentin (NEURONTIN) capsule 400 mg  400 mg Oral Nightly Vanessa Menon APRN   400 mg at 04/04/18 2200   • glucagon (human recombinant) (GLUCAGEN DIAGNOSTIC) injection 1 mg  1 mg Subcutaneous PRN Vanessa Menon, APRN       • guaiFENesin (MUCINEX) 12 hr tablet 1,200 mg  1,200 mg Oral BID Vanessa Menon APRN   1,200 mg at 04/05/18 0911   • HYDROcodone-acetaminophen (NORCO) 7.5-325 MG per tablet 1 tablet  1 tablet Oral Q4H PRN Abel Stark MD   1 tablet at 04/05/18 0919   • hydrOXYzine (ATARAX) tablet 50 mg  50 mg Oral TID PRN Vanessa Menon APRN   50 mg at 03/26/18 2209   • insulin detemir (LEVEMIR) injection 15 Units  15 Units Subcutaneous Q12H CLEMENTE Mejias   15 Units at 04/05/18 0913   • insulin lispro (humaLOG) injection 0-24 Units  0-24 Units Subcutaneous 4x Daily With Meals & Nightly Mark Howell MD   16 Units at 04/05/18 0912   • ipratropium-albuterol (DUO-NEB) nebulizer solution 3 mL  3 mL Nebulization Q4H - RT Awa Neumann APRN   3 mL at 04/05/18 0630   • lidocaine-Maalox-diphenhydramine (MIRACLE MOUTHWASH) oral suspension 10 mL  10 mL Oral 4x Daily Vanessa Menon APRN   10 mL at 04/05/18 0910   • methylPREDNISolone sodium succinate (SOLU-Medrol) injection 40 mg  40 mg Intravenous Q12H Johan Brar MD   40 mg at 04/05/18 0513   • naloxone (NARCAN) injection 0.1 mg  0.1 mg Intravenous Q5 Min PRN Abel Stark MD       • nystatin (MYCOSTATIN) 004459 UNIT/ML suspension 500,000 Units  5 mL Oral 4x Daily CLEMENTE Franco   500,000 Units at 04/05/18 0911   • pantoprazole (PROTONIX) EC tablet 40 mg  40 mg Oral Q AM CLEMENTE Franco   40 mg  at 04/05/18 0513   • polyethylene glycol (MIRALAX) powder 17 g  17 g Oral Daily Diamante Rosenbaum Alyx, APRN   17 g at 04/05/18 0912   • rosuvastatin (CRESTOR) tablet 10 mg  10 mg Oral Nightly Vanessa Menon, APRN   10 mg at 04/04/18 2200   • traZODone (DESYREL) tablet 100 mg  100 mg Oral Nightly PRN Vanessa Mneon, APRN   100 mg at 03/23/18 2247       Past Medical History:  Past Medical History:   Diagnosis Date   • Garza's esophagus    • Depression    • Diabetes mellitus    • GERD (gastroesophageal reflux disease)    • Hypertension    • Kidney stone        Past Surgical History:  Past Surgical History:   Procedure Laterality Date   • BACK SURGERY     • COLONOSCOPY  10/18/2006    hyperplastic ascending colon polyp- recall in 7 years   • COLONOSCOPY N/A 7/24/2017    Procedure: COLONOSCOPY WITH ANESTHESIA;  Surgeon: Jenniffer Smith MD;  Location: [facility] ENDOSCOPY;  Service:    • ENDOSCOPY N/A 7/24/2017    Procedure: ESOPHAGOGASTRODUODENOSCOPY WITH ANESTHESIA;  Surgeon: Jenniffer Smith MD;  Location: [facility] ENDOSCOPY;  Service:    • HYSTERECTOMY     • THORACOTOMY Left 3/27/2018    Procedure: THORACOTOMY, drainage of empyema and decortication;  Surgeon: Abel Stark MD;  Location: [facility] OR;  Service: Cardiothoracic   • UPPER GASTROINTESTINAL ENDOSCOPY  12/08/2011       Family History  History reviewed. No pertinent family history.    Social History  Social History     Social History   • Marital status:      Spouse name: N/A   • Number of children: N/A   • Years of education: N/A     Occupational History   • Not on file.     Social History Main Topics   • Smoking status: Former Smoker   • Smokeless tobacco: Never Used   • Alcohol use No   • Drug use: No   • Sexual activity: Not on file     Other Topics Concern   • Not on file     Social History Narrative   • No narrative on file         Review of Systems:  History obtained from chart review and the patient  General ROS: No fever or  "chills  Respiratory ROS: positive for - shortness of breath  Cardiovascular ROS: no chest pain or dyspnea on exertion  Gastrointestinal ROS: No abdominal pain or melena  Genito-Urinary ROS: No dysuria or hematuria  14 point ROS reviewed with the patient and negative except as noted above and in the HPI unless unable to obtain.    Objective:  /74 (BP Location: Right arm, Patient Position: Lying) Comment: nurse notified  Pulse 92   Temp 97.8 °F (36.6 °C) (Oral)   Resp 16   Ht 160 cm (63\")   Wt 85.8 kg (189 lb 3.2 oz)   SpO2 91%   Breastfeeding? No   BMI 33.52 kg/m²     Intake/Output Summary (Last 24 hours) at 04/05/18 1009  Last data filed at 04/05/18 0928   Gross per 24 hour   Intake              480 ml   Output                0 ml   Net              480 ml     General: awake/alert up in chair  HEENT: Normocephalic atraumatic head  Neck: Supple with no JVD or carotid bruits.  Chest:  Diminished breath sounds   CVS: regular rate and rhythm  Abdominal: soft, nontender, normal bowel sounds  Extremities: no cyanosis or edema  Skin: warm and dry without rash      Labs:    Results from last 7 days  Lab Units 04/04/18 0318 04/03/18  0320 04/02/18  0437   WBC 10*3/mm3 11.28* 11.13* 10.93*   HEMOGLOBIN g/dL 10.1* 9.7* 9.8*   HEMATOCRIT % 32.0* 30.6* 30.4*   PLATELETS 10*3/mm3 441* 405* 391           Results from last 7 days  Lab Units 04/05/18  0223 04/04/18  0318 04/03/18  0320 04/02/18  0437 04/01/18  0451 03/31/18  0505   SODIUM mmol/L 142 142 142 141 144 143   POTASSIUM mmol/L 3.7 3.3* 3.7 3.8 3.7 4.0   CHLORIDE mmol/L 99 101 104 105 108 108   CO2 mmol/L 30.0 28.0 27.0 24.0 23.0* 23.0*   BUN mg/dL 23* 17 15 13 14 18   CREATININE mg/dL 1.85* 2.02* 2.01* 2.02* 2.09* 2.12*   CALCIUM mg/dL 9.1 9.0 9.0 8.9 8.8 8.2*   BILIRUBIN mg/dL  --   --   --  0.2 0.2 0.1   ALK PHOS U/L  --   --   --  254* 256* 223*   ALT (SGPT) U/L  --   --   --  56* 57* 55*   AST (SGOT) U/L  --   --   --  39 57* 65*   GLUCOSE mg/dL 183* " 106* 105* 164* 90 87           Radiology:   Imaging Results (last 72 hours)     Procedure Component Value Units Date/Time    XR Chest 1 View [[id number]] Collected:  03/31/18 0902     Updated:  03/31/18 0907    Narrative:       EXAMINATION: XR CHEST 1 VW-     3/31/2018 3:50 AM CDT     HISTORY: Post Op Lung Surgery; J86.9-Pyothorax without fistula;  Z74.09-Other reduced mobility.     One view chest x-ray compared with yesterday.        2. Left chest tubes remain in good position.     There is persistent consolidation of the left lung base.  No visible pneumothorax.     The right lung is adequately expanded.     There is a new finding of focal infiltrate or atelectasis within the  midportion of the lung adjacent to the hilum.     Summary:  1. Stable left lung with chest tubes and basilar infiltrate/atelectasis.  2. Developing focal infiltrate or atelectasis within the midportion of  the right lung.  This report was finalized on 03/31/2018 09:03 by Dr. Jimmy Hernandez MD.    XR Chest 1 View [[id number]] Collected:  03/30/18 0721     Updated:  03/30/18 0727    Narrative:       HISTORY: Chest tubes     CXR: Frontal view the chest obtained.     COMPARISON: 3/29/2018     FINDINGS: The 2 left-sided chest tubes are similar in their position.  There is stable left lung opacities with a left pleural fluid  collection. The right lung appears clear. The mediastinal contours are  similar.     The left internal jugular central line is been removed.       Impression:       1. Similar positioning of the 2 left-sided chest tubes with a stable  left pleural fluid collection but no appreciable pneumothorax. Left mid  and lower lung consolidation.  This report was finalized on 03/30/2018 07:24 by Dr. Vanessa Jones MD.    US Renal Bilateral [[id number]] Collected:  03/29/18 0903     Updated:  03/29/18 0908    Narrative:       EXAMINATION: US RENAL BILATERAL- 3/29/2018 9:03 AM CDT     HISTORY: Acute kidney injury; J86.9-Pyothorax without  fistula.     REPORT: Sonographic images of kidneys were obtained bilaterally, there  are no comparison studies.     The proximal aorta measures 2.3 cm in diameter, normal. The IVC measures  2.1 cm, normal. The right kidney measures 13.4 x 6.4 x 6.7 cm and has  normal morphology and echogenicity. No mass or hydronephrosis is seen.  Color flow imaging demonstrates vascular flow within the right kidney.     The left kidney measures 12.1 x 6.4 x 6.8 cm and has normal morphology  and echogenicity. No mass or hydronephrosis is seen on the left. Color  flow imaging demonstrates vascular flow within the left kidney. The  bladder is within normal limits.       Impression:       Normal ultrasound of the kidneys.  This report was finalized on 03/29/2018 09:05 by Dr. Ok Campa MD.    XR Chest 1 View [[id number]] Collected:  03/29/18 0728     Updated:  03/29/18 0733    Narrative:       HISTORY: Left-sided chest tube     CXR: A frontal view the chest is obtained.     COMPARISON: 3/20/2018     FINDINGS: The 2 left-sided chest tubes are similar in position. There is  no appreciable pneumothorax. There is similar left pleural fluid  collection with left basilar consolidation. There are small right  basilar atelectasis. Mediastinal contours are similar.     The left internal jugular central line tip projects of the left  brachiocephalic vein.       Impression:       1. Similar positioning of the 2 left-sided chest tubes with no  appreciable pneumothorax. Similar left pleural fluid collection with  left basilar consolidation. Probable right basilar atelectasis.  2. Left IJ central line tip projects over the left brachiocephalic vein.  This report was finalized on 03/29/2018 07:30 by Dr. Vanessa Jones MD.          Culture:  No components found for: WOUNDCUL, 3  No components found for: CSFCUL, 3  No components found for: BC, 3  No components found for: URINECUL, 3      Assessment   1.  Acute kidney injury secondary to acute  allergic interstitial nephritis versus ATN. Started on Solumedrol X 2 days ago. Creatinine down to 1.85.   2.  Left lower lobe pneumonia with empyema.  3.  Acute respiratory failure. resolved.  4.  Iron deficiency anemia.    Plan:  1.  Continue to avoid nephrotoxins  2.  Patient going home today. Switch from IV steroid to PO per admitting.   3. F/U in our office in 1 week to re-evaluate renal parameters.        Rosa Lu, APRN  4/5/2018  10:09 AM

## 2018-04-06 ENCOUNTER — TELEPHONE (OUTPATIENT)
Dept: CARDIAC SURGERY | Facility: CLINIC | Age: 58
End: 2018-04-06

## 2018-04-06 ENCOUNTER — TELEPHONE (OUTPATIENT)
Dept: FAMILY MEDICINE CLINIC | Age: 58
End: 2018-04-06

## 2018-04-06 DIAGNOSIS — E11.65 UNCONTROLLED TYPE 2 DIABETES MELLITUS WITH HYPERGLYCEMIA, WITH LONG-TERM CURRENT USE OF INSULIN (HCC): ICD-10-CM

## 2018-04-06 DIAGNOSIS — Z79.4 UNCONTROLLED TYPE 2 DIABETES MELLITUS WITH HYPERGLYCEMIA, WITH LONG-TERM CURRENT USE OF INSULIN (HCC): ICD-10-CM

## 2018-04-06 RX ORDER — HYDROCODONE BITARTRATE AND ACETAMINOPHEN 5; 325 MG/1; MG/1
1 TABLET ORAL EVERY 6 HOURS PRN
Qty: 40 TABLET | Refills: 0 | Status: SHIPPED | OUTPATIENT
Start: 2018-04-06 | End: 2018-05-07

## 2018-04-06 NOTE — TELEPHONE ENCOUNTER
Patient has been notified to  script that dr rojas is writing for more pain meds will scan script in

## 2018-04-06 NOTE — TELEPHONE ENCOUNTER
called stated that wife was seen and is in pain, wanting to see about getting pain meds called in.

## 2018-04-09 DIAGNOSIS — Z79.4 UNCONTROLLED TYPE 2 DIABETES MELLITUS WITH HYPERGLYCEMIA, WITH LONG-TERM CURRENT USE OF INSULIN (HCC): ICD-10-CM

## 2018-04-09 DIAGNOSIS — E11.65 UNCONTROLLED TYPE 2 DIABETES MELLITUS WITH HYPERGLYCEMIA, WITH LONG-TERM CURRENT USE OF INSULIN (HCC): ICD-10-CM

## 2018-04-10 ENCOUNTER — TELEPHONE (OUTPATIENT)
Dept: FAMILY MEDICINE CLINIC | Age: 58
End: 2018-04-10

## 2018-04-11 ENCOUNTER — OFFICE VISIT (OUTPATIENT)
Dept: FAMILY MEDICINE CLINIC | Age: 58
End: 2018-04-11
Payer: MEDICARE

## 2018-04-11 ENCOUNTER — TELEPHONE (OUTPATIENT)
Dept: FAMILY MEDICINE CLINIC | Age: 58
End: 2018-04-11

## 2018-04-11 VITALS
OXYGEN SATURATION: 96 % | BODY MASS INDEX: 29.94 KG/M2 | HEART RATE: 82 BPM | RESPIRATION RATE: 16 BRPM | WEIGHT: 165 LBS | DIASTOLIC BLOOD PRESSURE: 82 MMHG | SYSTOLIC BLOOD PRESSURE: 136 MMHG | TEMPERATURE: 98.2 F

## 2018-04-11 DIAGNOSIS — F32.A ANXIETY AND DEPRESSION: ICD-10-CM

## 2018-04-11 DIAGNOSIS — D72.829 LEUKOCYTOSIS, UNSPECIFIED TYPE: ICD-10-CM

## 2018-04-11 DIAGNOSIS — F41.9 ANXIETY AND DEPRESSION: ICD-10-CM

## 2018-04-11 DIAGNOSIS — Z09 HOSPITAL DISCHARGE FOLLOW-UP: Primary | ICD-10-CM

## 2018-04-11 LAB
BASOPHILS ABSOLUTE: 0 K/UL (ref 0–0.2)
BASOPHILS RELATIVE PERCENT: 0.1 % (ref 0–1)
EOSINOPHILS ABSOLUTE: 0 K/UL (ref 0–0.6)
EOSINOPHILS RELATIVE PERCENT: 0.1 % (ref 0–5)
HCT VFR BLD CALC: 44.6 % (ref 37–47)
HEMOGLOBIN: 13.7 G/DL (ref 12–16)
LYMPHOCYTES ABSOLUTE: 1.2 K/UL (ref 1.1–4.5)
LYMPHOCYTES RELATIVE PERCENT: 8.3 % (ref 20–40)
MCH RBC QN AUTO: 29 PG (ref 27–31)
MCHC RBC AUTO-ENTMCNC: 30.7 G/DL (ref 33–37)
MCV RBC AUTO: 94.5 FL (ref 81–99)
MONOCYTES ABSOLUTE: 0.4 K/UL (ref 0–0.9)
MONOCYTES RELATIVE PERCENT: 2.9 % (ref 0–10)
NEUTROPHILS ABSOLUTE: 12.4 K/UL (ref 1.5–7.5)
NEUTROPHILS RELATIVE PERCENT: 87.7 % (ref 50–65)
PDW BLD-RTO: 14.1 % (ref 11.5–14.5)
PLATELET # BLD: 401 K/UL (ref 130–400)
PMV BLD AUTO: 11.3 FL (ref 9.4–12.3)
RBC # BLD: 4.72 M/UL (ref 4.2–5.4)
WBC # BLD: 14.2 K/UL (ref 4.8–10.8)

## 2018-04-11 PROCEDURE — 99213 OFFICE O/P EST LOW 20 MIN: CPT | Performed by: NURSE PRACTITIONER

## 2018-04-11 PROCEDURE — G8427 DOCREV CUR MEDS BY ELIG CLIN: HCPCS | Performed by: NURSE PRACTITIONER

## 2018-04-11 PROCEDURE — G8417 CALC BMI ABV UP PARAM F/U: HCPCS | Performed by: NURSE PRACTITIONER

## 2018-04-11 PROCEDURE — 1036F TOBACCO NON-USER: CPT | Performed by: NURSE PRACTITIONER

## 2018-04-11 PROCEDURE — 3017F COLORECTAL CA SCREEN DOC REV: CPT | Performed by: NURSE PRACTITIONER

## 2018-04-11 PROCEDURE — 3014F SCREEN MAMMO DOC REV: CPT | Performed by: NURSE PRACTITIONER

## 2018-04-11 PROCEDURE — G8599 NO ASA/ANTIPLAT THER USE RNG: HCPCS | Performed by: NURSE PRACTITIONER

## 2018-04-11 RX ORDER — HYDROCODONE BITARTRATE AND ACETAMINOPHEN 5; 325 MG/1; MG/1
1 TABLET ORAL EVERY 6 HOURS PRN
COMMUNITY
End: 2018-05-03 | Stop reason: ALTCHOICE

## 2018-04-11 RX ORDER — FLUOXETINE 10 MG/1
CAPSULE ORAL
Qty: 30 CAPSULE | Refills: 3 | Status: SHIPPED | OUTPATIENT
Start: 2018-04-11 | End: 2018-07-14 | Stop reason: SDUPTHER

## 2018-04-13 ENCOUNTER — HOSPITAL ENCOUNTER (INPATIENT)
Facility: HOSPITAL | Age: 58
LOS: 1 days | Discharge: HOME | End: 2018-04-14
Attending: EMERGENCY MEDICINE | Admitting: FAMILY MEDICINE

## 2018-04-13 ENCOUNTER — APPOINTMENT (OUTPATIENT)
Dept: GENERAL RADIOLOGY | Facility: HOSPITAL | Age: 58
End: 2018-04-13

## 2018-04-13 DIAGNOSIS — E87.1 HYPONATREMIA: ICD-10-CM

## 2018-04-13 DIAGNOSIS — I10 ESSENTIAL HYPERTENSION: ICD-10-CM

## 2018-04-13 DIAGNOSIS — E11.8 TYPE 2 DIABETES MELLITUS WITH COMPLICATION, UNSPECIFIED LONG TERM INSULIN USE STATUS: Primary | ICD-10-CM

## 2018-04-13 DIAGNOSIS — E87.5 HYPERKALEMIA: ICD-10-CM

## 2018-04-13 DIAGNOSIS — D72.829 LEUKOCYTOSIS, UNSPECIFIED TYPE: ICD-10-CM

## 2018-04-13 PROBLEM — R73.9 HYPERGLYCEMIA: Status: ACTIVE | Noted: 2018-04-13

## 2018-04-13 LAB
ALBUMIN SERPL-MCNC: 4.3 G/DL (ref 3.5–5)
ALBUMIN/GLOB SERPL: 1.3 G/DL (ref 1.1–2.5)
ALP SERPL-CCNC: 228 U/L (ref 24–120)
ALT SERPL W P-5'-P-CCNC: 53 U/L (ref 0–54)
ANION GAP SERPL CALCULATED.3IONS-SCNC: 11 MMOL/L (ref 4–13)
ANION GAP SERPL CALCULATED.3IONS-SCNC: 12 MMOL/L (ref 4–13)
ANION GAP SERPL CALCULATED.3IONS-SCNC: 13 MMOL/L (ref 4–13)
ANION GAP SERPL CALCULATED.3IONS-SCNC: 13 MMOL/L (ref 4–13)
ANION GAP SERPL CALCULATED.3IONS-SCNC: 14 MMOL/L (ref 4–13)
ARTERIAL PATENCY WRIST A: ABNORMAL
AST SERPL-CCNC: 29 U/L (ref 7–45)
ATMOSPHERIC PRESS: 747 MMHG
BACTERIA UR QL AUTO: ABNORMAL /HPF
BASE EXCESS BLDA CALC-SCNC: 1.3 MMOL/L (ref 0–2)
BASOPHILS # BLD AUTO: 0.02 10*3/MM3 (ref 0–0.2)
BASOPHILS NFR BLD AUTO: 0.2 % (ref 0–2)
BDY SITE: ABNORMAL
BILIRUB SERPL-MCNC: 0.5 MG/DL (ref 0.1–1)
BILIRUB UR QL STRIP: NEGATIVE
BODY TEMPERATURE: 37 C
BUN BLD-MCNC: 30 MG/DL (ref 5–21)
BUN BLD-MCNC: 31 MG/DL (ref 5–21)
BUN BLD-MCNC: 33 MG/DL (ref 5–21)
BUN BLD-MCNC: 35 MG/DL (ref 5–21)
BUN BLD-MCNC: 36 MG/DL (ref 5–21)
BUN/CREAT SERPL: 31.6 (ref 7–25)
BUN/CREAT SERPL: 31.9 (ref 7–25)
BUN/CREAT SERPL: 33.7 (ref 7–25)
BUN/CREAT SERPL: 34.1 (ref 7–25)
BUN/CREAT SERPL: 35 (ref 7–25)
CALCIUM SPEC-SCNC: 10.1 MG/DL (ref 8.4–10.4)
CALCIUM SPEC-SCNC: 10.1 MG/DL (ref 8.4–10.4)
CALCIUM SPEC-SCNC: 9.8 MG/DL (ref 8.4–10.4)
CHLORIDE SERPL-SCNC: 80 MMOL/L (ref 98–110)
CHLORIDE SERPL-SCNC: 85 MMOL/L (ref 98–110)
CHLORIDE SERPL-SCNC: 90 MMOL/L (ref 98–110)
CHLORIDE SERPL-SCNC: 90 MMOL/L (ref 98–110)
CHLORIDE SERPL-SCNC: 92 MMOL/L (ref 98–110)
CLARITY UR: CLEAR
CO2 SERPL-SCNC: 29 MMOL/L (ref 24–31)
CO2 SERPL-SCNC: 30 MMOL/L (ref 24–31)
CO2 SERPL-SCNC: 31 MMOL/L (ref 24–31)
COHGB MFR BLD: 1.1 % (ref 0–5)
COLOR UR: YELLOW
CREAT BLD-MCNC: 0.88 MG/DL (ref 0.5–1.4)
CREAT BLD-MCNC: 0.98 MG/DL (ref 0.5–1.4)
CREAT BLD-MCNC: 0.98 MG/DL (ref 0.5–1.4)
CREAT BLD-MCNC: 1 MG/DL (ref 0.5–1.4)
CREAT BLD-MCNC: 1.13 MG/DL (ref 0.5–1.4)
DEPRECATED RDW RBC AUTO: 42.4 FL (ref 40–54)
EOSINOPHIL # BLD AUTO: 0.01 10*3/MM3 (ref 0–0.7)
EOSINOPHIL NFR BLD AUTO: 0.1 % (ref 0–4)
ERYTHROCYTE [DISTWIDTH] IN BLOOD BY AUTOMATED COUNT: 13.5 % (ref 12–15)
GAS FLOW AIRWAY: 3 LPM
GFR SERPL CREATININE-BSD FRML MDRD: 49 ML/MIN/1.73
GFR SERPL CREATININE-BSD FRML MDRD: 57 ML/MIN/1.73
GFR SERPL CREATININE-BSD FRML MDRD: 58 ML/MIN/1.73
GFR SERPL CREATININE-BSD FRML MDRD: 58 ML/MIN/1.73
GFR SERPL CREATININE-BSD FRML MDRD: 66 ML/MIN/1.73
GLOBULIN UR ELPH-MCNC: 3.3 GM/DL
GLUCOSE BLD-MCNC: 382 MG/DL (ref 70–100)
GLUCOSE BLD-MCNC: 500 MG/DL (ref 70–100)
GLUCOSE BLD-MCNC: 586 MG/DL (ref 70–100)
GLUCOSE BLD-MCNC: 680 MG/DL (ref 70–100)
GLUCOSE BLD-MCNC: 995 MG/DL (ref 70–100)
GLUCOSE BLDC GLUCOMTR-MCNC: 123 MG/DL (ref 70–130)
GLUCOSE BLDC GLUCOMTR-MCNC: 329 MG/DL (ref 70–130)
GLUCOSE BLDC GLUCOMTR-MCNC: 416 MG/DL (ref 70–130)
GLUCOSE BLDC GLUCOMTR-MCNC: 512 MG/DL (ref 70–130)
GLUCOSE BLDC GLUCOMTR-MCNC: 523 MG/DL (ref 70–130)
GLUCOSE UR STRIP-MCNC: ABNORMAL MG/DL
HCO3 BLDA-SCNC: 26.4 MMOL/L (ref 20–26)
HCT VFR BLD AUTO: 40.5 % (ref 37–47)
HCT VFR BLD CALC: 43.1 % (ref 38–51)
HGB BLD-MCNC: 13.8 G/DL (ref 12–16)
HGB BLDA-MCNC: 14.1 G/DL (ref 13.5–17.5)
HGB UR QL STRIP.AUTO: ABNORMAL
HYALINE CASTS UR QL AUTO: ABNORMAL /LPF
IMM GRANULOCYTES # BLD: 0.11 10*3/MM3 (ref 0–0.03)
IMM GRANULOCYTES NFR BLD: 0.9 % (ref 0–5)
KETONES UR QL STRIP: NEGATIVE
LEUKOCYTE ESTERASE UR QL STRIP.AUTO: NEGATIVE
LYMPHOCYTES # BLD AUTO: 1.37 10*3/MM3 (ref 0.72–4.86)
LYMPHOCYTES NFR BLD AUTO: 11.5 % (ref 15–45)
Lab: ABNORMAL
MCH RBC QN AUTO: 29.3 PG (ref 28–32)
MCHC RBC AUTO-ENTMCNC: 34.1 G/DL (ref 33–36)
MCV RBC AUTO: 86 FL (ref 82–98)
METHGB BLD QL: 0.7 % (ref 0–3)
MODALITY: ABNORMAL
MONOCYTES # BLD AUTO: 0.44 10*3/MM3 (ref 0.19–1.3)
MONOCYTES NFR BLD AUTO: 3.7 % (ref 4–12)
NEUTROPHILS # BLD AUTO: 9.99 10*3/MM3 (ref 1.87–8.4)
NEUTROPHILS NFR BLD AUTO: 83.6 % (ref 39–78)
NITRITE UR QL STRIP: NEGATIVE
NRBC BLD MANUAL-RTO: 0 /100 WBC (ref 0–0)
OXYHGB MFR BLDV: 96.4 % (ref 94–99)
PCO2 BLDA: 42.7 MM HG (ref 35–45)
PH BLDA: 7.4 PH UNITS (ref 7.35–7.45)
PH UR STRIP.AUTO: 7 [PH] (ref 5–8)
PLATELET # BLD AUTO: 268 10*3/MM3 (ref 130–400)
PMV BLD AUTO: 11.7 FL (ref 6–12)
PO2 BLDA: 94.3 MM HG (ref 83–108)
POTASSIUM BLD-SCNC: 4 MMOL/L (ref 3.5–5.3)
POTASSIUM BLD-SCNC: 4.3 MMOL/L (ref 3.5–5.3)
POTASSIUM BLD-SCNC: 4.4 MMOL/L (ref 3.5–5.3)
POTASSIUM BLD-SCNC: 4.6 MMOL/L (ref 3.5–5.3)
POTASSIUM BLD-SCNC: 5.9 MMOL/L (ref 3.5–5.3)
POTASSIUM BLDA-SCNC: 5.5 MMOL/L (ref 3.5–5.2)
PROT SERPL-MCNC: 7.6 G/DL (ref 6.3–8.7)
PROT UR QL STRIP: NEGATIVE
RBC # BLD AUTO: 4.71 10*6/MM3 (ref 4.2–5.4)
RBC # UR: ABNORMAL /HPF
REF LAB TEST METHOD: ABNORMAL
SAO2 % BLDCOA: 98.2 % (ref 94–99)
SODIUM BLD-SCNC: 123 MMOL/L (ref 135–145)
SODIUM BLD-SCNC: 128 MMOL/L (ref 135–145)
SODIUM BLD-SCNC: 130 MMOL/L (ref 135–145)
SODIUM BLD-SCNC: 133 MMOL/L (ref 135–145)
SODIUM BLD-SCNC: 134 MMOL/L (ref 135–145)
SODIUM BLDA-SCNC: 122 MMOL/L (ref 136–145)
SP GR UR STRIP: 1.03 (ref 1–1.03)
SQUAMOUS #/AREA URNS HPF: ABNORMAL /HPF
UROBILINOGEN UR QL STRIP: ABNORMAL
VENTILATOR MODE: ABNORMAL
WBC NRBC COR # BLD: 11.94 10*3/MM3 (ref 4.8–10.8)
WBC UR QL AUTO: ABNORMAL /HPF

## 2018-04-13 PROCEDURE — 82375 ASSAY CARBOXYHB QUANT: CPT

## 2018-04-13 PROCEDURE — 63710000001 INSULIN LISPRO (HUMAN) PER 5 UNITS: Performed by: FAMILY MEDICINE

## 2018-04-13 PROCEDURE — 63710000001 INSULIN REGULAR HUMAN PER 5 UNITS

## 2018-04-13 PROCEDURE — 82962 GLUCOSE BLOOD TEST: CPT

## 2018-04-13 PROCEDURE — 82805 BLOOD GASES W/O2 SATURATION: CPT

## 2018-04-13 PROCEDURE — 36600 WITHDRAWAL OF ARTERIAL BLOOD: CPT

## 2018-04-13 PROCEDURE — 99284 EMERGENCY DEPT VISIT MOD MDM: CPT

## 2018-04-13 PROCEDURE — 85025 COMPLETE CBC W/AUTO DIFF WBC: CPT | Performed by: EMERGENCY MEDICINE

## 2018-04-13 PROCEDURE — 80053 COMPREHEN METABOLIC PANEL: CPT | Performed by: EMERGENCY MEDICINE

## 2018-04-13 PROCEDURE — 83050 HGB METHEMOGLOBIN QUAN: CPT

## 2018-04-13 PROCEDURE — 63710000001 INSULIN REGULAR HUMAN PER 5 UNITS: Performed by: EMERGENCY MEDICINE

## 2018-04-13 PROCEDURE — 81001 URINALYSIS AUTO W/SCOPE: CPT | Performed by: EMERGENCY MEDICINE

## 2018-04-13 PROCEDURE — 63710000001 INSULIN DETEMIR PER 5 UNITS: Performed by: FAMILY MEDICINE

## 2018-04-13 PROCEDURE — 71045 X-RAY EXAM CHEST 1 VIEW: CPT

## 2018-04-13 PROCEDURE — 25010000002 ENOXAPARIN PER 10 MG: Performed by: FAMILY MEDICINE

## 2018-04-13 RX ORDER — AMLODIPINE BESYLATE 10 MG/1
10 TABLET ORAL DAILY
Status: DISCONTINUED | OUTPATIENT
Start: 2018-04-13 | End: 2018-04-14 | Stop reason: HOSPADM

## 2018-04-13 RX ORDER — FLUOXETINE HYDROCHLORIDE 20 MG/1
20 CAPSULE ORAL DAILY
Status: DISCONTINUED | OUTPATIENT
Start: 2018-04-13 | End: 2018-04-14 | Stop reason: HOSPADM

## 2018-04-13 RX ORDER — SODIUM CHLORIDE 0.9 % (FLUSH) 0.9 %
1-10 SYRINGE (ML) INJECTION AS NEEDED
Status: DISCONTINUED | OUTPATIENT
Start: 2018-04-13 | End: 2018-04-14 | Stop reason: HOSPADM

## 2018-04-13 RX ORDER — ROSUVASTATIN CALCIUM 10 MG/1
10 TABLET, COATED ORAL DAILY
Status: DISCONTINUED | OUTPATIENT
Start: 2018-04-13 | End: 2018-04-14 | Stop reason: HOSPADM

## 2018-04-13 RX ORDER — HYDROCODONE BITARTRATE AND ACETAMINOPHEN 7.5; 325 MG/1; MG/1
1 TABLET ORAL EVERY 4 HOURS PRN
Status: DISCONTINUED | OUTPATIENT
Start: 2018-04-13 | End: 2018-04-14 | Stop reason: HOSPADM

## 2018-04-13 RX ORDER — ALUMINA, MAGNESIA, AND SIMETHICONE 2400; 2400; 240 MG/30ML; MG/30ML; MG/30ML
15 SUSPENSION ORAL EVERY 6 HOURS PRN
Status: DISCONTINUED | OUTPATIENT
Start: 2018-04-13 | End: 2018-04-14 | Stop reason: HOSPADM

## 2018-04-13 RX ORDER — TRAZODONE HYDROCHLORIDE 100 MG/1
100 TABLET ORAL NIGHTLY PRN
Status: DISCONTINUED | OUTPATIENT
Start: 2018-04-13 | End: 2018-04-14 | Stop reason: HOSPADM

## 2018-04-13 RX ORDER — PREDNISONE 20 MG/1
40 TABLET ORAL DAILY
Status: DISCONTINUED | OUTPATIENT
Start: 2018-04-13 | End: 2018-04-14 | Stop reason: HOSPADM

## 2018-04-13 RX ORDER — SODIUM CHLORIDE 9 MG/ML
150 INJECTION, SOLUTION INTRAVENOUS CONTINUOUS
Status: DISCONTINUED | OUTPATIENT
Start: 2018-04-13 | End: 2018-04-14 | Stop reason: HOSPADM

## 2018-04-13 RX ORDER — FAMOTIDINE 20 MG/1
20 TABLET, FILM COATED ORAL 2 TIMES DAILY
Status: DISCONTINUED | OUTPATIENT
Start: 2018-04-13 | End: 2018-04-14 | Stop reason: HOSPADM

## 2018-04-13 RX ORDER — NICOTINE POLACRILEX 4 MG
15 LOZENGE BUCCAL
Status: DISCONTINUED | OUTPATIENT
Start: 2018-04-13 | End: 2018-04-14 | Stop reason: HOSPADM

## 2018-04-13 RX ORDER — PANTOPRAZOLE SODIUM 40 MG/1
40 TABLET, DELAYED RELEASE ORAL DAILY
Status: DISCONTINUED | OUTPATIENT
Start: 2018-04-13 | End: 2018-04-14 | Stop reason: HOSPADM

## 2018-04-13 RX ORDER — ONDANSETRON 2 MG/ML
4 INJECTION INTRAMUSCULAR; INTRAVENOUS EVERY 6 HOURS PRN
Status: DISCONTINUED | OUTPATIENT
Start: 2018-04-13 | End: 2018-04-14 | Stop reason: HOSPADM

## 2018-04-13 RX ORDER — HYDROCODONE BITARTRATE AND ACETAMINOPHEN 5; 325 MG/1; MG/1
1 TABLET ORAL EVERY 6 HOURS PRN
Status: DISCONTINUED | OUTPATIENT
Start: 2018-04-13 | End: 2018-04-13 | Stop reason: SDUPTHER

## 2018-04-13 RX ORDER — ASPIRIN 81 MG/1
81 TABLET ORAL DAILY
Status: DISCONTINUED | OUTPATIENT
Start: 2018-04-13 | End: 2018-04-14 | Stop reason: HOSPADM

## 2018-04-13 RX ORDER — LORAZEPAM 0.5 MG/1
0.5 TABLET ORAL EVERY 6 HOURS PRN
Status: DISCONTINUED | OUTPATIENT
Start: 2018-04-13 | End: 2018-04-14 | Stop reason: HOSPADM

## 2018-04-13 RX ORDER — AMOXICILLIN AND CLAVULANATE POTASSIUM 600; 42.9 MG/5ML; MG/5ML
1200 POWDER, FOR SUSPENSION ORAL EVERY 12 HOURS SCHEDULED
Status: DISCONTINUED | OUTPATIENT
Start: 2018-04-13 | End: 2018-04-14 | Stop reason: HOSPADM

## 2018-04-13 RX ORDER — DEXTROSE MONOHYDRATE 25 G/50ML
25 INJECTION, SOLUTION INTRAVENOUS
Status: DISCONTINUED | OUTPATIENT
Start: 2018-04-13 | End: 2018-04-14 | Stop reason: HOSPADM

## 2018-04-13 RX ORDER — ACETAMINOPHEN 650 MG/1
650 SUPPOSITORY RECTAL EVERY 4 HOURS PRN
Status: DISCONTINUED | OUTPATIENT
Start: 2018-04-13 | End: 2018-04-14 | Stop reason: HOSPADM

## 2018-04-13 RX ORDER — POLYETHYLENE GLYCOL 3350 17 G/17G
17 POWDER, FOR SOLUTION ORAL DAILY
Status: DISCONTINUED | OUTPATIENT
Start: 2018-04-13 | End: 2018-04-14 | Stop reason: HOSPADM

## 2018-04-13 RX ORDER — LABETALOL HYDROCHLORIDE 5 MG/ML
10 INJECTION, SOLUTION INTRAVENOUS ONCE
Status: DISCONTINUED | OUTPATIENT
Start: 2018-04-13 | End: 2018-04-14 | Stop reason: HOSPADM

## 2018-04-13 RX ADMIN — AMOXICILLIN AND CLAVULANATE POTASSIUM 1200 MG: 600; 42.9 SUSPENSION ORAL at 21:19

## 2018-04-13 RX ADMIN — SODIUM CHLORIDE 150 ML/HR: 9 INJECTION, SOLUTION INTRAVENOUS at 22:23

## 2018-04-13 RX ADMIN — SODIUM CHLORIDE 150 ML/HR: 9 INJECTION, SOLUTION INTRAVENOUS at 14:56

## 2018-04-13 RX ADMIN — INSULIN LISPRO 9 UNITS: 100 INJECTION, SOLUTION INTRAVENOUS; SUBCUTANEOUS at 15:28

## 2018-04-13 RX ADMIN — FAMOTIDINE 20 MG: 20 TABLET, FILM COATED ORAL at 21:19

## 2018-04-13 RX ADMIN — HYDROCODONE BITARTRATE AND ACETAMINOPHEN 1 TABLET: 7.5; 325 TABLET ORAL at 21:19

## 2018-04-13 RX ADMIN — ENOXAPARIN SODIUM 30 MG: 100 INJECTION SUBCUTANEOUS at 17:46

## 2018-04-13 RX ADMIN — INSULIN LISPRO 9 UNITS: 100 INJECTION, SOLUTION INTRAVENOUS; SUBCUTANEOUS at 16:48

## 2018-04-13 RX ADMIN — INSULIN HUMAN 10 UNITS: 100 INJECTION, SOLUTION PARENTERAL at 12:50

## 2018-04-13 RX ADMIN — INSULIN LISPRO 7 UNITS: 100 INJECTION, SOLUTION INTRAVENOUS; SUBCUTANEOUS at 17:46

## 2018-04-13 RX ADMIN — INSULIN DETEMIR 17 UNITS: 100 INJECTION, SOLUTION SUBCUTANEOUS at 14:45

## 2018-04-13 RX ADMIN — SODIUM CHLORIDE 1000 ML: 9 INJECTION, SOLUTION INTRAVENOUS at 12:50

## 2018-04-13 RX ADMIN — GABAPENTIN 400 MG: 300 CAPSULE ORAL at 21:18

## 2018-04-13 RX ADMIN — INSULIN HUMAN 10 UNITS: 100 INJECTION, SOLUTION PARENTERAL at 14:44

## 2018-04-13 NOTE — H&P
[facility] Medicine Services  HISTORY AND PHYSICAL    Date of Admission: 4/13/2018  Primary Care Physician: CLEMENTE East    Subjective     Chief Complaint: Blood sugar elevated.     History of Present Illness  Patient had discharged from [facility] last week.  In follow up with her doctors it was found her blood sugar was markedly elevated.    She presented to the ER for treatment.  Tearful that she needs to come back in .      Review of Systems   Constitutional: Negative.  Negative for fever.   HENT: Negative for congestion and mouth sores.    Eyes: Negative for visual disturbance.   Respiratory: Positive for cough. Negative for shortness of breath.    Cardiovascular: Negative for chest pain and palpitations.        Pain from surgical site is improving.   Gastrointestinal: Negative for diarrhea, nausea and vomiting.   Endocrine: Negative.    Genitourinary: Negative.    Musculoskeletal: Negative for arthralgias and myalgias.   Skin: Negative.    Allergic/Immunologic: Negative.    Neurological: Negative for dizziness.   Hematological: Negative.    Psychiatric/Behavioral: Negative for agitation and behavioral problems.            Past Medical History:   Past Medical History:   Diagnosis Date   • Garza's esophagus    • Depression    • Diabetes mellitus    • GERD (gastroesophageal reflux disease)    • Hypertension    • Kidney stone      Past Surgical History:  Past Surgical History:   Procedure Laterality Date   • BACK SURGERY     • COLONOSCOPY  10/18/2006    hyperplastic ascending colon polyp- recall in 7 years   • COLONOSCOPY N/A 7/24/2017    Procedure: COLONOSCOPY WITH ANESTHESIA;  Surgeon: Jenniffer Smith MD;  Location: [facility] ENDOSCOPY;  Service:    • ENDOSCOPY N/A 7/24/2017    Procedure: ESOPHAGOGASTRODUODENOSCOPY WITH ANESTHESIA;  Surgeon: Jenniffer Smith MD;  Location: [facility] ENDOSCOPY;  Service:    • HYSTERECTOMY     • THORACOTOMY Left 3/27/2018    Procedure: THORACOTOMY,  drainage of empyema and decortication;  Surgeon: Abel Stark MD;  Location: [facility] OR;  Service: Cardiothoracic   • UPPER GASTROINTESTINAL ENDOSCOPY  12/08/2011     Social History:  reports that she has quit smoking. She has never used smokeless tobacco. She reports that she does not drink alcohol or use drugs.  DPOA use NOK   Code status full  PCP  SHIRLEY CORNEJO    Family History:   Mother diabetes hypertension CAD  Father prostate cancer.     Allergies:  Allergies   Allergen Reactions   • Sulfa Antibiotics Hives     Medications:  Prior to Admission medications    Medication Sig Start Date End Date Taking? Authorizing Provider   amLODIPine (NORVASC) 10 MG tablet Take 10 mg by mouth Daily.    Historical Provider, MD   amoxicillin-clavulanate (AUGMENTIN) 600-42.9 MG/5ML suspension Take 10 mL by mouth Every 12 (Twelve) Hours for 21 days. 4/5/18 4/26/18  CLEMENTE Franco   aspirin 81 MG EC tablet Chew 81 mg Daily.    Historical Provider, MD   FLUoxetine (PROzac) 20 MG capsule Take 20 mg by mouth Daily.    Historical Provider, MD   gabapentin (NEURONTIN) 100 MG capsule Take 400 mg by mouth Every Night. 4/5/17   Historical Provider, MD   HYDROcodone-acetaminophen (NORCO) 5-325 MG per tablet Take 1 tablet by mouth Every 6 (Six) Hours As Needed for Moderate Pain .    Historical Provider, MD   HYDROcodone-acetaminophen (NORCO) 5-325 MG per tablet Take 1 tablet by mouth Every 6 (Six) Hours As Needed for Moderate Pain  for up to 40 doses. 4/6/18   Abel Stark MD   insulin aspart (novoLOG) 100 UNIT/ML injection Inject  under the skin 3 (Three) Times a Day Before Meals. 150 - 199 = 4 units  200 - 249 = 8 units  250 - 299 = 12 units  300 - 349 = 16 units  349 - 400 = 20 units  > 400 = 24 units    Historical Provider, MD   insulin detemir (LEVEMIR) 100 UNIT/ML injection Inject 17 Units under the skin Every 12 (Twelve) Hours. 4/5/18   CLEMENTE Franco   pantoprazole (PROTONIX) 40 MG EC  "tablet Take 40 mg by mouth Daily.    Historical Provider, MD   polyethylene glycol (MIRALAX) packet Take 17 g by mouth Daily. 4/5/18   CLEMENTE Franco   predniSONE (DELTASONE) 20 MG tablet Take 2 tablets by mouth Daily. 4/5/18   CLEMENTE Franco   raNITIdine (ZANTAC) 150 MG tablet Take 150 mg by mouth Daily As Needed (Breakthrough acid reflux).    Historical Provider, MD   rosuvastatin (CRESTOR) 10 MG tablet Take 10 mg by mouth Daily.    Historical Provider, MD   traZODone (DESYREL) 50 MG tablet Take 100 mg by mouth At Night As Needed for Sleep.    Historical Provider, MD   vitamin D (ERGOCALCIFEROL) 37749 units capsule capsule Take 50,000 Units by mouth 1 (One) Time Per Week. Fridays    Historical Provider, MD     Objective     Vital Signs: BP (!) 155/104   Pulse 78   Temp 98.9 °F (37.2 °C)   Resp 18   Ht 157.5 cm (62\")   Wt 72.6 kg (160 lb)   SpO2 97%   BMI 29.26 kg/m²   Physical Exam   Constitutional: She is oriented to person, place, and time. She appears well-developed and well-nourished.   HENT:   Head: Normocephalic and atraumatic.   Eyes: Conjunctivae and EOM are normal. Pupils are equal, round, and reactive to light.   Neck: Neck supple.   Cardiovascular: Normal rate and regular rhythm.  Exam reveals no gallop and no friction rub.    No murmur heard.  Pulmonary/Chest: Effort normal and breath sounds normal.   Abdominal: Soft. Bowel sounds are normal. There is no hepatosplenomegaly. There is no tenderness.   Musculoskeletal: Normal range of motion. She exhibits no edema.   Neurological: She is alert and oriented to person, place, and time. No cranial nerve deficit.   Skin: Skin is warm and dry.   Chest wall incision healing.  No erythema   Psychiatric: She has a normal mood and affect. Her behavior is normal.   Nursing note and vitals reviewed.          Results Reviewed:  Lab Results (last 24 hours)     Procedure Component Value Units Date/Time    Urinalysis With / Microscopic " If Indicated - Urine, Clean Catch [[id number]] Collected:  04/13/18 1238    Specimen:  Urine from Urine, Clean Catch Updated:  04/13/18 1242    Comprehensive Metabolic Panel [[id number]]  (Abnormal) Collected:  04/13/18 1116    Specimen:  Blood Updated:  04/13/18 1159     Glucose 995 (C) mg/dL      BUN 36 (H) mg/dL      Creatinine 1.13 mg/dL      Sodium 123 (L) mmol/L      Potassium 5.9 (H) mmol/L      Chloride 80 (L) mmol/L      CO2 31.0 mmol/L      Calcium 10.1 mg/dL      Total Protein 7.6 g/dL      Albumin 4.30 g/dL      ALT (SGPT) 53 U/L      AST (SGOT) 29 U/L      Alkaline Phosphatase 228 (H) U/L      Total Bilirubin 0.5 mg/dL      eGFR Non African Amer 49 (L) mL/min/1.73      Globulin 3.3 gm/dL      A/G Ratio 1.3 g/dL      BUN/Creatinine Ratio 31.9 (H)     Anion Gap 12.0 mmol/L     CBC & Differential [[id number]] Collected:  04/13/18 1116    Specimen:  Blood Updated:  04/13/18 1144    Narrative:       The following orders were created for panel order CBC & Differential.  Procedure                               Abnormality         Status                     ---------                               -----------         ------                     CBC Auto Differential[[id number]]        Abnormal            Final result                 Please view results for these tests on the individual orders.    CBC Auto Differential [[id number]]  (Abnormal) Collected:  04/13/18 1116    Specimen:  Blood Updated:  04/13/18 1144     WBC 11.94 (H) 10*3/mm3      RBC 4.71 10*6/mm3      Hemoglobin 13.8 g/dL      Hematocrit 40.5 %      MCV 86.0 fL      MCH 29.3 pg      MCHC 34.1 g/dL      RDW 13.5 %      RDW-SD 42.4 fl      MPV 11.7 fL      Platelets 268 10*3/mm3      Neutrophil % 83.6 (H) %      Lymphocyte % 11.5 (L) %      Monocyte % 3.7 (L) %      Eosinophil % 0.1 %      Basophil % 0.2 %      Immature Grans % 0.9 %      Neutrophils, Absolute 9.99 (H) 10*3/mm3      Lymphocytes, Absolute 1.37 10*3/mm3      Monocytes, Absolute 0.44 10*3/mm3       Eosinophils, Absolute 0.01 10*3/mm3      Basophils, Absolute 0.02 10*3/mm3      Immature Grans, Absolute 0.11 (H) 10*3/mm3      nRBC 0.0 /100 WBC         Imaging Results (last 24 hours)     Procedure Component Value Units Date/Time    XR Chest 1 View [[id number]] Collected:  04/13/18 1228     Updated:  04/13/18 1232    Narrative:       EXAMINATION:   XR CHEST 1 VW-  4/13/2018 12:28 PM CDT     HISTORY: Cough     Frontal upright radiograph of the chest 4/13/2018 11:45 AM CDT     COMPARISON: April 4, 2018.     FINDINGS:   The lungs are clear. Infiltrate noted April for the right lung is now  completely resolved with no residual. Resection of the left sixth rib is  noted.. The cardiomediastinal silhouette and pulmonary vascularity are  within normal limits.      Mild pleural thickening in the left lateral chest is noted..       Impression:       1. No acute cardiopulmonary process. Previous noted infiltrate described  April 4 is completely resolved..        This report was finalized on 04/13/2018 12:29 by Dr. Romaine Carias MD.        I have personally reviewed and interpreted the radiology studies and ECG obtained at time of admission.     Assessment / Plan     Assessment:     Hyperglycemia  DM II uncontrolled.  HTN  Hyperlipidemia    Plan:      Admit ccu  IVF  Sliding scale insulin  Resume long acting insulin  Hourly bmp  Hourly fsbs on the opposite half hour  Morning lab  Cbc, cmp    Code Status: full     I discussed the patients findings and my recommendations with the patient    Estimated length of stay 2-3    Sandra Saunders DO   04/13/18   1:10 PM

## 2018-04-13 NOTE — ED PROVIDER NOTES
Subjective   Patient is a 57-year-old female with history of hypertension and type II diabetes mellitus (with history of empyema status post left thoracotomy with evacuation of empyema and decortication on 03/27/2018) who reports that she checked her blood sugar yesterday morning and it was in the 220s.  Patient reports that later that day she went to Dr. Hurd's office and had laboratory work performed.  She was called today by Dr. Hurd's office and was told that her laboratory work performed yesterday showed that her serum sodium level was low and her glucose level was very elevated.  She was told to come to the emergency department.  She reports she checked her glucose at home this morning at 8 AM and it was 222.  Laboratory report dated 04/12/2018 (as ordered by Dr. Hurd's office) revealed a glucose of 1215 and a sodium of 118.      History provided by:  Patient      Review of Systems   Constitutional: Positive for fatigue (Chronic).   HENT: Negative.    Eyes: Negative.    Respiratory: Positive for cough (Chronic).    Cardiovascular: Negative.    Gastrointestinal: Negative.    Endocrine: Negative.    Genitourinary: Negative.    Musculoskeletal: Negative.    Skin: Negative.    Allergic/Immunologic: Negative.    Neurological: Negative.    Hematological: Negative.    Psychiatric/Behavioral: Negative.    All other systems reviewed and are negative.      Past Medical History:   Diagnosis Date   • Garza's esophagus    • Depression    • Diabetes mellitus    • GERD (gastroesophageal reflux disease)    • Hypertension    • Kidney stone        Allergies   Allergen Reactions   • Sulfa Antibiotics Hives       Past Surgical History:   Procedure Laterality Date   • BACK SURGERY     • COLONOSCOPY  10/18/2006    hyperplastic ascending colon polyp- recall in 7 years   • COLONOSCOPY N/A 7/24/2017    Procedure: COLONOSCOPY WITH ANESTHESIA;  Surgeon: Jenniffer Smith MD;  Location: [facility] ENDOSCOPY;  Service:    • ENDOSCOPY N/A  7/24/2017    Procedure: ESOPHAGOGASTRODUODENOSCOPY WITH ANESTHESIA;  Surgeon: Jenniffer Smith MD;  Location: [facility] ENDOSCOPY;  Service:    • HYSTERECTOMY     • THORACOTOMY Left 3/27/2018    Procedure: THORACOTOMY, drainage of empyema and decortication;  Surgeon: Abel Stark MD;  Location: [facility] OR;  Service: Cardiothoracic   • UPPER GASTROINTESTINAL ENDOSCOPY  12/08/2011       History reviewed. No pertinent family history.    Social History     Social History   • Marital status:      Social History Main Topics   • Smoking status: Former Smoker   • Smokeless tobacco: Never Used   • Alcohol use No   • Drug use: No     Other Topics Concern   • Not on file           Objective   Physical Exam   Constitutional: She is oriented to person, place, and time. She appears well-developed and well-nourished.   HENT:   Head: Normocephalic and atraumatic.   Right Ear: External ear normal.   Left Ear: External ear normal.   Nose: Nose normal.   Mouth/Throat: Oropharynx is clear and moist.   Eyes: Conjunctivae and EOM are normal. Pupils are equal, round, and reactive to light. Right eye exhibits no discharge. Left eye exhibits no discharge.   Neck: Normal range of motion. Neck supple. No tracheal deviation present. No thyromegaly present.   Cardiovascular: Normal rate, regular rhythm, normal heart sounds and intact distal pulses.    No murmur heard.  Pulmonary/Chest: Effort normal. No respiratory distress. She exhibits no tenderness.   Decreased bilateral air entry & occasional cough   Abdominal: Soft. She exhibits no distension. There is no tenderness.   Musculoskeletal: Normal range of motion. She exhibits no edema, tenderness or deformity.   Neurological: She is alert and oriented to person, place, and time. No cranial nerve deficit.   Skin: Skin is warm and dry. No erythema. No pallor.   Post-surgical site appearance on left posterior mid thoracic site and left lateral rib area   Psychiatric: She has a normal mood and  affect. Judgment normal.   Nursing note and vitals reviewed.      Procedures         ED Course  ED Course                  MDM  Number of Diagnoses or Management Options  Essential hypertension: established and worsening  Hyperkalemia:   Hyponatremia: established and improving  Leukocytosis, unspecified type:   Type 2 diabetes mellitus with complication, unspecified long term insulin use status: established and worsening     Amount and/or Complexity of Data Reviewed  Clinical lab tests: ordered and reviewed  Tests in the radiology section of CPT®: ordered and reviewed  Discuss the patient with other providers: yes    Risk of Complications, Morbidity, and/or Mortality  Presenting problems: moderate  Diagnostic procedures: moderate  Management options: moderate    Patient Progress  Patient progress: stable      Final diagnoses:   Type 2 diabetes mellitus with complication, unspecified long term insulin use status   Hyperkalemia   Hyponatremia   Leukocytosis, unspecified type   Essential hypertension            Edu Lopez MD  04/13/18 9197

## 2018-04-14 VITALS
HEART RATE: 68 BPM | RESPIRATION RATE: 18 BRPM | DIASTOLIC BLOOD PRESSURE: 71 MMHG | WEIGHT: 159 LBS | BODY MASS INDEX: 29.26 KG/M2 | SYSTOLIC BLOOD PRESSURE: 129 MMHG | HEIGHT: 62 IN | OXYGEN SATURATION: 98 % | TEMPERATURE: 98.7 F

## 2018-04-14 LAB
ALBUMIN SERPL-MCNC: 3.4 G/DL (ref 3.5–5)
ALBUMIN/GLOB SERPL: 1.1 G/DL (ref 1.1–2.5)
ALP SERPL-CCNC: 147 U/L (ref 24–120)
ALT SERPL W P-5'-P-CCNC: 41 U/L (ref 0–54)
ANION GAP SERPL CALCULATED.3IONS-SCNC: 8 MMOL/L (ref 4–13)
AST SERPL-CCNC: 30 U/L (ref 7–45)
BILIRUB SERPL-MCNC: 0.2 MG/DL (ref 0.1–1)
BUN BLD-MCNC: 26 MG/DL (ref 5–21)
BUN/CREAT SERPL: 26.8 (ref 7–25)
CALCIUM SPEC-SCNC: 9 MG/DL (ref 8.4–10.4)
CHLORIDE SERPL-SCNC: 101 MMOL/L (ref 98–110)
CO2 SERPL-SCNC: 31 MMOL/L (ref 24–31)
CREAT BLD-MCNC: 0.97 MG/DL (ref 0.5–1.4)
GFR SERPL CREATININE-BSD FRML MDRD: 59 ML/MIN/1.73
GLOBULIN UR ELPH-MCNC: 3.1 GM/DL
GLUCOSE BLD-MCNC: 104 MG/DL (ref 70–100)
GLUCOSE BLDC GLUCOMTR-MCNC: 115 MG/DL (ref 70–130)
GLUCOSE BLDC GLUCOMTR-MCNC: 136 MG/DL (ref 70–130)
GLUCOSE BLDC GLUCOMTR-MCNC: 95 MG/DL (ref 70–130)
POTASSIUM BLD-SCNC: 4.1 MMOL/L (ref 3.5–5.3)
PROT SERPL-MCNC: 6.5 G/DL (ref 6.3–8.7)
SODIUM BLD-SCNC: 140 MMOL/L (ref 135–145)

## 2018-04-14 PROCEDURE — 63710000001 PREDNISONE PER 1 MG: Performed by: FAMILY MEDICINE

## 2018-04-14 PROCEDURE — 82962 GLUCOSE BLOOD TEST: CPT

## 2018-04-14 PROCEDURE — 63710000001 INSULIN DETEMIR PER 5 UNITS: Performed by: FAMILY MEDICINE

## 2018-04-14 PROCEDURE — 80053 COMPREHEN METABOLIC PANEL: CPT | Performed by: FAMILY MEDICINE

## 2018-04-14 RX ADMIN — FLUOXETINE HYDROCHLORIDE 20 MG: 20 CAPSULE ORAL at 08:25

## 2018-04-14 RX ADMIN — AMOXICILLIN AND CLAVULANATE POTASSIUM 1200 MG: 600; 42.9 SUSPENSION ORAL at 08:50

## 2018-04-14 RX ADMIN — AMLODIPINE BESYLATE 10 MG: 10 TABLET ORAL at 08:25

## 2018-04-14 RX ADMIN — INSULIN DETEMIR 17 UNITS: 100 INJECTION, SOLUTION SUBCUTANEOUS at 08:37

## 2018-04-14 RX ADMIN — PREDNISONE 40 MG: 20 TABLET ORAL at 08:25

## 2018-04-14 RX ADMIN — ROSUVASTATIN CALCIUM 10 MG: 10 TABLET, FILM COATED ORAL at 08:25

## 2018-04-14 RX ADMIN — FAMOTIDINE 20 MG: 20 TABLET, FILM COATED ORAL at 08:25

## 2018-04-14 RX ADMIN — ASPIRIN 81 MG: 81 TABLET ORAL at 08:25

## 2018-04-14 NOTE — PLAN OF CARE
Problem: Patient Care Overview  Goal: Plan of Care Review  Outcome: Ongoing (interventions implemented as appropriate)   04/14/18 0556   OTHER   Outcome Summary Blood sugar at 0400 was 115. Pt comfortable and able to sleep overnight. Afebrile. Accuchecks q4hrs.    Coping/Psychosocial   Plan of Care Reviewed With patient   Plan of Care Review   Progress improving     Goal: Individualization and Mutuality  Outcome: Ongoing (interventions implemented as appropriate)    Goal: Discharge Needs Assessment  Outcome: Ongoing (interventions implemented as appropriate)    Goal: Interprofessional Rounds/Family Conf  Outcome: Ongoing (interventions implemented as appropriate)      Problem: Hyperglycemia, Persistent (Adult)  Goal: Signs and Symptoms of Listed Potential Problems Will be Absent, Minimized or Managed (Hyperglycemia, Persistent)  Outcome: Ongoing (interventions implemented as appropriate)

## 2018-04-14 NOTE — DISCHARGE SUMMARY
[facility] Medicine Services  DISCHARGE SUMMARY       Date of Admission: 4/13/2018  Date of Discharge:  4/14/2018  Primary Care Physician: CLEMENTE East    Presenting Problem/History of Present Illness:  Hyperglycemia [R73.9]     Final Discharge Diagnoses:  Hyperglycemia    Consults: none    Procedures Performed: none    Pertinent Test Results:    Specimen: Blood Updated: 04/14/18 0453    Glucose 104 (H) mg/dL     BUN 26 (H) mg/dL     Creatinine 0.97 mg/dL     Sodium 140 mmol/L     Potassium 4.1 mmol/L     Chloride 101 mmol/L     CO2 31.0 mmol/L     Calcium 9.0 mg/dL     Total Protein 6.5 g/dL     Albumin 3.40 (L) g/dL     ALT (SGPT) 41 U/L     AST (SGOT) 30 U/L     Alkaline Phosphatase 147 (H) U/L     Total Bilirubin 0.2 mg/dL     eGFR Non African Amer 59 (L) mL/min/1.73     Globulin 3.1 gm/dL     A/G Ratio 1.1 g/dL     BUN/Creatinine Ratio 26.8 (H)    Anion Gap 8.0 mmol/L    Basic Metabolic Panel [[id number]] (Abnormal) Collected: 04/13/18 1722   Lab Status: Final result Specimen: Blood Updated: 04/13/18 1740    Glucose 382 (H) mg/dL     BUN 30 (H) mg/dL     Creatinine 0.88 mg/dL     Sodium 134 (L) mmol/L     Potassium 4.0 mmol/L     Chloride 92 (L) mmol/L     CO2 29.0 mmol/L     Calcium 9.8 mg/dL     eGFR Non African Amer 66 mL/min/1.73     BUN/Creatinine Ratio 34.1 (H)    Anion Gap 13.0 mmol/L    Narrative:     GFR Normal >60  Chronic Kidney Disease <60  Kidney Failure <15   Basic Metabolic Panel [[id number]] (Abnormal) Collected: 04/13/18 1602   Lab Status: Final result Specimen: Blood Updated: 04/13/18 1637    Glucose 500 (C) mg/dL     BUN 31 (H) mg/dL     Creatinine 0.98 mg/dL     Sodium 133 (L) mmol/L     Potassium 4.4 mmol/L     Chloride 90 (L) mmol/L     CO2 30.0 mmol/L     Calcium 9.8 mg/dL     eGFR Non African Amer 58 (L) mL/min/1.73     BUN/Creatinine Ratio 31.6 (H)    Anion Gap 13.0 mmol/L    Narrative:     GFR Normal >60  Chronic Kidney Disease <60  Kidney  "Failure <15   Basic Metabolic Panel [[id number]] (Abnormal) Collected: 04/13/18 1508   Lab Status: Final result Specimen: Blood Updated: 04/13/18 1531    Glucose 586 (C) mg/dL     BUN 33 (H) mg/dL     Creatinine 0.98 mg/dL     Sodium 130 (L) mmol/L     Potassium 4.3 mmol/L     Chloride 90 (L) mmol/L     CO2 29.0 mmol/L     Calcium 9.8 mg/dL     eGFR Non African Amer 58 (L) mL/min/1.73     BUN/Creatinine Ratio 33.7 (H)    Anion Gap 11.0 mmol/L    Narrative:     GFR Normal >60  Chronic Kidney Disease <60  Kidney Failure <15   Basic Metabolic Panel [[id number]] (Abnormal) Collected: 04/13/18 1340   Lab Status: Final result Specimen: Blood Updated: 04/13/18 1425    Glucose 680 (C) mg/dL     BUN 35 (H) mg/dL     Creatinine 1.00 mg/dL     Sodium 128 (L) mmol/L     Potassium 4.6 mmol/L     Chloride 85 (L) mmol/L     CO2 29.0 mmol/L     Calcium 10.1 mg/dL     eGFR Non African Amer 57 (L) mL/min/1.73     BUN/Creatinine Ratio 35.0 (H)    Anion Gap 14.0 (H) mmol/L    Narrative:     GFR Normal >60       Chief Complaint on Day of Discharge:   Wants to go home.     History of Present Illness on Day of Discharge: Sugars are now with in acceptable range.    Hospital Course:  The patient is a 57 y.o. female who presented to [facility] with elevated blood sugar.  She states she had been taking her medications as prescribed, however; she was usign long acting only once a day.     She was admitted.  She was hydrated.  Insulin given appropriately.    Sugars came down appropriately.  Her  Is bringing her glucometer at home to be checked against ours for correctness.       Condition on Discharge:  Stable improved     Physical Exam on Discharge:  /66   Pulse 67   Temp 98.7 °F (37.1 °C)   Resp 18   Ht 157.5 cm (62\")   Wt 72.1 kg (159 lb)   SpO2 98%   BMI 29.08 kg/m²   Physical Exam   Constitutional: She is oriented to person, place, and time. She appears well-developed and well-nourished.   Eyes: Conjunctivae " and EOM are normal. Pupils are equal, round, and reactive to light.   Neck: Neck supple.   Cardiovascular: Normal rate and regular rhythm.  Exam reveals no gallop and no friction rub.    No murmur heard.  Pulmonary/Chest: Effort normal and breath sounds normal.   Abdominal: Soft. Bowel sounds are normal. There is no hepatosplenomegaly. There is no tenderness.   Musculoskeletal: Normal range of motion. She exhibits no edema.   Neurological: She is alert and oriented to person, place, and time. No cranial nerve deficit.   Skin: Skin is warm and dry.   Psychiatric: She has a normal mood and affect. Her behavior is normal.   Nursing note and vitals reviewed.        Discharge Disposition:  Home or Self Care    Discharge Medications:   Carlee Vee   Home Medication Instructions LALO:[id number]    Printed on:04/14/18 4133   Medication Information                      amLODIPine (NORVASC) 10 MG tablet  Take 10 mg by mouth Daily.             amoxicillin-clavulanate (AUGMENTIN) 600-42.9 MG/5ML suspension  Take 10 mL by mouth Every 12 (Twelve) Hours for 21 days.             aspirin 81 MG EC tablet  Chew 81 mg Daily.             FLUoxetine (PROzac) 20 MG capsule  Take 20 mg by mouth Daily.             gabapentin (NEURONTIN) 100 MG capsule  Take 400 mg by mouth Every Night.             HYDROcodone-acetaminophen (NORCO) 5-325 MG per tablet  Take 1 tablet by mouth Every 6 (Six) Hours As Needed for Moderate Pain .             HYDROcodone-acetaminophen (NORCO) 5-325 MG per tablet  Take 1 tablet by mouth Every 6 (Six) Hours As Needed for Moderate Pain  for up to 40 doses.             insulin aspart (novoLOG) 100 UNIT/ML injection  Inject  under the skin 3 (Three) Times a Day Before Meals. 150 - 199 = 4 units  200 - 249 = 8 units  250 - 299 = 12 units  300 - 349 = 16 units  349 - 400 = 20 units  > 400 = 24 units             insulin detemir (LEVEMIR) 100 UNIT/ML injection  Inject 17 Units under the skin Every 12 (Twelve) Hours.              pantoprazole (PROTONIX) 40 MG EC tablet  Take 40 mg by mouth Daily.             polyethylene glycol (MIRALAX) packet  Take 17 g by mouth Daily.             predniSONE (DELTASONE) 20 MG tablet  Take 2 tablets by mouth Daily.             raNITIdine (ZANTAC) 150 MG tablet  Take 150 mg by mouth Daily As Needed (Breakthrough acid reflux).             rosuvastatin (CRESTOR) 10 MG tablet  Take 10 mg by mouth Daily.             traZODone (DESYREL) 50 MG tablet  Take 100 mg by mouth At Night As Needed for Sleep.             vitamin D (ERGOCALCIFEROL) 18106 units capsule capsule  Take 50,000 Units by mouth 1 (One) Time Per Week. Fridays                 Discharge Diet:   Diet Instructions     Diet: Regular, Consistent Carbohydrate       Discharge Diet:   Regular  Consistent Carbohydrate             Activity at Discharge:   Activity Instructions     Activity as Tolerated             Discharge Care Plan/Instructions: Monitor glucometer ac and hs, use sliding scale stay hydrated    Follow-up Appointments:   Future Appointments  Date Time Provider Department Center   5/7/2018 1:00 PM Abel Stark MD [facility]  [facility] None     PCP one week  Nephrology as directed.       Test Results Pending at Discharge: none    Sandra Saunders DO  04/14/18  7:41 AM    Time: 30 minutes

## 2018-04-16 ASSESSMENT — ENCOUNTER SYMPTOMS
SHORTNESS OF BREATH: 1
CONSTIPATION: 0
DIARRHEA: 0

## 2018-05-03 ENCOUNTER — OFFICE VISIT (OUTPATIENT)
Dept: FAMILY MEDICINE CLINIC | Age: 58
End: 2018-05-03
Payer: MEDICARE

## 2018-05-03 VITALS
HEART RATE: 93 BPM | DIASTOLIC BLOOD PRESSURE: 70 MMHG | OXYGEN SATURATION: 98 % | WEIGHT: 174 LBS | TEMPERATURE: 99.6 F | RESPIRATION RATE: 18 BRPM | SYSTOLIC BLOOD PRESSURE: 98 MMHG | HEIGHT: 62 IN | BODY MASS INDEX: 32.02 KG/M2

## 2018-05-03 DIAGNOSIS — G62.9 NEUROPATHY: ICD-10-CM

## 2018-05-03 DIAGNOSIS — L90.5: ICD-10-CM

## 2018-05-03 DIAGNOSIS — E55.9 VITAMIN D DEFICIENCY: ICD-10-CM

## 2018-05-03 DIAGNOSIS — Z87.09 HISTORY OF PLEURAL EMPYEMA: ICD-10-CM

## 2018-05-03 DIAGNOSIS — R07.81 RIB PAIN ON LEFT SIDE: Primary | ICD-10-CM

## 2018-05-03 LAB
ALBUMIN SERPL-MCNC: 4.1 G/DL (ref 3.5–5.2)
ALP BLD-CCNC: 94 U/L (ref 35–104)
ALT SERPL-CCNC: 23 U/L (ref 5–33)
ANION GAP SERPL CALCULATED.3IONS-SCNC: 16 MMOL/L (ref 7–19)
AST SERPL-CCNC: 20 U/L (ref 5–32)
BASOPHILS ABSOLUTE: 0 K/UL (ref 0–0.2)
BASOPHILS RELATIVE PERCENT: 0.4 % (ref 0–1)
BILIRUB SERPL-MCNC: <0.2 MG/DL (ref 0.2–1.2)
BUN BLDV-MCNC: 9 MG/DL (ref 6–20)
CALCIUM SERPL-MCNC: 9.5 MG/DL (ref 8.6–10)
CHLORIDE BLD-SCNC: 101 MMOL/L (ref 98–111)
CO2: 24 MMOL/L (ref 22–29)
CREAT SERPL-MCNC: 0.7 MG/DL (ref 0.5–0.9)
EOSINOPHILS ABSOLUTE: 0.4 K/UL (ref 0–0.6)
EOSINOPHILS RELATIVE PERCENT: 4.1 % (ref 0–5)
GFR NON-AFRICAN AMERICAN: >60
GLUCOSE BLD-MCNC: 155 MG/DL (ref 74–109)
HCT VFR BLD CALC: 40.7 % (ref 37–47)
HEMOGLOBIN: 12.7 G/DL (ref 12–16)
LYMPHOCYTES ABSOLUTE: 3.6 K/UL (ref 1.1–4.5)
LYMPHOCYTES RELATIVE PERCENT: 42.3 % (ref 20–40)
MCH RBC QN AUTO: 28.9 PG (ref 27–31)
MCHC RBC AUTO-ENTMCNC: 31.2 G/DL (ref 33–37)
MCV RBC AUTO: 92.5 FL (ref 81–99)
MONOCYTES ABSOLUTE: 0.6 K/UL (ref 0–0.9)
MONOCYTES RELATIVE PERCENT: 7.1 % (ref 0–10)
NEUTROPHILS ABSOLUTE: 3.9 K/UL (ref 1.5–7.5)
NEUTROPHILS RELATIVE PERCENT: 45.9 % (ref 50–65)
PDW BLD-RTO: 14.9 % (ref 11.5–14.5)
PLATELET # BLD: 205 K/UL (ref 130–400)
PMV BLD AUTO: 11.8 FL (ref 9.4–12.3)
POTASSIUM SERPL-SCNC: 3.9 MMOL/L (ref 3.5–5)
RBC # BLD: 4.4 M/UL (ref 4.2–5.4)
SODIUM BLD-SCNC: 141 MMOL/L (ref 136–145)
TOTAL PROTEIN: 6.9 G/DL (ref 6.6–8.7)
VITAMIN D 25-HYDROXY: 25.9 NG/ML
WBC # BLD: 8.5 K/UL (ref 4.8–10.8)

## 2018-05-03 PROCEDURE — 1036F TOBACCO NON-USER: CPT | Performed by: NURSE PRACTITIONER

## 2018-05-03 PROCEDURE — G8427 DOCREV CUR MEDS BY ELIG CLIN: HCPCS | Performed by: NURSE PRACTITIONER

## 2018-05-03 PROCEDURE — 99214 OFFICE O/P EST MOD 30 MIN: CPT | Performed by: NURSE PRACTITIONER

## 2018-05-03 PROCEDURE — 3017F COLORECTAL CA SCREEN DOC REV: CPT | Performed by: NURSE PRACTITIONER

## 2018-05-03 PROCEDURE — G8417 CALC BMI ABV UP PARAM F/U: HCPCS | Performed by: NURSE PRACTITIONER

## 2018-05-03 PROCEDURE — G8599 NO ASA/ANTIPLAT THER USE RNG: HCPCS | Performed by: NURSE PRACTITIONER

## 2018-05-03 RX ORDER — TRAMADOL HYDROCHLORIDE 50 MG/1
TABLET ORAL
Qty: 60 TABLET | Refills: 0 | Status: SHIPPED | OUTPATIENT
Start: 2018-05-03 | End: 2018-07-14 | Stop reason: SDUPTHER

## 2018-05-03 RX ORDER — AMLODIPINE BESYLATE 10 MG/1
10 TABLET ORAL DAILY
COMMUNITY
End: 2018-05-03 | Stop reason: DRUGHIGH

## 2018-05-03 RX ORDER — GABAPENTIN 100 MG/1
CAPSULE ORAL
Qty: 120 CAPSULE | Refills: 2 | Status: SHIPPED | OUTPATIENT
Start: 2018-05-03 | End: 2018-06-30 | Stop reason: SDUPTHER

## 2018-05-03 RX ORDER — INSULIN GLARGINE 100 [IU]/ML
20 INJECTION, SOLUTION SUBCUTANEOUS 2 TIMES DAILY
COMMUNITY
End: 2018-07-14 | Stop reason: SDUPTHER

## 2018-05-03 RX ORDER — AMLODIPINE BESYLATE 5 MG/1
5 TABLET ORAL DAILY
Status: SHIPPED | COMMUNITY
Start: 2018-05-03 | End: 2018-07-14 | Stop reason: SDUPTHER

## 2018-05-03 ASSESSMENT — ENCOUNTER SYMPTOMS
SHORTNESS OF BREATH: 0
COUGH: 0
CONSTIPATION: 0
DIARRHEA: 0

## 2018-05-03 ASSESSMENT — PATIENT HEALTH QUESTIONNAIRE - PHQ9
SUM OF ALL RESPONSES TO PHQ QUESTIONS 1-9: 0
2. FEELING DOWN, DEPRESSED OR HOPELESS: 0
SUM OF ALL RESPONSES TO PHQ9 QUESTIONS 1 & 2: 0
1. LITTLE INTEREST OR PLEASURE IN DOING THINGS: 0

## 2018-05-07 ENCOUNTER — OFFICE VISIT (OUTPATIENT)
Dept: CARDIAC SURGERY | Facility: CLINIC | Age: 58
End: 2018-05-07

## 2018-05-07 ENCOUNTER — HOSPITAL ENCOUNTER (OUTPATIENT)
Dept: GENERAL RADIOLOGY | Facility: HOSPITAL | Age: 58
Discharge: HOME | End: 2018-05-07
Admitting: NURSE PRACTITIONER

## 2018-05-07 VITALS
HEIGHT: 62 IN | DIASTOLIC BLOOD PRESSURE: 84 MMHG | SYSTOLIC BLOOD PRESSURE: 130 MMHG | BODY MASS INDEX: 32.76 KG/M2 | HEART RATE: 98 BPM | WEIGHT: 178 LBS | OXYGEN SATURATION: 99 %

## 2018-05-07 DIAGNOSIS — J86.9 EMPYEMA OF PLEURA (HCC): ICD-10-CM

## 2018-05-07 DIAGNOSIS — J86.9 EMPYEMA OF PLEURA (HCC): Primary | ICD-10-CM

## 2018-05-07 LAB — FUNGUS WND CULT: NORMAL

## 2018-05-07 PROCEDURE — 99024 POSTOP FOLLOW-UP VISIT: CPT | Performed by: THORACIC SURGERY (CARDIOTHORACIC VASCULAR SURGERY)

## 2018-05-07 PROCEDURE — 71046 X-RAY EXAM CHEST 2 VIEWS: CPT

## 2018-05-07 NOTE — PROGRESS NOTES
05/07/18  1:20 PM      Patient Name:Carlee Vee  PCP:CLEMENTE Gonzalez    Procedure and Date: Left thoracotomy with evacuation of empyema and decortication performed on 03/27/2018.    Subjective     Ms.Traci Veeis recovering well from her operation.Her post operative recovery was uneventful with no major complications.  She did require readmission for 1 day for diabetes control.  This is under much better control presently.  She is returning gradually to pre op sleeping patterns and pain control has been good. Her activity level is good. The patient is walking daily. She has no fevers/sweats/chills.She has no drainage or problems from incisions.She has no major chest pain or shortness of breath.  She is no longer on home oxygen and was discharged per Dr. Stephenie Canada and is not taking antibiotics now.       Objective       Current Outpatient Prescriptions:   •  amLODIPine (NORVASC) 10 MG tablet, Take 10 mg by mouth Daily., Disp: , Rfl:   •  aspirin 81 MG EC tablet, Chew 81 mg Daily., Disp: , Rfl:   •  FLUoxetine (PROzac) 20 MG capsule, Take 20 mg by mouth Daily., Disp: , Rfl:   •  gabapentin (NEURONTIN) 100 MG capsule, Take 400 mg by mouth Every Night., Disp: , Rfl: 3  •  insulin aspart (novoLOG) 100 UNIT/ML injection, Inject  under the skin 3 (Three) Times a Day Before Meals. 150 - 199 = 4 units 200 - 249 = 8 units 250 - 299 = 12 units 300 - 349 = 16 units 349 - 400 = 20 units > 400 = 24 units, Disp: , Rfl:   •  insulin detemir (LEVEMIR) 100 UNIT/ML injection, Inject 17 Units under the skin Every 12 (Twelve) Hours., Disp: 1 each, Rfl: 1  •  pantoprazole (PROTONIX) 40 MG EC tablet, Take 40 mg by mouth Daily., Disp: , Rfl:   •  polyethylene glycol (MIRALAX) packet, Take 17 g by mouth Daily., Disp: 30 each, Rfl: 0  •  rosuvastatin (CRESTOR) 10 MG tablet, Take 10 mg by mouth Daily., Disp: , Rfl:   •  vitamin D (ERGOCALCIFEROL) 21925 units capsule capsule, Take 50,000 Units by mouth 1 (One) Time Per Week. Fridays,  "Disp: , Rfl:   •  traZODone (DESYREL) 50 MG tablet, Take 100 mg by mouth At Night As Needed for Sleep., Disp: , Rfl:     /84 (BP Location: Left arm, Patient Position: Sitting, Cuff Size: Adult)   Pulse 98   Ht 157.5 cm (62\")   Wt 80.7 kg (178 lb)   SpO2 99%   BMI 32.56 kg/m²     Physical Exam:     General: Alert,oriented in no distress     Chest:  wound healing well.     Lungs: clear to auscultation bilaterally     Heart: regular rate and rythym without murmur,gallop or rub    Images for this visit: A PA and lateral chest x-ray performed today shows both lungs expanded.  There was some scarring in the left base which is normal post decortication.  This is a normal postoperative film      Impression: : Improving.  She is to avoid heavy straining and lifting with her left arm but otherwise may return to normal activities     Tobacco: The patient does not use tobacco products and therefore  does not need tobacco cessation education.  He quit approximately 3 years ago and has no desire to restart.      Plan: I feel that Ms.Traci Vee is progressing well.  I will turn over her further care to her PCP Elinor CORNEJO . I will be glad to see the patient again as needed and she has our office contact number should any difficulties arise.        Abel Stark M.D.  5/7/2018  1:20 PM                  "

## 2018-05-17 RX ORDER — ERGOCALCIFEROL 1.25 MG/1
CAPSULE ORAL
Qty: 4 CAPSULE | Refills: 2 | Status: SHIPPED | OUTPATIENT
Start: 2018-05-17

## 2018-05-23 ENCOUNTER — OFFICE VISIT (OUTPATIENT)
Dept: FAMILY MEDICINE CLINIC | Age: 58
End: 2018-05-23
Payer: MEDICARE

## 2018-05-23 VITALS
RESPIRATION RATE: 16 BRPM | HEART RATE: 80 BPM | TEMPERATURE: 98 F | WEIGHT: 178 LBS | BODY MASS INDEX: 32.56 KG/M2 | SYSTOLIC BLOOD PRESSURE: 128 MMHG | DIASTOLIC BLOOD PRESSURE: 82 MMHG | OXYGEN SATURATION: 98 %

## 2018-05-23 DIAGNOSIS — I10 ESSENTIAL HYPERTENSION: Primary | ICD-10-CM

## 2018-05-23 DIAGNOSIS — E11.9 TYPE 2 DIABETES MELLITUS TREATED WITH INSULIN (HCC): ICD-10-CM

## 2018-05-23 DIAGNOSIS — Z79.4 TYPE 2 DIABETES MELLITUS TREATED WITH INSULIN (HCC): ICD-10-CM

## 2018-05-23 PROCEDURE — G8417 CALC BMI ABV UP PARAM F/U: HCPCS | Performed by: NURSE PRACTITIONER

## 2018-05-23 PROCEDURE — 2022F DILAT RTA XM EVC RTNOPTHY: CPT | Performed by: NURSE PRACTITIONER

## 2018-05-23 PROCEDURE — G8599 NO ASA/ANTIPLAT THER USE RNG: HCPCS | Performed by: NURSE PRACTITIONER

## 2018-05-23 PROCEDURE — 1036F TOBACCO NON-USER: CPT | Performed by: NURSE PRACTITIONER

## 2018-05-23 PROCEDURE — G8427 DOCREV CUR MEDS BY ELIG CLIN: HCPCS | Performed by: NURSE PRACTITIONER

## 2018-05-23 PROCEDURE — 3046F HEMOGLOBIN A1C LEVEL >9.0%: CPT | Performed by: NURSE PRACTITIONER

## 2018-05-23 PROCEDURE — 99213 OFFICE O/P EST LOW 20 MIN: CPT | Performed by: NURSE PRACTITIONER

## 2018-05-23 PROCEDURE — 3017F COLORECTAL CA SCREEN DOC REV: CPT | Performed by: NURSE PRACTITIONER

## 2018-05-23 ASSESSMENT — ENCOUNTER SYMPTOMS
SHORTNESS OF BREATH: 0
COUGH: 0

## 2018-06-28 DIAGNOSIS — Z79.4 TYPE 2 DIABETES MELLITUS TREATED WITH INSULIN (HCC): ICD-10-CM

## 2018-06-28 DIAGNOSIS — E11.9 TYPE 2 DIABETES MELLITUS TREATED WITH INSULIN (HCC): ICD-10-CM

## 2018-06-28 DIAGNOSIS — I10 ESSENTIAL HYPERTENSION: ICD-10-CM

## 2018-06-28 LAB
ALBUMIN SERPL-MCNC: 4.2 G/DL (ref 3.5–5.2)
ALP BLD-CCNC: 83 U/L (ref 35–104)
ALT SERPL-CCNC: 52 U/L (ref 5–33)
ANION GAP SERPL CALCULATED.3IONS-SCNC: 13 MMOL/L (ref 7–19)
AST SERPL-CCNC: 42 U/L (ref 5–32)
BILIRUB SERPL-MCNC: <0.2 MG/DL (ref 0.2–1.2)
BUN BLDV-MCNC: 16 MG/DL (ref 6–20)
CALCIUM SERPL-MCNC: 9.3 MG/DL (ref 8.6–10)
CHLORIDE BLD-SCNC: 103 MMOL/L (ref 98–111)
CHOLESTEROL, TOTAL: 214 MG/DL (ref 160–199)
CO2: 26 MMOL/L (ref 22–29)
CREAT SERPL-MCNC: 0.6 MG/DL (ref 0.5–0.9)
GFR NON-AFRICAN AMERICAN: >60
GLUCOSE BLD-MCNC: 213 MG/DL (ref 74–109)
HBA1C MFR BLD: 8.4 %
HDLC SERPL-MCNC: 45 MG/DL (ref 65–121)
LDL CHOLESTEROL CALCULATED: 97 MG/DL
POTASSIUM SERPL-SCNC: 3.9 MMOL/L (ref 3.5–5)
SODIUM BLD-SCNC: 142 MMOL/L (ref 136–145)
TOTAL PROTEIN: 6.7 G/DL (ref 6.6–8.7)
TRIGL SERPL-MCNC: 361 MG/DL (ref 0–149)

## 2018-06-30 DIAGNOSIS — G62.9 NEUROPATHY: ICD-10-CM

## 2018-06-30 RX ORDER — PEN NEEDLE, DIABETIC 30 GX3/16"
NEEDLE, DISPOSABLE MISCELLANEOUS
Qty: 90 EACH | Refills: 5 | Status: SHIPPED | OUTPATIENT
Start: 2018-06-30 | End: 2018-10-17

## 2018-06-30 RX ORDER — GABAPENTIN 100 MG/1
200 CAPSULE ORAL 2 TIMES DAILY
Qty: 120 CAPSULE | Refills: 2 | Status: SHIPPED | OUTPATIENT
Start: 2018-06-30 | End: 2018-10-17 | Stop reason: SDUPTHER

## 2018-07-07 DIAGNOSIS — E78.2 MIXED HYPERLIPIDEMIA: ICD-10-CM

## 2018-07-09 RX ORDER — ROSUVASTATIN CALCIUM 20 MG/1
TABLET, COATED ORAL
Qty: 30 TABLET | Refills: 0 | Status: SHIPPED | OUTPATIENT
Start: 2018-07-09 | End: 2018-07-09 | Stop reason: SDUPTHER

## 2018-07-14 ENCOUNTER — OFFICE VISIT (OUTPATIENT)
Dept: FAMILY MEDICINE CLINIC | Age: 58
End: 2018-07-14
Payer: MEDICARE

## 2018-07-14 VITALS
SYSTOLIC BLOOD PRESSURE: 120 MMHG | HEART RATE: 80 BPM | OXYGEN SATURATION: 97 % | BODY MASS INDEX: 35.3 KG/M2 | TEMPERATURE: 98.1 F | WEIGHT: 193 LBS | DIASTOLIC BLOOD PRESSURE: 70 MMHG

## 2018-07-14 DIAGNOSIS — E11.65 TYPE 2 DIABETES MELLITUS WITH HYPERGLYCEMIA, WITH LONG-TERM CURRENT USE OF INSULIN (HCC): Primary | ICD-10-CM

## 2018-07-14 DIAGNOSIS — E78.2 MIXED HYPERLIPIDEMIA: ICD-10-CM

## 2018-07-14 DIAGNOSIS — Z12.31 SCREENING MAMMOGRAM, ENCOUNTER FOR: ICD-10-CM

## 2018-07-14 DIAGNOSIS — R07.81 RIB PAIN ON LEFT SIDE: ICD-10-CM

## 2018-07-14 DIAGNOSIS — E55.9 VITAMIN D INSUFFICIENCY: ICD-10-CM

## 2018-07-14 DIAGNOSIS — Z79.4 TYPE 2 DIABETES MELLITUS WITH HYPERGLYCEMIA, WITH LONG-TERM CURRENT USE OF INSULIN (HCC): Primary | ICD-10-CM

## 2018-07-14 DIAGNOSIS — L90.5: ICD-10-CM

## 2018-07-14 PROCEDURE — 3045F PR MOST RECENT HEMOGLOBIN A1C LEVEL 7.0-9.0%: CPT | Performed by: NURSE PRACTITIONER

## 2018-07-14 PROCEDURE — G8417 CALC BMI ABV UP PARAM F/U: HCPCS | Performed by: NURSE PRACTITIONER

## 2018-07-14 PROCEDURE — G8427 DOCREV CUR MEDS BY ELIG CLIN: HCPCS | Performed by: NURSE PRACTITIONER

## 2018-07-14 PROCEDURE — 2022F DILAT RTA XM EVC RTNOPTHY: CPT | Performed by: NURSE PRACTITIONER

## 2018-07-14 PROCEDURE — 3017F COLORECTAL CA SCREEN DOC REV: CPT | Performed by: NURSE PRACTITIONER

## 2018-07-14 PROCEDURE — G8599 NO ASA/ANTIPLAT THER USE RNG: HCPCS | Performed by: NURSE PRACTITIONER

## 2018-07-14 PROCEDURE — 99214 OFFICE O/P EST MOD 30 MIN: CPT | Performed by: NURSE PRACTITIONER

## 2018-07-14 PROCEDURE — 1036F TOBACCO NON-USER: CPT | Performed by: NURSE PRACTITIONER

## 2018-07-14 RX ORDER — FLUOXETINE HYDROCHLORIDE 20 MG/1
CAPSULE ORAL
Qty: 90 CAPSULE | Refills: 3 | Status: SHIPPED | OUTPATIENT
Start: 2018-07-14 | End: 2019-08-05 | Stop reason: SDUPTHER

## 2018-07-14 RX ORDER — FLUOXETINE 10 MG/1
CAPSULE ORAL
Qty: 90 CAPSULE | Refills: 3 | Status: SHIPPED | OUTPATIENT
Start: 2018-07-14 | End: 2019-05-17 | Stop reason: SDUPTHER

## 2018-07-14 RX ORDER — AMLODIPINE BESYLATE 5 MG/1
5 TABLET ORAL DAILY
Qty: 90 TABLET | Refills: 3 | Status: SHIPPED | OUTPATIENT
Start: 2018-07-14 | End: 2019-09-26 | Stop reason: ALTCHOICE

## 2018-07-14 RX ORDER — INSULIN GLARGINE 100 [IU]/ML
INJECTION, SOLUTION SUBCUTANEOUS
Qty: 10 ML | Refills: 1 | Status: SHIPPED | OUTPATIENT
Start: 2018-07-14 | End: 2019-10-04 | Stop reason: SDUPTHER

## 2018-07-14 RX ORDER — TRAMADOL HYDROCHLORIDE 50 MG/1
TABLET ORAL
Qty: 60 TABLET | Refills: 0 | Status: SHIPPED | OUTPATIENT
Start: 2018-07-14 | End: 2018-09-24

## 2018-07-14 RX ORDER — GLUCOSAMINE HCL/CHONDROITIN SU 500-400 MG
CAPSULE ORAL
Qty: 100 STRIP | Refills: 3 | Status: SHIPPED | OUTPATIENT
Start: 2018-07-14 | End: 2019-10-04 | Stop reason: SDUPTHER

## 2018-07-14 RX ORDER — PANTOPRAZOLE SODIUM 40 MG/1
40 TABLET, DELAYED RELEASE ORAL DAILY
Qty: 90 TABLET | Refills: 3 | Status: SHIPPED | OUTPATIENT
Start: 2018-07-14 | End: 2019-08-09 | Stop reason: SDUPTHER

## 2018-07-14 RX ORDER — BLOOD-GLUCOSE METER
KIT MISCELLANEOUS
Qty: 1 KIT | Refills: 0 | Status: SHIPPED | OUTPATIENT
Start: 2018-07-14 | End: 2020-07-08 | Stop reason: SDUPTHER

## 2018-07-14 RX ORDER — ROSUVASTATIN CALCIUM 20 MG/1
TABLET, COATED ORAL
Qty: 90 TABLET | Refills: 3 | Status: SHIPPED | OUTPATIENT
Start: 2018-07-14 | End: 2019-05-17 | Stop reason: SDUPTHER

## 2018-07-14 ASSESSMENT — ENCOUNTER SYMPTOMS: SHORTNESS OF BREATH: 0

## 2018-07-14 NOTE — LETTER
MEDICATION AGREEMENT     Tamea Million  [birth year]      For certain conditions, multiple classes of medications may be used to help better manage your symptoms, and to improve your ability to function at home, work and in social settings. However, these medications do have risks, which will be discussed with you, including addiction and dependency. The following prescribed medications need frequent monitoring and will require you to partner and assist in your healthcare. Medication  Dose, instructions and quantity as indicated on current prescription bottle Diagnosis/Reason(s) for Taking Category     traMADol (Ultram)                             Benefits and goals of Controlled Substance Medications: There are two potential goals for your treatment: (1) decreased pain and suffering (2) improved daily life functions. There are many possible treatments for your chronic condition(s), and, in addition to controlled substance medications, we will try alternatives such as physical therapy, yoga, massage, home daily exercise, meditation, relaxation techniques, injections, chiropractic manipulations, surgery, cognitive therapy, hypnosis and many medications that are not habit-forming. Use of controlled substance medications may be helpful, but they are unlikely to resolve all of your symptoms or restore all function. Risks of Controlled Substance Medications:    Opioid pain medications: These medications can lead to problems such as addiction/dependence, sedation, lightheadedness/dizziness, memory issues, falls, constipation, nausea, or vomiting. They may also impair the ability to drive or operate machinery. Additionally, these medications may lower testosterone levels, leading to loss of bone strength, stamina and sex drive.   They may cause problems with breathing, sleep apnea and reduced coughing, which are especially dangerous for patients with lung disease. Overdose or dangerous interactions with alcohol and other medications may occur, leading to death. Hyperalgesia may develop, in which patients receiving opioids for the treatment of pain may actually become more sensitive to certain painful stimuli, and in some cases, experience pain from ordinarily non-painful stimuli. Women between the ages of 14-53 who could become pregnant should carefully weigh the risks and benefits of opioids with their physicians, as these medications increase the risk of pregnancy complications, including miscarriage,  delivery and stillbirth. It is also possible for babies to be born addicted to opioids. Opioid dependence withdrawal symptoms may include; feelings of uneasiness, increased pain, irritability, belly pain, diarrhea, sweats and goose-flesh. Benzodiazepines and non-benzodiazepine sleep medications: These medications can lead to problems such as addiction/dependence, sedation, fatigue, lightheadedness, dizziness, incoordination, falls, depression, hallucinations, and impaired judgment, memory and concentration. The ability to drive and operate machinery may also be affected. Abnormal sleep-related behaviors have been reported, including sleep walking, driving, making telephone calls, eating, or having sex while not fully awake. These medications can suppress breathing and worsen sleep apnea, particularly when combined with alcohol or other sedating medications, potentially leading to death. Dependence withdrawal symptoms may include tremors, anxiety, hallucinations and seizures. Stimulants:  Common adverse effects include addiction/dependence, increased blood pressure and heart rate, decreased appetite, nausea, involuntary weight loss, insomnia, irritability, and headaches.   These risks may increase when these medications are combined with other stimulants, such as caffeine pills or energy drinks, certain weight loss · I will actively participate in any program designed to improve function, including social, physical, psychological and daily or work activities. 2. I will not use illegal or street drugs or another person's prescription. If I have an addiction problem with drugs or alcohol and my provider asks me to enter a program to address this issue, I agree to follow through. Such programs may include:  · 12-Step program and securing a sponsor  · Individual counseling   · Inpatient or outpatient treatment  · Other:_____________________________________________________________________________________________________________________________________________    If in treatment, I will request that a copy of the programs initial evaluation and treatment recommendations be sent to this provider and will not expect refills until that is received. I will also request written monthly updates be sent to this provider to verify my continuing treatment. 3. I will consent to drug screening upon my providers request to assure I am only taking the prescribed drugs, described in this MEDICATION AGREEMENT. I understand that a drug screen is a laboratory test in which a sample of my urine, blood or saliva is checked to see what drugs I have been taking. 4. I agree that I will treat the providers and staff at this office with respect at all times. I will keep all of my scheduled appointments, but if I need to cancel my appointment, I will do so a minimum of 24 hours before it is scheduled. 5. I understand that this provider may stop prescribing the medications listed if:  · I do not show any improvement in pain, or my activity has not improved. · I develop rapid tolerance or loss of improvement, as described in my treatment plan. · I develop significant side effects from the medication.   · My behavior is inconsistent with the responsibilities outlined above,

## 2018-07-14 NOTE — PROGRESS NOTES
06/28/2018 213* 74 - 109 mg/dL Final    BUN 06/28/2018 16  6 - 20 mg/dL Final    CREATININE 06/28/2018 0.6  0.5 - 0.9 mg/dL Final    GFR Non- 06/28/2018 >60  >60 Final    Comment: This calculation may be inaccurate for patients under the age of 25 years. For ages 25 and older, a GFR >60 mL/min/1.73m2 (not corrected for weight) is  valid for stable renal function.  Calcium 06/28/2018 9.3  8.6 - 10.0 mg/dL Final    Total Protein 06/28/2018 6.7  6.6 - 8.7 g/dL Final    Alb 06/28/2018 4.2  3.5 - 5.2 g/dL Final    Total Bilirubin 06/28/2018 <0.2  0.2 - 1.2 mg/dL Final    Alkaline Phosphatase 06/28/2018 83  35 - 104 U/L Final    ALT 06/28/2018 52* 5 - 33 U/L Final    AST 06/28/2018 42* 5 - 32 U/L Final    Cholesterol, Total 06/28/2018 214* 160 - 199 mg/dL Final    Comment: <160 MG/DL=OPTIMAL    160-199 MG/DL= DESIRABLE    200-239 MG/DL=BORDERLINE-INCREASED RISK OF ATHEROSCLEROTIC  CARDIOVASCULAR DISEASE    > OR = 240 MG/DL-ASSOCIATED WITH AN INCREASED RISK OF  ATHROSCLEROTIC CARDIOVASCULAR DISEASE      Triglycerides 06/28/2018 361* 0 - 149 mg/dL Final    HDL 06/28/2018 45* 65 - 121 mg/dL Final    Comment: VALUES>60 MG/DL ARE ASSOCIATED WITH A DECREASED RISK OF  ATHEROSCLEROTIC CARDIOVASCULAR DISEASE      LDL Calculated 06/28/2018 97  <100 mg/dL Final    Comment: <100 MG/DL=OPITIMAL    100-129 MG/DL=DESIRABLE    130-159 MG/DL BORDERLINE=INCREASED RISK OF ATHEROSCLEROTIC  CARDIOVASCULAR DISEASE    > OR = 160 MG/DL=ASSOCIATED WITH AN INCREASE RISK OF  ATHEROSCLEROTIC CARDIOVASCULAR DISEASE      Hemoglobin A1C 06/28/2018 8.4* See comment % Final    Comment: HbA1c levels >6% are an indication of hyperglycemia during the preceding 2  to 3 months or longer. HbA1c levels may reach 20% or higher in poorly controlled diabetes. Therapeutic action is suggested at levels above 8%. Diabetes patients with HbA1c levels below 7% meet the goal of the American  Diabetes Association.     HbA1c levels that patient needs for insulin pen. Generic brand ok Yes MOOK Welch   vitamin D (ERGOCALCIFEROL) 21019 units CAPS capsule TAKE 1 CAPSULE BY MOUTH 1 TIME A WEEK Yes MOOK Pool   UNABLE TO FIND D/c oxygen Yes MOOK Pool   insulin aspart (NOVOLOG) 100 UNIT/ML injection vial Per sliding scale Yes MOOK Pool   Insulin Pen Needle ([employer] PEN NEEDLES) 31G X 6 MM MISC 1 each by Does not apply route daily Yes MOOK Pool   glucose monitoring kit (FREESTYLE) monitoring kit For bid testing Type 2 dm Yes MOOK Pool   Lancets MISC 1 each by Does not apply route 2 times daily Dispense brand per insurance benefits and patient request. Yes MOOK Pool   Ez Smart Blood Glucose Lancets MISC For qid and prn testing for newly diagnosed diabetes DX: Diabetes, insulin requiring Yes MOOK Pool   glucose blood VI test strips (ASCENSIA AUTODISC VI;ONE TOUCH ULTRA TEST VI) strip 1 each by In Vitro route 4 times daily (with meals and nightly) For qid and prn testing for newly diagnosed diabetes  Freestyle Freedom lite    DX: Diabetes, insulin requiring Yes MOOK Pool   aspirin EC 81 MG EC tablet Take 1 tablet by mouth daily Yes MOOK Pool   acetaminophen (TYLENOL ARTHRITIS PAIN) 650 MG CR tablet Take 650 mg by mouth every 8 hours as needed for Pain. Yes Historical Provider, MD   insulin glargine (LANTUS SOLOSTAR) 100 UNIT/ML injection pen 20units bid for diabetes  MOOK Pool     Allergies   Allergen Reactions    Sulfa Antibiotics Hives     Past Surgical History:   Procedure Laterality Date    BACK SURGERY      Dr. Annika Jiang      Family History   Problem Relation Age of Onset    Diabetes Mother     Stroke Mother      Social History     Social History    Marital status:      Spouse name: N/A    Number of children: N/A    Years of education: N/A     Occupational History    Not on file.      Social History Main Topics    Smoking status: Former Smoker     Packs/day: 0.50     Years: 20.00     Types: Cigarettes     Start date: 6/5/1983     Quit date: 8/1/2014    Smokeless tobacco: Never Used    Alcohol use No    Drug use: No    Sexual activity: Not on file     Other Topics Concern    Not on file     Social History Narrative    No narrative on file       Review of Systems   Constitutional: Negative for unexpected weight change. Respiratory: Negative for shortness of breath. Cardiovascular: Negative for leg swelling. Musculoskeletal:        Left chest wall pain   Neurological: Negative for weakness. OBJECTIVE:    Physical Exam   Constitutional: She is oriented to person, place, and time. She appears well-developed and well-nourished. No distress. HENT:   Head: Normocephalic and atraumatic. Right Ear: Tympanic membrane, external ear and ear canal normal.   Left Ear: Tympanic membrane, external ear and ear canal normal.   Nose: Nose normal. Right sinus exhibits no maxillary sinus tenderness and no frontal sinus tenderness. Left sinus exhibits no maxillary sinus tenderness and no frontal sinus tenderness. Mouth/Throat: Uvula is midline, oropharynx is clear and moist and mucous membranes are normal. No oropharyngeal exudate, posterior oropharyngeal edema or posterior oropharyngeal erythema. Eyes: Conjunctivae and EOM are normal. Pupils are equal, round, and reactive to light. Neck: Normal range of motion. Neck supple. No tracheal deviation present. Cardiovascular: Normal rate, regular rhythm and normal heart sounds. Pulmonary/Chest: Effort normal and breath sounds normal. No respiratory distress. She has no wheezes. Abdominal: Bowel sounds are normal.   Musculoskeletal: She exhibits no edema (no swelling of lower ext). Lymphadenopathy:     She has no cervical adenopathy. Neurological: She is alert and oriented to person, place, and time. Skin: Skin is warm and dry.  She is not

## 2018-07-19 ENCOUNTER — TELEPHONE (OUTPATIENT)
Dept: FAMILY MEDICINE CLINIC | Age: 58
End: 2018-07-19

## 2018-07-20 NOTE — TELEPHONE ENCOUNTER
Patient states she is okay with the levemir, per pharmacy vials are covered. She will check with pharmacy later to give you time to send in.

## 2018-08-01 ENCOUNTER — HOSPITAL ENCOUNTER (OUTPATIENT)
Dept: WOMENS IMAGING | Age: 58
Discharge: HOME | End: 2018-08-01
Payer: MEDICARE

## 2018-08-01 DIAGNOSIS — Z12.31 SCREENING MAMMOGRAM, ENCOUNTER FOR: ICD-10-CM

## 2018-08-01 PROCEDURE — 77067 SCR MAMMO BI INCL CAD: CPT

## 2018-09-28 DIAGNOSIS — K21.9 GASTROESOPHAGEAL REFLUX DISEASE WITHOUT ESOPHAGITIS: ICD-10-CM

## 2018-09-28 DIAGNOSIS — F41.9 ANXIETY: ICD-10-CM

## 2018-09-28 DIAGNOSIS — E78.2 MIXED HYPERLIPIDEMIA: ICD-10-CM

## 2018-09-28 DIAGNOSIS — F32.89 OTHER DEPRESSION: ICD-10-CM

## 2018-09-28 RX ORDER — PANTOPRAZOLE SODIUM 40 MG/1
40 TABLET, DELAYED RELEASE ORAL DAILY
Qty: 30 TABLET | Refills: 0 | Status: SHIPPED | OUTPATIENT
Start: 2018-09-28 | End: 2018-10-17

## 2018-09-28 RX ORDER — ROSUVASTATIN CALCIUM 20 MG/1
TABLET, COATED ORAL
Qty: 30 TABLET | Refills: 0 | Status: SHIPPED | OUTPATIENT
Start: 2018-09-28 | End: 2018-10-17

## 2018-09-28 RX ORDER — FLUOXETINE HYDROCHLORIDE 20 MG/1
CAPSULE ORAL
Qty: 30 CAPSULE | Refills: 0 | Status: SHIPPED | OUTPATIENT
Start: 2018-09-28 | End: 2018-10-17

## 2018-10-16 DIAGNOSIS — E78.2 MIXED HYPERLIPIDEMIA: ICD-10-CM

## 2018-10-16 DIAGNOSIS — Z79.4 TYPE 2 DIABETES MELLITUS WITH HYPERGLYCEMIA, WITH LONG-TERM CURRENT USE OF INSULIN (HCC): ICD-10-CM

## 2018-10-16 DIAGNOSIS — E55.9 VITAMIN D INSUFFICIENCY: ICD-10-CM

## 2018-10-16 DIAGNOSIS — E11.65 TYPE 2 DIABETES MELLITUS WITH HYPERGLYCEMIA, WITH LONG-TERM CURRENT USE OF INSULIN (HCC): ICD-10-CM

## 2018-10-16 LAB
ALBUMIN SERPL-MCNC: 4.4 G/DL (ref 3.5–5.2)
ALP BLD-CCNC: 92 U/L (ref 35–104)
ALT SERPL-CCNC: 38 U/L (ref 5–33)
ANION GAP SERPL CALCULATED.3IONS-SCNC: 17 MMOL/L (ref 7–19)
AST SERPL-CCNC: 28 U/L (ref 5–32)
BASOPHILS ABSOLUTE: 0.1 K/UL (ref 0–0.2)
BASOPHILS RELATIVE PERCENT: 0.5 % (ref 0–1)
BILIRUB SERPL-MCNC: 0.3 MG/DL (ref 0.2–1.2)
BUN BLDV-MCNC: 18 MG/DL (ref 6–20)
CALCIUM SERPL-MCNC: 9.4 MG/DL (ref 8.6–10)
CHLORIDE BLD-SCNC: 100 MMOL/L (ref 98–111)
CO2: 22 MMOL/L (ref 22–29)
CREAT SERPL-MCNC: 0.7 MG/DL (ref 0.5–0.9)
CREATININE URINE: 216.2 MG/DL (ref 4.2–622)
EOSINOPHILS ABSOLUTE: 0.2 K/UL (ref 0–0.6)
EOSINOPHILS RELATIVE PERCENT: 2.3 % (ref 0–5)
GFR NON-AFRICAN AMERICAN: >60
GLUCOSE BLD-MCNC: 235 MG/DL (ref 74–109)
HBA1C MFR BLD: 8.4 % (ref 4–6)
HCT VFR BLD CALC: 42.4 % (ref 37–47)
HEMOGLOBIN: 13.7 G/DL (ref 12–16)
LYMPHOCYTES ABSOLUTE: 3.6 K/UL (ref 1.1–4.5)
LYMPHOCYTES RELATIVE PERCENT: 38.5 % (ref 20–40)
MCH RBC QN AUTO: 30.6 PG (ref 27–31)
MCHC RBC AUTO-ENTMCNC: 32.3 G/DL (ref 33–37)
MCV RBC AUTO: 94.9 FL (ref 81–99)
MICROALBUMIN UR-MCNC: 3.9 MG/DL (ref 0–19)
MICROALBUMIN/CREAT UR-RTO: 18 MG/G
MONOCYTES ABSOLUTE: 0.6 K/UL (ref 0–0.9)
MONOCYTES RELATIVE PERCENT: 6 % (ref 0–10)
NEUTROPHILS ABSOLUTE: 5 K/UL (ref 1.5–7.5)
NEUTROPHILS RELATIVE PERCENT: 52.5 % (ref 50–65)
PDW BLD-RTO: 13.1 % (ref 11.5–14.5)
PLATELET # BLD: 220 K/UL (ref 130–400)
PMV BLD AUTO: 12.1 FL (ref 9.4–12.3)
POTASSIUM SERPL-SCNC: 4.3 MMOL/L (ref 3.5–5)
RBC # BLD: 4.47 M/UL (ref 4.2–5.4)
SODIUM BLD-SCNC: 139 MMOL/L (ref 136–145)
T4 FREE: 1 NG/DL (ref 0.9–1.7)
TOTAL PROTEIN: 7.4 G/DL (ref 6.6–8.7)
TSH SERPL DL<=0.05 MIU/L-ACNC: 2.39 UIU/ML (ref 0.27–4.2)
VITAMIN D 25-HYDROXY: 43.3 NG/ML
WBC # BLD: 9.4 K/UL (ref 4.8–10.8)

## 2018-10-17 ENCOUNTER — OFFICE VISIT (OUTPATIENT)
Dept: FAMILY MEDICINE CLINIC | Age: 58
End: 2018-10-17
Payer: MEDICARE

## 2018-10-17 VITALS
WEIGHT: 217 LBS | HEIGHT: 62 IN | TEMPERATURE: 98.5 F | SYSTOLIC BLOOD PRESSURE: 124 MMHG | HEART RATE: 110 BPM | DIASTOLIC BLOOD PRESSURE: 74 MMHG | BODY MASS INDEX: 39.93 KG/M2 | RESPIRATION RATE: 20 BRPM | OXYGEN SATURATION: 98 %

## 2018-10-17 DIAGNOSIS — Z79.4 DIABETES MELLITUS TYPE 2, INSULIN DEPENDENT (HCC): Primary | ICD-10-CM

## 2018-10-17 DIAGNOSIS — G62.9 NEUROPATHY: ICD-10-CM

## 2018-10-17 DIAGNOSIS — E66.9 OBESE BODY HABITUS: ICD-10-CM

## 2018-10-17 DIAGNOSIS — E11.9 DIABETES MELLITUS TYPE 2, INSULIN DEPENDENT (HCC): Primary | ICD-10-CM

## 2018-10-17 DIAGNOSIS — Z28.21 REFUSED INFLUENZA VACCINE: ICD-10-CM

## 2018-10-17 PROCEDURE — G8427 DOCREV CUR MEDS BY ELIG CLIN: HCPCS | Performed by: NURSE PRACTITIONER

## 2018-10-17 PROCEDURE — 3017F COLORECTAL CA SCREEN DOC REV: CPT | Performed by: NURSE PRACTITIONER

## 2018-10-17 PROCEDURE — 1036F TOBACCO NON-USER: CPT | Performed by: NURSE PRACTITIONER

## 2018-10-17 PROCEDURE — 2022F DILAT RTA XM EVC RTNOPTHY: CPT | Performed by: NURSE PRACTITIONER

## 2018-10-17 PROCEDURE — G8484 FLU IMMUNIZE NO ADMIN: HCPCS | Performed by: NURSE PRACTITIONER

## 2018-10-17 PROCEDURE — 3045F PR MOST RECENT HEMOGLOBIN A1C LEVEL 7.0-9.0%: CPT | Performed by: NURSE PRACTITIONER

## 2018-10-17 PROCEDURE — G8417 CALC BMI ABV UP PARAM F/U: HCPCS | Performed by: NURSE PRACTITIONER

## 2018-10-17 PROCEDURE — G8599 NO ASA/ANTIPLAT THER USE RNG: HCPCS | Performed by: NURSE PRACTITIONER

## 2018-10-17 PROCEDURE — 99214 OFFICE O/P EST MOD 30 MIN: CPT | Performed by: NURSE PRACTITIONER

## 2018-10-17 RX ORDER — GABAPENTIN 300 MG/1
300 CAPSULE ORAL 2 TIMES DAILY
Qty: 60 CAPSULE | Refills: 1 | Status: SHIPPED | OUTPATIENT
Start: 2018-10-17 | End: 2018-12-11 | Stop reason: SDUPTHER

## 2018-10-17 RX ORDER — TRAZODONE HYDROCHLORIDE 50 MG/1
100 TABLET ORAL
COMMUNITY
End: 2019-05-17

## 2018-10-17 ASSESSMENT — ENCOUNTER SYMPTOMS: SHORTNESS OF BREATH: 0

## 2018-10-17 NOTE — PROGRESS NOTES
Ht 5' 2\" (1.575 m)   Wt 217 lb (98.4 kg)   SpO2 98%   BMI 39.69 kg/m²      ASSESSMENT:      ICD-10-CM    1. Diabetes mellitus type 2, insulin dependent (HCC) E11.9 Hemoglobin A1C    Z79.4 Lipid Panel     Comprehensive Metabolic Panel   2. Neuropathy G62.9 gabapentin (NEURONTIN) 300 MG capsule   3. Obese body habitus E66.9 naltrexone-bupropion (CONTRAVE) 8-90 MG per extended release tablet     Lipid Panel     Comprehensive Metabolic Panel   4. Refused influenza vaccine Z28.21        PLAN:    Teresa Ramirez: Medication Refill (Patient presents for medication refills and follow up on diabetes; patient would like an increase on gabapentin for feet pain) and 3 Month Follow-Up  CASIMIRO  Update contract for gabapentin  Lab due in 3mo. F/u in relation to Contrave and wt management in approx 1mo (vouchers are available online)  Diagnosis and orders for this visit are above. Please note that this chart was generated using dragon dictationsoftware. Although every effort was made to ensure the accuracy of this automated transcription, some errors in transcription may have occurred.

## 2018-10-22 ENCOUNTER — TELEPHONE (OUTPATIENT)
Dept: FAMILY MEDICINE CLINIC | Age: 58
End: 2018-10-22

## 2018-12-11 DIAGNOSIS — G62.9 NEUROPATHY: ICD-10-CM

## 2018-12-13 RX ORDER — GABAPENTIN 300 MG/1
300 CAPSULE ORAL 2 TIMES DAILY
Qty: 60 CAPSULE | Refills: 1 | Status: SHIPPED | OUTPATIENT
Start: 2018-12-13 | End: 2019-05-17 | Stop reason: SDUPTHER

## 2019-05-16 DIAGNOSIS — E11.9 DIABETES MELLITUS TYPE 2, INSULIN DEPENDENT (HCC): ICD-10-CM

## 2019-05-16 DIAGNOSIS — Z79.4 DIABETES MELLITUS TYPE 2, INSULIN DEPENDENT (HCC): ICD-10-CM

## 2019-05-16 DIAGNOSIS — E66.9 OBESE BODY HABITUS: ICD-10-CM

## 2019-05-16 LAB
ALBUMIN SERPL-MCNC: 4.2 G/DL (ref 3.5–5.2)
ALP BLD-CCNC: 105 U/L (ref 35–104)
ALT SERPL-CCNC: 62 U/L (ref 5–33)
ANION GAP SERPL CALCULATED.3IONS-SCNC: 12 MMOL/L (ref 7–19)
AST SERPL-CCNC: 43 U/L (ref 5–32)
BILIRUB SERPL-MCNC: 0.3 MG/DL (ref 0.2–1.2)
BUN BLDV-MCNC: 15 MG/DL (ref 6–20)
CALCIUM SERPL-MCNC: 9.4 MG/DL (ref 8.6–10)
CHLORIDE BLD-SCNC: 102 MMOL/L (ref 98–111)
CHOLESTEROL, TOTAL: 325 MG/DL (ref 160–199)
CO2: 24 MMOL/L (ref 22–29)
CREAT SERPL-MCNC: 0.7 MG/DL (ref 0.5–0.9)
GFR NON-AFRICAN AMERICAN: >60
GLUCOSE BLD-MCNC: 223 MG/DL (ref 74–109)
HBA1C MFR BLD: 9.4 % (ref 4–6)
HDLC SERPL-MCNC: 40 MG/DL (ref 65–121)
LDL CHOLESTEROL CALCULATED: 227 MG/DL
POTASSIUM SERPL-SCNC: 3.8 MMOL/L (ref 3.5–5)
SODIUM BLD-SCNC: 138 MMOL/L (ref 136–145)
TOTAL PROTEIN: 7.3 G/DL (ref 6.6–8.7)
TRIGL SERPL-MCNC: 292 MG/DL (ref 0–149)

## 2019-05-17 ENCOUNTER — OFFICE VISIT (OUTPATIENT)
Dept: FAMILY MEDICINE CLINIC | Age: 59
End: 2019-05-17
Payer: MEDICARE

## 2019-05-17 ENCOUNTER — HOSPITAL ENCOUNTER (OUTPATIENT)
Dept: GENERAL RADIOLOGY | Age: 59
Discharge: HOME | End: 2019-05-17
Payer: MEDICARE

## 2019-05-17 VITALS
SYSTOLIC BLOOD PRESSURE: 158 MMHG | DIASTOLIC BLOOD PRESSURE: 90 MMHG | BODY MASS INDEX: 39.32 KG/M2 | OXYGEN SATURATION: 98 % | WEIGHT: 215 LBS | HEART RATE: 107 BPM | TEMPERATURE: 98 F | RESPIRATION RATE: 16 BRPM

## 2019-05-17 DIAGNOSIS — E78.2 MIXED HYPERLIPIDEMIA: ICD-10-CM

## 2019-05-17 DIAGNOSIS — R05.3 PERSISTENT COUGH FOR 3 WEEKS OR LONGER: Primary | ICD-10-CM

## 2019-05-17 DIAGNOSIS — Z79.899 MEDICATION MANAGEMENT: ICD-10-CM

## 2019-05-17 DIAGNOSIS — B37.2 INTERTRIGINOUS CANDIDIASIS: ICD-10-CM

## 2019-05-17 DIAGNOSIS — Z79.4 UNCONTROLLED TYPE 2 DIABETES MELLITUS WITH HYPERGLYCEMIA, WITH LONG-TERM CURRENT USE OF INSULIN (HCC): ICD-10-CM

## 2019-05-17 DIAGNOSIS — R05.3 PERSISTENT COUGH FOR 3 WEEKS OR LONGER: ICD-10-CM

## 2019-05-17 DIAGNOSIS — G62.9 NEUROPATHY: ICD-10-CM

## 2019-05-17 DIAGNOSIS — F41.9 ANXIETY: ICD-10-CM

## 2019-05-17 DIAGNOSIS — E11.65 UNCONTROLLED TYPE 2 DIABETES MELLITUS WITH HYPERGLYCEMIA, WITH LONG-TERM CURRENT USE OF INSULIN (HCC): ICD-10-CM

## 2019-05-17 LAB
AMPHETAMINE SCREEN, URINE: NEGATIVE
BARBITURATE SCREEN, URINE: NEGATIVE
BENZODIAZEPINE SCREEN, URINE: NEGATIVE
BUPRENORPHINE URINE: NORMAL
COCAINE METABOLITE SCREEN URINE: NEGATIVE
GABAPENTIN SCREEN, URINE: NORMAL
MDMA URINE: NEGATIVE
METHADONE SCREEN, URINE: NEGATIVE
METHAMPHETAMINE, URINE: NEGATIVE
OPIATE SCREEN URINE: NEGATIVE
OXYCODONE SCREEN URINE: NEGATIVE
PHENCYCLIDINE SCREEN URINE: NEGATIVE
PROPOXYPHENE SCREEN, URINE: NORMAL
THC SCREEN, URINE: NEGATIVE
TRICYCLIC ANTIDEPRESSANTS, UR: NEGATIVE

## 2019-05-17 PROCEDURE — 3017F COLORECTAL CA SCREEN DOC REV: CPT | Performed by: NURSE PRACTITIONER

## 2019-05-17 PROCEDURE — 3046F HEMOGLOBIN A1C LEVEL >9.0%: CPT | Performed by: NURSE PRACTITIONER

## 2019-05-17 PROCEDURE — 2022F DILAT RTA XM EVC RTNOPTHY: CPT | Performed by: NURSE PRACTITIONER

## 2019-05-17 PROCEDURE — G8427 DOCREV CUR MEDS BY ELIG CLIN: HCPCS | Performed by: NURSE PRACTITIONER

## 2019-05-17 PROCEDURE — 99214 OFFICE O/P EST MOD 30 MIN: CPT | Performed by: NURSE PRACTITIONER

## 2019-05-17 PROCEDURE — 80305 DRUG TEST PRSMV DIR OPT OBS: CPT | Performed by: NURSE PRACTITIONER

## 2019-05-17 PROCEDURE — G8417 CALC BMI ABV UP PARAM F/U: HCPCS | Performed by: NURSE PRACTITIONER

## 2019-05-17 PROCEDURE — 1036F TOBACCO NON-USER: CPT | Performed by: NURSE PRACTITIONER

## 2019-05-17 PROCEDURE — 71046 X-RAY EXAM CHEST 2 VIEWS: CPT

## 2019-05-17 RX ORDER — AZITHROMYCIN 250 MG/1
250 TABLET, FILM COATED ORAL SEE ADMIN INSTRUCTIONS
Qty: 6 TABLET | Refills: 0 | Status: SHIPPED | OUTPATIENT
Start: 2019-05-17 | End: 2019-05-22

## 2019-05-17 RX ORDER — ROSUVASTATIN CALCIUM 40 MG/1
TABLET, COATED ORAL
Qty: 90 TABLET | Refills: 1 | Status: SHIPPED | OUTPATIENT
Start: 2019-05-17 | End: 2019-09-26 | Stop reason: SDUPTHER

## 2019-05-17 RX ORDER — FLUOXETINE 10 MG/1
CAPSULE ORAL
Qty: 90 CAPSULE | Refills: 3 | Status: SHIPPED | OUTPATIENT
Start: 2019-05-17 | End: 2019-09-26 | Stop reason: DRUGHIGH

## 2019-05-17 RX ORDER — NYSTATIN 100000 U/G
CREAM TOPICAL
Qty: 60 G | Refills: 1 | Status: SHIPPED | OUTPATIENT
Start: 2019-05-17 | End: 2021-04-01 | Stop reason: SDUPTHER

## 2019-05-17 RX ORDER — BENZONATATE 100 MG/1
100-200 CAPSULE ORAL 3 TIMES DAILY PRN
Qty: 60 CAPSULE | Refills: 0 | Status: SHIPPED | OUTPATIENT
Start: 2019-05-17 | End: 2019-05-24

## 2019-05-17 RX ORDER — GABAPENTIN 300 MG/1
300 CAPSULE ORAL 2 TIMES DAILY
Qty: 60 CAPSULE | Refills: 2 | Status: SHIPPED | OUTPATIENT
Start: 2019-05-17 | End: 2019-09-26 | Stop reason: SDUPTHER

## 2019-05-17 NOTE — PROGRESS NOTES
 Oxycodone Screen, Ur 05/17/2019 NEGATIVE   Final    PCP Screen, Urine 05/17/2019 NEGATIVE   Final    THC Screen, Urine 05/17/2019 NEGATIVE   Final    Tricyclic Antidepressants, Urine 05/17/2019 NEGATIVE   Final         Past Medical History:   Diagnosis Date    Back pain 6/5/2014    Carotid artery stenosis     Depression 6/5/2014    Diabetes ([facility] [address]     GERD (gastroesophageal reflux disease)     Hyperlipidemia     Hypertension 6/5/2014    Type II or unspecified type diabetes mellitus without mention of complication, not stated as uncontrolled      Prior to Visit Medications    Medication Sig Taking? Authorizing Provider   FLUoxetine (PROZAC) 10 MG capsule 1 po daily (take with the 20mg dose for total of 30mg) for anxiety Yes MOOK Pool   gabapentin (NEURONTIN) 300 MG capsule Take 1 capsule by mouth 2 times daily for 30 days. Yes MOOK Pool   Liraglutide (VICTOZA) 18 MG/3ML SOPN SC injection 0.6mg subq daily for 1wk then increase to 1.2mg daily Yes MOOK Pool   rosuvastatin (CRESTOR) 40 MG tablet TAKE 1 TABLET BY MOUTH EVERY NIGHT FOR CHOLESTEROL Yes MOOK Pool   nystatin (MYCOSTATIN) 073657 UNIT/GM cream Apply topically 2 times daily.  Yes MOOK Pool   azithromycin (ZITHROMAX) 250 MG tablet Take 1 tablet by mouth See Admin Instructions for 5 days 500mg on day 1 followed by 250mg on days 2 - 5 Yes MOOK Pool   benzonatate (TESSALON) 100 MG capsule Take 1-2 capsules by mouth 3 times daily as needed for Cough Yes MOOK Pool   Polyethylene Glycol 3350 (MIRALAX PO) Take 17 g by mouth Yes Historical Provider, MD   pantoprazole (PROTONIX) 40 MG tablet Take 1 tablet by mouth daily Yes MOOK Pool   FLUoxetine (PROZAC) 20 MG capsule TAKE 1 CAPSULE BY MOUTH EVERY DAY  Patient taking differently: Take 20 mg by mouth daily 1 po daily (take with the 20mg dose for total of 30mg) for anxiety Yes MOOK Pool   amLODIPine (NORVASC) 5 MG tablet Take 1 tablet by mouth daily  Patient taking differently: Take 5 mg by mouth daily as needed  Yes MOOK Pool   blood glucose monitor strips For qid testing Type 2 Dm Dispense brand per patient request or insurance benefits. Yes MOOK Pool   insulin glargine (LANTUS) 100 UNIT/ML injection vial 40 units every evening for diabetes Yes MOOK Pool   vitamin D (ERGOCALCIFEROL) 89442 units CAPS capsule TAKE 1 CAPSULE BY MOUTH 1 TIME A WEEK Yes MOOK Pool   insulin aspart (NOVOLOG) 100 UNIT/ML injection vial Per sliding scale Yes MOOK Pool   Ez Smart Blood Glucose Lancets MISC For qid and prn testing for newly diagnosed diabetes DX: Diabetes, insulin requiring Yes MOOK Pool   glucose blood VI test strips (ASCENSIA AUTODISC VI;ONE TOUCH ULTRA TEST VI) strip 1 each by In Vitro route 4 times daily (with meals and nightly) For qid and prn testing for newly diagnosed diabetes  Freestyle Freedom lite    DX: Diabetes, insulin requiring Yes MOOK Pool   aspirin EC 81 MG EC tablet Take 1 tablet by mouth daily Yes MOOK Pool   acetaminophen (TYLENOL ARTHRITIS PAIN) 650 MG CR tablet Take 650 mg by mouth every 8 hours as needed for Pain.  Yes Historical Provider, MD   Insulin Syringe-Needle U-100 ([employer] INSULIN SYRINGE .5CC/30G) 30G X 1/2\" 0.5 ML MISC 1 each by Does not apply route nightly  MOOK Pool   glucose monitoring kit (FREESTYLE) monitoring kit Use for qid glucose testing as directed  MOOK Pool   glucose monitoring kit (FREESTYLE) monitoring kit For bid testing Type 2 dm  MOOK Pool   Lancets MISC 1 each by Does not apply route 2 times daily Dispense brand per insurance benefits and patient request.  MOOK Lee     Allergies   Allergen Reactions    Sulfa Antibiotics Hives     Past Surgical History:   Procedure Laterality Date    BACK SURGERY      Dr. Teofilo Isaac N/A      Family History   Problem Relation Age of Onset    Diabetes Mother     Stroke Mother      Social History     Socioeconomic History    Marital status:      Spouse name: Not on file    Number of children: Not on file    Years of education: Not on file    Highest education level: Not on file   Occupational History    Not on file   Social Needs    Financial resource strain: Not on file    Food insecurity:     Worry: Not on file     Inability: Not on file    Transportation needs:     Medical: Not on file     Non-medical: Not on file   Tobacco Use    Smoking status: Former Smoker     Packs/day: 0.50     Years: 20.00     Pack years: 10.00     Types: Cigarettes     Start date: 1983     Last attempt to quit: 2014     Years since quittin.8    Smokeless tobacco: Never Used   Substance and Sexual Activity    Alcohol use: No     Alcohol/week: 0.0 oz    Drug use: No    Sexual activity: Not on file   Lifestyle    Physical activity:     Days per week: Not on file     Minutes per session: Not on file    Stress: Not on file   Relationships    Social connections:     Talks on phone: Not on file     Gets together: Not on file     Attends Zoroastrian service: Not on file     Active member of club or organization: Not on file     Attends meetings of clubs or organizations: Not on file     Relationship status: Not on file    Intimate partner violence:     Fear of current or ex partner: Not on file     Emotionally abused: Not on file     Physically abused: Not on file     Forced sexual activity: Not on file   Other Topics Concern    Not on file   Social History Narrative    Not on file       Review of Systems   Constitutional: Negative for fever and unexpected weight change (gain but pt contributes to diet changes). HENT: Positive for congestion. Respiratory: Positive for cough. Negative for shortness of breath. Cardiovascular: Negative for leg swelling. Musculoskeletal: Positive for back pain (neuropathy).    Neurological: Negative for dizziness. Psychiatric/Behavioral: The patient is nervous/anxious. OBJECTIVE:    Physical Exam   Constitutional: She is oriented to person, place, and time. She appears well-developed and well-nourished. No distress. overwt   HENT:   Head: Normocephalic and atraumatic. Right Ear: Tympanic membrane, external ear and ear canal normal.   Left Ear: Tympanic membrane, external ear and ear canal normal.   Nose: Nose normal. Right sinus exhibits no maxillary sinus tenderness and no frontal sinus tenderness. Left sinus exhibits no maxillary sinus tenderness and no frontal sinus tenderness. Mouth/Throat: Uvula is midline, oropharynx is clear and moist and mucous membranes are normal. No oropharyngeal exudate, posterior oropharyngeal edema or posterior oropharyngeal erythema. Eyes: Pupils are equal, round, and reactive to light. Conjunctivae and EOM are normal.   Neck: Normal range of motion. Neck supple. No tracheal deviation present. Cardiovascular: Normal rate, regular rhythm and normal heart sounds. Pulmonary/Chest: Effort normal and breath sounds normal. No respiratory distress. Abdominal: Soft. Bowel sounds are normal. There is no tenderness. Lymphadenopathy:     She has no cervical adenopathy. Neurological: She is alert and oriented to person, place, and time. Skin: Skin is warm and dry. She is not diaphoretic. Psychiatric: She has a normal mood and affect. Her behavior is normal.   Nursing note and vitals reviewed. BP (!) 158/90 (Site: Left Upper Arm, Position: Sitting, Cuff Size: Medium Adult)   Pulse 107   Temp 98 °F (36.7 °C) (Oral)   Resp 16   Wt 215 lb (97.5 kg)   SpO2 98%   BMI 39.32 kg/m²      ASSESSMENT:      ICD-10-CM    1. Persistent cough for 3 weeks or longer R05 XR CHEST STANDARD (2 VW)     azithromycin (ZITHROMAX) 250 MG tablet     benzonatate (TESSALON) 100 MG capsule   2. Neuropathy G62.9 gabapentin (NEURONTIN) 300 MG capsule   3.  Mixed hyperlipidemia E78.2

## 2019-05-20 ASSESSMENT — ENCOUNTER SYMPTOMS
COUGH: 1
SHORTNESS OF BREATH: 0
BACK PAIN: 1

## 2019-08-05 DIAGNOSIS — K21.9 GASTROESOPHAGEAL REFLUX DISEASE WITHOUT ESOPHAGITIS: ICD-10-CM

## 2019-08-05 RX ORDER — RANITIDINE 150 MG/1
TABLET ORAL
Qty: 30 TABLET | Refills: 0 | Status: SHIPPED | OUTPATIENT
Start: 2019-08-05 | End: 2019-09-26 | Stop reason: SDUPTHER

## 2019-08-05 RX ORDER — FLUOXETINE HYDROCHLORIDE 20 MG/1
CAPSULE ORAL
Qty: 90 CAPSULE | Refills: 0 | Status: SHIPPED | OUTPATIENT
Start: 2019-08-05 | End: 2019-09-26 | Stop reason: SDUPTHER

## 2019-08-09 DIAGNOSIS — E78.2 MIXED HYPERLIPIDEMIA: ICD-10-CM

## 2019-08-09 RX ORDER — PANTOPRAZOLE SODIUM 40 MG/1
40 TABLET, DELAYED RELEASE ORAL DAILY
Qty: 90 TABLET | Refills: 0 | Status: SHIPPED | OUTPATIENT
Start: 2019-08-09 | End: 2019-09-26 | Stop reason: SDUPTHER

## 2019-08-09 RX ORDER — ROSUVASTATIN CALCIUM 20 MG/1
TABLET, COATED ORAL
Qty: 90 TABLET | Refills: 0 | Status: SHIPPED | OUTPATIENT
Start: 2019-08-09 | End: 2019-09-26 | Stop reason: DRUGHIGH

## 2019-09-26 ENCOUNTER — OFFICE VISIT (OUTPATIENT)
Dept: FAMILY MEDICINE CLINIC | Age: 59
End: 2019-09-26
Payer: MEDICARE

## 2019-09-26 VITALS
BODY MASS INDEX: 38.41 KG/M2 | RESPIRATION RATE: 20 BRPM | OXYGEN SATURATION: 98 % | WEIGHT: 210 LBS | SYSTOLIC BLOOD PRESSURE: 130 MMHG | DIASTOLIC BLOOD PRESSURE: 86 MMHG | TEMPERATURE: 98.1 F | HEART RATE: 125 BPM

## 2019-09-26 DIAGNOSIS — Z79.4 UNCONTROLLED TYPE 2 DIABETES MELLITUS WITH HYPERGLYCEMIA, WITH LONG-TERM CURRENT USE OF INSULIN (HCC): Primary | ICD-10-CM

## 2019-09-26 DIAGNOSIS — R74.8 ELEVATED LIVER ENZYMES: ICD-10-CM

## 2019-09-26 DIAGNOSIS — Z12.31 SCREENING MAMMOGRAM, ENCOUNTER FOR: ICD-10-CM

## 2019-09-26 DIAGNOSIS — E11.65 UNCONTROLLED TYPE 2 DIABETES MELLITUS WITH HYPERGLYCEMIA, WITH LONG-TERM CURRENT USE OF INSULIN (HCC): Primary | ICD-10-CM

## 2019-09-26 DIAGNOSIS — Z79.4 UNCONTROLLED TYPE 2 DIABETES MELLITUS WITH HYPERGLYCEMIA, WITH LONG-TERM CURRENT USE OF INSULIN (HCC): ICD-10-CM

## 2019-09-26 DIAGNOSIS — F41.9 ANXIETY AND DEPRESSION: ICD-10-CM

## 2019-09-26 DIAGNOSIS — E78.2 MIXED HYPERLIPIDEMIA: ICD-10-CM

## 2019-09-26 DIAGNOSIS — E55.9 VITAMIN D INSUFFICIENCY: ICD-10-CM

## 2019-09-26 DIAGNOSIS — E11.65 UNCONTROLLED TYPE 2 DIABETES MELLITUS WITH HYPERGLYCEMIA, WITH LONG-TERM CURRENT USE OF INSULIN (HCC): ICD-10-CM

## 2019-09-26 DIAGNOSIS — Z11.59 ENCOUNTER FOR SCREENING FOR OTHER VIRAL DISEASES: ICD-10-CM

## 2019-09-26 DIAGNOSIS — K21.9 GASTROESOPHAGEAL REFLUX DISEASE WITHOUT ESOPHAGITIS: ICD-10-CM

## 2019-09-26 DIAGNOSIS — F41.9 ANXIETY: ICD-10-CM

## 2019-09-26 DIAGNOSIS — F32.A ANXIETY AND DEPRESSION: ICD-10-CM

## 2019-09-26 DIAGNOSIS — G62.9 NEUROPATHY: ICD-10-CM

## 2019-09-26 DIAGNOSIS — E66.9 OBESE BODY HABITUS: ICD-10-CM

## 2019-09-26 DIAGNOSIS — K76.0 HEPATIC STEATOSIS: ICD-10-CM

## 2019-09-26 DIAGNOSIS — Z79.899 MEDICATION MANAGEMENT: ICD-10-CM

## 2019-09-26 LAB
ALBUMIN SERPL-MCNC: 4.8 G/DL (ref 3.5–5.2)
ALP BLD-CCNC: 129 U/L (ref 35–104)
ALT SERPL-CCNC: 80 U/L (ref 5–33)
AMPHETAMINE SCREEN, URINE: NEGATIVE
ANION GAP SERPL CALCULATED.3IONS-SCNC: 18 MMOL/L (ref 7–19)
AST SERPL-CCNC: 49 U/L (ref 5–32)
BARBITURATE SCREEN, URINE: NEGATIVE
BASOPHILS ABSOLUTE: 0.1 K/UL (ref 0–0.2)
BASOPHILS RELATIVE PERCENT: 0.7 % (ref 0–1)
BENZODIAZEPINE SCREEN, URINE: NEGATIVE
BILIRUB SERPL-MCNC: 0.3 MG/DL (ref 0.2–1.2)
BILIRUBIN URINE: NEGATIVE
BLOOD, URINE: NEGATIVE
BUN BLDV-MCNC: 14 MG/DL (ref 8–23)
BUPRENORPHINE URINE: NORMAL
CALCIUM SERPL-MCNC: 10.2 MG/DL (ref 8.8–10.2)
CHLORIDE BLD-SCNC: 101 MMOL/L (ref 98–111)
CHOLESTEROL, TOTAL: 343 MG/DL (ref 160–199)
CLARITY: CLEAR
CO2: 21 MMOL/L (ref 22–29)
COCAINE METABOLITE SCREEN URINE: NEGATIVE
COLOR: YELLOW
CREAT SERPL-MCNC: 0.8 MG/DL (ref 0.5–0.9)
CREATININE URINE: 71.2 MG/DL (ref 4.2–622)
EOSINOPHILS ABSOLUTE: 0.2 K/UL (ref 0–0.6)
EOSINOPHILS RELATIVE PERCENT: 2.1 % (ref 0–5)
GABAPENTIN SCREEN, URINE: NORMAL
GFR NON-AFRICAN AMERICAN: >60
GLUCOSE BLD-MCNC: 226 MG/DL (ref 74–109)
GLUCOSE URINE: >=1000 MG/DL
HBA1C MFR BLD: 11.2 % (ref 4–6)
HCT VFR BLD CALC: 45.6 % (ref 37–47)
HDLC SERPL-MCNC: 45 MG/DL (ref 65–121)
HEMOGLOBIN: 15.3 G/DL (ref 12–16)
IMMATURE GRANULOCYTES #: 0 K/UL
KETONES, URINE: NEGATIVE MG/DL
LDL CHOLESTEROL CALCULATED: ABNORMAL MG/DL
LDL CHOLESTEROL DIRECT: 257 MG/DL
LEUKOCYTE ESTERASE, URINE: NEGATIVE
LYMPHOCYTES ABSOLUTE: 3.7 K/UL (ref 1.1–4.5)
LYMPHOCYTES RELATIVE PERCENT: 34 % (ref 20–40)
MCH RBC QN AUTO: 30.8 PG (ref 27–31)
MCHC RBC AUTO-ENTMCNC: 33.6 G/DL (ref 33–37)
MCV RBC AUTO: 91.8 FL (ref 81–99)
MDMA URINE: NEGATIVE
METHADONE SCREEN, URINE: NEGATIVE
METHAMPHETAMINE, URINE: NEGATIVE
MICROALBUMIN UR-MCNC: 4.8 MG/DL (ref 0–19)
MICROALBUMIN/CREAT UR-RTO: 67.4 MG/G
MONOCYTES ABSOLUTE: 0.5 K/UL (ref 0–0.9)
MONOCYTES RELATIVE PERCENT: 4.5 % (ref 0–10)
NEUTROPHILS ABSOLUTE: 6.3 K/UL (ref 1.5–7.5)
NEUTROPHILS RELATIVE PERCENT: 58.3 % (ref 50–65)
NITRITE, URINE: NEGATIVE
OPIATE SCREEN URINE: NEGATIVE
OXYCODONE SCREEN URINE: NEGATIVE
PDW BLD-RTO: 12.8 % (ref 11.5–14.5)
PH UA: 6 (ref 5–8)
PHENCYCLIDINE SCREEN URINE: NEGATIVE
PLATELET # BLD: 235 K/UL (ref 130–400)
PMV BLD AUTO: 12.5 FL (ref 9.4–12.3)
POTASSIUM REFLEX MAGNESIUM: 3.8 MMOL/L (ref 3.5–5)
PROPOXYPHENE SCREEN, URINE: NORMAL
PROTEIN UA: NEGATIVE MG/DL
RBC # BLD: 4.97 M/UL (ref 4.2–5.4)
SODIUM BLD-SCNC: 140 MMOL/L (ref 136–145)
SPECIFIC GRAVITY UA: 1.04 (ref 1–1.03)
THC SCREEN, URINE: NEGATIVE
TOTAL PROTEIN: 8.2 G/DL (ref 6.6–8.7)
TRICYCLIC ANTIDEPRESSANTS, UR: NEGATIVE
TRIGL SERPL-MCNC: 469 MG/DL (ref 0–149)
TSH SERPL DL<=0.05 MIU/L-ACNC: 1.4 UIU/ML (ref 0.27–4.2)
URINE REFLEX TO CULTURE: ABNORMAL
UROBILINOGEN, URINE: 0.2 E.U./DL
VITAMIN D 25-HYDROXY: 77.8 NG/ML
WBC # BLD: 10.8 K/UL (ref 4.8–10.8)

## 2019-09-26 PROCEDURE — 99214 OFFICE O/P EST MOD 30 MIN: CPT | Performed by: NURSE PRACTITIONER

## 2019-09-26 PROCEDURE — 3017F COLORECTAL CA SCREEN DOC REV: CPT | Performed by: NURSE PRACTITIONER

## 2019-09-26 PROCEDURE — G8417 CALC BMI ABV UP PARAM F/U: HCPCS | Performed by: NURSE PRACTITIONER

## 2019-09-26 PROCEDURE — 2022F DILAT RTA XM EVC RTNOPTHY: CPT | Performed by: NURSE PRACTITIONER

## 2019-09-26 PROCEDURE — 80305 DRUG TEST PRSMV DIR OPT OBS: CPT | Performed by: NURSE PRACTITIONER

## 2019-09-26 PROCEDURE — G8427 DOCREV CUR MEDS BY ELIG CLIN: HCPCS | Performed by: NURSE PRACTITIONER

## 2019-09-26 PROCEDURE — 3046F HEMOGLOBIN A1C LEVEL >9.0%: CPT | Performed by: NURSE PRACTITIONER

## 2019-09-26 PROCEDURE — 1036F TOBACCO NON-USER: CPT | Performed by: NURSE PRACTITIONER

## 2019-09-26 RX ORDER — PANTOPRAZOLE SODIUM 40 MG/1
40 TABLET, DELAYED RELEASE ORAL DAILY
Qty: 90 TABLET | Refills: 1 | Status: SHIPPED | OUTPATIENT
Start: 2019-09-26 | End: 2020-05-01 | Stop reason: SDUPTHER

## 2019-09-26 RX ORDER — PHENTERMINE HYDROCHLORIDE 15 MG/1
15 CAPSULE ORAL EVERY MORNING
Qty: 30 CAPSULE | Refills: 0 | Status: SHIPPED | OUTPATIENT
Start: 2019-09-26 | End: 2019-10-26

## 2019-09-26 RX ORDER — GABAPENTIN 300 MG/1
300 CAPSULE ORAL 2 TIMES DAILY
COMMUNITY
Start: 2019-08-05 | End: 2020-05-06 | Stop reason: ALTCHOICE

## 2019-09-26 RX ORDER — FLUOXETINE HYDROCHLORIDE 40 MG/1
40 CAPSULE ORAL DAILY
Qty: 90 CAPSULE | Refills: 1 | Status: SHIPPED | OUTPATIENT
Start: 2019-09-26 | End: 2020-05-01 | Stop reason: SDUPTHER

## 2019-09-26 RX ORDER — ROSUVASTATIN CALCIUM 40 MG/1
TABLET, COATED ORAL
Qty: 90 TABLET | Refills: 1 | Status: SHIPPED | OUTPATIENT
Start: 2019-09-26 | End: 2020-05-06 | Stop reason: SDUPTHER

## 2019-09-26 RX ORDER — RANITIDINE 150 MG/1
TABLET ORAL
Qty: 90 TABLET | Refills: 1 | Status: SHIPPED | OUTPATIENT
Start: 2019-09-26 | End: 2020-05-06 | Stop reason: ALTCHOICE

## 2019-09-26 RX ORDER — GABAPENTIN 300 MG/1
300 CAPSULE ORAL 2 TIMES DAILY
Qty: 60 CAPSULE | Refills: 2 | Status: SHIPPED | OUTPATIENT
Start: 2019-09-26 | End: 2020-05-06 | Stop reason: ALTCHOICE

## 2019-09-26 RX ORDER — FLUOXETINE 10 MG/1
CAPSULE ORAL
Qty: 90 CAPSULE | Refills: 3 | Status: CANCELLED | OUTPATIENT
Start: 2019-09-26

## 2019-09-26 ASSESSMENT — ENCOUNTER SYMPTOMS
CONSTIPATION: 0
SHORTNESS OF BREATH: 0
DIARRHEA: 0
TROUBLE SWALLOWING: 0

## 2019-09-26 NOTE — PROGRESS NOTES
Yes Yoly Liyah, APRN   glucose blood VI test strips (ASCENSIA AUTODISC VI;ONE TOUCH ULTRA TEST VI) strip 1 each by In Vitro route 4 times daily (with meals and nightly) For qid and prn testing for newly diagnosed diabetes  Freestyle Freedom lite    DX: Diabetes, insulin requiring Yes Yoly [place], APRN   aspirin EC 81 MG EC tablet Take 1 tablet by mouth daily Yes Yoly Liyah, APRN   acetaminophen (TYLENOL ARTHRITIS PAIN) 650 MG CR tablet Take 650 mg by mouth every 8 hours as needed for Pain.  Yes Historical Provider, MD     Allergies   Allergen Reactions    Sulfa Antibiotics Hives     Past Surgical History:   Procedure Laterality Date    BACK SURGERY      Dr. Tami Castleman      Family History   Problem Relation Age of Onset    Diabetes Mother     Stroke Mother      Social History     Socioeconomic History    Marital status:      Spouse name: Not on file    Number of children: Not on file    Years of education: Not on file    Highest education level: Not on file   Occupational History    Not on file   Social Needs    Financial resource strain: Not on file    Food insecurity:     Worry: Not on file     Inability: Not on file    Transportation needs:     Medical: Not on file     Non-medical: Not on file   Tobacco Use    Smoking status: Former Smoker     Packs/day: 0.50     Years: 20.00     Pack years: 10.00     Types: Cigarettes     Start date: 1983     Last attempt to quit: 2014     Years since quittin.1    Smokeless tobacco: Never Used   Substance and Sexual Activity    Alcohol use: No     Alcohol/week: 0.0 standard drinks    Drug use: No    Sexual activity: Not on file   Lifestyle    Physical activity:     Days per week: Not on file     Minutes per session: Not on file    Stress: Not on file   Relationships    Social connections:     Talks on phone: Not on file     Gets together: Not on file     Attends [employer] service: Not on of med adjustment for anxiety. Schedule pap  Diagnosis and orders for this visit are above. Please note that this chart was generated using dragon dictationsoftware. Although every effort was made to ensure the accuracy of this automated transcription, some errors in transcription may have occurred.

## 2019-09-28 DIAGNOSIS — Z79.4 TYPE 2 DIABETES MELLITUS WITH HYPERGLYCEMIA, WITH LONG-TERM CURRENT USE OF INSULIN (HCC): ICD-10-CM

## 2019-09-28 DIAGNOSIS — E11.65 TYPE 2 DIABETES MELLITUS WITH HYPERGLYCEMIA, WITH LONG-TERM CURRENT USE OF INSULIN (HCC): ICD-10-CM

## 2019-09-30 RX ORDER — NAPROXEN SODIUM 220 MG
TABLET ORAL
Qty: 100 EACH | Refills: 0 | Status: SHIPPED | OUTPATIENT
Start: 2019-09-30 | End: 2020-01-06

## 2019-10-02 DIAGNOSIS — E78.2 MIXED HYPERLIPIDEMIA: ICD-10-CM

## 2019-10-02 DIAGNOSIS — Z79.4 UNCONTROLLED TYPE 2 DIABETES MELLITUS WITH HYPERGLYCEMIA, WITH LONG-TERM CURRENT USE OF INSULIN (HCC): Primary | ICD-10-CM

## 2019-10-02 DIAGNOSIS — E11.65 UNCONTROLLED TYPE 2 DIABETES MELLITUS WITH HYPERGLYCEMIA, WITH LONG-TERM CURRENT USE OF INSULIN (HCC): Primary | ICD-10-CM

## 2019-10-04 DIAGNOSIS — Z79.4 UNCONTROLLED TYPE 2 DIABETES MELLITUS WITH HYPERGLYCEMIA, WITH LONG-TERM CURRENT USE OF INSULIN (HCC): ICD-10-CM

## 2019-10-04 DIAGNOSIS — E11.65 UNCONTROLLED TYPE 2 DIABETES MELLITUS WITH HYPERGLYCEMIA, WITH LONG-TERM CURRENT USE OF INSULIN (HCC): ICD-10-CM

## 2019-10-04 RX ORDER — EZETIMIBE 10 MG/1
10 TABLET ORAL DAILY
Qty: 30 TABLET | Refills: 3 | Status: SHIPPED | OUTPATIENT
Start: 2019-10-04 | End: 2020-01-20

## 2019-10-04 RX ORDER — GLUCOSAMINE HCL/CHONDROITIN SU 500-400 MG
CAPSULE ORAL
Qty: 100 STRIP | Refills: 3 | Status: SHIPPED | OUTPATIENT
Start: 2019-10-04

## 2019-10-04 RX ORDER — INSULIN GLARGINE 100 [IU]/ML
INJECTION, SOLUTION SUBCUTANEOUS
Qty: 10 ML | Refills: 1 | Status: SHIPPED | OUTPATIENT
Start: 2019-10-04 | End: 2020-05-06

## 2019-10-09 ENCOUNTER — TELEPHONE (OUTPATIENT)
Dept: FAMILY MEDICINE CLINIC | Age: 59
End: 2019-10-09

## 2019-10-10 ENCOUNTER — HOSPITAL ENCOUNTER (OUTPATIENT)
Dept: GENERAL RADIOLOGY | Age: 59
Discharge: HOME | End: 2019-10-10
Payer: MEDICARE

## 2019-10-10 DIAGNOSIS — R74.8 ELEVATED LIVER ENZYMES: ICD-10-CM

## 2019-10-10 PROCEDURE — 76705 ECHO EXAM OF ABDOMEN: CPT

## 2019-10-25 ENCOUNTER — OFFICE VISIT (OUTPATIENT)
Dept: FAMILY MEDICINE CLINIC | Age: 59
End: 2019-10-25
Payer: MEDICARE

## 2019-10-25 VITALS
TEMPERATURE: 98.2 F | WEIGHT: 210 LBS | DIASTOLIC BLOOD PRESSURE: 94 MMHG | HEART RATE: 126 BPM | BODY MASS INDEX: 38.64 KG/M2 | SYSTOLIC BLOOD PRESSURE: 148 MMHG | HEIGHT: 62 IN | RESPIRATION RATE: 20 BRPM | OXYGEN SATURATION: 99 %

## 2019-10-25 DIAGNOSIS — E11.65 UNCONTROLLED TYPE 2 DIABETES MELLITUS WITH HYPERGLYCEMIA, WITH LONG-TERM CURRENT USE OF INSULIN (HCC): ICD-10-CM

## 2019-10-25 DIAGNOSIS — I10 ESSENTIAL HYPERTENSION: ICD-10-CM

## 2019-10-25 DIAGNOSIS — Z79.4 UNCONTROLLED TYPE 2 DIABETES MELLITUS WITH HYPERGLYCEMIA, WITH LONG-TERM CURRENT USE OF INSULIN (HCC): ICD-10-CM

## 2019-10-25 DIAGNOSIS — F41.9 ANXIETY AND DEPRESSION: ICD-10-CM

## 2019-10-25 DIAGNOSIS — K76.0 FATTY LIVER: Primary | ICD-10-CM

## 2019-10-25 DIAGNOSIS — F32.A ANXIETY AND DEPRESSION: ICD-10-CM

## 2019-10-25 PROCEDURE — G8484 FLU IMMUNIZE NO ADMIN: HCPCS | Performed by: NURSE PRACTITIONER

## 2019-10-25 PROCEDURE — 3046F HEMOGLOBIN A1C LEVEL >9.0%: CPT | Performed by: NURSE PRACTITIONER

## 2019-10-25 PROCEDURE — G8417 CALC BMI ABV UP PARAM F/U: HCPCS | Performed by: NURSE PRACTITIONER

## 2019-10-25 PROCEDURE — 2022F DILAT RTA XM EVC RTNOPTHY: CPT | Performed by: NURSE PRACTITIONER

## 2019-10-25 PROCEDURE — 3017F COLORECTAL CA SCREEN DOC REV: CPT | Performed by: NURSE PRACTITIONER

## 2019-10-25 PROCEDURE — 1036F TOBACCO NON-USER: CPT | Performed by: NURSE PRACTITIONER

## 2019-10-25 PROCEDURE — G8427 DOCREV CUR MEDS BY ELIG CLIN: HCPCS | Performed by: NURSE PRACTITIONER

## 2019-10-25 PROCEDURE — 99213 OFFICE O/P EST LOW 20 MIN: CPT | Performed by: NURSE PRACTITIONER

## 2019-10-25 RX ORDER — LOSARTAN POTASSIUM 50 MG/1
50 TABLET ORAL DAILY
Qty: 30 TABLET | Refills: 5 | Status: SHIPPED | OUTPATIENT
Start: 2019-10-25 | End: 2020-05-06 | Stop reason: SDUPTHER

## 2019-10-25 ASSESSMENT — ENCOUNTER SYMPTOMS
SHORTNESS OF BREATH: 0
ABDOMINAL PAIN: 0

## 2019-12-23 ENCOUNTER — TELEPHONE (OUTPATIENT)
Dept: INTERNAL MEDICINE | Age: 59
End: 2019-12-23

## 2019-12-26 ENCOUNTER — TELEPHONE (OUTPATIENT)
Dept: FAMILY MEDICINE CLINIC | Age: 59
End: 2019-12-26

## 2019-12-26 NOTE — TELEPHONE ENCOUNTER
Novolog is not on formulary and insurance will not cover. Denial did not give alternatives, but trial of Humolog was mentioned several times during PA process.

## 2019-12-27 RX ORDER — INSULIN LISPRO 100 [IU]/ML
INJECTION, SOLUTION INTRAVENOUS; SUBCUTANEOUS
Qty: 3 ML | Refills: 5 | Status: SHIPPED | OUTPATIENT
Start: 2019-12-27 | End: 2020-01-31 | Stop reason: SDUPTHER

## 2020-01-06 RX ORDER — FLUOXETINE HYDROCHLORIDE 20 MG/1
CAPSULE ORAL
Qty: 30 CAPSULE | Refills: 2 | Status: SHIPPED | OUTPATIENT
Start: 2020-01-06 | End: 2020-05-01 | Stop reason: DRUGHIGH

## 2020-01-06 RX ORDER — NAPROXEN SODIUM 220 MG
TABLET ORAL
Qty: 100 EACH | Refills: 0 | Status: SHIPPED | OUTPATIENT
Start: 2020-01-06

## 2020-01-31 ENCOUNTER — TELEPHONE (OUTPATIENT)
Dept: FAMILY MEDICINE CLINIC | Age: 60
End: 2020-01-31

## 2020-01-31 RX ORDER — INSULIN LISPRO 100 [IU]/ML
INJECTION, SOLUTION INTRAVENOUS; SUBCUTANEOUS
Qty: 3 ML | Refills: 5 | Status: SHIPPED | OUTPATIENT
Start: 2020-01-31 | End: 2020-05-06

## 2020-04-28 ENCOUNTER — HOSPITAL ENCOUNTER (OUTPATIENT)
Dept: ULTRASOUND IMAGING | Age: 60
Discharge: HOME | End: 2020-04-28
Payer: MEDICARE

## 2020-04-28 PROCEDURE — 76705 ECHO EXAM OF ABDOMEN: CPT

## 2020-05-01 RX ORDER — PANTOPRAZOLE SODIUM 40 MG/1
40 TABLET, DELAYED RELEASE ORAL DAILY
Qty: 90 TABLET | Refills: 0 | Status: SHIPPED | OUTPATIENT
Start: 2020-05-01

## 2020-05-01 RX ORDER — RANITIDINE 150 MG/1
TABLET ORAL
Qty: 90 TABLET | Refills: 1 | OUTPATIENT
Start: 2020-05-01

## 2020-05-01 RX ORDER — FAMOTIDINE 20 MG/1
20 TABLET, FILM COATED ORAL 2 TIMES DAILY PRN
Qty: 60 TABLET | Refills: 0 | Status: SHIPPED | OUTPATIENT
Start: 2020-05-01 | End: 2020-06-02 | Stop reason: SDUPTHER

## 2020-05-01 RX ORDER — FLUOXETINE HYDROCHLORIDE 40 MG/1
40 CAPSULE ORAL DAILY
Qty: 90 CAPSULE | Refills: 0 | Status: SHIPPED | OUTPATIENT
Start: 2020-05-01 | End: 2020-07-08 | Stop reason: SDUPTHER

## 2020-05-01 NOTE — TELEPHONE ENCOUNTER
Last office visit - 10/25/2019  Labs - labs ordered from 6 months ago haven't been done  Please advise on refills.

## 2020-05-06 ENCOUNTER — VIRTUAL VISIT (OUTPATIENT)
Dept: FAMILY MEDICINE CLINIC | Age: 60
End: 2020-05-06
Payer: MEDICARE

## 2020-05-06 VITALS — DIASTOLIC BLOOD PRESSURE: 78 MMHG | SYSTOLIC BLOOD PRESSURE: 129 MMHG

## 2020-05-06 PROCEDURE — G8427 DOCREV CUR MEDS BY ELIG CLIN: HCPCS | Performed by: NURSE PRACTITIONER

## 2020-05-06 PROCEDURE — 3017F COLORECTAL CA SCREEN DOC REV: CPT | Performed by: NURSE PRACTITIONER

## 2020-05-06 PROCEDURE — 2022F DILAT RTA XM EVC RTNOPTHY: CPT | Performed by: NURSE PRACTITIONER

## 2020-05-06 PROCEDURE — 3046F HEMOGLOBIN A1C LEVEL >9.0%: CPT | Performed by: NURSE PRACTITIONER

## 2020-05-06 PROCEDURE — 99214 OFFICE O/P EST MOD 30 MIN: CPT | Performed by: NURSE PRACTITIONER

## 2020-05-06 RX ORDER — ROSUVASTATIN CALCIUM 40 MG/1
TABLET, COATED ORAL
Qty: 30 TABLET | Refills: 1 | Status: SHIPPED | OUTPATIENT
Start: 2020-05-06 | End: 2020-06-03 | Stop reason: SDUPTHER

## 2020-05-06 RX ORDER — ROPINIROLE 0.25 MG/1
TABLET, FILM COATED ORAL
Qty: 60 TABLET | Refills: 1 | Status: SHIPPED | OUTPATIENT
Start: 2020-05-06 | End: 2020-07-07 | Stop reason: SDUPTHER

## 2020-05-06 RX ORDER — LOSARTAN POTASSIUM 50 MG/1
50 TABLET ORAL DAILY
Qty: 30 TABLET | Refills: 1 | Status: SHIPPED | OUTPATIENT
Start: 2020-05-06 | End: 2020-07-07 | Stop reason: SDUPTHER

## 2020-05-06 RX ORDER — EZETIMIBE 10 MG/1
10 TABLET ORAL DAILY
Qty: 30 TABLET | Refills: 1 | Status: SHIPPED | OUTPATIENT
Start: 2020-05-06 | End: 2020-09-28 | Stop reason: SDUPTHER

## 2020-05-06 ASSESSMENT — ENCOUNTER SYMPTOMS
DIARRHEA: 0
SHORTNESS OF BREATH: 0
BACK PAIN: 1
CONSTIPATION: 1

## 2020-05-06 NOTE — PROGRESS NOTES
2020    TELEHEALTH EVALUATION -- Audio/Visual (During [id number] public health emergency)    Chief Complaint   Patient presents with    Other     restless legs at night    Diabetes           Chayo Potts (:  1960) has requested an audio/video evaluation for the following concern(s):  HPI:pt is home. Pt has reported that she has stopped gabapentin r/t frustration in getting refill. Pt is having restless leg feelings bilat and having trouble sleeping r/t this. Patient had taken gabapentin in the past in relation to having neuropathy and radicular pain originating from spine. Patient is open to trying a different medication at this point and is in agreement to try Requip. Patient does report that restless legs are keeping her awake as she is trying to fall asleep and has need for continuous movement of legs and jerking of legs. Patient has longstanding diabetes. Patient was initially diagnosed with diabetes and an emergency type situation where she was suddenly not feeling well went to the ER and blood sugar was well over 500. Patient has not always adhere to diabetic regimen. I have thought patient was taking NovoLog and Lantus, however patient states that she has now changed to Novolin and and Novolin R. Patient states that the other medications were too costly and her health care  that she has within the family was unable to keep her with samples that she had been getting. Novolin N---pt is taking 28 units twice daily  Novolin R---pt is taking tid usually 15units with meal  Pt reports no hypoglycemia when she is eating regular   Pt reports that she is seeing 170-210. Patient is aware that these numbers are entirely too high and that we do need to work harder for diabetic control. Patient is also aware that she needs not skip meals and needs to be eating 3 meals a day and a bedtime snack.   Patient has been recommended to see dietitian in the past.  Patient has recently had 1983     Last attempt to quit: 2014     Years since quittin.7    Smokeless tobacco: Never Used   Substance Use Topics    Alcohol use: No     Alcohol/week: 0.0 standard drinks    Drug use: No        Allergies   Allergen Reactions    Sulfa Antibiotics Hives   ,   Past Medical History:   Diagnosis Date    Back pain 2014    Carotid artery stenosis     Depression 2014    Diabetes ([address]     GERD (gastroesophageal reflux disease)     Hyperlipidemia     Hypertension 2014    Type II or unspecified type diabetes mellitus without mention of complication, not stated as uncontrolled    ,   Past Surgical History:   Procedure Laterality Date   Mg Galvan N/A    ,   Social History     Tobacco Use    Smoking status: Former Smoker     Packs/day: 0.50     Years: 20.00     Pack years: 10.00     Types: Cigarettes     Start date: 1983     Last attempt to quit: 2014     Years since quittin.7    Smokeless tobacco: Never Used   Substance Use Topics    Alcohol use: No     Alcohol/week: 0.0 standard drinks    Drug use: No   ,   Family History   Problem Relation Age of Onset    Diabetes Mother     Stroke Mother        PHYSICAL EXAMINATION:  [ INSTRUCTIONS:  \"[x]\" Indicates a positive item  \"[]\" Indicates a negative item  -- DELETE ALL ITEMS NOT EXAMINED]  Vital Signs: /78   Pt reported above vital.    Constitutional: [x] Appears well-developed and well-nourished [x] No apparent distress      [] Abnormal-   Mental status  [x] Alert and awake  [x] Oriented to person/place/time [x]Able to follow commands      Eyes:  EOM    [x]  Normal  [] Abnormal-  Sclera  [x]  Normal  [] Abnormal -         Discharge [x]  None visible  [] Abnormal -    HENT:   [x] Normocephalic, atraumatic.   [] Abnormal   [x] Mouth/Throat: Mucous membranes are moist.     External Ears [x] Normal  [] Abnormal-     Neck: [x] No visualized mass Pulmonary/Chest: [x] Respiratory effort normal.  [x] No visualized signs of difficulty breathing or respiratory distress        [] Abnormal-      Musculoskeletal:   [x] Normal gait with no signs of ataxia         [x] Normal range of motion of neck        [] Abnormal-       Neurological:        [x] No Facial Asymmetry (Cranial nerve 7 motor function) (limited exam to video visit)          [x] No gaze palsy        [] Abnormal-         Skin:        [x] No significant exanthematous lesions or discoloration noted on facial skin         [] Abnormal-            Psychiatric:       [x] Normal Affect [x] No Hallucinations        [] Abnormal-     Other pertinent observable physical exam findings- NA    ASSESSMENT/PLAN:  1. Uncontrolled type 2 diabetes mellitus with hyperglycemia, with long-term current use of insulin (HCC)  -3 DM meals a day and bedtime snack  -continue current plan until lab reviewed  - Urinalysis Reflex to Culture; Future  - CBC Auto Differential; Future  - Comprehensive Metabolic Panel w/ Reflex to MG; Future  - Hemoglobin A1C; Future  - Lipid Panel; Future    2. Mixed hyperlipidemia  -continue zetia  - rosuvastatin (CRESTOR) 40 MG tablet; TAKE 1 TABLET BY MOUTH EVERY NIGHT FOR CHOLESTEROL  Dispense: 30 tablet; Refill: 1  - TSH without Reflex; Future  - T4, Free; Future    3. Essential hypertension  - losartan (COZAAR) 50 MG tablet; Take 1 tablet by mouth daily  Dispense: 30 tablet; Refill: 1    4. Gastroesophageal reflux disease without esophagitis  -continue protonix and prn pepcid    5. Fatty liver    - Comprehensive Metabolic Panel w/ Reflex to MG; Future  - Lipid Panel; Future    6. Vitamin D insufficiency    - Vitamin D 25 Hydroxy; Future      Return in about 2 weeks (around 5/20/2020), or LAB IN 2wks and then f/u to be made as needed from review of lab, for as needed pending lab results.     Francis Nunez is a 60 y.o. female being evaluated by a Virtual Visit (video visit) encounter to address

## 2020-06-03 RX ORDER — FAMOTIDINE 20 MG/1
20 TABLET, FILM COATED ORAL 2 TIMES DAILY PRN
Qty: 180 TABLET | Refills: 0 | Status: SHIPPED | OUTPATIENT
Start: 2020-06-03 | End: 2020-09-28 | Stop reason: SDUPTHER

## 2020-06-03 RX ORDER — ROSUVASTATIN CALCIUM 40 MG/1
TABLET, COATED ORAL
Qty: 30 TABLET | Refills: 1 | Status: SHIPPED | OUTPATIENT
Start: 2020-06-03 | End: 2020-07-07 | Stop reason: SDUPTHER

## 2020-06-29 RX ORDER — LOSARTAN POTASSIUM 50 MG/1
50 TABLET ORAL DAILY
Qty: 30 TABLET | Refills: 1 | Status: CANCELLED | OUTPATIENT
Start: 2020-06-29

## 2020-06-29 RX ORDER — ROPINIROLE 0.25 MG/1
TABLET, FILM COATED ORAL
Qty: 60 TABLET | Refills: 1 | Status: CANCELLED | OUTPATIENT
Start: 2020-06-29

## 2020-06-30 RX ORDER — ROSUVASTATIN CALCIUM 40 MG/1
TABLET, COATED ORAL
Qty: 30 TABLET | Refills: 0 | Status: SHIPPED | OUTPATIENT
Start: 2020-06-30 | End: 2020-07-08 | Stop reason: SDUPTHER

## 2020-06-30 RX ORDER — LOSARTAN POTASSIUM 50 MG/1
50 TABLET ORAL DAILY
Qty: 30 TABLET | Refills: 0 | Status: SHIPPED | OUTPATIENT
Start: 2020-06-30 | End: 2020-07-08 | Stop reason: SDUPTHER

## 2020-06-30 RX ORDER — ROPINIROLE 0.25 MG/1
TABLET, FILM COATED ORAL
Qty: 60 TABLET | Refills: 0 | Status: SHIPPED | OUTPATIENT
Start: 2020-06-30 | End: 2020-07-08 | Stop reason: SDUPTHER

## 2020-07-01 ENCOUNTER — TELEPHONE (OUTPATIENT)
Dept: FAMILY MEDICINE CLINIC | Age: 60
End: 2020-07-01

## 2020-07-01 NOTE — TELEPHONE ENCOUNTER
Pharmacy sent a fax requesting Victoza 18mg/3mL (2 Pack) and I don't see it on her medication list. Please advise

## 2020-07-01 NOTE — TELEPHONE ENCOUNTER
victoza appears to have last been prescribed in May 2019. Has pt been taking this each day? It was not reported at our last virtual visit.   Pt needs in office visit with ALL meds in bag for review. (and lab is due)

## 2020-07-02 NOTE — TELEPHONE ENCOUNTER
FYI - Patient states that she has not been taking that medication. Insurance wouldn't pay that is why the medication wasn't reported at last visit. Phone call to Sharon to d/c Victoza.

## 2020-07-06 DIAGNOSIS — R74.8 ELEVATED LIVER ENZYMES: ICD-10-CM

## 2020-07-06 DIAGNOSIS — E78.2 MIXED HYPERLIPIDEMIA: ICD-10-CM

## 2020-07-06 DIAGNOSIS — Z79.4 UNCONTROLLED TYPE 2 DIABETES MELLITUS WITH HYPERGLYCEMIA, WITH LONG-TERM CURRENT USE OF INSULIN (HCC): ICD-10-CM

## 2020-07-06 DIAGNOSIS — Z11.59 ENCOUNTER FOR SCREENING FOR OTHER VIRAL DISEASES: ICD-10-CM

## 2020-07-06 DIAGNOSIS — E11.65 UNCONTROLLED TYPE 2 DIABETES MELLITUS WITH HYPERGLYCEMIA, WITH LONG-TERM CURRENT USE OF INSULIN (HCC): ICD-10-CM

## 2020-07-06 DIAGNOSIS — E55.9 VITAMIN D INSUFFICIENCY: ICD-10-CM

## 2020-07-06 DIAGNOSIS — K76.0 FATTY LIVER: ICD-10-CM

## 2020-07-06 LAB
ALBUMIN SERPL-MCNC: 4.4 G/DL (ref 3.5–5.2)
ALP BLD-CCNC: 107 U/L (ref 35–104)
ALT SERPL-CCNC: 60 U/L (ref 5–33)
ANION GAP SERPL CALCULATED.3IONS-SCNC: 18 MMOL/L (ref 7–19)
AST SERPL-CCNC: 40 U/L (ref 5–32)
BASOPHILS ABSOLUTE: 0.1 K/UL (ref 0–0.2)
BASOPHILS RELATIVE PERCENT: 0.7 % (ref 0–1)
BILIRUB SERPL-MCNC: 0.3 MG/DL (ref 0.2–1.2)
BILIRUBIN URINE: NEGATIVE
BLOOD, URINE: NEGATIVE
BUN BLDV-MCNC: 21 MG/DL (ref 8–23)
CALCIUM SERPL-MCNC: 9.4 MG/DL (ref 8.8–10.2)
CHLORIDE BLD-SCNC: 98 MMOL/L (ref 98–111)
CHOLESTEROL, TOTAL: 194 MG/DL (ref 160–199)
CLARITY: CLEAR
CO2: 21 MMOL/L (ref 22–29)
COLOR: YELLOW
CREAT SERPL-MCNC: 1 MG/DL (ref 0.5–0.9)
EOSINOPHILS ABSOLUTE: 0.2 K/UL (ref 0–0.6)
EOSINOPHILS RELATIVE PERCENT: 2.5 % (ref 0–5)
GFR NON-AFRICAN AMERICAN: 56
GLUCOSE BLD-MCNC: 292 MG/DL (ref 74–109)
GLUCOSE URINE: >=1000 MG/DL
HBA1C MFR BLD: 9.6 % (ref 4–6)
HBV SURFACE AB TITR SER: NORMAL {TITER}
HCT VFR BLD CALC: 46.2 % (ref 37–47)
HDLC SERPL-MCNC: 39 MG/DL (ref 65–121)
HEMOGLOBIN: 15.2 G/DL (ref 12–16)
IMMATURE GRANULOCYTES #: 0 K/UL
KETONES, URINE: NEGATIVE MG/DL
LDL CHOLESTEROL CALCULATED: 79 MG/DL
LEUKOCYTE ESTERASE, URINE: NEGATIVE
LYMPHOCYTES ABSOLUTE: 3.9 K/UL (ref 1.1–4.5)
LYMPHOCYTES RELATIVE PERCENT: 44.6 % (ref 20–40)
MCH RBC QN AUTO: 31.5 PG (ref 27–31)
MCHC RBC AUTO-ENTMCNC: 32.9 G/DL (ref 33–37)
MCV RBC AUTO: 95.9 FL (ref 81–99)
MONOCYTES ABSOLUTE: 0.5 K/UL (ref 0–0.9)
MONOCYTES RELATIVE PERCENT: 5.2 % (ref 0–10)
NEUTROPHILS ABSOLUTE: 4.1 K/UL (ref 1.5–7.5)
NEUTROPHILS RELATIVE PERCENT: 46.7 % (ref 50–65)
NITRITE, URINE: NEGATIVE
PDW BLD-RTO: 12.9 % (ref 11.5–14.5)
PH UA: 6 (ref 5–8)
PLATELET # BLD: 217 K/UL (ref 130–400)
PMV BLD AUTO: 12.3 FL (ref 9.4–12.3)
POTASSIUM REFLEX MAGNESIUM: 4.3 MMOL/L (ref 3.5–5)
PROTEIN UA: NEGATIVE MG/DL
RBC # BLD: 4.82 M/UL (ref 4.2–5.4)
SODIUM BLD-SCNC: 137 MMOL/L (ref 136–145)
SPECIFIC GRAVITY UA: 1.04 (ref 1–1.03)
T4 FREE: 0.92 NG/DL (ref 0.93–1.7)
TOTAL PROTEIN: 7.2 G/DL (ref 6.6–8.7)
TRIGL SERPL-MCNC: 381 MG/DL (ref 0–149)
TSH SERPL DL<=0.05 MIU/L-ACNC: 2.87 UIU/ML (ref 0.27–4.2)
UROBILINOGEN, URINE: 1 E.U./DL
VITAMIN D 25-HYDROXY: 93.2 NG/ML
WBC # BLD: 8.8 K/UL (ref 4.8–10.8)

## 2020-07-07 NOTE — PROGRESS NOTES
"Chief Complaint:   Chief Complaint   Patient presents with   • Endoscopy     Pt presents today for endo recall-last endo was 7/24/2017; Garza's; Pt states her reflux has been \"really bad\" the last 6 months-is on Pantoprazole 40mg daily-seems to be worse at night-pt started taking Pepcid at night and states that hasn't helped          Patient ID: Carlee Vee is a 59 y.o. female     History of Present Illness: This is a very pleasant 59-year-old female who is here today to follow-up for history of Garza's disease and who has complaints today of reflux, heartburn and nausea along with vomiting.    The patient states that about 6 months ago it felt as though her pantoprazole was not helping with her reflux.  She states that she has had associated heartburn.  She states that she followed up with her PCP for this and was given Zantac however that was taken off of the market and then was started on Pepcid at night.  She states that this is not really working either.  She states that within this past month her symptoms have seemed to really worsen most especially with eating or drinking.  She states that she is also having nausea and vomiting.The patient's last EGD was performed on 7/24/2017 with findings of established short Garza's esophagus.  Recall set for 3 years.     The patient denies any  epigastric pain, dysphagia, or hematemesis.  The patient denies any fever or chills.  Denies any melena or hematochezia.  Denies any unintentional weight loss or loss of appetite.    Past Medical History:   Diagnosis Date   • Garza's esophagus    • Depression    • Diabetes mellitus (CMS/HCC)    • GERD (gastroesophageal reflux disease)    • History of adenomatous polyp of colon    • History of colon polyps    • Hypertension    • Kidney stone        Past Surgical History:   Procedure Laterality Date   • BACK SURGERY     • COLONOSCOPY  10/18/2006    Thickened fold in the ascending colon-path shows hyperplastic polyp; Repeat 7 " years   • COLONOSCOPY N/A 7/24/2017    One 4mm tubular adenomatous polyp in the transverse colon; One 5mm hyperplastic polyp in the sigmoid colon; The examination was otherwise normal on direct and retroflexion views; Repeat 5 years   • ENDOSCOPY N/A 7/24/2017    Esophageal mucosal changes secondary to established short-segment Garza's disease-biopsied; Small HH; Normal stomach; Normal examined duodenum; Repeat 3 years   • ENDOSCOPY  12/08/2011    Esophagitis-biopsied; Garza's-biopsied; HH; Repeat 3 years   • ENDOSCOPY  10/27/2008    Garza's esophagus-biopsied; HH; Repeat 2 years   • ENDOSCOPY  10/23/2006    HH; Probable Garza's esophagus-biopsied; Repeat 2 years   • HYSTERECTOMY     • THORACOTOMY Left 3/27/2018    Procedure: THORACOTOMY, drainage of empyema and decortication;  Surgeon: Abel Stark MD;  Location: [facility];  Service: Cardiothoracic         Current Outpatient Medications:   •  amLODIPine (NORVASC) 10 MG tablet, Take 10 mg by mouth Daily., Disp: , Rfl:   •  aspirin 81 MG EC tablet, Chew 81 mg Daily., Disp: , Rfl:   •  famotidine (PEPCID) 20 MG tablet, Take 1 tablet by mouth Every Night., Disp: , Rfl:   •  FLUoxetine (PROzac) 20 MG capsule, Take 20 mg by mouth Daily., Disp: , Rfl:   •  insulin aspart (novoLOG) 100 UNIT/ML injection, Inject  under the skin 3 (Three) Times a Day Before Meals. 150 - 199 = 4 units 200 - 249 = 8 units 250 - 299 = 12 units 300 - 349 = 16 units 349 - 400 = 20 units > 400 = 24 units, Disp: , Rfl:   •  insulin detemir (LEVEMIR) 100 UNIT/ML injection, Inject 17 Units under the skin Every 12 (Twelve) Hours., Disp: 1 each, Rfl: 1  •  pantoprazole (PROTONIX) 40 MG EC tablet, Take 1 tablet by mouth 2 (two) times a day., Disp: 60 tablet, Rfl: 12  •  polyethylene glycol (MIRALAX) packet, Take 17 g by mouth Daily. (Patient taking differently: Take 17 g by mouth As Needed.), Disp: 30 each, Rfl: 0  •  rosuvastatin (CRESTOR) 10 MG tablet, Take 10 mg by mouth Daily., Disp: ,  "Rfl:   •  vitamin D (ERGOCALCIFEROL) 87579 units capsule capsule, Take 50,000 Units by mouth 1 (One) Time Per Week. Fridays, Disp: , Rfl:   •  gabapentin (NEURONTIN) 100 MG capsule, Take 400 mg by mouth Every Night., Disp: , Rfl: 3  •  traZODone (DESYREL) 50 MG tablet, Take 100 mg by mouth At Night As Needed for Sleep., Disp: , Rfl:     Allergies   Allergen Reactions   • Sulfa Antibiotics Hives       Social History     Socioeconomic History   • Marital status:      Spouse name: Not on file   • Number of children: Not on file   • Years of education: Not on file   • Highest education level: Not on file   Tobacco Use   • Smoking status: Former Smoker   • Smokeless tobacco: Never Used   Substance and Sexual Activity   • Alcohol use: No   • Drug use: No       Family History   Problem Relation Age of Onset   • Colon cancer Neg Hx    • Colon polyps Neg Hx    • Esophageal cancer Neg Hx    • Liver cancer Neg Hx    • Liver disease Neg Hx    • Rectal cancer Neg Hx    • Stomach cancer Neg Hx        Vitals:    07/08/20 0820   BP: 116/64   BP Location: Left arm   Patient Position: Sitting   Cuff Size: Adult   Pulse: 104   Temp: 98.3 °F (36.8 °C)   TempSrc: Tympanic   SpO2: 99%   Weight: 98 kg (216 lb)   Height: 160 cm (63\")       Review of Systems:    General:    Present -feeling well   Skin:    Not Present-Rash   HEENT:     Not Present-Acute visual changes or Acute hearing changes   Neck :    Not Present- swollen glands   Genitourinary:      Not Present- burning, frequency, urgency hematuria, dysuria,   Cardiovascular:   Not Present-chest pain, palpitations, or pressure   Respiratory:   Not Present- shortness of breath or cough   Gastrointestinal:  Musculoskeletal:  Neurological:  Psychiatric:   Present as mentioned in the HP    Not Present. Recent gait disturbances.    Not Present-Seizures and weakness in extremities.    Not Present- Anxiety or Depression.       Physical Exam:    General Appearance:    Alert, " cooperative, in no acute distress   Psych:    Mood appropriate    Eyes:          conjunctivae and sclerae normal, no   icterus, no pallor   ENMT:    Ears appear intact with no abnormalities noted oral mucosa moist   Neck:   No adenopathy, supple, trachea midline, no thyromegaly, no   carotid bruit, no JVD    Cardiovascular:    Regular rhythm and normal rate, normal S1 and S2, no            murmur, no gallop, no rub, no click   Gastrointestinal:     Inspection normal.  Normal bowel sounds, no masses, no organomegaly, soft round non-tender, non-distended, no guarding, no rebound or tenderness. No hepatosplenomegaly.   Skin:   No bleeding, bruising or rash   Neurologic:   nonfocal       Lab Results - Last 18 Months   Lab Units 07/06/20  0824 09/26/19  0950 05/16/19  0853   GLUCOSE mg/dL 292* 226* 223*   BUN mg/dL 21 14 15   CREATININE mg/dL 1* 0.8 0.7   SODIUM mmol/L 137 140 138   POTASSIUM mmol/L  --   --  3.8   CHLORIDE mmol/L 98 101 102   TOTAL CO2 mmol/L 21* 21* 24   TOTAL PROTEIN g/dL 7.2 8.2 7.3   ALBUMIN g/dL 4.4 4.8 4.2   ALT (SGPT) U/L 60* 80* 62*   AST (SGOT) U/L 40* 49* 43*   ALK PHOS U/L 107* 129* 105*   BILIRUBIN mg/dL 0.3 0.3 0.3       Lab Results - Last 18 Months   Lab Units 07/06/20  0824 09/26/19  0950   HEMOGLOBIN g/dL 15.2 15.3   HEMATOCRIT % 46.2 45.6   MCV fL 95.9 91.8   WBC K/uL 8.8 10.8   RDW % 12.9 12.8   MPV fL 12.3 12.5*   PLATELETS K/uL 217 235       Lab Results - Last 18 Months   Lab Units 07/06/20  0824 09/26/19  0950   TSH uIU/mL 2.870 1.400        No results for input(s): HEPBIGMCORE, HEPBSAB, HEPBSAG, HCVSCRATIO, HEPCAB, AFP, FERRITIN, CERULOPLSM, URIEL, MITOAB, AFPTM, CHROMOSOME in the last 90704 hours.    Invalid input(s): HVBSAB1, AFPTN    Body mass index is 38.26 kg/m². Patient's Body mass index is 38.26 kg/m². BMI is above normal parameters. Recommendations include: nutrition counseling.    Assessment and Plan:  Assessment/Plan   Carlee was seen today for endoscopy.    Diagnoses and  all orders for this visit:    Garza's esophagus without dysplasia  -     Case Request; Standing  -     Case Request    Gastroesophageal reflux disease without esophagitis  -     Case Request; Standing  -     Case Request    Heartburn  -     Case Request; Standing  -     Case Request    Non-intractable vomiting with nausea, unspecified vomiting type  -     Case Request; Standing  -     Case Request    Other orders  -     pantoprazole (PROTONIX) 40 MG EC tablet; Take 1 tablet by mouth 2 (two) times a day.  -     Follow Anesthesia Guidelines / Protocol; Future  -     Obtain Informed Consent; Future      I have scheduled the patient for EGD.  Will increase Protonix to 40 mg twice daily.  Instructed patient to work on weight loss as well as tighter control of blood glucose.  May use Pepcid as needed.  Patient instructed she will undergo COVID screening prior to procedure.  She gave verbal understanding.  Written and verbal education given.    The risks, benefits, and alternatives of endoscopy were reviewed with the patient today.  Risks including perforation, with or without dilation, possibly requiring surgery.  Additional risks include risk of bleeding.  There is also the risk of a drug reaction or problems with anesthesia.  This will be discussed with the further by the anesthesia team on the day of the procedure. The benefits include the diagnosis and management of disease of the upper digestive tract.  Alternatives to endoscopy include upper GI series, laboratory testing, radiographic evaluation, or no intervention.  The patient verbalizes understanding and agrees.       Patient Instructions   Food Choices for Gastroesophageal Reflux Disease, Adult  When you have gastroesophageal reflux disease (GERD), the foods you eat and your eating habits are very important. Choosing the right foods can help ease your discomfort. Think about working with a nutrition specialist (dietitian) to help you make good choices.  What  are tips for following this plan?    Meals  · Choose healthy foods that are low in fat, such as fruits, vegetables, whole grains, low-fat dairy products, and lean meat, fish, and poultry.  · Eat small meals often instead of 3 large meals a day. Eat your meals slowly, and in a place where you are relaxed. Avoid bending over or lying down until 2-3 hours after eating.  · Avoid eating meals 2-3 hours before bed.  · Avoid drinking a lot of liquid with meals.  · Cook foods using methods other than frying. Bake, grill, or broil food instead.  · Avoid or limit:  ? Chocolate.  ? Peppermint or spearmint.  ? Alcohol.  ? Pepper.  ? Black and decaffeinated coffee.  ? Black and decaffeinated tea.  ? Bubbly (carbonated) soft drinks.  ? Caffeinated energy drinks and soft drinks.  · Limit high-fat foods such as:  ? Fatty meat or fried foods.  ? Whole milk, cream, butter, or ice cream.  ? Nuts and nut butters.  ? Pastries, donuts, and sweets made with butter or shortening.  · Avoid foods that cause symptoms. These foods may be different for everyone. Common foods that cause symptoms include:  ? Tomatoes.  ? Oranges, tracey, and limes.  ? Peppers.  ? Spicy food.  ? Onions and garlic.  ? Vinegar.  Lifestyle  · Maintain a healthy weight. Ask your doctor what weight is healthy for you. If you need to lose weight, work with your doctor to do so safely.  · Exercise for at least 30 minutes for 5 or more days each week, or as told by your doctor.  · Wear loose-fitting clothes.  · Do not smoke. If you need help quitting, ask your doctor.  · Sleep with the head of your bed higher than your feet. Use a wedge under the mattress or blocks under the bed frame to raise the head of the bed.  Summary  · When you have gastroesophageal reflux disease (GERD), food and lifestyle choices are very important in easing your symptoms.  · Eat small meals often instead of 3 large meals a day. Eat your meals slowly, and in a place where you are  relaxed.  · Limit high-fat foods such as fatty meat or fried foods.  · Avoid bending over or lying down until 2-3 hours after eating.  · Avoid peppermint and spearmint, caffeine, alcohol, and chocolate.  This information is not intended to replace advice given to you by your health care provider. Make sure you discuss any questions you have with your health care provider.  Document Released: 06/18/2013 Document Revised: 04/09/2020 Document Reviewed: 01/23/2018  Elsevier Patient Education © 2020 [employer]    Gastroparesis    Gastroparesis is a condition in which food takes longer than normal to empty from the stomach. The condition is usually long-lasting (chronic). It may also be called delayed gastric emptying.  There is no cure, but there are treatments and things that you can do at home to help relieve symptoms. Treating the underlying condition that causes gastroparesis can also help relieve symptoms.  What are the causes?  In many cases, the cause of this condition is not known. Possible causes include:  · A hormone (endocrine) disorder, such as hypothyroidism or diabetes.  · A nervous system disease, such as Parkinson's disease or multiple sclerosis.  · Cancer, infection, or surgery that affects the stomach or vagus nerve. The vagus nerve runs from your chest, through your neck, to the lower part of your brain.  · A connective tissue disorder, such as scleroderma.  · Certain medicines.  What increases the risk?  You are more likely to develop this condition if you:  · Have certain disorders or diseases, including:  ? An endocrine disorder.  ? An eating disorder.  ? Amyloidosis.  ? Scleroderma.  ? Parkinson's disease.  ? Multiple sclerosis.  ? Cancer or infection of the stomach or the vagus nerve.  · Have had surgery on the stomach or vagus nerve.  · Take certain medicines.  · Are female.  What are the signs or symptoms?  Symptoms of this condition include:  · Feeling full after eating very  little.  · Nausea.  · Vomiting.  · Heartburn.  · Abdominal bloating.  · Inconsistent blood sugar (glucose) levels on blood tests.  · Lack of appetite.  · Weight loss.  · Acid from the stomach coming up into the esophagus (gastroesophageal reflux).  · Sudden tightening (spasm) of the stomach, which can be painful.  Symptoms may come and go. Some people may not notice any symptoms.  How is this diagnosed?  This condition is diagnosed with tests, such as:  · Tests that check how long it takes food to move through the stomach and intestines. These tests include:  ? Upper gastrointestinal (GI) series. For this test, you drink a liquid that shows up well on X-rays, and then X-rays will be taken of your intestines.  ? Gastric emptying scintigraphy. For this test, you eat food that contains a small amount of radioactive material, and then scans are taken.  ? Wireless capsule GI monitoring system. For this test, you swallow a pill (capsule) that records information about how foods and fluid move through your stomach.  · Gastric manometry. For this test, a tube is passed down your throat and into your stomach to measure electrical and muscular activity.  · Endoscopy. For this test, a long, thin tube is passed down your throat and into your stomach to check for problems in your stomach lining.  · Ultrasound. This test uses sound waves to create images of inside the body. This can help rule out gallbladder disease or pancreatitis as a cause of your symptoms.  How is this treated?  There is no cure for gastroparesis. Treatment may include:  · Treating the underlying cause.  · Managing your symptoms by making changes to your diet and exercise habits.  · Taking medicines to control nausea and vomiting and to stimulate stomach muscles.  · Getting food through a feeding tube in the hospital. This may be done in severe cases.  · Having surgery to insert a device into your body that helps improve stomach emptying and control nausea  and vomiting (gastric neurostimulator).  Follow these instructions at home:  · Take over-the-counter and prescription medicines only as told by your health care provider.  · Follow instructions from your health care provider about eating or drinking restrictions. Your health care provider may recommend that you:  ? Eat smaller meals more often.  ? Eat low-fat foods.  ? Eat low-fiber forms of high-fiber foods. For example, eat cooked vegetables instead of raw vegetables.  ? Have only liquid foods instead of solid foods. Liquid foods are easier to digest.  · Drink enough fluid to keep your urine pale yellow.  · Exercise as often as told by your health care provider.  · Keep all follow-up visits as told by your health care provider. This is important.  Contact a health care provider if you:  · Notice that your symptoms do not improve with treatment.  · Have new symptoms.  Get help right away if you:  · Have severe abdominal pain that does not improve with treatment.  · Have nausea that is severe or does not go away.  · Cannot drink fluids without vomiting.  Summary  · Gastroparesis is a chronic condition in which food takes longer than normal to empty from the stomach.  · Symptoms include nausea, vomiting, heartburn, abdominal bloating, and loss of appetite.  · Eating smaller portions, and low-fat, low-fiber foods may help you manage your symptoms.  · Get help right away if you have severe abdominal pain.  This information is not intended to replace advice given to you by your health care provider. Make sure you discuss any questions you have with your health care provider.  Document Released: 12/18/2006 Document Revised: 03/18/2019 Document Reviewed: 10/23/2018  [employer] Patient Education © 2020 [employer]        Next follow-up appointment        EMR Dragon/Transcription disclaimer:  Much of this encounter note is an electronic transcription/translation of spoken language to printed text. The electronic translation  of spoken language may permit erroneous, or at times, nonsensical words or phrases to be inadvertently transcribed; although I have reviewed the note for such errors, some may still exist.

## 2020-07-08 ENCOUNTER — OFFICE VISIT (OUTPATIENT)
Dept: FAMILY MEDICINE CLINIC | Age: 60
End: 2020-07-08
Payer: MEDICARE

## 2020-07-08 ENCOUNTER — OFFICE VISIT (OUTPATIENT)
Dept: GASTROENTEROLOGY | Facility: CLINIC | Age: 60
End: 2020-07-08

## 2020-07-08 ENCOUNTER — HOSPITAL ENCOUNTER (OUTPATIENT)
Dept: GENERAL RADIOLOGY | Age: 60
Discharge: HOME | End: 2020-07-08
Payer: MEDICARE

## 2020-07-08 VITALS
HEIGHT: 63 IN | OXYGEN SATURATION: 99 % | HEART RATE: 104 BPM | WEIGHT: 216 LBS | TEMPERATURE: 98.3 F | DIASTOLIC BLOOD PRESSURE: 64 MMHG | BODY MASS INDEX: 38.27 KG/M2 | SYSTOLIC BLOOD PRESSURE: 116 MMHG

## 2020-07-08 VITALS
WEIGHT: 218 LBS | SYSTOLIC BLOOD PRESSURE: 132 MMHG | TEMPERATURE: 98.4 F | HEART RATE: 120 BPM | OXYGEN SATURATION: 97 % | DIASTOLIC BLOOD PRESSURE: 62 MMHG | HEIGHT: 62 IN | RESPIRATION RATE: 18 BRPM | BODY MASS INDEX: 40.12 KG/M2

## 2020-07-08 DIAGNOSIS — R11.2 NON-INTRACTABLE VOMITING WITH NAUSEA, UNSPECIFIED VOMITING TYPE: ICD-10-CM

## 2020-07-08 DIAGNOSIS — R12 HEARTBURN: ICD-10-CM

## 2020-07-08 DIAGNOSIS — K21.9 GASTROESOPHAGEAL REFLUX DISEASE WITHOUT ESOPHAGITIS: ICD-10-CM

## 2020-07-08 DIAGNOSIS — K22.70 BARRETT'S ESOPHAGUS WITHOUT DYSPLASIA: Primary | ICD-10-CM

## 2020-07-08 PROBLEM — R11.10 NON-INTRACTABLE VOMITING: Status: ACTIVE | Noted: 2020-07-08

## 2020-07-08 LAB
AMPHETAMINE SCREEN, URINE: NORMAL
BARBITURATE SCREEN, URINE: NORMAL
BENZODIAZEPINE SCREEN, URINE: NORMAL
BUPRENORPHINE URINE: NORMAL
COCAINE METABOLITE SCREEN URINE: NORMAL
GABAPENTIN SCREEN, URINE: NORMAL
MDMA URINE: NORMAL
METHADONE SCREEN, URINE: NORMAL
METHAMPHETAMINE, URINE: NORMAL
OPIATE SCREEN URINE: NORMAL
OXYCODONE SCREEN URINE: NORMAL
PHENCYCLIDINE SCREEN URINE: NORMAL
PROPOXYPHENE SCREEN, URINE: NORMAL
THC SCREEN, URINE: NORMAL
TRICYCLIC ANTIDEPRESSANTS, UR: NORMAL

## 2020-07-08 PROCEDURE — 73521 X-RAY EXAM HIPS BI 2 VIEWS: CPT

## 2020-07-08 PROCEDURE — 2022F DILAT RTA XM EVC RTNOPTHY: CPT | Performed by: NURSE PRACTITIONER

## 2020-07-08 PROCEDURE — G8427 DOCREV CUR MEDS BY ELIG CLIN: HCPCS | Performed by: NURSE PRACTITIONER

## 2020-07-08 PROCEDURE — 99214 OFFICE O/P EST MOD 30 MIN: CPT | Performed by: NURSE PRACTITIONER

## 2020-07-08 PROCEDURE — 3017F COLORECTAL CA SCREEN DOC REV: CPT | Performed by: NURSE PRACTITIONER

## 2020-07-08 PROCEDURE — 72100 X-RAY EXAM L-S SPINE 2/3 VWS: CPT

## 2020-07-08 PROCEDURE — 71046 X-RAY EXAM CHEST 2 VIEWS: CPT

## 2020-07-08 PROCEDURE — 3046F HEMOGLOBIN A1C LEVEL >9.0%: CPT | Performed by: NURSE PRACTITIONER

## 2020-07-08 PROCEDURE — 93000 ELECTROCARDIOGRAM COMPLETE: CPT | Performed by: NURSE PRACTITIONER

## 2020-07-08 PROCEDURE — G8417 CALC BMI ABV UP PARAM F/U: HCPCS | Performed by: NURSE PRACTITIONER

## 2020-07-08 PROCEDURE — 80305 DRUG TEST PRSMV DIR OPT OBS: CPT | Performed by: NURSE PRACTITIONER

## 2020-07-08 PROCEDURE — 1036F TOBACCO NON-USER: CPT | Performed by: NURSE PRACTITIONER

## 2020-07-08 RX ORDER — ROSUVASTATIN CALCIUM 40 MG/1
TABLET, COATED ORAL
Qty: 90 TABLET | Refills: 0 | Status: SHIPPED | OUTPATIENT
Start: 2020-07-08 | End: 2020-09-15 | Stop reason: SDUPTHER

## 2020-07-08 RX ORDER — TRAMADOL HYDROCHLORIDE 50 MG/1
50 TABLET ORAL EVERY 4 HOURS PRN
Qty: 30 TABLET | Refills: 0 | Status: SHIPPED | OUTPATIENT
Start: 2020-07-08 | End: 2020-07-31 | Stop reason: SDUPTHER

## 2020-07-08 RX ORDER — FLUOXETINE HYDROCHLORIDE 40 MG/1
40 CAPSULE ORAL DAILY
Qty: 90 CAPSULE | Refills: 3 | Status: SHIPPED | OUTPATIENT
Start: 2020-07-08 | End: 2020-07-08

## 2020-07-08 RX ORDER — ROPINIROLE 0.25 MG/1
TABLET, FILM COATED ORAL
Qty: 180 TABLET | Refills: 0 | Status: SHIPPED | OUTPATIENT
Start: 2020-07-08 | End: 2020-11-03 | Stop reason: SDUPTHER

## 2020-07-08 RX ORDER — PANTOPRAZOLE SODIUM 40 MG/1
40 TABLET, DELAYED RELEASE ORAL 2 TIMES DAILY
Qty: 60 TABLET | Refills: 12 | Status: SHIPPED | OUTPATIENT
Start: 2020-07-08 | End: 2021-06-14

## 2020-07-08 RX ORDER — FAMOTIDINE 20 MG/1
1 TABLET, FILM COATED ORAL NIGHTLY
COMMUNITY
Start: 2020-06-03

## 2020-07-08 RX ORDER — LOSARTAN POTASSIUM 50 MG/1
50 TABLET ORAL DAILY
Qty: 90 TABLET | Refills: 0 | Status: SHIPPED | OUTPATIENT
Start: 2020-07-08 | End: 2020-10-05 | Stop reason: SDUPTHER

## 2020-07-08 RX ORDER — FLUOXETINE HYDROCHLORIDE 40 MG/1
40 CAPSULE ORAL DAILY
Qty: 90 CAPSULE | Refills: 3 | OUTPATIENT
Start: 2020-07-08 | End: 2021-05-10 | Stop reason: SDUPTHER

## 2020-07-08 NOTE — PATIENT INSTRUCTIONS
Food Choices for Gastroesophageal Reflux Disease, Adult  When you have gastroesophageal reflux disease (GERD), the foods you eat and your eating habits are very important. Choosing the right foods can help ease your discomfort. Think about working with a nutrition specialist (dietitian) to help you make good choices.  What are tips for following this plan?    Meals  · Choose healthy foods that are low in fat, such as fruits, vegetables, whole grains, low-fat dairy products, and lean meat, fish, and poultry.  · Eat small meals often instead of 3 large meals a day. Eat your meals slowly, and in a place where you are relaxed. Avoid bending over or lying down until 2-3 hours after eating.  · Avoid eating meals 2-3 hours before bed.  · Avoid drinking a lot of liquid with meals.  · Cook foods using methods other than frying. Bake, grill, or broil food instead.  · Avoid or limit:  ? Chocolate.  ? Peppermint or spearmint.  ? Alcohol.  ? Pepper.  ? Black and decaffeinated coffee.  ? Black and decaffeinated tea.  ? Bubbly (carbonated) soft drinks.  ? Caffeinated energy drinks and soft drinks.  · Limit high-fat foods such as:  ? Fatty meat or fried foods.  ? Whole milk, cream, butter, or ice cream.  ? Nuts and nut butters.  ? Pastries, donuts, and sweets made with butter or shortening.  · Avoid foods that cause symptoms. These foods may be different for everyone. Common foods that cause symptoms include:  ? Tomatoes.  ? Oranges, tracey, and limes.  ? Peppers.  ? Spicy food.  ? Onions and garlic.  ? Vinegar.  Lifestyle  · Maintain a healthy weight. Ask your doctor what weight is healthy for you. If you need to lose weight, work with your doctor to do so safely.  · Exercise for at least 30 minutes for 5 or more days each week, or as told by your doctor.  · Wear loose-fitting clothes.  · Do not smoke. If you need help quitting, ask your doctor.  · Sleep with the head of your bed higher than your feet. Use a wedge under the  mattress or blocks under the bed frame to raise the head of the bed.  Summary  · When you have gastroesophageal reflux disease (GERD), food and lifestyle choices are very important in easing your symptoms.  · Eat small meals often instead of 3 large meals a day. Eat your meals slowly, and in a place where you are relaxed.  · Limit high-fat foods such as fatty meat or fried foods.  · Avoid bending over or lying down until 2-3 hours after eating.  · Avoid peppermint and spearmint, caffeine, alcohol, and chocolate.  This information is not intended to replace advice given to you by your health care provider. Make sure you discuss any questions you have with your health care provider.  Document Released: 06/18/2013 Document Revised: 04/09/2020 Document Reviewed: 01/23/2018  Elsevier Patient Education © 2020 [employer]    Gastroparesis    Gastroparesis is a condition in which food takes longer than normal to empty from the stomach. The condition is usually long-lasting (chronic). It may also be called delayed gastric emptying.  There is no cure, but there are treatments and things that you can do at home to help relieve symptoms. Treating the underlying condition that causes gastroparesis can also help relieve symptoms.  What are the causes?  In many cases, the cause of this condition is not known. Possible causes include:  · A hormone (endocrine) disorder, such as hypothyroidism or diabetes.  · A nervous system disease, such as Parkinson's disease or multiple sclerosis.  · Cancer, infection, or surgery that affects the stomach or vagus nerve. The vagus nerve runs from your chest, through your neck, to the lower part of your brain.  · A connective tissue disorder, such as scleroderma.  · Certain medicines.  What increases the risk?  You are more likely to develop this condition if you:  · Have certain disorders or diseases, including:  ? An endocrine disorder.  ? An eating  disorder.  ? Amyloidosis.  ? Scleroderma.  ? Parkinson's disease.  ? Multiple sclerosis.  ? Cancer or infection of the stomach or the vagus nerve.  · Have had surgery on the stomach or vagus nerve.  · Take certain medicines.  · Are female.  What are the signs or symptoms?  Symptoms of this condition include:  · Feeling full after eating very little.  · Nausea.  · Vomiting.  · Heartburn.  · Abdominal bloating.  · Inconsistent blood sugar (glucose) levels on blood tests.  · Lack of appetite.  · Weight loss.  · Acid from the stomach coming up into the esophagus (gastroesophageal reflux).  · Sudden tightening (spasm) of the stomach, which can be painful.  Symptoms may come and go. Some people may not notice any symptoms.  How is this diagnosed?  This condition is diagnosed with tests, such as:  · Tests that check how long it takes food to move through the stomach and intestines. These tests include:  ? Upper gastrointestinal (GI) series. For this test, you drink a liquid that shows up well on X-rays, and then X-rays will be taken of your intestines.  ? Gastric emptying scintigraphy. For this test, you eat food that contains a small amount of radioactive material, and then scans are taken.  ? Wireless capsule GI monitoring system. For this test, you swallow a pill (capsule) that records information about how foods and fluid move through your stomach.  · Gastric manometry. For this test, a tube is passed down your throat and into your stomach to measure electrical and muscular activity.  · Endoscopy. For this test, a long, thin tube is passed down your throat and into your stomach to check for problems in your stomach lining.  · Ultrasound. This test uses sound waves to create images of inside the body. This can help rule out gallbladder disease or pancreatitis as a cause of your symptoms.  How is this treated?  There is no cure for gastroparesis. Treatment may include:  · Treating the underlying cause.  · Managing your  symptoms by making changes to your diet and exercise habits.  · Taking medicines to control nausea and vomiting and to stimulate stomach muscles.  · Getting food through a feeding tube in the hospital. This may be done in severe cases.  · Having surgery to insert a device into your body that helps improve stomach emptying and control nausea and vomiting (gastric neurostimulator).  Follow these instructions at home:  · Take over-the-counter and prescription medicines only as told by your health care provider.  · Follow instructions from your health care provider about eating or drinking restrictions. Your health care provider may recommend that you:  ? Eat smaller meals more often.  ? Eat low-fat foods.  ? Eat low-fiber forms of high-fiber foods. For example, eat cooked vegetables instead of raw vegetables.  ? Have only liquid foods instead of solid foods. Liquid foods are easier to digest.  · Drink enough fluid to keep your urine pale yellow.  · Exercise as often as told by your health care provider.  · Keep all follow-up visits as told by your health care provider. This is important.  Contact a health care provider if you:  · Notice that your symptoms do not improve with treatment.  · Have new symptoms.  Get help right away if you:  · Have severe abdominal pain that does not improve with treatment.  · Have nausea that is severe or does not go away.  · Cannot drink fluids without vomiting.  Summary  · Gastroparesis is a chronic condition in which food takes longer than normal to empty from the stomach.  · Symptoms include nausea, vomiting, heartburn, abdominal bloating, and loss of appetite.  · Eating smaller portions, and low-fat, low-fiber foods may help you manage your symptoms.  · Get help right away if you have severe abdominal pain.  This information is not intended to replace advice given to you by your health care provider. Make sure you discuss any questions you have with your health care  provider.  Document Released: 12/18/2006 Document Revised: 03/18/2019 Document Reviewed: 10/23/2018  Elsevier Patient Education © 2020 [employer] Inc.

## 2020-07-08 NOTE — PROGRESS NOTES
SUBJECTIVE:    Patient ID: Karley Rush is a58 y.o. female. Karley Rush is here today for Follow-up (Patient presents for follow up on labs and to discuss lower back pain and hip pain when walking.)  . HPI:   HPI     Low back pain and hip pain-bilat  Pt states that with walking worsens pain  Pain also noted to mid back  Onset well over 1mo and pain has increased  -pt has had to take pain med in past and \"just don't want to have to do that\"  tx-heat pad, ice, change of sleeping location  Lumbar MRI 2014 shows  Pt has had surgery to lumbar spine x 2. Does report having hardware to low back. Endo next week, saw GI today    Diabetes  a1c down from 11.2 to 9.6%  Pt states that she has been cheating on DM diet and \"sat down today and made a diabetic diet list\"      EKG reviewed and no acute finding noted. Past Medical History:   Diagnosis Date    Back pain 6/5/2014    Carotid artery stenosis     Depression 6/5/2014    Diabetes ([facility] [address]     GERD (gastroesophageal reflux disease)     Hyperlipidemia     Hypertension 6/5/2014    Morbidly obese ([facility] [address] 7/15/2020    Type II or unspecified type diabetes mellitus without mention of complication, not stated as uncontrolled      Prior to Visit Medications    Medication Sig Taking?  Authorizing Provider   rOPINIRole (REQUIP) 0.25 MG tablet 1 po nightly for 1wk and then may increase to 2 po nightly for restless legs Yes MOOK Pool   losartan (COZAAR) 50 MG tablet Take 1 tablet by mouth daily Yes MOOK Pool   rosuvastatin (CRESTOR) 40 MG tablet TAKE 1 TABLET BY MOUTH EVERY NIGHT FOR CHOLESTEROL Yes MOOK Pool   FLUoxetine (PROZAC) 40 MG capsule Take 1 capsule by mouth daily Yes MOOK Pool   famotidine (PEPCID) 20 MG tablet Take 1 tablet by mouth 2 times daily as needed (for breakthrough reflux) Yes MOOK Pool   ezetimibe (ZETIA) 10 MG tablet Take 1 tablet by mouth daily Yes MOOK Pool   pantoprazole (PROTONIX) 40 MG tablet Take 1 tablet by mouth daily Yes MOOK Pool   Insulin Syringe-Needle U-100 (INSULIN SYRINGE .5CC/30GX5/16\") 30G X 5/16\" 0.5 ML MISC USE WITH INSULIN EVERY DAY Yes MOOK Pool   B-D ULTRAFINE III SHORT PEN 31G X 8 MM MISC USE ONCE DAILY Yes MOOK Pool   blood glucose monitor strips For qid testing Type 2 Dm Dispense brand per patient request or insurance benefits. Yes MOOK Pool   nystatin (MYCOSTATIN) 137368 UNIT/GM cream Apply topically 2 times daily. Yes MOOK Pool   Polyethylene Glycol 3350 (MIRALAX PO) Take 17 g by mouth Yes Historical Provider, MD   vitamin D (ERGOCALCIFEROL) 27830 units CAPS capsule TAKE 1 CAPSULE BY MOUTH 1 TIME A WEEK Yes MOOK Pool   glucose monitoring kit (FREESTYLE) monitoring kit For bid testing Type 2 dm Yes MOOK Pool   Lancets MISC 1 each by Does not apply route 2 times daily Dispense brand per insurance benefits and patient request. Yes MOOK Pool   Ez Smart Blood Glucose Lancets MISC For qid and prn testing for newly diagnosed diabetes DX: Diabetes, insulin requiring Yes MOOK Pool   glucose blood VI test strips (ASCENSIA AUTODISC VI;ONE TOUCH ULTRA TEST VI) strip 1 each by In Vitro route 4 times daily (with meals and nightly) For qid and prn testing for newly diagnosed diabetes  Freestyle Freedom lite    DX: Diabetes, insulin requiring Yes MOOK Pool   aspirin EC 81 MG EC tablet Take 1 tablet by mouth daily Yes MOOK Pool   acetaminophen (TYLENOL ARTHRITIS PAIN) 650 MG CR tablet Take 650 mg by mouth every 8 hours as needed for Pain.  Yes Historical Provider, MD   insulin aspart (NOVOLOG) 100 UNIT/ML injection vial   Historical Provider, MD   LANTUS 100 UNIT/ML injection vial   Historical Provider, MD   levoFLOXacin (LEVAQUIN) 500 MG tablet Take 1 tablet by mouth daily for 7 days  MOOK Pool     Allergies   Allergen Reactions    Sulfa Antibiotics Hives     Past Surgical History:   Procedure Laterality Date    Musculoskeletal: Positive for arthralgias and back pain. Neurological: Negative for weakness. Psychiatric/Behavioral: The patient is nervous/anxious. OBJECTIVE:    Physical Exam  Vitals signs and nursing note reviewed. Constitutional:       General: She is not in acute distress. Appearance: She is well-developed. She is obese. She is not ill-appearing or toxic-appearing. HENT:      Head: Normocephalic and atraumatic. Eyes:      Extraocular Movements: Extraocular movements intact. Conjunctiva/sclera: Conjunctivae normal.      Pupils: Pupils are equal, round, and reactive to light. Neck:      Musculoskeletal: Normal range of motion and neck supple. Trachea: No tracheal deviation. Cardiovascular:      Rate and Rhythm: Normal rate and regular rhythm. Heart sounds: Normal heart sounds. Pulmonary:      Effort: Pulmonary effort is normal. No respiratory distress. Breath sounds: Examination of the right-lower field reveals decreased breath sounds. Examination of the left-lower field reveals decreased breath sounds. Decreased breath sounds present. No wheezing. Skin:     General: Skin is warm and dry. Neurological:      Mental Status: She is alert and oriented to person, place, and time. Psychiatric:         Mood and Affect: Mood is not anxious or depressed. Affect is not angry. Speech: Speech normal.         Behavior: Behavior normal.         Thought Content: Thought content normal.         Judgment: Judgment normal.         /62 (Site: Left Upper Arm, Position: Sitting, Cuff Size: Large Adult)   Pulse 120   Temp 98.4 °F (36.9 °C) (Temporal)   Resp 18   Ht 5' 2\" (1.575 m)   Wt 218 lb (98.9 kg)   SpO2 97%   BMI 39.87 kg/m²      ASSESSMENT:      ICD-10-CM    1. Acute exacerbation of chronic low back pain  M54.5 XR LUMBAR SPINE (2-3 VIEWS)    G89.29 XR HIP BILATERAL W AP PELVIS (2 VIEWS)     traMADol (ULTRAM) 50 MG tablet   2.  Bilateral hip pain

## 2020-07-09 ENCOUNTER — TRANSCRIBE ORDERS (OUTPATIENT)
Dept: ADMINISTRATIVE | Facility: HOSPITAL | Age: 60
End: 2020-07-09

## 2020-07-09 DIAGNOSIS — Z01.818 PRE-OP TESTING: Primary | ICD-10-CM

## 2020-07-09 RX ORDER — LEVOFLOXACIN 500 MG/1
500 TABLET, FILM COATED ORAL DAILY
Qty: 7 TABLET | Refills: 0 | Status: SHIPPED | OUTPATIENT
Start: 2020-07-09 | End: 2020-07-15 | Stop reason: ALTCHOICE

## 2020-07-14 ENCOUNTER — LAB (OUTPATIENT)
Dept: LAB | Facility: HOSPITAL | Age: 60
End: 2020-07-14

## 2020-07-14 PROCEDURE — C9803 HOPD COVID-19 SPEC COLLECT: HCPCS | Performed by: INTERNAL MEDICINE

## 2020-07-14 PROCEDURE — U0003 INFECTIOUS AGENT DETECTION BY NUCLEIC ACID (DNA OR RNA); SEVERE ACUTE RESPIRATORY SYNDROME CORONAVIRUS 2 (SARS-COV-2) (CORONAVIRUS DISEASE [COVID-19]), AMPLIFIED PROBE TECHNIQUE, MAKING USE OF HIGH THROUGHPUT TECHNOLOGIES AS DESCRIBED BY CMS-2020-01-R: HCPCS | Performed by: INTERNAL MEDICINE

## 2020-07-15 ENCOUNTER — HOSPITAL ENCOUNTER (OUTPATIENT)
Dept: GENERAL RADIOLOGY | Age: 60
Discharge: HOME | End: 2020-07-15
Payer: MEDICARE

## 2020-07-15 ENCOUNTER — OFFICE VISIT (OUTPATIENT)
Dept: FAMILY MEDICINE CLINIC | Age: 60
End: 2020-07-15
Payer: MEDICARE

## 2020-07-15 VITALS
OXYGEN SATURATION: 98 % | DIASTOLIC BLOOD PRESSURE: 72 MMHG | TEMPERATURE: 98.4 F | WEIGHT: 214 LBS | RESPIRATION RATE: 18 BRPM | HEART RATE: 117 BPM | SYSTOLIC BLOOD PRESSURE: 138 MMHG | BODY MASS INDEX: 39.14 KG/M2

## 2020-07-15 PROBLEM — E66.01 MORBIDLY OBESE (HCC): Status: ACTIVE | Noted: 2020-07-15

## 2020-07-15 LAB
COVID LABCORP PRIORITY: NORMAL
SARS-COV-2 RNA RESP QL NAA+PROBE: NOT DETECTED

## 2020-07-15 PROCEDURE — 3017F COLORECTAL CA SCREEN DOC REV: CPT | Performed by: NURSE PRACTITIONER

## 2020-07-15 PROCEDURE — 1036F TOBACCO NON-USER: CPT | Performed by: NURSE PRACTITIONER

## 2020-07-15 PROCEDURE — G8417 CALC BMI ABV UP PARAM F/U: HCPCS | Performed by: NURSE PRACTITIONER

## 2020-07-15 PROCEDURE — 71046 X-RAY EXAM CHEST 2 VIEWS: CPT

## 2020-07-15 PROCEDURE — 99213 OFFICE O/P EST LOW 20 MIN: CPT | Performed by: NURSE PRACTITIONER

## 2020-07-15 PROCEDURE — G8427 DOCREV CUR MEDS BY ELIG CLIN: HCPCS | Performed by: NURSE PRACTITIONER

## 2020-07-15 RX ORDER — INSULIN GLARGINE 100 [IU]/ML
60 INJECTION, SOLUTION SUBCUTANEOUS NIGHTLY
COMMUNITY
Start: 2020-07-01

## 2020-07-15 RX ORDER — LEVOFLOXACIN 500 MG/1
500 TABLET, FILM COATED ORAL DAILY
Qty: 7 TABLET | Refills: 0 | Status: SHIPPED | OUTPATIENT
Start: 2020-07-15 | End: 2020-07-22

## 2020-07-15 ASSESSMENT — ENCOUNTER SYMPTOMS
SHORTNESS OF BREATH: 0
COUGH: 0

## 2020-07-15 NOTE — PROGRESS NOTES
SUBJECTIVE:    Patient ID: Antoni Lopez is a58 y.o. female. Antoni Lopez is here today for Follow-up (Patient presents for follow up on pneumonia. antibiotics are completed)  . HPI:   HPI     Pt is here today f/u on pneumonia that was found on cxr last week. Pt is reporting back pain is improving. Completed levaquin 500mg x 7days  No fevers  No coughing  Only symptom pt had was soa and states that is now resolved. Narrative    EXAMINATION:  XR CHEST (2 VW)  7/8/2020 3:35 PM    HISTORY: Shortness of air on exertion. COMPARISON: 5/17/2019. TECHNIQUE: 2 views were obtained. FINDINGS: Charlcie Boles is new opacity in the right upper lobe that is    ill-defined. There has been prior rib resection on the left likely    related to thoracotomy. The heart is normal in size. There are    degenerative changes of the spine.         Impression    1. New opacity in the right upper lobe likely representing an area of    pneumonia. Correlate clinically. If there are no clinical findings of    pneumonia, consider CT follow-up to rule out neoplasm. 2. 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 Signed by Dr Benji Oliveira on 7/8/2020 3:37 PM          Past Medical History:   Diagnosis Date    Back pain 6/5/2014    Carotid artery stenosis     Depression 6/5/2014    Diabetes ([facility] [address]     GERD (gastroesophageal reflux disease)     Hyperlipidemia     Hypertension 6/5/2014    Morbidly obese ([facility] [address] 7/15/2020    Type II or unspecified type diabetes mellitus without mention of complication, not stated as uncontrolled      Prior to Visit Medications    Medication Sig Taking?  Authorizing Provider   insulin aspart (NOVOLOG) 100 UNIT/ML injection vial  Yes Historical Provider, MD   LANTUS 100 UNIT/ML injection vial  Yes Historical Provider, MD   rOPINIRole (REQUIP) 0.25 MG tablet 1 po nightly for 1wk and then may increase to 2 po nightly for restless legs Yes MOOK Pool   losartan (COZAAR) 50 MG tablet Take 1 tablet by Not on file   Social History Narrative    Not on file       Review of Systems   Constitutional: Negative for appetite change, fever and unexpected weight change (trying to lose wt ). Respiratory: Negative for cough and shortness of breath. Cardiovascular: Negative for chest pain. Neurological: Negative for weakness. OBJECTIVE:    Physical Exam  Vitals signs and nursing note reviewed. Constitutional:       Appearance: Normal appearance. She is well-developed. She is not ill-appearing or diaphoretic. HENT:      Head: Normocephalic and atraumatic. Eyes:      Conjunctiva/sclera: Conjunctivae normal.      Pupils: Pupils are equal, round, and reactive to light. Neck:      Musculoskeletal: Neck supple. Trachea: No tracheal deviation. Cardiovascular:      Rate and Rhythm: Regular rhythm. Tachycardia present. Heart sounds: Normal heart sounds. Comments: Heart rate now at 104 apical  Pulmonary:      Effort: Pulmonary effort is normal. No respiratory distress. Breath sounds: Normal breath sounds. No stridor. No wheezing or rhonchi. Skin:     General: Skin is warm and dry. Capillary Refill: Capillary refill takes less than 2 seconds. Neurological:      Mental Status: She is alert and oriented to person, place, and time. Psychiatric:         Mood and Affect: Mood is not anxious or depressed. Affect is not angry. Speech: Speech normal.         Behavior: Behavior normal.         Thought Content: Thought content normal.         Judgment: Judgment normal.         /72 (Site: Left Upper Arm, Position: Sitting, Cuff Size: Large Adult)   Pulse 117   Temp 98.4 °F (36.9 °C) (Temporal)   Resp 18   Wt 214 lb (97.1 kg)   SpO2 98%   BMI 39.14 kg/m²      ASSESSMENT:      ICD-10-CM    1. Pneumonia of right upper lobe due to infectious organism ([address]  J18.1 XR CHEST STANDARD (2 VW)     CANCELED: XR CHEST STANDARD (2 VW)   2.  Morbidly obese (HCC)  E66.01 Pt is now re-adhering to DM diet and attempting wt loss       PLAN:    Alexander Records: Follow-up (Patient presents for follow up on pneumonia. antibiotics are completed)  Will call with report of cxr  Pt states that she is scheduled for endo Friday and has had covid testing. I am concerned that test may be canceled since pt has encounter and is not quarantined--pt is aware. Diagnosis and orders for this visit are above. Please note that this chart was generated using [employer]ftware. Although every effort was made to ensure the accuracy of this automated transcription, some errors in transcription may have occurred.

## 2020-07-21 ASSESSMENT — ENCOUNTER SYMPTOMS
COUGH: 0
BACK PAIN: 1
TROUBLE SWALLOWING: 0
SHORTNESS OF BREATH: 1

## 2020-07-27 ENCOUNTER — HOSPITAL ENCOUNTER (OUTPATIENT)
Dept: GENERAL RADIOLOGY | Age: 60
Discharge: HOME | End: 2020-07-27
Payer: MEDICARE

## 2020-07-27 PROCEDURE — 71046 X-RAY EXAM CHEST 2 VIEWS: CPT

## 2020-07-31 ENCOUNTER — OFFICE VISIT (OUTPATIENT)
Dept: FAMILY MEDICINE CLINIC | Age: 60
End: 2020-07-31
Payer: MEDICARE

## 2020-07-31 VITALS
WEIGHT: 215 LBS | HEIGHT: 62 IN | RESPIRATION RATE: 18 BRPM | OXYGEN SATURATION: 98 % | BODY MASS INDEX: 39.56 KG/M2 | TEMPERATURE: 98 F | DIASTOLIC BLOOD PRESSURE: 82 MMHG | HEART RATE: 111 BPM | SYSTOLIC BLOOD PRESSURE: 128 MMHG

## 2020-07-31 LAB
AMPHETAMINE SCREEN, URINE: NEGATIVE
BARBITURATE SCREEN, URINE: NEGATIVE
BENZODIAZEPINE SCREEN, URINE: NEGATIVE
BUPRENORPHINE URINE: NEGATIVE
COCAINE METABOLITE SCREEN URINE: NEGATIVE
GABAPENTIN SCREEN, URINE: NORMAL
MDMA URINE: NEGATIVE
METHADONE SCREEN, URINE: NEGATIVE
METHAMPHETAMINE, URINE: NEGATIVE
OPIATE SCREEN URINE: NEGATIVE
OXYCODONE SCREEN URINE: NEGATIVE
PHENCYCLIDINE SCREEN URINE: NEGATIVE
PROPOXYPHENE SCREEN, URINE: NORMAL
THC SCREEN, URINE: NEGATIVE
TRICYCLIC ANTIDEPRESSANTS, UR: NORMAL

## 2020-07-31 PROCEDURE — 80305 DRUG TEST PRSMV DIR OPT OBS: CPT | Performed by: NURSE PRACTITIONER

## 2020-07-31 PROCEDURE — G8427 DOCREV CUR MEDS BY ELIG CLIN: HCPCS | Performed by: NURSE PRACTITIONER

## 2020-07-31 PROCEDURE — 90732 PPSV23 VACC 2 YRS+ SUBQ/IM: CPT | Performed by: NURSE PRACTITIONER

## 2020-07-31 PROCEDURE — 1036F TOBACCO NON-USER: CPT | Performed by: NURSE PRACTITIONER

## 2020-07-31 PROCEDURE — 3046F HEMOGLOBIN A1C LEVEL >9.0%: CPT | Performed by: NURSE PRACTITIONER

## 2020-07-31 PROCEDURE — 2022F DILAT RTA XM EVC RTNOPTHY: CPT | Performed by: NURSE PRACTITIONER

## 2020-07-31 PROCEDURE — 99214 OFFICE O/P EST MOD 30 MIN: CPT | Performed by: NURSE PRACTITIONER

## 2020-07-31 PROCEDURE — G0009 ADMIN PNEUMOCOCCAL VACCINE: HCPCS | Performed by: NURSE PRACTITIONER

## 2020-07-31 PROCEDURE — 3017F COLORECTAL CA SCREEN DOC REV: CPT | Performed by: NURSE PRACTITIONER

## 2020-07-31 PROCEDURE — G8417 CALC BMI ABV UP PARAM F/U: HCPCS | Performed by: NURSE PRACTITIONER

## 2020-07-31 RX ORDER — TRAMADOL HYDROCHLORIDE 50 MG/1
50 TABLET ORAL EVERY 8 HOURS PRN
Qty: 60 TABLET | Refills: 0 | Status: SHIPPED | OUTPATIENT
Start: 2020-07-31 | End: 2020-08-30

## 2020-07-31 ASSESSMENT — ENCOUNTER SYMPTOMS
BACK PAIN: 1
SHORTNESS OF BREATH: 0

## 2020-07-31 NOTE — PROGRESS NOTES
file   Social History Narrative    Not on file       Review of Systems   Constitutional: Negative for unexpected weight change. Respiratory: Negative for shortness of breath. Cardiovascular: Negative for leg swelling. Musculoskeletal: Positive for back pain. Psychiatric/Behavioral: The patient is nervous/anxious. OBJECTIVE:    Physical Exam  Vitals signs and nursing note reviewed. Constitutional:       Appearance: She is well-developed. She is obese. She is not ill-appearing. HENT:      Head: Normocephalic and atraumatic. Eyes:      Conjunctiva/sclera: Conjunctivae normal.      Pupils: Pupils are equal, round, and reactive to light. Neck:      Musculoskeletal: Neck supple. Trachea: No tracheal deviation. Cardiovascular:      Rate and Rhythm: Normal rate and regular rhythm. Heart sounds: Normal heart sounds. Pulmonary:      Effort: Pulmonary effort is normal.      Breath sounds: Normal breath sounds. Musculoskeletal:      Right lower leg: No edema. Left lower leg: No edema. Skin:     General: Skin is warm and dry. Capillary Refill: Capillary refill takes less than 2 seconds. Neurological:      Mental Status: She is alert and oriented to person, place, and time. Mental status is at baseline. Psychiatric:         Mood and Affect: Mood normal. Mood is not anxious or depressed. Affect is not angry. Speech: Speech normal.         Behavior: Behavior normal.         Thought Content: Thought content normal.         Judgment: Judgment normal.         /82 (Site: Left Upper Arm, Position: Sitting, Cuff Size: Large Adult)   Pulse 111   Temp 98 °F (36.7 °C) (Temporal)   Resp 18   Ht 5' 2\" (1.575 m)   Wt 215 lb (97.5 kg)   SpO2 98%   BMI 39.32 kg/m²      ASSESSMENT:      ICD-10-CM    1. Uncontrolled type 2 diabetes mellitus with hyperglycemia, with long-term current use of insulin ([facility] [address]  E11.65 External Referral To Ophthalmology    Z79.4    2.  Need for pneumococcal vaccine  Z23 Pneumococcal polysaccharide vaccine 23-valent greater than or equal to 1yo subcutaneous/IM   3. Acute exacerbation of chronic low back pain  M54.5     G89.29    4. Bilateral hip pain  M25.551 traMADol (ULTRAM) 50 MG tablet    M25.552 POCT Rapid Drug Screen   5. Chronic bilateral low back pain without sciatica  M54.5 traMADol (ULTRAM) 50 MG tablet    G89.29 POCT Rapid Drug Screen   6. Medication management  Z79.899 POCT Rapid Drug Screen       PLAN:    Lexi Skinner: Follow-up (Patient presents for 3 week follow up.)  Sliding scale form needed  Pneum 23 today  CASIMIRO  Contract needed  UDS needed  Lab due Oct  Diagnosis and orders for this visit are above. Please note that this chart was generated using dragon dictationsoftware. Although every effort was made to ensure the accuracy of this automated transcription, some errors in transcription may have occurred.

## 2020-07-31 NOTE — LETTER
MEDICATION AGREEMENT     Jimbo Rogers  [birth year]      For certain conditions, multiple classes of medications may be used to help better manage your symptoms, and to improve your ability to function at home, work and in social settings. However, these medications do have risks, which will be discussed with you, including addiction and dependency. The following prescribed medications need frequent monitoring and will require you to partner and assist in your healthcare. Medication  Dose, instructions and quantity as indicated on current prescription bottle Diagnosis/Reason(s) for Taking Category   traMADol (ULTRAM) 50 MG tablet   #60 Bilateral hip pain (M25.551 , M25.552); Chronic bilateral low back pain without sciatica (M54.5 , G89.29)                       Benefits and goals of Controlled Substance Medications: There are two potential goals for your treatment: (1) decreased pain and suffering (2) improved daily life functions. There are many possible treatments for your chronic condition(s), and, in addition to controlled substance medications, we will try alternatives such as physical therapy, yoga, massage, home daily exercise, meditation, relaxation techniques, injections, chiropractic manipulations, surgery, cognitive therapy, hypnosis and many medications that are not habit-forming. Use of controlled substance medications may be helpful, but they are unlikely to resolve all of your symptoms or restore all function. Risks of Controlled Substance Medications:    Opioid pain medications: These medications can lead to problems such as addiction/dependence, sedation, lightheadedness/dizziness, memory issues, falls, constipation, nausea, or vomiting. They may also impair the ability to drive or operate machinery. Additionally, these medications may lower testosterone levels, leading to loss of bone strength, stamina and sex drive.   They may cause problems with breathing, sleep apnea and reduced coughing, which are especially dangerous for patients with lung disease. Overdose or dangerous interactions with alcohol and other medications may occur, leading to death. Hyperalgesia may develop, in which patients receiving opioids for the treatment of pain may actually become more sensitive to certain painful stimuli, and in some cases, experience pain from ordinarily non-painful stimuli. Women between the ages of 14-53 who could become pregnant should carefully weigh the risks and benefits of opioids with their physicians, as these medications increase the risk of pregnancy complications, including miscarriage,  delivery and stillbirth. It is also possible for babies to be born addicted to opioids. Opioid dependence withdrawal symptoms may include; feelings of uneasiness, increased pain, irritability, belly pain, diarrhea, sweats and goose-flesh. Benzodiazepines and non-benzodiazepine sleep medications: These medications can lead to problems such as addiction/dependence, sedation, fatigue, lightheadedness, dizziness, incoordination, falls, depression, hallucinations, and impaired judgment, memory and concentration. The ability to drive and operate machinery may also be affected. Abnormal sleep-related behaviors have been reported, including sleep walking, driving, making telephone calls, eating, or having sex while not fully awake. These medications can suppress breathing and worsen sleep apnea, particularly when combined with alcohol or other sedating medications, potentially leading to death. Dependence withdrawal symptoms may include tremors, anxiety, hallucinations and seizures. Stimulants:  Common adverse effects include addiction/dependence, increased blood pressure and heart rate, decreased appetite, nausea, involuntary weight loss, insomnia, irritability, and headaches.   These risks may increase when these medications are combined with other stimulants, such as caffeine pills or energy drinks, certain weight loss supplements and oral decongestants. Dependence withdrawal symptoms may include depressed mood, loss of interest, suicidal thoughts, anxiety, fatigue, appetite changes and agitation. Testosterone replacement therapy:  Potential side effects include increased risk of stroke and heart attack, blood clots, increased blood pressure, increased cholesterol, enlarged prostate, sleep apnea, irritability/aggression and other mood disorders, and decreased fertility. Other:     1. I understand that I have the following responsibilities:  · I will take medications at the dose and frequency prescribed. · I will not increase or change how I take my medications without the approval of the health care provider who signs this Medication Agreement. · I will arrange for refills at the prescribed interval ONLY during regular office hours. I will not ask for refills earlier than agreed, after-hours, on holidays or on weekends. · I will obtain all refills for these medications at  ·  ____________________________________  pharmacy (phone number  ·  ________________________), with full consent for my provider and pharmacist to exchange information in writing or verbally. · I will not request any pain medications or controlled substances from other providers and will inform this provider of all other medications I am taking. · I will inform my other health care providers that I am taking these medications and of the existence of this [facility]. In the event of an emergency, I will provide the same information to the emergency department providers. · I will protect my prescriptions and medications. I understand that lost or misplaced prescriptions will not be replaced. · I will keep medications only for my own use and will not share them with others. I will keep all medications away from children. · I agree to participate in any medical, psychological or psychiatric assessments recommended by my provider. · I will actively participate in any program designed to improve function, including social, physical, psychological and daily or work activities. 2. I will not use illegal or street drugs or another person's prescription. If I have an addiction problem with drugs or alcohol and my provider asks me to enter a program to address this issue, I agree to follow through. Such programs may include:  · 12-Step program and securing a sponsor  · Individual counseling   · Inpatient or outpatient treatment  · Other:_____________________________________________________________________________________________________________________________________________    If in treatment, I will request that a copy of the programs initial evaluation and treatment recommendations be sent to this provider and will not expect refills until that is received. I will also request written monthly updates be sent to this provider to verify my continuing treatment. 3. I will consent to drug screening upon my providers request to assure I am only taking the prescribed drugs, described in this MEDICATION AGREEMENT. I understand that a drug screen is a laboratory test in which a sample of my urine, blood or saliva is checked to see what drugs I have been taking. 4. I agree that I will treat the providers and staff at this office with respect at all times. I will keep all of my scheduled appointments, but if I need to cancel my appointment, I will do so a minimum of 24 hours before it is scheduled. 5. I understand that this provider may stop prescribing the medications listed if:  · I do not show any improvement in pain, or my activity has not improved. · I develop rapid tolerance or loss of improvement, as described in my treatment plan. · I develop significant side effects from the medication. · My behavior is inconsistent with the responsibilities outlined above, which may also result in my being prevented from receiving further care from this office. · Other:____________________________________________________________________    AGREEMENT:    I have read the above and have had all of my questions answered. For chronic disease management, I know that my symptoms can be managed with many types of treatments. A chronic medication trial may be part of my treatment, but I must be an active participant in my care. Medication therapy is only one part of my symptom management plan. In some cases, there may be limited scientific evidence to support the chronic use of certain medications to improve symptoms and daily function. Furthermore, in certain circumstances, there may be scientific information that suggests that use of chronic controlled substances may actually worsen my symptoms and increase my risk of unintentional death directly related to this medication therapy. I know that if my provider feels my risk from controlled medications is greater than my benefit, I will have my controlled substance medication(s) compassionately lowered or removed altogether. I agree to a controlled substance medication trial.      I further agree to allow this office to contact my HIPAA contact on file if there are concerns about my safety and use of controlled medications. I have agreed to use the following medications above as instructed by my physician and as stated in this [facility] [address].      Patient Signature:  ______________________  ZUKI:0/44/6084 or _____________    Provider Signature:______________________  ASJU:0/11/0945 or _____________

## 2020-08-13 ENCOUNTER — TRANSCRIBE ORDERS (OUTPATIENT)
Dept: ADMINISTRATIVE | Facility: HOSPITAL | Age: 60
End: 2020-08-13

## 2020-08-13 DIAGNOSIS — Z01.818 PRE-OP TESTING: Primary | ICD-10-CM

## 2020-08-18 ENCOUNTER — LAB (OUTPATIENT)
Dept: LAB | Facility: HOSPITAL | Age: 60
End: 2020-08-18

## 2020-08-18 PROCEDURE — C9803 HOPD COVID-19 SPEC COLLECT: HCPCS | Performed by: INTERNAL MEDICINE

## 2020-08-18 PROCEDURE — U0003 INFECTIOUS AGENT DETECTION BY NUCLEIC ACID (DNA OR RNA); SEVERE ACUTE RESPIRATORY SYNDROME CORONAVIRUS 2 (SARS-COV-2) (CORONAVIRUS DISEASE [COVID-19]), AMPLIFIED PROBE TECHNIQUE, MAKING USE OF HIGH THROUGHPUT TECHNOLOGIES AS DESCRIBED BY CMS-2020-01-R: HCPCS | Performed by: INTERNAL MEDICINE

## 2020-08-19 LAB
COVID LABCORP PRIORITY: NORMAL
SARS-COV-2 RNA RESP QL NAA+PROBE: NOT DETECTED

## 2020-08-21 ENCOUNTER — HOSPITAL ENCOUNTER (OUTPATIENT)
Facility: HOSPITAL | Age: 60
Setting detail: HOSPITAL OUTPATIENT SURGERY
Discharge: HOME | End: 2020-08-21
Attending: INTERNAL MEDICINE | Admitting: INTERNAL MEDICINE

## 2020-08-21 ENCOUNTER — ANESTHESIA (OUTPATIENT)
Dept: GASTROENTEROLOGY | Facility: HOSPITAL | Age: 60
End: 2020-08-21

## 2020-08-21 ENCOUNTER — ANESTHESIA EVENT (OUTPATIENT)
Dept: GASTROENTEROLOGY | Facility: HOSPITAL | Age: 60
End: 2020-08-21

## 2020-08-21 VITALS
TEMPERATURE: 97.1 F | HEART RATE: 95 BPM | SYSTOLIC BLOOD PRESSURE: 95 MMHG | OXYGEN SATURATION: 95 % | WEIGHT: 213 LBS | HEIGHT: 62 IN | RESPIRATION RATE: 20 BRPM | BODY MASS INDEX: 39.2 KG/M2 | DIASTOLIC BLOOD PRESSURE: 59 MMHG

## 2020-08-21 DIAGNOSIS — K22.70 BARRETT'S ESOPHAGUS WITHOUT DYSPLASIA: ICD-10-CM

## 2020-08-21 DIAGNOSIS — R11.2 NON-INTRACTABLE VOMITING WITH NAUSEA, UNSPECIFIED VOMITING TYPE: ICD-10-CM

## 2020-08-21 DIAGNOSIS — K21.9 GASTROESOPHAGEAL REFLUX DISEASE WITHOUT ESOPHAGITIS: ICD-10-CM

## 2020-08-21 DIAGNOSIS — R12 HEARTBURN: ICD-10-CM

## 2020-08-21 LAB — GLUCOSE BLDC GLUCOMTR-MCNC: 292 MG/DL (ref 70–130)

## 2020-08-21 PROCEDURE — 25010000002 PROPOFOL 10 MG/ML EMULSION: Performed by: NURSE ANESTHETIST, CERTIFIED REGISTERED

## 2020-08-21 PROCEDURE — 43239 EGD BIOPSY SINGLE/MULTIPLE: CPT | Performed by: INTERNAL MEDICINE

## 2020-08-21 PROCEDURE — 82962 GLUCOSE BLOOD TEST: CPT

## 2020-08-21 PROCEDURE — 88305 TISSUE EXAM BY PATHOLOGIST: CPT | Performed by: INTERNAL MEDICINE

## 2020-08-21 RX ORDER — SODIUM CHLORIDE 9 MG/ML
500 INJECTION, SOLUTION INTRAVENOUS CONTINUOUS PRN
Status: DISCONTINUED | OUTPATIENT
Start: 2020-08-21 | End: 2020-08-21 | Stop reason: HOSPADM

## 2020-08-21 RX ORDER — LIDOCAINE HYDROCHLORIDE 10 MG/ML
0.5 INJECTION, SOLUTION EPIDURAL; INFILTRATION; INTRACAUDAL; PERINEURAL ONCE AS NEEDED
Status: DISCONTINUED | OUTPATIENT
Start: 2020-08-21 | End: 2020-08-21 | Stop reason: HOSPADM

## 2020-08-21 RX ORDER — PROPOFOL 10 MG/ML
VIAL (ML) INTRAVENOUS AS NEEDED
Status: DISCONTINUED | OUTPATIENT
Start: 2020-08-21 | End: 2020-08-21 | Stop reason: SURG

## 2020-08-21 RX ORDER — SODIUM CHLORIDE 0.9 % (FLUSH) 0.9 %
10 SYRINGE (ML) INJECTION AS NEEDED
Status: DISCONTINUED | OUTPATIENT
Start: 2020-08-21 | End: 2020-08-21 | Stop reason: HOSPADM

## 2020-08-21 RX ADMIN — PROPOFOL 200 MG: 10 INJECTION, EMULSION INTRAVENOUS at 07:41

## 2020-08-21 RX ADMIN — LIDOCAINE HYDROCHLORIDE 100 MG: 20 INJECTION, SOLUTION INTRAVENOUS at 07:41

## 2020-08-21 RX ADMIN — SODIUM CHLORIDE 500 ML: 9 INJECTION, SOLUTION INTRAVENOUS at 07:14

## 2020-08-21 RX ADMIN — LIDOCAINE HYDROCHLORIDE 50 MG: 20 INJECTION, SOLUTION INTRAVENOUS at 07:46

## 2020-08-21 NOTE — ANESTHESIA PREPROCEDURE EVALUATION
Anesthesia Evaluation     Patient summary reviewed   no history of anesthetic complications:  NPO Solid Status: > 8 hours             Airway   Mallampati: II  TM distance: >3 FB  Neck ROM: full  Dental    (+) upper dentures and lower dentures    Pulmonary - negative pulmonary ROS   Cardiovascular   Exercise tolerance: good (4-7 METS)    (+) hypertension, hyperlipidemia,       Neuro/Psych- negative ROS  GI/Hepatic/Renal/Endo    (+) obesity,  GERD,  diabetes mellitus type 2 using insulin,     Musculoskeletal     Abdominal    Substance History      OB/GYN          Other                        Anesthesia Plan    ASA 3     MAC       Anesthetic plan, all risks, benefits, and alternatives have been provided, discussed and informed consent has been obtained with: patient.

## 2020-08-21 NOTE — H&P
Chief Complaint:   GERD/BE--not responding to treatment    Subjective     HPI:   The patient states that about 6 months ago it felt as though her pantoprazole was not helping with her reflux.  She states that she has had associated heartburn.  She states that she followed up with her PCP for this and was given Zantac however that was taken off of the market and then was started on Pepcid at night.  She states that this is not really working either.  She states that within this past month her symptoms have seemed to really worsen most especially with eating or drinking.  She states that she is also having nausea and vomiting.The patient's last EGD was performed on 7/24/2017 with findings of established short Garza's esophagus.  Recall set for 3 years.     Past Medical History:   Past Medical History:   Diagnosis Date   • Garza's esophagus    • Depression    • Diabetes mellitus (CMS/HCC)    • GERD (gastroesophageal reflux disease)    • History of adenomatous polyp of colon    • History of colon polyps    • Hypertension    • Kidney stone    • PONV (postoperative nausea and vomiting)        Past Surgical History:  Past Surgical History:   Procedure Laterality Date   • BACK SURGERY     • COLONOSCOPY  10/18/2006    Thickened fold in the ascending colon-path shows hyperplastic polyp; Repeat 7 years   • COLONOSCOPY N/A 7/24/2017    One 4mm tubular adenomatous polyp in the transverse colon; One 5mm hyperplastic polyp in the sigmoid colon; The examination was otherwise normal on direct and retroflexion views; Repeat 5 years   • ENDOSCOPY N/A 7/24/2017    Esophageal mucosal changes secondary to established short-segment Garza's disease-biopsied; Small HH; Normal stomach; Normal examined duodenum; Repeat 3 years   • ENDOSCOPY  12/08/2011    Esophagitis-biopsied; Garza's-biopsied; HH; Repeat 3 years   • ENDOSCOPY  10/27/2008    Garza's esophagus-biopsied; HH; Repeat 2 years   • ENDOSCOPY  10/23/2006    HH; Probable  Garza's esophagus-biopsied; Repeat 2 years   • HYSTERECTOMY     • THORACOTOMY Left 3/27/2018    Procedure: THORACOTOMY, drainage of empyema and decortication;  Surgeon: Abel Stark MD;  Location: [facility];  Service: Cardiothoracic       Family History:  Family History   Problem Relation Age of Onset   • Colon cancer Neg Hx    • Colon polyps Neg Hx    • Esophageal cancer Neg Hx    • Liver cancer Neg Hx    • Liver disease Neg Hx    • Rectal cancer Neg Hx    • Stomach cancer Neg Hx        Social History:   reports that she has quit smoking. She has never used smokeless tobacco. She reports that she does not drink alcohol or use drugs.    Medications:   Medications Prior to Admission   Medication Sig Dispense Refill Last Dose   • amLODIPine (NORVASC) 10 MG tablet Take 10 mg by mouth Daily.   8/20/2020 at Unknown time   • aspirin 81 MG EC tablet Chew 81 mg Daily.   8/20/2020 at Unknown time   • famotidine (PEPCID) 20 MG tablet Take 1 tablet by mouth Every Night.   8/21/2020 at Unknown time   • FLUoxetine (PROzac) 20 MG capsule Take 20 mg by mouth Daily.   8/21/2020 at Unknown time   • insulin aspart (novoLOG) 100 UNIT/ML injection Inject  under the skin 3 (Three) Times a Day Before Meals. 150 - 199 = 4 units  200 - 249 = 8 units  250 - 299 = 12 units  300 - 349 = 16 units  349 - 400 = 20 units  > 400 = 24 units   8/20/2020 at 1630   • insulin detemir (LEVEMIR) 100 UNIT/ML injection Inject 17 Units under the skin Every 12 (Twelve) Hours. 1 each 1 8/20/2020 at 2200   • pantoprazole (PROTONIX) 40 MG EC tablet Take 1 tablet by mouth 2 (two) times a day. 60 tablet 12 8/20/2020 at Unknown time   • rosuvastatin (CRESTOR) 10 MG tablet Take 10 mg by mouth Daily.   8/20/2020 at Unknown time   • vitamin D (ERGOCALCIFEROL) 74027 units capsule capsule Take 50,000 Units by mouth 1 (One) Time Per Week. Fridays 8/21/2020 at Unknown time   • polyethylene glycol (MIRALAX) packet Take 17 g by mouth Daily. (Patient taking  "differently: Take 17 g by mouth As Needed.) 30 each 0 More than a month at Unknown time   • traZODone (DESYREL) 50 MG tablet Take 100 mg by mouth At Night As Needed for Sleep.   More than a month at Unknown time       Allergies:  Sulfa antibiotics    ROS:    General: Weight stable  Resp: No SOA  Cardiovascular: No CP      Objective     /68 (BP Location: Right arm, Patient Position: Sitting)   Pulse 100   Temp 97.1 °F (36.2 °C) (Temporal)   Resp 20   Ht 157.5 cm (62\")   Wt 96.6 kg (213 lb)   SpO2 98%   BMI 38.96 kg/m²     Physical Exam   Constitutional: Pt is oriented to person, place, and in no distress.  Eyes: No icterus  ENMT: Unremarkable   Cardiovascular: Normal rate, regular rhythm.    Pulmonary/Chest: No distress.  No audible wheezes  Abdominal: Soft.   Skin: Skin is warm and dry.   Psychiatric: Mood, memory, affect and judgment appear normal.     Assessment/Plan     Diagnosis:  GERD  BE    Anticipated Surgical Procedure:  Endoscopy    The risks, benefits, and alternatives of endoscopy were reviewed with the patient today.  Risks including perforation, with or without dilation, possibly requiring surgery.  Additional risks include risk of bleeding.  There is also the risk of a drug reaction or problems with anesthesia.  This will be discussed with the further by the anesthesia team on the day of the procedure. The benefits include the diagnosis and management of disease of the upper digestive tract.  Alternatives to endoscopy include upper GI series, laboratory testing, radiographic evaluation, or no intervention.  The patient verbalizes understanding and agrees.    Much of this encounter note is an electronic transcription/translation of spoken language to printed text. The electronic translation of spoken language may permit erroneous, or at times, nonsensical words or phrases to be inadvertently transcribed; although I have reviewed the note for such errors, some may still exist.  "

## 2020-08-21 NOTE — ANESTHESIA POSTPROCEDURE EVALUATION
Patient: Carele Vee    Procedure Summary     Date:  08/21/20 Room / Location:  [facility] ENDOSCOPY 6 / [facility]    Anesthesia Start:  0738 Anesthesia Stop:  0751    Procedure:  ESOPHAGOGASTRODUODENOSCOPY WITH ANESTHESIA (N/A ) Diagnosis:       Garza's esophagus without dysplasia      Gastroesophageal reflux disease without esophagitis      Heartburn      Non-intractable vomiting with nausea, unspecified vomiting type      (Garza's esophagus without dysplasia [K22.70])      (Gastroesophageal reflux disease without esophagitis [K21.9])      (Heartburn [R12])      (Non-intractable vomiting with nausea, unspecified vomiting type [R11.2])    Surgeon:  Jenniffer Smith MD Provider:  Camille Monsalve CRNA    Anesthesia Type:  MAC ASA Status:  3          Anesthesia Type: MAC    Vitals  Vitals Value Taken Time   BP 95/59 8/21/2020  8:05 AM   Temp     Pulse 95 8/21/2020  8:05 AM   Resp 20 8/21/2020  8:05 AM   SpO2 95 % 8/21/2020  8:05 AM   Vitals shown include unvalidated device data.        Post Anesthesia Care and Evaluation    Patient location during evaluation: PHASE II  Level of consciousness: awake and alert  Pain management: adequate  Airway patency: patent  Anesthetic complications: No anesthetic complications  PONV Status: none  Cardiovascular status: acceptable  Respiratory status: acceptable  Hydration status: acceptable  No anesthesia care post op

## 2020-08-24 LAB
CYTO UR: NORMAL
LAB AP CASE REPORT: NORMAL
PATH REPORT.FINAL DX SPEC: NORMAL
PATH REPORT.GROSS SPEC: NORMAL

## 2020-09-15 RX ORDER — ROSUVASTATIN CALCIUM 40 MG/1
TABLET, COATED ORAL
Qty: 30 TABLET | Refills: 0 | Status: SHIPPED | OUTPATIENT
Start: 2020-09-15 | End: 2020-11-05 | Stop reason: SDUPTHER

## 2020-09-28 RX ORDER — EZETIMIBE 10 MG/1
10 TABLET ORAL DAILY
Qty: 90 TABLET | Refills: 0 | Status: SHIPPED | OUTPATIENT
Start: 2020-09-28 | End: 2020-10-05 | Stop reason: SDUPTHER

## 2020-09-28 RX ORDER — FAMOTIDINE 20 MG/1
20 TABLET, FILM COATED ORAL 2 TIMES DAILY PRN
Qty: 180 TABLET | Refills: 0 | Status: SHIPPED | OUTPATIENT
Start: 2020-09-28 | End: 2020-12-30 | Stop reason: SDUPTHER

## 2020-10-05 RX ORDER — EZETIMIBE 10 MG/1
10 TABLET ORAL DAILY
Qty: 90 TABLET | Refills: 0 | Status: SHIPPED | OUTPATIENT
Start: 2020-10-05 | End: 2021-03-26 | Stop reason: SDUPTHER

## 2020-10-05 RX ORDER — LOSARTAN POTASSIUM 50 MG/1
50 TABLET ORAL DAILY
Qty: 90 TABLET | Refills: 0 | Status: SHIPPED | OUTPATIENT
Start: 2020-10-05 | End: 2020-12-30 | Stop reason: SDUPTHER

## 2020-11-03 ENCOUNTER — VIRTUAL VISIT (OUTPATIENT)
Dept: FAMILY MEDICINE CLINIC | Age: 60
End: 2020-11-03
Payer: MEDICARE

## 2020-11-03 VITALS
SYSTOLIC BLOOD PRESSURE: 132 MMHG | WEIGHT: 215 LBS | HEIGHT: 62 IN | DIASTOLIC BLOOD PRESSURE: 70 MMHG | BODY MASS INDEX: 39.56 KG/M2 | HEART RATE: 55 BPM

## 2020-11-03 PROCEDURE — G8484 FLU IMMUNIZE NO ADMIN: HCPCS | Performed by: FAMILY MEDICINE

## 2020-11-03 PROCEDURE — 3017F COLORECTAL CA SCREEN DOC REV: CPT | Performed by: FAMILY MEDICINE

## 2020-11-03 PROCEDURE — G0438 PPPS, INITIAL VISIT: HCPCS | Performed by: FAMILY MEDICINE

## 2020-11-03 RX ORDER — ROPINIROLE 0.25 MG/1
TABLET, FILM COATED ORAL
Qty: 180 TABLET | Refills: 0 | Status: SHIPPED | OUTPATIENT
Start: 2020-11-03 | End: 2020-11-04

## 2020-11-03 ASSESSMENT — LIFESTYLE VARIABLES
HOW OFTEN DURING THE LAST YEAR HAVE YOU HAD A FEELING OF GUILT OR REMORSE AFTER DRINKING: 0
HOW OFTEN DURING THE LAST YEAR HAVE YOU BEEN UNABLE TO REMEMBER WHAT HAPPENED THE NIGHT BEFORE BECAUSE YOU HAD BEEN DRINKING: 0
AUDIT-C TOTAL SCORE: 1
AUDIT TOTAL SCORE: 1
HOW OFTEN DURING THE LAST YEAR HAVE YOU FAILED TO DO WHAT WAS NORMALLY EXPECTED FROM YOU BECAUSE OF DRINKING: 0
HOW OFTEN DURING THE LAST YEAR HAVE YOU NEEDED AN ALCOHOLIC DRINK FIRST THING IN THE MORNING TO GET YOURSELF GOING AFTER A NIGHT OF HEAVY DRINKING: 0
HOW OFTEN DURING THE LAST YEAR HAVE YOU FOUND THAT YOU WERE NOT ABLE TO STOP DRINKING ONCE YOU HAD STARTED: 0
HOW OFTEN DO YOU HAVE A DRINK CONTAINING ALCOHOL: 1
HOW MANY STANDARD DRINKS CONTAINING ALCOHOL DO YOU HAVE ON A TYPICAL DAY: 0
HAVE YOU OR SOMEONE ELSE BEEN INJURED AS A RESULT OF YOUR DRINKING: 0
HAS A RELATIVE, FRIEND, DOCTOR, OR ANOTHER HEALTH PROFESSIONAL EXPRESSED CONCERN ABOUT YOUR DRINKING OR SUGGESTED YOU CUT DOWN: 0
HOW OFTEN DO YOU HAVE SIX OR MORE DRINKS ON ONE OCCASION: 0

## 2020-11-03 ASSESSMENT — PATIENT HEALTH QUESTIONNAIRE - PHQ9
SUM OF ALL RESPONSES TO PHQ QUESTIONS 1-9: 0
1. LITTLE INTEREST OR PLEASURE IN DOING THINGS: 0
SUM OF ALL RESPONSES TO PHQ QUESTIONS 1-9: 0
2. FEELING DOWN, DEPRESSED OR HOPELESS: 0
SUM OF ALL RESPONSES TO PHQ9 QUESTIONS 1 & 2: 0
SUM OF ALL RESPONSES TO PHQ QUESTIONS 1-9: 0

## 2020-11-03 NOTE — PATIENT INSTRUCTIONS
Personalized Preventive Plan for Ibeth Singh - 11/3/2020  Medicare offers a range of preventive health benefits. Some of the tests and screenings are paid in full while other may be subject to a deductible, co-insurance, and/or copay. Some of these benefits include a comprehensive review of your medical history including lifestyle, illnesses that may run in your family, and various assessments and screenings as appropriate. After reviewing your medical record and screening and assessments performed today your provider may have ordered immunizations, labs, imaging, and/or referrals for you. A list of these orders (if applicable) as well as your Preventive Care list are included within your After Visit Summary for your review. Other Preventive Recommendations:    · A preventive eye exam performed by an eye specialist is recommended every 1-2 years to screen for glaucoma; cataracts, macular degeneration, and other eye disorders. · A preventive dental visit is recommended every 6 months. · Try to get at least 150 minutes of exercise per week or 10,000 steps per day on a pedometer . · Order or download the FREE \"Exercise & Physical Activity: Your Everyday Guide\" from [employer]. Call 0-426.343.6143 or search [employer] online. · You need 0821-6776 mg of calcium and 6355-3960 IU of vitamin D per day. It is possible to meet your calcium requirement with diet alone, but a vitamin D supplement is usually necessary to meet this goal.  · When exposed to the sun, use a sunscreen that protects against both UVA and UVB radiation with an SPF of 30 or greater. Reapply every 2 to 3 hours or after sweating, drying off with a towel, or swimming. · Always wear a seat belt when traveling in a car. Always wear a helmet when riding a bicycle or motorcycle.   Patient information: Weight loss treatments    INTRODUCTION -- Obesity is a major international problem, and Americans are among the heaviest people in the world. The percentage of obese people in the [place] has risen steadily. Many people find that although they initially lose weight by dieting, they quickly regain the weight after the diet ends. Because it so hard to keep weight off over time, it is important to have as much information and support as possible before starting a diet. You are most likely to be successful in losing weight and keeping it off when you believe that your body weight can be controlled. STARTING A WEIGHT LOSS PROGRAM -- Some people like to talk to their doctor or nurse to get help choosing the best plan, monitoring progress, and getting advice and support along the way. To know what treatment (or combination of treatments) will work best, determine your body mass index (BMI) and waist circumference (measurement). The BMI is calculated from your height and weight. A person with a BMI between 25 and 29.9 is considered overweight   A person with a BMI of 30 or greater is considered to be obese  A waist circumference greater than 35 inches (88 cm) in women and 40 inches (102 cm) in men increases the risk of obesity-related complications, such as heart disease and diabetes. People who are obese and who have a larger waist size may need more aggressive weight loss treatment than others. Talk to your doctor or nurse for advice. Types of treatment -- Based on your measurements and your medical history, your doctor or nurse can determine what combination of weight loss treatments would work best for you. Treatments may include changes in lifestyle, exercise, dieting, and, in some cases, weight loss medicines or weight loss surgery. Weight loss surgery, also called bariatric surgery, is reserved for people with severe obesity who have not responded to other weight loss treatments.    SETTING A WEIGHT LOSS GOAL -- It is important to set a realistic weight loss goal. Your first goal should be to avoid gaining more weight and staying at your current weight (or within 5 percent). Many people have a \"dream\" weight that is difficult or impossible to achieve. People at high risk of developing diabetes who are able to lose 5 percent of their body weight and maintain this weight will reduce their risk of developing diabetes by about 50 percent and reduce their blood pressure. This is a success. Losing more than 15 percent of your body weight and staying at this weight is an extremely good result, even if you never reach your \"dream\" or \"ideal\" weight. LIFESTYLE CHANGES -- Programs that help you to change your lifestyle are usually run by psychologists or other professionals. The goals of lifestyle changes are to help you change your eating habits, become more active, and be more aware of how much you eat and exercise, helping you to make healthier choices. This type of treatment can be broken down into three steps: The triggers that make you want to eat   Eating   What happens after you eat  Triggers to eat -- Determining what triggers you to eat involves figuring out what foods you eat and where and when you eat. To figure out what triggers you to eat, keep a record for a few days of everything you eat, the places where you eat, how often you eat, and the emotions you were feeling when you ate. For some people, the trigger is related to a certain time of day or night. For others, the trigger is related to a certain place, like sitting at a desk working. Eating -- You can change your eating habits by breaking the chain of events between the trigger for eating and eating itself. There are many ways to do this.  For instance, you can:  Limit where you eat to a few places (eg, dining room)   Restrict the number of utensils (eg, only a fork) used for eating   Drink a sip of water between each bite   Chew your food a certain number of times   Get up and stop eating every few minutes  What happens after you eat -- Rewarding yourself for good eating behaviors can help you to develop better habits. This is not a reward for weight loss; instead, it is a reward for changing unhealthy behaviors. Do not use food as a reward. Some people find money, clothing, or personal care (eg, a hair cut, manicure, or massage) to be effective rewards. Treat yourself immediately after making better eating choices to reinforce the value of the good behavior. You need to have clear behavior goals, and you must have a time frame for reaching your goals. Reward small changes along the way to your final goal.  Other factors that contribute to successful weight loss -- Changing your behavior involves more than just changing unhealthy eating habits; it also involves finding people around you to support your weight loss, reducing stress, and learning to be strong when tempted by food. Establish a \"pablo\" system -- Having a friend or family member available to provide support and reinforce good behavior is very helpful. The support person needs to understand your goals. Learn to be strong -- Learning to be strong when tempted by food is an important part of losing weight. As an example, you will need to learn how to say \"no\" and continue to say no when urged to eat at parties and social gatherings. Develop strategies for events before you go, such as eating before you go or taking low-calorie snacks and drinks with you. Develop a support system -- Having a support system is helpful when losing weight. This is why many [employer] groups are successful. Family support is also essential; if your family does not support your efforts to lose weight, this can slow your progress or even keep you from losing weight. Positive thinking -- People often have conversations with themselves in their head; these conversations can be positive or negative.  If you eat a piece of cake that was not planned, you may respond by thinking, \"Oh, you stupid idiot, you've blown your diet!\" and as a result, you may eat more cake. A positive thought for the same event could be, \"Well, I ate cake when it was not on my plan. Now I should do something to get back on track. \" A positive approach is much more likely to be successful than a negative one. Reduce stress -- Although stress is a part of everyday life, it can trigger uncontrolled eating in some people. It is important to find a way to get through these difficult times without eating or by eating low-calorie food, like raw vegetables. It may be helpful to imagine a relaxing place that allows you to temporarily escape from stress. With deep breaths and closed eyes, you can imagine this relaxing place for a few minutes. Self-help programs -- Self-help programs like [employer] Watchers®, Overeaters Anonymous®, and Take Off [place] (TOPS)© work for some people. As with all weight loss programs, you are most likely to be successful with these plans if you make long-term changes in how you eat. CHOOSING A DIET -- A calorie is a unit of energy found in food. Your body needs calories to function. The goal of any diet is to burn up more calories than you eat. How quickly you lose weight depends upon several factors, such as your age, gender, and starting weight. Older people have a slower metabolism than young people, so they lose weight more slowly. Men lose more weight than women of similar height and weight when dieting because they use more energy. People who are extremely overweight lose weight more quickly than those who are only mildly overweight. Try not to drink alcohol or drinks with added sugar, and most sweets (candy, cakes, cookies), since they rarely contain important nutrients. Portion-controlled diets -- One simple way to diet is to buy packaged foods, like frozen low-calorie meals or meal-replacement canned drinks.  A typical meal plan for one day may include:  A meal-replacement drink or breakfast bar for breakfast A meal-replacement drink or a frozen low-calorie (250 to 350 calories) meal for lunch   A frozen low-calorie meal or other prepackaged, calorie-controlled meal, along with extra vegetables for dinner  This would give you 1000 to 1500 calories per day. Low-fat diet -- To reduce the amount of fat in your diet, you can:  Eat low-fat foods. Low-fat foods are those that contain less than 30 percent of calories from fat. Fat is listed on the food facts label (figure 1). Count fat grams. For a 1500 calorie diet, this would mean about 45 g or fewer of fat per day. Low-carbohydrate diet -- Low- and very-low-carbohydrate diets (eg, Atkins diet, [employer]) have become popular ways to lose weight quickly. With a very-low-carbohydrate diet, you eat between 0 and 60 grams of carbohydrates per day (a standard diet contains 200 to 300 grams of carbohydrates)   With a low-carbohydrate diet, you eat between 60 and 130 grams of carbohydrates per day  Carbohydrates are found in fruits, vegetables, and grains (including breads, rice, pasta, and cereal), alcoholic beverages, and in dairy products. Meat and fish do not contain carbohydrates. Side effects of very-low-carbohydrate diets can include constipation, headache, bad breath, muscle cramps, diarrhea, and weakness. Mediterranean diet -- The term \"Mediterranean diet\" refers to a way of eating that is common in olive[place]growing regions around the [facility]. Although there is some variation in Mediterranean diets, there are some similarities. Most Mediterranean diets include:  A high level of monounsaturated fats (from olive or canola oil, walnuts, pecans, almonds) and a low level of saturated fats (from butter)   A high amount of vegetables, fruits, legumes, and grains (7 to 10 servings of fruits and vegetables per day)   A moderate amount of milk and dairy products, mostly in the form of cheese.  Use low-fat dairy products (skim milk, fat-free yogurt, low-fat cheese). A relatively low amount of red meat and meat products. Substitute fish or poultry for red meat. For those who drink alcohol, a modest amount (mainly as red wine) may help to protect against cardiovascular disease. A modest amount is up to one (4 ounce) glass per day for women and up to two glasses per day for men. Which diet is best? -- Studies have compared different diets, including:  Very-low-carbohydrate (Atkins)   Macronutrient balance controlling glycemic load (Zone®)   Reduced-calorie (Weight Watchers®)   Very-low-fat (Ornish)  No one diet is \"best\" for weight loss. Any diet will help you to lose weight if you stick with the diet. Therefore, it is important to choose a diet that includes foods you like. Fad diets -- Fad diets often promise quick weight loss (more than 1 to 2 pounds per week) and may claim that you do not need to exercise or give up favorite foods. Some fad diets cost a lot of money, because you have to pay for seminars or pills. Fad diets generally lack any scientific evidence that they are safe and effective, but instead rely on \"before\" and \"after\" photos or testimonials. Diets that sound too good to be true usually are. These plans are a waste of time and money and are not recommended. A doctor, nurse, or nutritionist can help you find a safe and effective way to lose weight and keep it off. WEIGHT LOSS MEDICINES -- Taking a weight loss medicine may be helpful when used in combination with diet, exercise, and lifestyle changes. However, it is important to understand the risks and benefits of these medicines. It is also important to be realistic about your goal weight using a weight loss medicine; you may not reach your \"dream\" weight, but you may be able to reduce your risk of diabetes or heart disease.    Weight loss medicines may be recommended for people who have not been able to lose weight with diet and exercise who have a:  BMI of 30 or more    BMI between 27 and diarrhea, leakage of stool, or oily stools. These problems are more likely when you take orlistat with a high-fat meal (if more than 30 percent of calories in the meal are from fat). Side effects usually improve as you learn to avoid high-fat foods. Severe liver injury has been reported rarely in patients taking orlistat, but it is not known if orlistat caused the liver problems. Diet supplements -- Diet supplements are widely used by people who are trying to lose weight, although the safety and efficacy of these supplements are often unproven. A few of the more common diet supplements are discussed below; none of these are recommended because they have not been studied carefully, and there is no proof they are safe or effective. Chitosan and wheat dextrin are ineffective for weight loss, and their use is not recommended. Ephedra, a compound related to ephedrine, is no longer available in the [place] due to safety concerns. Many nonprescription diet pills previously contained ephedra. Although some studies have shown that ephedra helps with weight loss, there can be serious side effects (psychiatric symptoms, palpitations, and stomach upset), including death. There are not enough data about the safety and efficacy of chromium, ginseng, glucomannan, green tea, hydroxycitric acid, L carnitine, psyllium, pyruvate supplements, Raeford wort, and conjugated linoleic acid. Two supplements from [facility], [address] [place] (also known as the Jer Fairchild 15 pill) and Herbathin dietary supplement, have been shown to contain prescription drugs. Hoodia gordonii is a dietary supplement derived from a plant in [place]. It is not recommended because there is no proof that it is safe or effective. Bitter orange (Citrus aurantium) can increase your heart rate and blood pressure and is not recommended.   SHOULD I HAVE SURGERY TO LOSE WEIGHT? -- Weight loss surgery is recommended ONLY for people with one of the following:  Severe obesity (body mass index above 40) (calculator 1 and calculator 2) who have not responded to diet, exercise, or weight loss medicines   Body mass index between 35 and 40, along with a serious medical problem (including diabetes, severe joint pain, or sleep apnea) that would improve with weight loss  You should be sure that you understand the potential risks and benefits of weight loss surgery. You must be motivated and willing to make lifelong changes in how you eat to reach and maintain a healthier weight after surgery. You must also be realistic about weight loss after surgery (see 'Effectiveness of weight loss surgery' below). PREPARING FOR WEIGHT LOSS SURGERY -- Most people who have weight loss surgery will meet with several specialists before surgery is scheduled. This often includes a dietitian, mental health counselor, a doctor who specializes in care of obese people, and a surgeon who performs weight loss surgery (bariatric surgeon). You may need to work with these providers for several weeks or months before surgery. The nutritionist will explain what and how much you will be able to eat after surgery. You may also need to lose a small amount of weight before surgery. The mental health specialist will help you to cope with stress and other factors that can make it harder to lose weight or trigger you to eat   The medical doctor will determine whether you need other tests, counseling, or treatment before surgery. He or she might also help you begin a medical weight loss program so that you can lose some weight before surgery. The bariatric surgeon will meet with you to discuss the surgeries available to treat obesity. He or she will also make sure you are a good candidate for surgery. TYPES OF WEIGHT LOSS SURGERY -- There are several types of weight loss surgeries, the most common being lap banding, gastric bypass, and gastric sleeve.   Lap banding -- Laparoscopic adjustable gastric banding (LAGB), or lap banding, is a surgery that uses an adjustable band around the opening to the stomach (figure 1). This reduces the amount of food that you can eat at one time. Lap banding is done through small incisions, with a laparoscope. The band can be adjusted after surgery, allowing you to eat more or less food. Adjustments to the size and tightness of the band are made by using a needle to add or remove fluid from a port (a small container under the skin that is connected to the band). Adding fluid to the band makes it tighter which restricts the amount of food you can eat and may help you to lose more weight. Lap banding is a popular choice because it is relatively simple to perform, can be adjusted or removed, and has a low risk of serious complications immediately after surgery. However, weight loss with the lap band depends on your ability to follow the program closely. You will need to prepare nutritious meals that [facility] with\" the band, not against it. For example, the lap band will not work well if you eat or drink a large amount of liquid calories (like ice cream). The band will not help you to feel full when you eat/drink liquid calories. Weight loss ranges from 45 to 75 percent after two years. As an example, a person who is 120 pounds overweight could expect to lose approximately 54 to 90 pounds in the two years after lap banding. Gastric bypass -- Jocelyne-en-Y gastric bypass, also called gastric bypass, helps you to lose weight by reducing the amount of food you can eat and reducing the number of calories and nutrients you absorb from the food you eat. To perform gastric bypass, a surgeon creates a small stomach pouch by dividing the stomach and attaching it to the small intestine. This helps you to lose weight in two ways: The smaller stomach can hold less food than before surgery. This causes you to feel full after eating a very small amount of food or liquid.  Over time, the pouch might stretch, allowing you to eat more food. The body absorbs fewer calories, since food bypasses most of the stomach as well as the upper small intestine. This new arrangement seems to decrease your appetite and change how you break down foods by changing the release of various hormones. Gastric bypass can be performed as open surgery (through an incision on the abdomen) or laparoscopically, which uses smaller incisions and smaller instruments. Both the laparoscopic and open techniques have risks and benefits. You and your surgeon should work together to decide which surgery, if any, is right for you. Gastric bypass has a high success rate, and people lose an average of 62 to 68 percent of their excess body weight in the first year. Weight loss typically levels off after one to two years, with an overall excess weight loss between 50 and 75 percent. For a person who is 120 pounds overweight, an average of 60 to 90 pounds of weight loss would be expected. Gastric sleeve -- Gastric sleeve, also known as sleeve gastrectomy, is a surgery that reduces the size of the stomach and makes it into a narrow tube (figure 3). The new stomach is much smaller and produces less of the hormone (ghrelin) that causes hunger, helping you feel satisfied with less food. Sleeve gastrectomy is safer than gastric bypass because the intestines are not rearranged, and there is less chance of malnutrition. It also appears to control hunger better than lap banding. It might be safer than the lap banding because no foreign materials are used. The gastric sleeve has a good success rate, and people lose an average of 33 percent of their excess body weight in the first year. For a person who is 120 pounds overweight, this would mean losing about 40 pounds in the first year. WEIGHT LOSS SURGERY COMPLICATIONS -- A variety of complications can occur with weight loss surgery.  The risks of surgery depend upon which surgery you have and any medical problems you had before surgery. Some of the more common early surgical complications (one to six weeks after surgery) include:  Bleeding   Infection   Blockage or tear in the bowels   Need for further surgery  Important medical complications after surgery can include blood clots in the legs or lungs, heart attack, pneumonia, and urinary tract infection. Complications are less likely when surgery is performed in centers that are experienced in weight loss surgery. In general, centers with experience in weight loss surgery have:  Board-certified doctors and surgeons   A team of support staff (dietitians, counselors, nurses)   Long-term follow-up after surgery   Hospital staff experienced with the care of weight loss patients. This includes nurses who are trained in the care of patients immediately after surgery and anesthesiologists who are experienced in caring for the morbidly obese. EFFECTIVENESS OF WEIGHT LOSS SURGERY -- The goal of weight loss surgery is to reduce the risk of illness or death associated with obesity. Weight loss surgery can also help you to feel and look better, reduce the amount of money you spend on medicines, and cut down on sick days. As an example, weight loss surgery can improve health problems related to obesity (diabetes, high blood pressure, high cholesterol, sleep apnea) to the point that you need less or no medicine. Finally, weight loss surgery might reduce your risk of developing heart disease, cancer, and certain infections. AFTER WEIGHT LOSS SURGERY -- You will need to stay in the hospital until your team feels that it is safe for you to leave (on average, one to three days). Do not drive if you are taking prescription pain medicine. Begin exercising as soon as possible once you have healed; most weight loss centers will design an exercise program for you. Once you are home, it is important to eat and drink exactly what your doctor and dietitian recommend.  You will see your doctor, nurse, and dietitian on a regular basis after surgery to monitor your health, diet, and weight loss. You will be able to slowly increase how much you eat over time, although it will always be important to:  Eat small, frequent meals and not skip meals   Chew your food slowly and completely   Avoid eating while \"distracted\" (such as eating while watching TV)   Stop eating when you feel full   Drink liquids at least 30 minutes before or after eating   Avoid foods high in fat or sugar   Take vitamin supplements, as recommended  It can take several months to learn to listen to your body so that you know when you are hungry and when you are full. You may dislike foods you previously loved, and you may begin to prefer new foods. This can be a frustrating process for some people, so talk to your dietitian if you are having trouble. It usually takes between one and two years to lose weight after surgery. After reaching their goal weight, some people have plastic surgery (called \"body contouring\") to remove excess skin from the body, particularly in the abdominal area. Before you decide to have weight loss surgery, you must commit to staying healthy for life. This includes following up with your healthcare team, exercising most days of the week, and eating a sensible diet every day. It can be difficult to develop new eating and exercise habits after weight loss surgery, and you will have to work hard to stick to your goals. Recovering from surgery and losing weight can be stressful and emotional, and it is important to have the support of family and friends. Working with a , therapist, or support group can help you through the ups and downs. WHERE TO GET MORE INFORMATION -- Your healthcare provider is the best source of information for questions and concerns related to your medical problem.   This article will be updated as needed every four months on our Web site ([email or web address])  ·

## 2020-11-03 NOTE — PROGRESS NOTES
Medicare Annual Wellness Visit  Name: Taz Goddard Date: 11/3/2020   MRN: [id number] Sex: Female   Age: 63 y.o. Ethnicity: Non-/Non    : 1960 Race: Sourav Mcmahon is here for Medicare AWV    Screenings for behavioral, psychosocial and functional/safety risks, and cognitive dysfunction are all negative except as indicated below. These results, as well as other patient data from the [address] form, are documented in Flowsheets linked to this Encounter. Allergies   Allergen Reactions    Sulfa Antibiotics Hives       Prior to Visit Medications    Medication Sig Taking? Authorizing Provider   rOPINIRole (REQUIP) 0.25 MG tablet 1 po nightly for 1wk and then may increase to 2 po nightly for restless legs Yes Akash Underwood MD   ezetimibe (ZETIA) 10 MG tablet Take 1 tablet by mouth daily Yes MOOK Pool   losartan (COZAAR) 50 MG tablet Take 1 tablet by mouth daily Yes MOOK Pool   famotidine (PEPCID) 20 MG tablet Take 1 tablet by mouth 2 times daily as needed (for breakthrough reflux) Yes MOOK Pool   rosuvastatin (CRESTOR) 40 MG tablet TAKE 1 TABLET BY MOUTH EVERY NIGHT FOR CHOLESTEROL Yes MOOK Pool   insulin aspart (NOVOLOG) 100 UNIT/ML injection vial  Yes Historical Provider, MD   LANTUS 100 UNIT/ML injection vial  Yes Historical Provider, MD   FLUoxetine (PROZAC) 40 MG capsule Take 1 capsule by mouth daily Yes MOOK Pool   pantoprazole (PROTONIX) 40 MG tablet Take 1 tablet by mouth daily Yes MOOK Pool   Insulin Syringe-Needle U-100 (INSULIN SYRINGE .5CC/30GX5/16\") 30G X 5/16\" 0.5 ML MISC USE WITH INSULIN EVERY DAY Yes MOOK Pool   B-D ULTRAFINE III SHORT PEN 31G X 8 MM MISC USE ONCE DAILY Yes MOOK Pool   blood glucose monitor strips For qid testing Type 2 Dm Dispense brand per patient request or insurance benefits. Yes MOOK Pool   nystatin (MYCOSTATIN) 718469 UNIT/GM cream Apply topically 2 times daily.  Yes Ascencion Russell MOOK Pel[place]ier   Polyethylene Glycol 3350 (MIRALAX PO) Take 17 g by mouth Yes Historical Provider, MD   vitamin D (ERGOCALCIFEROL) 30341 units CAPS capsule TAKE 1 CAPSULE BY MOUTH 1 TIME A WEEK Yes MOOK Pool   glucose monitoring kit (FREESTYLE) monitoring kit For bid testing Type 2 dm Yes MOOK Pool   Lancets MISC 1 each by Does not apply route 2 times daily Dispense brand per insurance benefits and patient request. Yes MOOK Pool   Ez Smart Blood Glucose Lancets MISC For qid and prn testing for newly diagnosed diabetes DX: Diabetes, insulin requiring Yes MOOK Pool   glucose blood VI test strips (ASCENSIA AUTODISC VI;ONE TOUCH ULTRA TEST VI) strip 1 each by In Vitro route 4 times daily (with meals and nightly) For qid and prn testing for newly diagnosed diabetes  Freestyle Freedom lite    DX: Diabetes, insulin requiring Yes MOOK Pool   aspirin EC 81 MG EC tablet Take 1 tablet by mouth daily Yes MOOK Pool   acetaminophen (TYLENOL ARTHRITIS PAIN) 650 MG CR tablet Take 650 mg by mouth every 8 hours as needed for Pain.  Yes Historical Provider, MD       Past Medical History:   Diagnosis Date    Back pain 6/5/2014    Carotid artery stenosis     Depression 6/5/2014    Diabetes ([facility] [address]     GERD (gastroesophageal reflux disease)     Hyperlipidemia     Hypertension 6/5/2014    Morbidly obese ([facility] [address] 7/15/2020    Type II or unspecified type diabetes mellitus without mention of complication, not stated as uncontrolled        Past Surgical History:   Procedure Laterality Date   Mackenzie Newman 1st surgery and Dr. Vanessa Balderas 2nd surgery    DILATION AND CURETTAGE OF UTERUS      HYSTERECTOMY N/A        Family History   Problem Relation Age of Onset    Diabetes Mother     Stroke Mother        CareTeam (Including outside providers/suppliers regularly involved in providing care):   Patient Care Team:  MOOK Howe as PCP - General (Nurse Practitioner Primary Care)  Gissel Briones APRN as PCP - [facility] Empaneled Provider    Wt Readings from Last 3 Encounters:   11/03/20 215 lb (97.5 kg)   07/31/20 215 lb (97.5 kg)   07/15/20 214 lb (97.1 kg)     Vitals:    11/03/20 0931   BP: 132/70   Pulse: 55   Weight: 215 lb (97.5 kg)   Height: 5' 2\" (1.575 m)     Body mass index is 39.32 kg/m². Based upon direct observation of the patient, evaluation of cognition reveals recent and remote memory intact. Patient's complete Health Risk Assessment and screening values have been reviewed and are found in Flowsheets. The following problems were reviewed today and where indicated follow up appointments were made and/or referrals ordered. Positive Risk Factor Screenings with Interventions:     General Health and ACP:  General  In general, how would you say your health is?: Good  In the past 7 days, have you experienced any of the following?  New or Increased Pain, New or Increased Fatigue, Loneliness, Social Isolation, Stress or Anger?: None of These  Do you get the social and emotional support that you need?: Yes  Do you have a Living Will?: (!) No  Advance Directives     Power of [facility] Will ACP-Advance Directive ACP-Power of     Not on File Not on File Filed [address] [facility] Efren Risk Interventions:  · No Living Will: Patient declines ACP discussion/assistance    Health Habits/Nutrition:  Health Habits/Nutrition  Do you exercise for at least 20 minutes 2-3 times per week?: (!) No  Have you lost any weight without trying in the past 3 months?: No  Do you eat fewer than 2 meals per day?: No  Have you seen a dentist within the past year?: Yes  Body mass index: (!) 39.32  Health Habits/Nutrition Interventions:  · Inadequate physical activity:  educational materials provided to promote increased physical activity    Personalized Preventive Plan   Current Health Maintenance Status  Immunization History   Administered Date(s) Administered    Pneumococcal Polysaccharide (Izafrvlcu32) 07/31/2020        Health Maintenance   Topic Date Due    HIV screen  07/15/1974    DTaP/Tdap/Td vaccine (1 - Tdap) 07/15/1978    Shingles Vaccine (1 of 2) 07/15/2009    Cervical cancer screen  08/19/2018    Diabetic retinal exam  01/19/2019    Annual Wellness Visit (AWV)  05/29/2019    Breast cancer screen  08/01/2019    Flu vaccine (1) 09/01/2020    Diabetic foot exam  09/26/2020    Diabetic microalbuminuria test  09/26/2020    A1C test (Diabetic or Prediabetic)  10/06/2020    Lipid screen  07/06/2021    Potassium monitoring  07/06/2021    Creatinine monitoring  07/06/2021    Colon cancer screen colonoscopy  07/24/2022    Pneumococcal 0-64 years Vaccine  Completed    Hepatitis C screen  Completed    Hepatitis A vaccine  Aged Out    Hib vaccine  Aged Out    Meningococcal (ACWY) vaccine  Aged Out     Recommendations for [place] Due: see orders and patient instructions/AVS.  Health Maintenance plan reviewed with patient. Patient reports she does not take vaccines. .  Recommended screening schedule for the next 5-10 years is provided to the patient in written form: see Patient Instructions/AVS.    IPilar LPN, 45/1/4339, performed the documented evaluation under the direct supervision of the attending physician. Maddie Chen is a 63 y.o. female evaluated via telephone on 11/3/2020. Consent:  She and/or health care decision maker is aware that that she may receive a bill for this telephone service, depending on her insurance coverage, and has provided verbal consent to proceed: Yes      Documentation:  I communicated with the patient and/or health care decision maker about AWV. Details of this discussion including any medical advice provided. I affirm this is a Patient Initiated Episode with a Patient who has not had a related appointment within my department in the past 7 days or scheduled within the next 24 hours.     Patient identification was verified at the start of the visit: Yes    Total Time: minutes: 11-20 minutes    Note: not billable if this call serves to triage the patient into an appointment for the relevant concern      [employer]

## 2020-11-05 RX ORDER — ROSUVASTATIN CALCIUM 40 MG/1
TABLET, COATED ORAL
Qty: 30 TABLET | Refills: 1 | Status: SHIPPED | OUTPATIENT
Start: 2020-11-05 | End: 2020-12-30 | Stop reason: SDUPTHER

## 2020-12-30 RX ORDER — ROSUVASTATIN CALCIUM 40 MG/1
TABLET, COATED ORAL
Qty: 90 TABLET | Refills: 0 | Status: SHIPPED | OUTPATIENT
Start: 2020-12-30 | End: 2021-03-26 | Stop reason: SDUPTHER

## 2020-12-30 RX ORDER — LOSARTAN POTASSIUM 50 MG/1
50 TABLET ORAL DAILY
Qty: 90 TABLET | Refills: 0 | Status: SHIPPED | OUTPATIENT
Start: 2020-12-30 | End: 2021-03-26 | Stop reason: SDUPTHER

## 2020-12-30 RX ORDER — FAMOTIDINE 20 MG/1
20 TABLET, FILM COATED ORAL 2 TIMES DAILY PRN
Qty: 180 TABLET | Refills: 0 | Status: SHIPPED | OUTPATIENT
Start: 2020-12-30 | End: 2021-03-26 | Stop reason: SDUPTHER

## 2021-01-29 RX ORDER — ROPINIROLE 0.25 MG/1
TABLET, FILM COATED ORAL
Qty: 60 TABLET | Refills: 2 | Status: SHIPPED | OUTPATIENT
Start: 2021-01-29 | End: 2021-04-01 | Stop reason: SDUPTHER

## 2021-03-26 DIAGNOSIS — I10 ESSENTIAL HYPERTENSION: ICD-10-CM

## 2021-03-26 DIAGNOSIS — K21.9 GASTROESOPHAGEAL REFLUX DISEASE WITHOUT ESOPHAGITIS: ICD-10-CM

## 2021-03-26 DIAGNOSIS — E78.2 MIXED HYPERLIPIDEMIA: ICD-10-CM

## 2021-03-26 RX ORDER — EZETIMIBE 10 MG/1
10 TABLET ORAL DAILY
Qty: 90 TABLET | Refills: 0 | Status: SHIPPED | OUTPATIENT
Start: 2021-03-26

## 2021-03-26 RX ORDER — FAMOTIDINE 20 MG/1
20 TABLET, FILM COATED ORAL 2 TIMES DAILY PRN
Qty: 180 TABLET | Refills: 0 | Status: SHIPPED | OUTPATIENT
Start: 2021-03-26 | End: 2021-06-14 | Stop reason: SDUPTHER

## 2021-03-26 RX ORDER — LOSARTAN POTASSIUM 50 MG/1
50 TABLET ORAL DAILY
Qty: 90 TABLET | Refills: 0 | Status: SHIPPED | OUTPATIENT
Start: 2021-03-26 | End: 2021-06-14 | Stop reason: SDUPTHER

## 2021-03-26 RX ORDER — ROSUVASTATIN CALCIUM 40 MG/1
TABLET, COATED ORAL
Qty: 90 TABLET | Refills: 0 | Status: SHIPPED | OUTPATIENT
Start: 2021-03-26 | End: 2021-05-24

## 2021-04-01 ENCOUNTER — OFFICE VISIT (OUTPATIENT)
Dept: FAMILY MEDICINE CLINIC | Age: 61
End: 2021-04-01
Payer: MEDICARE

## 2021-04-01 VITALS
WEIGHT: 212 LBS | HEART RATE: 124 BPM | DIASTOLIC BLOOD PRESSURE: 74 MMHG | RESPIRATION RATE: 20 BRPM | BODY MASS INDEX: 39.01 KG/M2 | SYSTOLIC BLOOD PRESSURE: 134 MMHG | TEMPERATURE: 96.6 F | OXYGEN SATURATION: 98 % | HEIGHT: 62 IN

## 2021-04-01 DIAGNOSIS — E66.01 MORBIDLY OBESE (HCC): ICD-10-CM

## 2021-04-01 DIAGNOSIS — E78.2 MIXED HYPERLIPIDEMIA: ICD-10-CM

## 2021-04-01 DIAGNOSIS — E55.9 VITAMIN D INSUFFICIENCY: ICD-10-CM

## 2021-04-01 DIAGNOSIS — E11.65 UNCONTROLLED TYPE 2 DIABETES MELLITUS WITH HYPERGLYCEMIA, WITH LONG-TERM CURRENT USE OF INSULIN (HCC): Primary | ICD-10-CM

## 2021-04-01 DIAGNOSIS — Z79.4 UNCONTROLLED TYPE 2 DIABETES MELLITUS WITH HYPERGLYCEMIA, WITH LONG-TERM CURRENT USE OF INSULIN (HCC): Primary | ICD-10-CM

## 2021-04-01 DIAGNOSIS — I10 ESSENTIAL HYPERTENSION: ICD-10-CM

## 2021-04-01 DIAGNOSIS — G47.00 INSOMNIA, UNSPECIFIED TYPE: ICD-10-CM

## 2021-04-01 DIAGNOSIS — B37.2 INTERTRIGINOUS CANDIDIASIS: ICD-10-CM

## 2021-04-01 PROCEDURE — G8417 CALC BMI ABV UP PARAM F/U: HCPCS | Performed by: NURSE PRACTITIONER

## 2021-04-01 PROCEDURE — 2022F DILAT RTA XM EVC RTNOPTHY: CPT | Performed by: NURSE PRACTITIONER

## 2021-04-01 PROCEDURE — 3017F COLORECTAL CA SCREEN DOC REV: CPT | Performed by: NURSE PRACTITIONER

## 2021-04-01 PROCEDURE — 3046F HEMOGLOBIN A1C LEVEL >9.0%: CPT | Performed by: NURSE PRACTITIONER

## 2021-04-01 PROCEDURE — 99214 OFFICE O/P EST MOD 30 MIN: CPT | Performed by: NURSE PRACTITIONER

## 2021-04-01 PROCEDURE — G8427 DOCREV CUR MEDS BY ELIG CLIN: HCPCS | Performed by: NURSE PRACTITIONER

## 2021-04-01 PROCEDURE — 1036F TOBACCO NON-USER: CPT | Performed by: NURSE PRACTITIONER

## 2021-04-01 RX ORDER — NYSTATIN 100000 U/G
CREAM TOPICAL
Qty: 60 G | Refills: 1 | Status: SHIPPED | OUTPATIENT
Start: 2021-04-01

## 2021-04-01 RX ORDER — ZOLPIDEM TARTRATE 10 MG/1
TABLET ORAL
Qty: 30 TABLET | Refills: 2 | Status: SHIPPED | OUTPATIENT
Start: 2021-04-01 | End: 2021-05-01

## 2021-04-01 RX ORDER — ROPINIROLE 0.5 MG/1
TABLET, FILM COATED ORAL
Qty: 60 TABLET | Refills: 2 | Status: SHIPPED | OUTPATIENT
Start: 2021-04-01 | End: 2021-05-11 | Stop reason: SDUPTHER

## 2021-04-01 ASSESSMENT — ENCOUNTER SYMPTOMS
SHORTNESS OF BREATH: 0
DIARRHEA: 0
CONSTIPATION: 0
BACK PAIN: 1

## 2021-04-01 NOTE — LETTER
CONTROLLED SUBSTANCE MEDICATION AGREEMENT     Patient Name: Ashley Millard  Patient YOB: 1960   I understand, that controlled substance medications may be used to help better manage my symptoms and to improve my ability to function at home, work and in social settings. However, I also understand that these medications do have risks, which have been discussed with me, including possible development of physical or psychological dependence. I understand that successful treatment requires mutual trust and honesty between me and my provider. I understand and agree that following this Medication Agreement is necessary in continuing my provider-patient relationship and the success of my treatment plan. Explanation from my Provider: Benefits and Goals of Controlled Substance Medications: There are two potential goals for your treatment: (1) decreased pain and suffering (2) improved daily life functions. There are many possible treatments for your chronic condition(s). Alternatives such as physical therapy, yoga, massage, home daily exercise, meditation, relaxation techniques, injections, chiropractic manipulations, surgery, cognitive therapy, hypnosis and many medications that are not habit-forming may be used. Use of controlled substance medications may be helpful, but they are unlikely to resolve all symptoms or restore all function. Explanation from my Provider: Risks of Controlled Substance Medications:  Opioid pain medications: These medications can lead to problems such as addiction/dependence, sedation, lightheadedness/dizziness, memory issues, falls, constipation, nausea, or vomiting. They may also impair the ability to drive or operate machinery. Additionally, these medications may lower testosterone levels, leading to loss of bone strength, stamina and sex drive.   They may cause problems with breathing, sleep apnea and reduced coughing, which is especially dangerous for patients with lung disease. Overdose or dangerous interactions with alcohol and other medications may occur, leading to death. Hyperalgesia may develop, which means patients receiving opioids for the treatment of pain may become more sensitive to certain painful stimuli, and in some cases, experience pain from ordinarily non-painful stimuli. Women between the ages of 14-53 who could become pregnant should carefully weigh the risks and benefits of opioids with their physicians, as these medications increase the risk of pregnancy complications, including miscarriage,  delivery and stillbirth. It is also possible for babies to be born addicted to opioids. Opioid dependence withdrawal symptoms may include; feelings of uneasiness, increased pain, irritability, belly pain, diarrhea, sweats and goose-flesh. Benzodiazepines and non-benzodiazepine sleep medications: These medications can lead to problems such as addiction/dependence, sedation, fatigue, lightheadedness, dizziness, incoordination, falls, depression, hallucinations, and impaired judgment, memory and concentration. The ability to drive and operate machinery may also be affected. Abnormal sleep-related behaviors have been reported, including sleepwalking, driving, making telephone calls, eating, or having sex while not fully awake. These medications can suppress breathing and worsen sleep apnea, particularly when combined with alcohol or other sedating medications, potentially leading to death. Dependence withdrawal symptoms may include tremors, anxiety, hallucinations and seizures. Stimulants:  Common adverse effects include addiction/dependence, increased blood  pressure and heart rate, decreased appetite, nausea, involuntary weight loss, insomnia,                                                                                                                     Initials:_______   irritability, and headaches.   These risks may increase when these medications are combined with other stimulants, such as caffeine pills or energy drinks, certain weight loss supplements and oral decongestants. Dependence withdrawal symptoms may include depressed mood, loss of interest, suicidal thoughts, anxiety, fatigue, appetite changes and agitation. Testosterone replacement therapy:  Potential side effects include increased risk of stroke and heart attack, blood clots, increased blood pressure, increased cholesterol, enlarged prostate, sleep apnea, irritability/aggression and other mood disorders, and decreased fertility. I agree and understand that I and my prescriber have the following rights and responsibilities regarding my treatment plan:     1. MY RIGHTS:  To be informed of my treatment and medication plan. To be an active participant in my health and wellbeing. 2. MY RESPONSIBILITY AND UNDERSTANDING FOR USE OF MEDICATIONS   I will take medications at the dose and frequency as directed. For my safety, I will not increase or change how I take my medications without the recommendation of my healthcare provider.  I will actively participate in any program recommended by my provider which may improve function, including social, physical, psychological programs.  I will not take my medications with alcohol or other drugs not prescribed to me. I understand that drinking alcohol with my medications increases the chances of side effects, including reduced breathing rate and could lead to personal injury when operating machinery.  I understand that if I have a history of substance use disorders, including alcohol or other illicit drugs, that I may be at increased risk of addiction to my medications.  I agree to notify my provider immediately if I should become pregnant so that my treatment plan can be adjusted.    I agree and understand that I shall only receive controlled substance medications from the prescriber that signed this agreement unless there is written agreement among other prescribers of controlled substances outlining the responsibility of the medications being prescribed.  I understand that the if the controlled medication is not helping to achieve goals, the dosage may be tapered and no longer prescribed. 3. MY RESPONSIBILITY FOR COMMUNICATION / PRESCRIPTION RENEWALS   I agree that all controlled substance medications that I take will be prescribed only by my provider. If another healthcare provider prescribes me medication in an emergency, I will notify my provider within seventy-two (72) hours.  I will arrange for refills at the prescribed interval ONLY during regular office hours. I will not ask for refills earlier than agreed, after-hours, on holidays or weekends. Refills may take up to 72 hours for processing and prescriptions to reach the pharmacy.  I will inform my other health care providers that I am taking these medications and of the existence of this [id number]. In the event of an emergency, I will provide the same information to the emergency department prescribers.  I will keep my provider updated on the pharmacy I am using for controlled medication prescription filling. Initials:_______  4. MY RESPONSIBILITY FOR PROTECTING MEDICATIONS   I will protect my prescriptions and medications. I understand that lost or misplaced prescriptions will not be replaced.  I will keep medications only for my own use and will not share them with others. I will keep all medications away from children.  I agree that if my medications are adjusted or discontinued, I will properly dispose of any remaining medications. I understand that I will be required to dispose of any remaining controlled medications as, directed by my prescriber, prior to being provided with any prescriptions for other controlled medications.   Medication drop box locations can be found at: [email or web address]    5. MY RESPONSIBILITY WITH ILLEGAL DRUGS    I will not use illegal or street drugs or another person's prescription medications not prescribed to me.  If there are identified addiction type symptoms, then referral to a program may be provided by my provider and I agree to follow through with this recommendation. 6. MY RESPONSIBILITY FOR COOPERATION WITH INVESTIGATIONS   I understand that my provider will comply with any applicable law and may discuss my use and/or possible misuse/abuse of controlled substances and alcohol, as appropriate, with any health care provider involved in my care, pharmacist, or legal authority.  I authorize my provider and pharmacy to cooperate fully with law enforcement agencies (as permitted by law) in the investigation of any possible misuse, sale, or other diversion of my controlled substances.  I agree to waive any applicable privilege or right of privacy or confidentiality with respect to these authorizations. 7. PROVIDERS RIGHT TO MONITOR FOR SAFETY: PRESCRIPTION MONITORING / DRUG TESTING   I consent to drug/toxicology screening and will submit to a drug screen upon my providers request to assure I am only taking the prescribed drugs for my safety monitoring. I understand that a drug screen is a laboratory test in which a sample of my urine, blood or saliva is checked to see what drugs I have been taking. This may entail an observed urine specimen, which means that a nurse or other health care provider may watch me provide urine, and I will cooperate if I am asked to provide an observed specimen.  I understand that my provider will check a copy of my State Prescription Monitoring Program () Report in order to safely prescribe medications.  Pill Counts: I consent to pill counts when requested.   I may be asked to bring all my prescribed

## 2021-04-01 NOTE — PROGRESS NOTES
SUBJECTIVE:    Patient ID: Micaela Rosa is a58 y.o. female. Micaela Rosa is here today for Diabetes (Patient presents for follow up on diabetes check up and medication refills.) and Health Maintenance (Patient sees Dr. Aditya Shannon in [place] and we will call for records for [facility])  . HPI:   HPI     Diabetes  Pt is here today and states that \"they are doing alright\" in relation to glucose. She does not have glucose log for review. Pt is reporting that based on her glucose she is doing novolog with max up to 30u. lantus 60units at night  Pt has had some hypoglycemia. She does report that she feels it is directly linked to not measuring insulin per sliding scale and then not eating a bedtime snack/adequate food at the time. Does report adherence to statin treatment as well as blood pressure medication. Patient has gained weight since our last visit. 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 However, there has been quite a bit of increased stress within her life and this may be a contributing factor with weight gain. Insomnia  Years. Has taken ambien in past   Has not requested early refills or shown signs of abuse. Pt has tried trazodone and it did not work well for him. Pt did do well with ambien. Worsening--has RLS that is somewhat controlled with requip. Past Medical History:   Diagnosis Date    Back pain 6/5/2014    Carotid artery stenosis     Depression 6/5/2014    [facility] [address]     GERD (gastroesophageal reflux disease)     Hyperlipidemia     Hypertension 6/5/2014    Morbidly obese ([facility] [address] 7/15/2020    Type II or unspecified type diabetes mellitus without mention of complication, not stated as uncontrolled      Prior to Visit Medications    Medication Sig Taking?  Authorizing Provider   rOPINIRole (REQUIP) 0.5 MG tablet TAKE 1-2 TABLETS BY MOUTH EVERY NIGHT AT BEDTIME FOR RESTLESS LEGS Yes MOOK Pool   nystatin (MYCOSTATIN) 869012 UNIT/GM cream Apply topically 2 times daily. Yes MOOK Pool   zolpidem (AMBIEN) 10 MG tablet 1/2-1 po nightly as needed for sleep Yes MOOK Pool   losartan (COZAAR) 50 MG tablet Take 1 tablet by mouth daily Yes MOKO Pool   ezetimibe (ZETIA) 10 MG tablet Take 1 tablet by mouth daily Yes MOOK Pool   rosuvastatin (CRESTOR) 40 MG tablet TAKE 1 TABLET BY MOUTH EVERY NIGHT FOR CHOLESTEROL Yes MOOK Pool   famotidine (PEPCID) 20 MG tablet Take 1 tablet by mouth 2 times daily as needed (for breakthrough reflux) Yes MOOK Pool   insulin aspart (NOVOLOG) 100 UNIT/ML injection vial Inject 20 Units into the skin 3 times daily (before meals)  Yes Historical Provider, MD   LANTUS 100 UNIT/ML injection vial Inject 60 Units into the skin nightly  Yes Historical Provider, MD   FLUoxetine (PROZAC) 40 MG capsule Take 1 capsule by mouth daily Yes MOOK Pool   pantoprazole (PROTONIX) 40 MG tablet Take 1 tablet by mouth daily Yes MOOK Pool   Insulin Syringe-Needle U-100 (INSULIN SYRINGE .5CC/30GX5/16\") 30G X 5/16\" 0.5 ML MISC USE WITH INSULIN EVERY DAY Yes MOOK Pool   B-D ULTRAFINE III SHORT PEN 31G X 8 MM MISC USE ONCE DAILY Yes MOOK Pool   blood glucose monitor strips For qid testing Type 2 Dm Dispense brand per patient request or insurance benefits.  Yes MOOK Pool   vitamin D (ERGOCALCIFEROL) 11193 units CAPS capsule TAKE 1 CAPSULE BY MOUTH 1 TIME A WEEK Yes MOOK Pool   glucose monitoring kit (FREESTYLE) monitoring kit For bid testing Type 2 dm Yes MOOK Pool   Lancets MISC 1 each by Does not apply route 2 times daily Dispense brand per insurance benefits and patient request. Yes MOOK Pool   Ez Smart Blood Glucose Lancets MISC For qid and prn testing for newly diagnosed diabetes DX: Diabetes, insulin requiring Yes MOOK Pool   glucose blood VI test strips (ASCENSIA AUTODISC VI;ONE TOUCH ULTRA TEST VI) strip 1 each by In Vitro route 4 times daily (with meals and nightly) For qid and prn testing for newly diagnosed diabetes  Freestyle Freedom lite    DX: Diabetes, insulin requiring Yes Yoly Liyah, APRN   aspirin EC 81 MG EC tablet Take 1 tablet by mouth daily Yes Yoly Alma, APRN   acetaminophen (TYLENOL ARTHRITIS PAIN) 650 MG CR tablet Take 650 mg by mouth every 8 hours as needed for Pain.  Yes Historical Provider, MD     Allergies   Allergen Reactions    Sulfa Antibiotics Hives     Past Surgical History:   Procedure Laterality Date   Manoj Sanchez 1st surgery and Dr. Bryan Ortiz 2nd surgery    DILATION AND CURETTAGE OF UTERUS      HYSTERECTOMY N/A      Family History   Problem Relation Age of Onset    Diabetes Mother     Stroke Mother      Social History     Socioeconomic History    Marital status:      Spouse name: Not on file    Number of children: Not on file    Years of education: Not on file    Highest education level: Not on file   Occupational History    Not on file   Social Needs    Financial resource strain: Not on file    Food insecurity     Worry: Not on file     Inability: Not on file    Transportation needs     Medical: Not on file     Non-medical: Not on file   Tobacco Use    Smoking status: Former Smoker     Packs/day: 0.50     Years: 20.00     Pack years: 10.00     Types: Cigarettes     Start date: 1983     Quit date: 2014     Years since quittin.6    Smokeless tobacco: Never Used   Substance and Sexual Activity    Alcohol use: No     Alcohol/week: 0.0 standard drinks    Drug use: No    Sexual activity: Not on file   Lifestyle    Physical activity     Days per week: Not on file     Minutes per session: Not on file    Stress: Not on file   Relationships    Social connections     Talks on phone: Not on file     Gets together: Not on file     Attends Sikh service: Not on file     Active member of club or organization: Not on file     Attends meetings of clubs or organizations: Not on file     Relationship status: Not on file    Intimate partner violence     Fear of current or ex partner: Not on file     Emotionally abused: Not on file     Physically abused: Not on file     Forced sexual activity: Not on file   Other Topics Concern    Not on file   Social History Narrative    Not on file       Review of Systems   Respiratory: Negative for shortness of breath. Cardiovascular: Negative for chest pain and leg swelling. Gastrointestinal: Negative for constipation and diarrhea. Genitourinary: Negative for difficulty urinating. Musculoskeletal: Positive for back pain. Neurological: Negative for syncope. Psychiatric/Behavioral: Positive for sleep disturbance. OBJECTIVE:    Physical Exam  Vitals signs and nursing note reviewed. Constitutional:       General: She is not in acute distress. Appearance: Normal appearance. She is well-developed. She is obese. She is not ill-appearing, toxic-appearing or diaphoretic. HENT:      Head: Normocephalic and atraumatic. Eyes:      Extraocular Movements: Extraocular movements intact. Conjunctiva/sclera: Conjunctivae normal.      Pupils: Pupils are equal, round, and reactive to light. Neck:      Musculoskeletal: Normal range of motion and neck supple. No neck rigidity. Trachea: No tracheal deviation. Cardiovascular:      Rate and Rhythm: Normal rate and regular rhythm. Heart sounds: Normal heart sounds. Pulmonary:      Effort: Pulmonary effort is normal.      Breath sounds: Normal breath sounds. Abdominal:      General: Bowel sounds are normal. There is no distension. Palpations: Abdomen is soft. Tenderness: There is no abdominal tenderness. Musculoskeletal:      Right lower leg: No edema. Left lower leg: No edema. Lymphadenopathy:      Cervical: No cervical adenopathy. Skin:     General: Skin is warm and dry.       Capillary Refill: Capillary refill takes less than 2 seconds. Neurological:      General: No focal deficit present. Mental Status: She is alert and oriented to person, place, and time. Mental status is at baseline. Psychiatric:         Mood and Affect: Mood normal. Mood is not anxious or depressed. Affect is not angry. Speech: Speech normal.         Behavior: Behavior normal.         Thought Content: Thought content normal.         Judgment: Judgment normal.         /74 (Site: Left Upper Arm, Position: Sitting, Cuff Size: Large Adult)   Pulse 124   Temp 96.6 °F (35.9 °C) (Temporal)   Resp 20   Ht 5' 2\" (1.575 m)   Wt 212 lb (96.2 kg)   SpO2 98%   BMI 38.78 kg/m²      ASSESSMENT:      ICD-10-CM    1. Uncontrolled type 2 diabetes mellitus with hyperglycemia, with long-term current use of insulin ([facility])  E11.65 Urinalysis Reflex to Culture    Z79.4 CBC Auto Differential     Hemoglobin A1C     Microalbumin / Creatinine Urine Ratio   2. Intertriginous candidiasis  B37.2 nystatin (MYCOSTATIN) 348209 UNIT/GM cream   3. Insomnia, unspecified type  G47.00 zolpidem (AMBIEN) 10 MG tablet   4. Essential hypertension  I10 CBC Auto Differential   5. Mixed hyperlipidemia  E78.2 CBC Auto Differential     Comprehensive Metabolic Panel w/ Reflex to MG     Lipid Panel     TSH WITH REFLEX TO FT4   6. Vitamin D insufficiency  E55.9 Vitamin D 25 Hydroxy   7. Morbidly obese ([address]  E66.01        PLAN:    Julieta Late: Diabetes (Patient presents for follow up on diabetes check up and medication refills.) and Health Maintenance (Patient sees Dr. Rosio Betancourt in [facility] and we will call for records for [facility])  lab due  Continue current med plan at this time. Last ophth eval is needed for   [facility] record of vaccine needed  Novolog samples   Sliding scale info needed. Contract update needed  [facility]  Diagnosis and orders for this visit are above. Please note that this chart was generated using dragon dictationsoftware.   Although every effort was made to ensure the accuracy of this automated transcription, some errors in transcription may have occurred.

## 2021-05-10 RX ORDER — FLUOXETINE HYDROCHLORIDE 40 MG/1
40 CAPSULE ORAL DAILY
Qty: 90 CAPSULE | Refills: 3 | Status: SHIPPED | OUTPATIENT
Start: 2021-05-10 | End: 2022-06-24 | Stop reason: SDUPTHER

## 2021-05-12 RX ORDER — ROPINIROLE 0.5 MG/1
TABLET, FILM COATED ORAL
Qty: 180 TABLET | Refills: 0 | Status: SHIPPED | OUTPATIENT
Start: 2021-05-12

## 2021-05-20 DIAGNOSIS — Z79.4 UNCONTROLLED TYPE 2 DIABETES MELLITUS WITH HYPERGLYCEMIA, WITH LONG-TERM CURRENT USE OF INSULIN (HCC): ICD-10-CM

## 2021-05-20 DIAGNOSIS — E55.9 VITAMIN D INSUFFICIENCY: ICD-10-CM

## 2021-05-20 DIAGNOSIS — E78.2 MIXED HYPERLIPIDEMIA: ICD-10-CM

## 2021-05-20 DIAGNOSIS — I10 ESSENTIAL HYPERTENSION: ICD-10-CM

## 2021-05-20 DIAGNOSIS — E11.65 UNCONTROLLED TYPE 2 DIABETES MELLITUS WITH HYPERGLYCEMIA, WITH LONG-TERM CURRENT USE OF INSULIN (HCC): ICD-10-CM

## 2021-05-20 LAB
ALBUMIN SERPL-MCNC: 4.1 G/DL (ref 3.5–5.2)
ALP BLD-CCNC: 124 U/L (ref 35–104)
ALT SERPL-CCNC: 45 U/L (ref 5–33)
ANION GAP SERPL CALCULATED.3IONS-SCNC: 14 MMOL/L (ref 7–19)
AST SERPL-CCNC: 34 U/L (ref 5–32)
BASOPHILS ABSOLUTE: 0.1 K/UL (ref 0–0.2)
BASOPHILS RELATIVE PERCENT: 0.8 % (ref 0–1)
BILIRUB SERPL-MCNC: 0.4 MG/DL (ref 0.2–1.2)
BUN BLDV-MCNC: 13 MG/DL (ref 8–23)
CALCIUM SERPL-MCNC: 9.6 MG/DL (ref 8.8–10.2)
CHLORIDE BLD-SCNC: 98 MMOL/L (ref 98–111)
CHOLESTEROL, TOTAL: 306 MG/DL (ref 160–199)
CO2: 25 MMOL/L (ref 22–29)
CREAT SERPL-MCNC: 0.7 MG/DL (ref 0.5–0.9)
EOSINOPHILS ABSOLUTE: 0.2 K/UL (ref 0–0.6)
EOSINOPHILS RELATIVE PERCENT: 2.7 % (ref 0–5)
GFR AFRICAN AMERICAN: >59
GFR NON-AFRICAN AMERICAN: >60
GLUCOSE BLD-MCNC: 276 MG/DL (ref 74–109)
HBA1C MFR BLD: 11.4 % (ref 4–6)
HCT VFR BLD CALC: 43.9 % (ref 37–47)
HDLC SERPL-MCNC: 37 MG/DL (ref 65–121)
HEMOGLOBIN: 14.7 G/DL (ref 12–16)
IMMATURE GRANULOCYTES #: 0 K/UL
LDL CHOLESTEROL CALCULATED: 213 MG/DL
LYMPHOCYTES ABSOLUTE: 3.2 K/UL (ref 1.1–4.5)
LYMPHOCYTES RELATIVE PERCENT: 42.3 % (ref 20–40)
MCH RBC QN AUTO: 31.3 PG (ref 27–31)
MCHC RBC AUTO-ENTMCNC: 33.5 G/DL (ref 33–37)
MCV RBC AUTO: 93.4 FL (ref 81–99)
MONOCYTES ABSOLUTE: 0.5 K/UL (ref 0–0.9)
MONOCYTES RELATIVE PERCENT: 6.4 % (ref 0–10)
NEUTROPHILS ABSOLUTE: 3.6 K/UL (ref 1.5–7.5)
NEUTROPHILS RELATIVE PERCENT: 47.7 % (ref 50–65)
PDW BLD-RTO: 13.2 % (ref 11.5–14.5)
PLATELET # BLD: 185 K/UL (ref 130–400)
PMV BLD AUTO: 13.2 FL (ref 9.4–12.3)
POTASSIUM REFLEX MAGNESIUM: 3.6 MMOL/L (ref 3.5–5)
RBC # BLD: 4.7 M/UL (ref 4.2–5.4)
SODIUM BLD-SCNC: 137 MMOL/L (ref 136–145)
TOTAL PROTEIN: 7.6 G/DL (ref 6.6–8.7)
TRIGL SERPL-MCNC: 282 MG/DL (ref 0–149)
TSH REFLEX FT4: 1.74 UIU/ML (ref 0.35–5.5)
VITAMIN D 25-HYDROXY: >100 NG/ML
WBC # BLD: 7.5 K/UL (ref 4.8–10.8)

## 2021-05-22 DIAGNOSIS — E78.2 MIXED HYPERLIPIDEMIA: ICD-10-CM

## 2021-05-24 RX ORDER — ROSUVASTATIN CALCIUM 40 MG/1
TABLET, COATED ORAL
Qty: 30 TABLET | Refills: 3 | Status: SHIPPED | OUTPATIENT
Start: 2021-05-24 | End: 2021-11-11

## 2021-06-14 DIAGNOSIS — I10 ESSENTIAL HYPERTENSION: ICD-10-CM

## 2021-06-14 DIAGNOSIS — K21.9 GASTROESOPHAGEAL REFLUX DISEASE WITHOUT ESOPHAGITIS: ICD-10-CM

## 2021-06-14 DIAGNOSIS — K22.70 BARRETT'S ESOPHAGUS WITHOUT DYSPLASIA: Primary | ICD-10-CM

## 2021-06-14 DIAGNOSIS — K22.70 BARRETT'S ESOPHAGUS WITHOUT DYSPLASIA: ICD-10-CM

## 2021-06-14 RX ORDER — LOSARTAN POTASSIUM 50 MG/1
50 TABLET ORAL DAILY
Qty: 90 TABLET | Refills: 0 | Status: SHIPPED | OUTPATIENT
Start: 2021-06-14 | End: 2021-09-14

## 2021-06-14 RX ORDER — FAMOTIDINE 20 MG/1
20 TABLET, FILM COATED ORAL 2 TIMES DAILY PRN
Qty: 180 TABLET | Refills: 0 | Status: SHIPPED | OUTPATIENT
Start: 2021-06-14 | End: 2021-09-14

## 2021-06-14 RX ORDER — PANTOPRAZOLE SODIUM 40 MG/1
TABLET, DELAYED RELEASE ORAL
Qty: 60 TABLET | Refills: 1 | Status: SHIPPED | OUTPATIENT
Start: 2021-06-14 | End: 2021-06-14

## 2021-06-14 NOTE — TELEPHONE ENCOUNTER
Pt due yearly OV 8/2021-pended enough refills to [facility]ery to last until that time and placed note to pharmacy to have pt call our office and schedule appt.

## 2021-06-14 NOTE — TELEPHONE ENCOUNTER
Pt prefers 90-day supply-is due for yearly OV so I pended 1 refill to Nery to last until pt can schedule appt.

## 2021-06-15 RX ORDER — PANTOPRAZOLE SODIUM 40 MG/1
TABLET, DELAYED RELEASE ORAL
Qty: 180 TABLET | Refills: 0 | Status: SHIPPED | OUTPATIENT
Start: 2021-06-15

## 2021-09-12 DIAGNOSIS — I10 ESSENTIAL HYPERTENSION: ICD-10-CM

## 2021-09-12 DIAGNOSIS — K21.9 GASTROESOPHAGEAL REFLUX DISEASE WITHOUT ESOPHAGITIS: ICD-10-CM

## 2021-09-12 DIAGNOSIS — K22.70 BARRETT'S ESOPHAGUS WITHOUT DYSPLASIA: ICD-10-CM

## 2021-09-13 RX ORDER — PANTOPRAZOLE SODIUM 40 MG/1
TABLET, DELAYED RELEASE ORAL
Qty: 180 TABLET | Refills: 0 | OUTPATIENT
Start: 2021-09-13

## 2021-09-14 RX ORDER — LOSARTAN POTASSIUM 50 MG/1
50 TABLET ORAL DAILY
Qty: 30 TABLET | Refills: 0 | Status: SHIPPED | OUTPATIENT
Start: 2021-09-14

## 2021-09-14 RX ORDER — FAMOTIDINE 20 MG/1
TABLET, FILM COATED ORAL
Qty: 30 TABLET | Refills: 0 | Status: SHIPPED | OUTPATIENT
Start: 2021-09-14

## 2021-09-14 NOTE — TELEPHONE ENCOUNTER
Left message advising patient to schedule an appointment or call and let us know if she is going to a new provider.

## 2021-11-11 DIAGNOSIS — E78.2 MIXED HYPERLIPIDEMIA: ICD-10-CM

## 2021-11-11 RX ORDER — ROSUVASTATIN CALCIUM 40 MG/1
TABLET, COATED ORAL
Qty: 90 TABLET | Refills: 0 | Status: SHIPPED | OUTPATIENT
Start: 2021-11-11 | End: 2022-02-10

## 2021-11-15 ENCOUNTER — HOSPITAL ENCOUNTER (OUTPATIENT)
Dept: GENERAL RADIOLOGY | Age: 61
Discharge: HOME | End: 2021-11-15
Payer: MEDICARE

## 2021-11-15 ENCOUNTER — OFFICE VISIT (OUTPATIENT)
Dept: FAMILY MEDICINE CLINIC | Age: 61
End: 2021-11-15
Payer: MEDICARE

## 2021-11-15 VITALS
HEART RATE: 104 BPM | OXYGEN SATURATION: 92 % | BODY MASS INDEX: 32.74 KG/M2 | SYSTOLIC BLOOD PRESSURE: 120 MMHG | TEMPERATURE: 98.6 F | WEIGHT: 179 LBS | DIASTOLIC BLOOD PRESSURE: 60 MMHG

## 2021-11-15 DIAGNOSIS — J20.9 ACUTE BRONCHITIS, UNSPECIFIED ORGANISM: Primary | ICD-10-CM

## 2021-11-15 DIAGNOSIS — R05.9 COUGH: ICD-10-CM

## 2021-11-15 DIAGNOSIS — J20.9 ACUTE BRONCHITIS, UNSPECIFIED ORGANISM: ICD-10-CM

## 2021-11-15 LAB
BASOPHILS ABSOLUTE: 0.1 K/UL (ref 0–0.2)
BASOPHILS RELATIVE PERCENT: 0.4 % (ref 0–1)
EOSINOPHILS ABSOLUTE: 0.1 K/UL (ref 0–0.6)
EOSINOPHILS RELATIVE PERCENT: 0.6 % (ref 0–5)
HCT VFR BLD CALC: 44.7 % (ref 37–47)
HEMOGLOBIN: 14.4 G/DL (ref 12–16)
IMMATURE GRANULOCYTES #: 0.1 K/UL
LYMPHOCYTES ABSOLUTE: 2.5 K/UL (ref 1.1–4.5)
LYMPHOCYTES RELATIVE PERCENT: 16 % (ref 20–40)
MCH RBC QN AUTO: 30.6 PG (ref 27–31)
MCHC RBC AUTO-ENTMCNC: 32.2 G/DL (ref 33–37)
MCV RBC AUTO: 94.9 FL (ref 81–99)
MONOCYTES ABSOLUTE: 0.7 K/UL (ref 0–0.9)
MONOCYTES RELATIVE PERCENT: 4.3 % (ref 0–10)
NEUTROPHILS ABSOLUTE: 12.3 K/UL (ref 1.5–7.5)
NEUTROPHILS RELATIVE PERCENT: 78 % (ref 50–65)
PDW BLD-RTO: 12.9 % (ref 11.5–14.5)
PLATELET # BLD: 201 K/UL (ref 130–400)
PMV BLD AUTO: 12.7 FL (ref 9.4–12.3)
RBC # BLD: 4.71 M/UL (ref 4.2–5.4)
WBC # BLD: 15.8 K/UL (ref 4.8–10.8)

## 2021-11-15 PROCEDURE — G8484 FLU IMMUNIZE NO ADMIN: HCPCS | Performed by: NURSE PRACTITIONER

## 2021-11-15 PROCEDURE — 71046 X-RAY EXAM CHEST 2 VIEWS: CPT

## 2021-11-15 PROCEDURE — G8427 DOCREV CUR MEDS BY ELIG CLIN: HCPCS | Performed by: NURSE PRACTITIONER

## 2021-11-15 PROCEDURE — 1036F TOBACCO NON-USER: CPT | Performed by: NURSE PRACTITIONER

## 2021-11-15 PROCEDURE — 99213 OFFICE O/P EST LOW 20 MIN: CPT | Performed by: NURSE PRACTITIONER

## 2021-11-15 PROCEDURE — 3017F COLORECTAL CA SCREEN DOC REV: CPT | Performed by: NURSE PRACTITIONER

## 2021-11-15 PROCEDURE — G8417 CALC BMI ABV UP PARAM F/U: HCPCS | Performed by: NURSE PRACTITIONER

## 2021-11-15 RX ORDER — ALBUTEROL SULFATE 90 UG/1
AEROSOL, METERED RESPIRATORY (INHALATION)
Qty: 25.5 G | Refills: 0 | Status: SHIPPED | OUTPATIENT
Start: 2021-11-15

## 2021-11-15 RX ORDER — ALBUTEROL SULFATE 90 UG/1
2 AEROSOL, METERED RESPIRATORY (INHALATION) 4 TIMES DAILY PRN
Qty: 18 G | Refills: 0 | Status: SHIPPED | OUTPATIENT
Start: 2021-11-15 | End: 2021-11-15

## 2021-11-15 RX ORDER — CEFDINIR 300 MG/1
300 CAPSULE ORAL 2 TIMES DAILY
Qty: 20 CAPSULE | Refills: 0 | Status: SHIPPED | OUTPATIENT
Start: 2021-11-15 | End: 2021-11-25

## 2021-11-15 RX ORDER — PREDNISONE 20 MG/1
20 TABLET ORAL 2 TIMES DAILY
Qty: 10 TABLET | Refills: 0 | Status: SHIPPED | OUTPATIENT
Start: 2021-11-15 | End: 2021-11-20

## 2021-11-15 ASSESSMENT — ENCOUNTER SYMPTOMS
SORE THROAT: 0
COUGH: 1
DIARRHEA: 0
NAUSEA: 0
VOMITING: 0
SHORTNESS OF BREATH: 1

## 2021-11-15 NOTE — LETTER
[facility]  [address] [address]  Phone: [phone]  Fax: [phone]    MOOK Duff        November 15, 2021     Patient: Fei Scott   YOB: 1960   Date of Visit: 11/15/2021       To Whom It May Concern: It is my medical opinion that Fei Scott should remain out of work until 11/20/2021. If you have any questions or concerns, please don't hesitate to call.     Sincerely,        MOOK Duff

## 2021-11-15 NOTE — PROGRESS NOTES
SUBJECTIVE:  Shortness of breath   Patient ID: Susana Kerr is a 57 y.o. female. HPI:   HPI   Having chest pain with walking chest is full and hard time breathing. Has been sick for 2 weeks Did a home COVID test. negative  Taking Nyquil and Mucinex allergy pills   Running fever low grade   Has not checked temp  Past Medical History:   Diagnosis Date    Back pain 6/5/2014    Carotid artery stenosis     Depression 6/5/2014    Diabetes ([facility]     GERD (gastroesophageal reflux disease)     Hyperlipidemia     Hypertension 6/5/2014    Morbidly obese ([facility] [address] 7/15/2020    Type II or unspecified type diabetes mellitus without mention of complication, not stated as uncontrolled       Prior to Visit Medications    Medication Sig Taking? Authorizing Provider   cefdinir (OMNICEF) 300 MG capsule Take 1 capsule by mouth 2 times daily for 10 days Yes MOOK Rodrigez   predniSONE (DELTASONE) 20 MG tablet Take 1 tablet by mouth 2 times daily for 5 days Yes MOOK Rodrigez   albuterol sulfate HFA (VENTOLIN HFA) 108 (90 Base) MCG/ACT inhaler Inhale 2 puffs into the lungs 4 times daily as needed for Wheezing Yes MOOK Rodrigez   rosuvastatin (CRESTOR) 40 MG tablet TAKE 1 TABLET BY MOUTH EVERY NIGHT FOR CHOLESTEROL Yes MOOK Pool   famotidine (PEPCID) 20 MG tablet TAKE 1 TABLET BY MOUTH TWICE DAILY AS NEEDED FOR BREAKTHROUGH REFLUX Yes MOOK Pool   losartan (COZAAR) 50 MG tablet TAKE 1 TABLET BY MOUTH DAILY Yes MOOK Pool   FLUoxetine (PROZAC) 40 MG capsule Take 1 capsule by mouth daily Yes MOOK Pool   nystatin (MYCOSTATIN) 833828 UNIT/GM cream Apply topically 2 times daily.  Yes MOOK Pool   ezetimibe (ZETIA) 10 MG tablet Take 1 tablet by mouth daily Yes MOOK Pool   insulin aspart (NOVOLOG) 100 UNIT/ML injection vial Inject 20 Units into the skin 3 times daily (before meals)  Yes Historical Provider, MD CHILDSUS 100 UNIT/ML injection vial Inject 60 Units into the skin nightly  Yes Historical Provider, MD   pantoprazole (PROTONIX) 40 MG tablet Take 1 tablet by mouth daily Yes MOOK Pool   Insulin Syringe-Needle U-100 (INSULIN SYRINGE .5CC/30GX5/16\") 30G X 5/16\" 0.5 ML MISC USE WITH INSULIN EVERY DAY Yes MOOK Pool   B-D ULTRAFINE III SHORT PEN 31G X 8 MM MISC USE ONCE DAILY Yes MOOK Pool   blood glucose monitor strips For qid testing Type 2 Dm Dispense brand per patient request or insurance benefits. Yes MOOK Pool   vitamin D (ERGOCALCIFEROL) 01668 units CAPS capsule TAKE 1 CAPSULE BY MOUTH 1 TIME A WEEK Yes MOOK Pool   glucose monitoring kit (FREESTYLE) monitoring kit For bid testing Type 2 dm Yes MOOK Pool   Lancets MISC 1 each by Does not apply route 2 times daily Dispense brand per insurance benefits and patient request. Yes MOOK Pool   Ez Smart Blood Glucose Lancets MISC For qid and prn testing for newly diagnosed diabetes DX: Diabetes, insulin requiring Yes MOOK Pool   glucose blood VI test strips (ASCENSIA AUTODISC VI;ONE TOUCH ULTRA TEST VI) strip 1 each by In Vitro route 4 times daily (with meals and nightly) For qid and prn testing for newly diagnosed diabetes  Freestyle Freedom lite    DX: Diabetes, insulin requiring Yes MOOK Pool   aspirin EC 81 MG EC tablet Take 1 tablet by mouth daily Yes MOOK Pool   acetaminophen (TYLENOL ARTHRITIS PAIN) 650 MG CR tablet Take 650 mg by mouth every 8 hours as needed for Pain. Yes Historical Provider, MD   rOPINIRole (REQUIP) 0.5 MG tablet TAKE 1-2 TABLETS BY MOUTH EVERY NIGHT AT BEDTIME FOR RESTLESS LEGS  Patient not taking: Reported on 11/15/2021  MOOK Pool     Allergies   Allergen Reactions    Sulfa Antibiotics Hives       Review of Systems   Constitutional: Positive for fatigue and fever. HENT: Positive for congestion. Negative for sore throat. Respiratory: Positive for cough and shortness of breath.     Cardiovascular: Positive for chest pain.   Gastrointestinal: Negative for diarrhea, nausea and vomiting. Musculoskeletal: Positive for myalgias. Neurological: Positive for weakness. Negative for dizziness and headaches. OBJECTIVE:    Physical Exam  Constitutional:       Appearance: She is well-developed. HENT:      Head: Normocephalic and atraumatic. Right Ear: Tympanic membrane, ear canal and external ear normal. No drainage or tenderness. No middle ear effusion. There is no impacted cerumen. Tympanic membrane is not injected or bulging. Left Ear: Ear canal and external ear normal. No drainage or tenderness. A middle ear effusion is present. There is no impacted cerumen. Tympanic membrane is not injected or bulging. Nose: Nose normal. No congestion or rhinorrhea. Right Sinus: No maxillary sinus tenderness or frontal sinus tenderness. Left Sinus: No maxillary sinus tenderness or frontal sinus tenderness. Mouth/Throat:      Lips: Pink. Mouth: Mucous membranes are moist. No oral lesions. Dentition: No gum lesions. Pharynx: Oropharynx is clear. No oropharyngeal exudate, posterior oropharyngeal erythema or uvula swelling. Tonsils: No tonsillar exudate or tonsillar abscesses. Eyes:      General: Lids are normal.         Right eye: No discharge. Left eye: No discharge. Extraocular Movements: Extraocular movements intact. Conjunctiva/sclera: Conjunctivae normal.      Right eye: Right conjunctiva is not injected. No exudate. Left eye: Left conjunctiva is not injected. No exudate. Cardiovascular:      Rate and Rhythm: Normal rate and regular rhythm. Heart sounds: Normal heart sounds. No murmur heard. Pulmonary:      Effort: Pulmonary effort is normal. No respiratory distress. Breath sounds: Wheezing and rales present. No decreased breath sounds or rhonchi. Abdominal:      General: Bowel sounds are normal.      Palpations: Abdomen is soft.    Musculoskeletal: General: Normal range of motion. Cervical back: Normal range of motion and neck supple. No rigidity. Normal range of motion. Right lower leg: No edema. Left lower leg: No edema. Lymphadenopathy:      Cervical: No cervical adenopathy. Right cervical: No superficial cervical adenopathy. Left cervical: No superficial cervical adenopathy. Skin:     General: Skin is warm and dry. Coloration: Skin is not pale. Neurological:      Mental Status: She is alert and oriented to person, place, and time. Psychiatric:         Attention and Perception: Attention normal.         Mood and Affect: Mood normal.         Behavior: Behavior normal. Behavior is cooperative. /60 (Site: Right Upper Arm, Position: Sitting, Cuff Size: Medium Adult)   Pulse 104   Temp 98.6 °F (37 °C) (Temporal)   Wt 179 lb (81.2 kg)   SpO2 92%   BMI 32.74 kg/m²      ASSESSMENT:      ICD-10-CM    1. Cough  R05.9 XR CHEST STANDARD (2 VW)     CBC Auto Differential   2. Acute bronchitis, unspecified organism  J20.9 cefdinir (OMNICEF) 300 MG capsule     predniSONE (DELTASONE) 20 MG tablet     albuterol sulfate HFA (VENTOLIN HFA) 108 (90 Base) MCG/ACT inhaler       PLAN:    Celestina Alvarado: Cough (cough up flem) and Congestion (has been sick 2 weeks, at home covid test was neg)    Work excuse given  Tylenol for fever  Push fluids. RTC for no improvement.

## 2021-11-16 RX ORDER — FLUCONAZOLE 150 MG/1
150 TABLET ORAL DAILY
Qty: 3 TABLET | Refills: 0 | Status: SHIPPED | OUTPATIENT
Start: 2021-11-16 | End: 2021-11-19

## 2021-11-18 ENCOUNTER — OFFICE VISIT (OUTPATIENT)
Dept: FAMILY MEDICINE CLINIC | Age: 61
End: 2021-11-18
Payer: MEDICARE

## 2021-11-18 VITALS
TEMPERATURE: 98.4 F | RESPIRATION RATE: 22 BRPM | OXYGEN SATURATION: 96 % | HEART RATE: 82 BPM | DIASTOLIC BLOOD PRESSURE: 82 MMHG | SYSTOLIC BLOOD PRESSURE: 130 MMHG

## 2021-11-18 DIAGNOSIS — J18.9 PNEUMONIA OF BOTH LOWER LOBES DUE TO INFECTIOUS ORGANISM: Primary | ICD-10-CM

## 2021-11-18 PROCEDURE — 99213 OFFICE O/P EST LOW 20 MIN: CPT | Performed by: NURSE PRACTITIONER

## 2021-11-18 PROCEDURE — G8427 DOCREV CUR MEDS BY ELIG CLIN: HCPCS | Performed by: NURSE PRACTITIONER

## 2021-11-18 PROCEDURE — G8417 CALC BMI ABV UP PARAM F/U: HCPCS | Performed by: NURSE PRACTITIONER

## 2021-11-18 PROCEDURE — G8484 FLU IMMUNIZE NO ADMIN: HCPCS | Performed by: NURSE PRACTITIONER

## 2021-11-18 PROCEDURE — 1036F TOBACCO NON-USER: CPT | Performed by: NURSE PRACTITIONER

## 2021-11-18 PROCEDURE — 3017F COLORECTAL CA SCREEN DOC REV: CPT | Performed by: NURSE PRACTITIONER

## 2021-11-18 RX ORDER — AZITHROMYCIN 250 MG/1
250 TABLET, FILM COATED ORAL SEE ADMIN INSTRUCTIONS
Qty: 6 TABLET | Refills: 0 | Status: SHIPPED | OUTPATIENT
Start: 2021-11-18 | End: 2021-11-23

## 2021-11-18 ASSESSMENT — ENCOUNTER SYMPTOMS
SHORTNESS OF BREATH: 1
NAUSEA: 0
COUGH: 1
TROUBLE SWALLOWING: 0
VOMITING: 0
DIARRHEA: 0

## 2021-11-18 NOTE — LETTER
[facility]  [address] [address]  Phone: [phone]  Fax: [phone]    MOOK Person        November 18, 2021     Patient: Jj Shoulders   YOB: 1960   Date of Visit: 11/18/2021       To Whom It May Concern: It is my medical opinion that Jj Shoulders may return to work on 12/2/2021. If you have any questions or concerns, please don't hesitate to call.     Sincerely,        MOOK Person

## 2021-11-18 NOTE — PROGRESS NOTES
SUBJECTIVE:  Follow up Pneumonia   Patient ID: Dianne Lopez is a 57 y.o. female. HPI:   HPI   CXR positive for left and right Pneumonia basilar, a with elevated WBC. At times feels like temperature still present. Still tired. Cough is productive. Eating well no diarrhea     Past Medical History:   Diagnosis Date    Back pain 6/5/2014    Carotid artery stenosis     Depression 6/5/2014    [facility] ([facility]     GERD (gastroesophageal reflux disease)     Hyperlipidemia     Hypertension 6/5/2014    Morbidly obese ([facility] 7/15/2020    Type II or unspecified type diabetes mellitus without mention of complication, not stated as uncontrolled       Prior to Visit Medications    Medication Sig Taking?  Authorizing Provider   azithromycin (ZITHROMAX) 250 MG tablet Take 1 tablet by mouth See Admin Instructions for 5 days 500mg on day 1 followed by 250mg on days 2 - 5 Yes MOOK Conklin   fluconazole (DIFLUCAN) 150 MG tablet Take 1 tablet by mouth daily for 3 days Yes MOOK Conklin   cefdinir (OMNICEF) 300 MG capsule Take 1 capsule by mouth 2 times daily for 10 days Yes MOOK Conklin   predniSONE (DELTASONE) 20 MG tablet Take 1 tablet by mouth 2 times daily for 5 days Yes MOOK Conklin   albuterol sulfate  (90 Base) MCG/ACT inhaler INHALE 2 PUFFS INTO THE LUNGS FOUR TIMES DAILY AS NEEDED FOR WHEEZING Yes MOOK Pool   rosuvastatin (CRESTOR) 40 MG tablet TAKE 1 TABLET BY MOUTH EVERY NIGHT FOR CHOLESTEROL Yes MOOK Pool   famotidine (PEPCID) 20 MG tablet TAKE 1 TABLET BY MOUTH TWICE DAILY AS NEEDED FOR BREAKTHROUGH REFLUX Yes MOOK Pool   losartan (COZAAR) 50 MG tablet TAKE 1 TABLET BY MOUTH DAILY Yes MOOK Pool   rOPINIRole (REQUIP) 0.5 MG tablet TAKE 1-2 TABLETS BY MOUTH EVERY NIGHT AT BEDTIME FOR RESTLESS LEGS Yes MOOK Pool   FLUoxetine (PROZAC) 40 MG capsule Take 1 capsule by mouth daily Yes MOOK Pool   nystatin (MYCOSTATIN) 720797 for chills and fever. HENT: Negative for trouble swallowing. Respiratory: Positive for cough and shortness of breath. Gastrointestinal: Negative for diarrhea, nausea and vomiting. Neurological: Negative for dizziness and headaches. Psychiatric/Behavioral: Positive for sleep disturbance (improving ). OBJECTIVE:    Physical Exam  Constitutional:       Appearance: She is well-developed. HENT:      Head: Normocephalic and atraumatic. Right Ear: Tympanic membrane, ear canal and external ear normal. No drainage or tenderness. No middle ear effusion. There is no impacted cerumen. Tympanic membrane is not injected or bulging. Left Ear: Tympanic membrane, ear canal and external ear normal. No drainage or tenderness. No middle ear effusion. There is no impacted cerumen. Tympanic membrane is not injected or bulging. Nose: Nose normal. No congestion or rhinorrhea. Right Sinus: No maxillary sinus tenderness or frontal sinus tenderness. Left Sinus: No maxillary sinus tenderness or frontal sinus tenderness. Mouth/Throat:      Lips: Pink. Mouth: Mucous membranes are moist. No oral lesions. Dentition: No gum lesions. Pharynx: Oropharynx is clear. No oropharyngeal exudate, posterior oropharyngeal erythema or uvula swelling. Tonsils: No tonsillar exudate or tonsillar abscesses. Eyes:      General: Lids are normal.         Right eye: No discharge. Left eye: No discharge. Extraocular Movements: Extraocular movements intact. Conjunctiva/sclera: Conjunctivae normal.      Right eye: Right conjunctiva is not injected. No exudate. Left eye: Left conjunctiva is not injected. No exudate. Cardiovascular:      Rate and Rhythm: Normal rate and regular rhythm. Heart sounds: Normal heart sounds. No murmur heard. Pulmonary:      Effort: Pulmonary effort is normal. No respiratory distress.       Breath sounds: Examination of the right-lower field reveals rales. Examination of the left-lower field reveals rales. Wheezing and rales present. No decreased breath sounds or rhonchi. Abdominal:      General: Bowel sounds are normal.      Palpations: Abdomen is soft. Musculoskeletal:         General: Normal range of motion. Cervical back: Normal range of motion and neck supple. No rigidity. Normal range of motion. Right lower leg: No edema. Left lower leg: No edema. Lymphadenopathy:      Cervical: No cervical adenopathy. Right cervical: No superficial cervical adenopathy. Left cervical: No superficial cervical adenopathy. Skin:     General: Skin is warm and dry. Coloration: Skin is not pale. Neurological:      Mental Status: She is alert and oriented to person, place, and time. Psychiatric:         Attention and Perception: Attention normal.         Mood and Affect: Mood normal.         Behavior: Behavior normal. Behavior is cooperative. /82   Pulse 82   Temp 98.4 °F (36.9 °C) (Temporal)   Resp 22   SpO2 96%      ASSESSMENT:      ICD-10-CM    1. Pneumonia of both lower lobes due to infectious organism  J18.9 azithromycin (ZITHROMAX) 250 MG tablet     XR CHEST STANDARD (2 VW)       PLAN:    Jj Shoulders: Pneumonia (Follow up from [address] [place] [address] . Coughing , some wheezing )  add mucinex and continue with omnicef     Diagnosis and orders for this visit are above.

## 2021-12-02 ENCOUNTER — HOSPITAL ENCOUNTER (OUTPATIENT)
Dept: GENERAL RADIOLOGY | Age: 61
Discharge: HOME | End: 2021-12-02
Payer: MEDICARE

## 2021-12-02 DIAGNOSIS — J18.9 PNEUMONIA OF BOTH LOWER LOBES DUE TO INFECTIOUS ORGANISM: ICD-10-CM

## 2021-12-02 PROCEDURE — 71046 X-RAY EXAM CHEST 2 VIEWS: CPT

## 2022-02-02 NOTE — TELEPHONE ENCOUNTER
Patient aware and will do labs and will call back to make an appointment.
Pt is due for DM ov every 3mo.  (virtual if unable to do in person, but she will need vitals and glucose log for review)
No

## 2022-02-09 DIAGNOSIS — E78.2 MIXED HYPERLIPIDEMIA: ICD-10-CM

## 2022-02-10 RX ORDER — ROSUVASTATIN CALCIUM 40 MG/1
TABLET, COATED ORAL
Qty: 90 TABLET | Refills: 0 | Status: SHIPPED | OUTPATIENT
Start: 2022-02-10

## 2022-06-14 RX ORDER — FLUOXETINE HYDROCHLORIDE 40 MG/1
40 CAPSULE ORAL DAILY
Qty: 90 CAPSULE | Refills: 3 | OUTPATIENT
Start: 2022-06-14

## 2022-06-24 ENCOUNTER — TELEPHONE (OUTPATIENT)
Dept: FAMILY MEDICINE CLINIC | Age: 62
End: 2022-06-24

## 2022-06-24 DIAGNOSIS — F41.9 ANXIETY DISORDER, UNSPECIFIED TYPE: Primary | ICD-10-CM

## 2022-06-24 RX ORDER — FLUOXETINE HYDROCHLORIDE 40 MG/1
40 CAPSULE ORAL DAILY
Qty: 30 CAPSULE | Refills: 0 | Status: SHIPPED | OUTPATIENT
Start: 2022-06-24

## 2022-06-24 NOTE — TELEPHONE ENCOUNTER
Left detailed message for patient to call back and schedule a check up with [facility] for diabetes and medication refills. One month of medication was sent to pharmacy for fluoxetine since [address] reported that she was completely out.

## 2022-08-26 ENCOUNTER — TELEPHONE (OUTPATIENT)
Dept: GASTROENTEROLOGY | Facility: CLINIC | Age: 62
End: 2022-08-26

## 2022-08-26 NOTE — TELEPHONE ENCOUNTER
I have sent 2 letters to pt re: recall colon and have had no response. I called and discussed with her today-she was asking when her next endo was due. I advised it was due 8/2023. Pt tells me she would like to wait and have both endo/colon at the same time. She is not having any issues and feels comfortable waiting 1 more year to have done. I am going to change her recall in our system-pt knows she will receive a letter closer to that time and was advised to call us to schedule when she receives it. I also advised if she started having any problems to call us before that time to schedule an appt.

## 2022-11-09 ENCOUNTER — OFFICE VISIT (OUTPATIENT)
Dept: FAMILY MEDICINE CLINIC | Age: 62
End: 2022-11-09
Payer: MEDICARE

## 2022-11-09 VITALS
WEIGHT: 168 LBS | DIASTOLIC BLOOD PRESSURE: 80 MMHG | BODY MASS INDEX: 30.91 KG/M2 | TEMPERATURE: 98.3 F | HEIGHT: 62 IN | SYSTOLIC BLOOD PRESSURE: 122 MMHG | HEART RATE: 127 BPM

## 2022-11-09 VITALS
HEIGHT: 62 IN | WEIGHT: 168.8 LBS | TEMPERATURE: 98.3 F | DIASTOLIC BLOOD PRESSURE: 80 MMHG | OXYGEN SATURATION: 98 % | BODY MASS INDEX: 31.06 KG/M2 | HEART RATE: 127 BPM | SYSTOLIC BLOOD PRESSURE: 122 MMHG

## 2022-11-09 DIAGNOSIS — Z79.4 UNCONTROLLED TYPE 2 DIABETES MELLITUS WITH HYPERGLYCEMIA, WITH LONG-TERM CURRENT USE OF INSULIN (HCC): ICD-10-CM

## 2022-11-09 DIAGNOSIS — R00.0 TACHYCARDIA: ICD-10-CM

## 2022-11-09 DIAGNOSIS — E78.2 MIXED HYPERLIPIDEMIA: ICD-10-CM

## 2022-11-09 DIAGNOSIS — Z00.00 MEDICARE ANNUAL WELLNESS VISIT, SUBSEQUENT: Primary | ICD-10-CM

## 2022-11-09 DIAGNOSIS — K21.9 GASTROESOPHAGEAL REFLUX DISEASE WITHOUT ESOPHAGITIS: ICD-10-CM

## 2022-11-09 DIAGNOSIS — Z12.31 SCREENING MAMMOGRAM, ENCOUNTER FOR: ICD-10-CM

## 2022-11-09 DIAGNOSIS — B37.2 INTERTRIGINOUS CANDIDIASIS: ICD-10-CM

## 2022-11-09 DIAGNOSIS — E11.65 UNCONTROLLED TYPE 2 DIABETES MELLITUS WITH HYPERGLYCEMIA, WITH LONG-TERM CURRENT USE OF INSULIN (HCC): Primary | ICD-10-CM

## 2022-11-09 DIAGNOSIS — Z79.4 UNCONTROLLED TYPE 2 DIABETES MELLITUS WITH HYPERGLYCEMIA, WITH LONG-TERM CURRENT USE OF INSULIN (HCC): Primary | ICD-10-CM

## 2022-11-09 DIAGNOSIS — E11.65 UNCONTROLLED TYPE 2 DIABETES MELLITUS WITH HYPERGLYCEMIA, WITH LONG-TERM CURRENT USE OF INSULIN (HCC): ICD-10-CM

## 2022-11-09 PROBLEM — E11.9 TYPE 2 DIABETES MELLITUS (HCC): Status: ACTIVE | Noted: 2022-11-09

## 2022-11-09 PROCEDURE — 3078F DIAST BP <80 MM HG: CPT | Performed by: NURSE PRACTITIONER

## 2022-11-09 PROCEDURE — 2022F DILAT RTA XM EVC RTNOPTHY: CPT | Performed by: NURSE PRACTITIONER

## 2022-11-09 PROCEDURE — 3046F HEMOGLOBIN A1C LEVEL >9.0%: CPT | Performed by: NURSE PRACTITIONER

## 2022-11-09 PROCEDURE — G8484 FLU IMMUNIZE NO ADMIN: HCPCS | Performed by: NURSE PRACTITIONER

## 2022-11-09 PROCEDURE — 3017F COLORECTAL CA SCREEN DOC REV: CPT | Performed by: NURSE PRACTITIONER

## 2022-11-09 PROCEDURE — G8417 CALC BMI ABV UP PARAM F/U: HCPCS | Performed by: NURSE PRACTITIONER

## 2022-11-09 PROCEDURE — 3074F SYST BP LT 130 MM HG: CPT | Performed by: NURSE PRACTITIONER

## 2022-11-09 PROCEDURE — 99214 OFFICE O/P EST MOD 30 MIN: CPT | Performed by: NURSE PRACTITIONER

## 2022-11-09 PROCEDURE — G0439 PPPS, SUBSEQ VISIT: HCPCS | Performed by: NURSE PRACTITIONER

## 2022-11-09 PROCEDURE — 1036F TOBACCO NON-USER: CPT | Performed by: NURSE PRACTITIONER

## 2022-11-09 PROCEDURE — G8427 DOCREV CUR MEDS BY ELIG CLIN: HCPCS | Performed by: NURSE PRACTITIONER

## 2022-11-09 RX ORDER — PANTOPRAZOLE SODIUM 40 MG/1
40 TABLET, DELAYED RELEASE ORAL
Qty: 90 TABLET | Refills: 1 | Status: SHIPPED | OUTPATIENT
Start: 2022-11-09

## 2022-11-09 RX ORDER — PANTOPRAZOLE SODIUM 40 MG/1
40 TABLET, DELAYED RELEASE ORAL DAILY
Qty: 90 TABLET | Refills: 0 | Status: CANCELLED | OUTPATIENT
Start: 2022-11-09

## 2022-11-09 RX ORDER — NYSTATIN 100000 U/G
CREAM TOPICAL
Qty: 60 G | Refills: 1 | Status: SHIPPED | OUTPATIENT
Start: 2022-11-09

## 2022-11-09 RX ORDER — NYSTATIN 100000 U/G
CREAM TOPICAL
Qty: 60 G | Refills: 5 | Status: SHIPPED | OUTPATIENT
Start: 2022-11-09

## 2022-11-09 SDOH — ECONOMIC STABILITY: FOOD INSECURITY: WITHIN THE PAST 12 MONTHS, YOU WORRIED THAT YOUR FOOD WOULD RUN OUT BEFORE YOU GOT MONEY TO BUY MORE.: NEVER TRUE

## 2022-11-09 SDOH — ECONOMIC STABILITY: FOOD INSECURITY: WITHIN THE PAST 12 MONTHS, THE FOOD YOU BOUGHT JUST DIDN'T LAST AND YOU DIDN'T HAVE MONEY TO GET MORE.: NEVER TRUE

## 2022-11-09 ASSESSMENT — LIFESTYLE VARIABLES
HOW MANY STANDARD DRINKS CONTAINING ALCOHOL DO YOU HAVE ON A TYPICAL DAY: PATIENT DOES NOT DRINK
HOW OFTEN DO YOU HAVE A DRINK CONTAINING ALCOHOL: NEVER

## 2022-11-09 ASSESSMENT — PATIENT HEALTH QUESTIONNAIRE - PHQ9
8. MOVING OR SPEAKING SO SLOWLY THAT OTHER PEOPLE COULD HAVE NOTICED. OR THE OPPOSITE, BEING SO FIGETY OR RESTLESS THAT YOU HAVE BEEN MOVING AROUND A LOT MORE THAN USUAL: 0
9. THOUGHTS THAT YOU WOULD BE BETTER OFF DEAD, OR OF HURTING YOURSELF: 0
SUM OF ALL RESPONSES TO PHQ QUESTIONS 1-9: 0
SUM OF ALL RESPONSES TO PHQ QUESTIONS 1-9: 0
SUM OF ALL RESPONSES TO PHQ9 QUESTIONS 1 & 2: 0
SUM OF ALL RESPONSES TO PHQ QUESTIONS 1-9: 0
10. IF YOU CHECKED OFF ANY PROBLEMS, HOW DIFFICULT HAVE THESE PROBLEMS MADE IT FOR YOU TO DO YOUR WORK, TAKE CARE OF THINGS AT HOME, OR GET ALONG WITH OTHER PEOPLE: 0
4. FEELING TIRED OR HAVING LITTLE ENERGY: 0
3. TROUBLE FALLING OR STAYING ASLEEP: 0
5. POOR APPETITE OR OVEREATING: 0
6. FEELING BAD ABOUT YOURSELF - OR THAT YOU ARE A FAILURE OR HAVE LET YOURSELF OR YOUR FAMILY DOWN: 0
1. LITTLE INTEREST OR PLEASURE IN DOING THINGS: 0
7. TROUBLE CONCENTRATING ON THINGS, SUCH AS READING THE NEWSPAPER OR WATCHING TELEVISION: 0
SUM OF ALL RESPONSES TO PHQ QUESTIONS 1-9: 0
2. FEELING DOWN, DEPRESSED OR HOPELESS: 0

## 2022-11-09 ASSESSMENT — ENCOUNTER SYMPTOMS: RESPIRATORY NEGATIVE: 1

## 2022-11-09 ASSESSMENT — SOCIAL DETERMINANTS OF HEALTH (SDOH): HOW HARD IS IT FOR YOU TO PAY FOR THE VERY BASICS LIKE FOOD, HOUSING, MEDICAL CARE, AND HEATING?: NOT VERY HARD

## 2022-11-09 NOTE — PROGRESS NOTES
Medicare Annual Wellness Visit    Nancy Man is here for Medicare AWV (Patient is here for medicare annual wellness visit.)    Assessment & Plan   1. Medicare annual wellness visit, subsequent    - Urinalysis with Reflex to Culture; Future  - CBC with Auto Differential; Future  - Comprehensive Metabolic Panel w/ Reflex to MG; Future  - Hemoglobin A1C; Future  - Lipid Panel; Future  - Microalbumin / Creatinine Urine Ratio; Future  - TSH with Reflex to FT4; Future  - Magnesium; Future    2. Uncontrolled type 2 diabetes mellitus with hyperglycemia, with long-term current use of insulin ([facility] [address]    - External Referral To Ophthalmology  - Urinalysis with Reflex to Culture; Future  - Comprehensive Metabolic Panel w/ Reflex to MG; Future  - Hemoglobin A1C; Future  - Lipid Panel; Future  - Microalbumin / Creatinine Urine Ratio; Future        3 Tachycardia    - TSH with Reflex to FT4; Future    4 Screening mammogram, encounter for    - Mammography screening bilateral; Future            Pt also has E/M visit related to other concerns. Plan as above. Recommendations for Preventive Services Due: see orders and patient instructions/AVS.  Recommended screening schedule for the next 5-10 years is provided to the patient in written form: see Patient Instructions/AVS.     Return for Medicare Annual Wellness Visit in 1 year. Subjective       Patient's complete Health Risk Assessment and screening values have been reviewed and are found in Flowsheets. The following problems were reviewed today and where indicated follow up appointments were made and/or referrals ordered.       Positive Risk Factor Screenings with Interventions:             General Health and ACP:  General  In general, how would you say your health is?: Good  In the past 7 days, have you experienced any of the following: New or Increased Pain, New or Increased Fatigue, Loneliness, Social Isolation, Stress or Anger?: No  Do you get the social and emotional support that you need?: Yes  Do you have a Living Will?: (!) No    Advance Directives       Power of [address] Will ACP-Advance Directive ACP-Power of     Not on File Not on File Not on File Not on File        General Health Risk Interventions:  No Living Will: edu attached. Health Habits/Nutrition:  Physical Activity: Inactive    Days of Exercise per Week: 0 days    Minutes of Exercise per Session: 0 min     Have you lost any weight without trying in the past 3 months?: No  Body mass index: (!) 30.87  Have you seen the dentist within the past year?: N/A - wear dentures  Health Habits/Nutrition Interventions:  Nutritional issues:  educational materials for healthy, well-balanced diet provided     Safety:  Do you have working smoke detectors?: Yes  Do you have any tripping hazards - loose or unsecured carpets or rugs?: No  Do you have any tripping hazards - clutter in doorways, halls, or stairs?: No  Do you have either shower bars, grab bars, non-slip mats or non-slip surfaces in your shower or bathtub?: (!) No---keeps a nonskid rug in shower  Do all of your stairways have a railing or banister?: (!) No, no stairs. Do you always fasten your seatbelt when you are in a car?: Yes  Safety Interventions:  Patient declines any further evaluation/treatment for this issue           Objective   Vitals:    11/09/22 1505   BP: 122/80   Site: Left Upper Arm   Position: Sitting   Cuff Size: Medium Adult   Pulse: (!) 127   Temp: 98.3 °F (36.8 °C)   TempSrc: Oral   SpO2: 98%   Weight: 168 lb 12.8 oz (76.6 kg)   Height: 5' 2\" (1.575 m)      Body mass index is 30.87 kg/m². Allergies   Allergen Reactions    Sulfa Antibiotics Hives     Prior to Visit Medications    Medication Sig Taking? Authorizing Provider   nystatin (MYCOSTATIN) 135051 UNIT/GM cream Apply topically 2 times daily.  Yes MOOK Pool   famotidine (PEPCID) 20 MG tablet TAKE 1 TABLET BY MOUTH TWICE DAILY AS NEEDED FOR BREAKTHROUGH REFLUX Yes MOOK Pool   rOPINIRole (REQUIP) 0.5 MG tablet TAKE 1-2 TABLETS BY MOUTH EVERY NIGHT AT BEDTIME FOR RESTLESS LEGS Yes MOOK Pool   insulin aspart (NOVOLOG) 100 UNIT/ML injection vial Inject 20 Units into the skin 3 times daily (before meals)  Yes Historical Provider, MD   LANTUS 100 UNIT/ML injection vial Inject 60 Units into the skin nightly  Yes Historical Provider, MD   Insulin Syringe-Needle U-100 (INSULIN SYRINGE .5CC/30GX5/16\") 30G X 5/16\" 0.5 ML MISC USE WITH INSULIN EVERY DAY Yes MOOK Pool   B-D ULTRAFINE III SHORT PEN 31G X 8 MM MISC USE ONCE DAILY Yes MOOK Pool   blood glucose monitor strips For qid testing Type 2 Dm Dispense brand per patient request or insurance benefits. Yes MOOK Pool   vitamin D (ERGOCALCIFEROL) 18608 units CAPS capsule TAKE 1 CAPSULE BY MOUTH 1 TIME A WEEK Yes MOOK Pool   glucose monitoring kit (FREESTYLE) monitoring kit For bid testing Type 2 dm Yes MOOK Pool   Lancets MISC 1 each by Does not apply route 2 times daily Dispense brand per insurance benefits and patient request. Yes MOOK Pool   Ez Smart Blood Glucose Lancets MISC For qid and prn testing for newly diagnosed diabetes DX: Diabetes, insulin requiring Yes MOOK Pool   glucose blood VI test strips (ASCENSIA AUTODISC VI;ONE TOUCH ULTRA TEST VI) strip 1 each by In Vitro route 4 times daily (with meals and nightly) For qid and prn testing for newly diagnosed diabetes  Freestyle Freedom lite    DX: Diabetes, insulin requiring Yes MOOK Pool   aspirin EC 81 MG EC tablet Take 1 tablet by mouth daily Yes MOOK Pool   acetaminophen (TYLENOL) 650 MG extended release tablet Take 650 mg by mouth every 8 hours as needed for Pain. Yes Historical Provider, MD   pantoprazole (PROTONIX) 40 MG tablet Take 1 tablet by mouth every morning (before breakfast)  MOOK Pool   nystatin (MYCOSTATIN) 415386 UNIT/GM cream Apply topically 2 times daily.   MOOK Baker CareTeam (Including outside providers/suppliers regularly involved in providing care):   Patient Care Team:  MOOK Ritchie as PCP - General (Nurse Practitioner Primary Care)  MOOK Ritchie as PCP - [facility] Empaneled Provider     Reviewed and updated this visit:  Tobacco  Allergies  Meds  Problems  Med Hx  Surg Hx  Soc Hx  Fam Hx

## 2022-11-09 NOTE — PROGRESS NOTES
SUBJECTIVE:    Patient ID: Felipe Cano is a56 y.o. female. Felipe Cano is here today for Check-Up (Patient is here for a diabetes check up.)  . HPI:   HPI     Patient is here today for checkup related to healthcare concerns. She has not been here in approximately 1 year for diabetic checkup or any other matter    Patient does state that she is very happy she is working and is enjoying her job a great deal.  She also states that she overall feels quite good. Diabetes with history of ketoacidosis. 2020 wt was 220 and now is 160s. Patient somewhat contributes this to more active lifestyle with working 4 days/week. Glucose is 300 at highest.  Pt does report that she only has to take insulin once to twice a day. She is checking glucose in AM  Long acting insulin 30units. She states to me that she is buying this at [place]. And then mealtime insulin is once or twice a day; uses sliding scale. Patient does come to me today asking if mounjaro would be appropriate for her. I have expressed that I would like to see lab results before we move further with that medication change decision. Gerd  Notes improvement since losing wt. Has been doing otc nexium but breakthrough acid and then pepcid. Wants to restart protonix 40mg daily. Pt does have endo scheduled with cscope for 2023      Past medical history of intertriginous candida.   Has used nystatin topical treatment well in the past.    Past Medical History:   Diagnosis Date    Back pain 6/5/2014    Carotid artery stenosis     Depression 6/5/2014    Diabetes ([facility] [address]     GERD (gastroesophageal reflux disease)     Hyperlipidemia     Hypertension 6/5/2014    Morbidly obese ([address] 7/15/2020    Type II or unspecified type diabetes mellitus without mention of complication, not stated as uncontrolled     Uncontrolled type 2 diabetes mellitus with hyperglycemia, with long-term current use of insulin ([address] 11/9/2022     Prior to Visit Medications Medication Sig Taking? Authorizing Provider   pantoprazole (PROTONIX) 40 MG tablet Take 1 tablet by mouth every morning (before breakfast) Yes MOOK Pool   nystatin (MYCOSTATIN) 114375 UNIT/GM cream Apply topically 2 times daily. Yes MOOK Pool   famotidine (PEPCID) 20 MG tablet TAKE 1 TABLET BY MOUTH TWICE DAILY AS NEEDED FOR BREAKTHROUGH REFLUX Yes MOOK Pool   insulin aspart (NOVOLOG) 100 UNIT/ML injection vial Inject 20 Units into the skin 3 times daily (before meals)  Yes Historical Provider, MD   LANTUS 100 UNIT/ML injection vial Inject 60 Units into the skin nightly  Yes Historical Provider, MD   aspirin EC 81 MG EC tablet Take 1 tablet by mouth daily Yes MOOK Pool   nystatin (MYCOSTATIN) 801597 UNIT/GM cream Apply topically 2 times daily. MOOK Pool   rOPINIRole (REQUIP) 0.5 MG tablet TAKE 1-2 TABLETS BY MOUTH EVERY NIGHT AT BEDTIME FOR RESTLESS LEGS  MOOK Pool   Insulin Syringe-Needle U-100 (INSULIN SYRINGE .5CC/30GX5/16\") 30G X 5/16\" 0.5 ML MISC USE WITH INSULIN EVERY DAY  MOOK Pool   B-D ULTRAFINE III SHORT PEN 31G X 8 MM MISC USE ONCE DAILY  MOOK Pool   blood glucose monitor strips For qid testing Type 2 Dm Dispense brand per patient request or insurance benefits.   MOOK Pool   vitamin D (ERGOCALCIFEROL) 12767 units CAPS capsule TAKE 1 CAPSULE BY MOUTH 1 TIME A WEEK  MOOK Pool   glucose monitoring kit (FREESTYLE) monitoring kit For bid testing Type 2 dm  MOOK Pool   Lancets MISC 1 each by Does not apply route 2 times daily Dispense brand per insurance benefits and patient request.  MOOK Silva   Ez Smart Blood Glucose Lancets MISC For qid and prn testing for newly diagnosed diabetes DX: Diabetes, insulin requiring  MOOK Pool   glucose blood VI test strips (ASCENSIA AUTODISC VI;ONE TOUCH ULTRA TEST VI) strip 1 each by In Vitro route 4 times daily (with meals and nightly) For qid and prn testing for newly diagnosed diabetes  Freestyle Freedom lite    DX: Diabetes, insulin requiring  Yoly MOOK Pelletier   acetaminophen (TYLENOL) 650 MG extended release tablet Take 650 mg by mouth every 8 hours as needed for Pain. Historical Provider, MD     Allergies   Allergen Reactions    Sulfa Antibiotics Hives     Past Surgical History:   Procedure Laterality Date    Naresh Corbin Breeding 1st surgery and Dr. Richmond Murdock 2nd surgery    DILATION AND CURETTAGE OF UTERUS      HYSTERECTOMY (CERVIX STATUS UNKNOWN) N/A      Family History   Problem Relation Age of Onset    Diabetes Mother     Stroke Mother      Social History     Socioeconomic History    Marital status:      Spouse name: Not on file    Number of children: Not on file    Years of education: Not on file    Highest education level: Not on file   Occupational History    Not on file   Tobacco Use    Smoking status: Former     Packs/day: 0.50     Years: 20.00     Pack years: 10.00     Types: Cigarettes     Start date: 1983     Quit date: 2014     Years since quittin.2    Smokeless tobacco: Never   Substance and Sexual Activity    Alcohol use: No     Alcohol/week: 0.0 standard drinks    Drug use: No    Sexual activity: Not on file   Other Topics Concern    Not on file   Social History Narrative    Not on file     Social Determinants of Health     Financial Resource Strain: Low Risk     Difficulty of Paying Living Expenses: Not very hard   Food Insecurity: No Food Insecurity    Worried About Running Out of Food in the Last Year: Never true    Ran Out of Food in the Last Year: Never true   Transportation Needs: Not on file   Physical Activity: Inactive    Days of Exercise per Week: 0 days    Minutes of Exercise per Session: 0 min   Stress: Not on file   Social Connections: Not on file   Intimate Partner Violence: Not on file   Housing Stability: Not on file       Review of Systems   Constitutional:  Negative for fever. HENT:  Positive for congestion.     Respiratory: Negative. Cardiovascular: Negative. Musculoskeletal:  Negative for arthralgias. OBJECTIVE:    Physical Exam  Vitals and nursing note reviewed. Constitutional:       General: She is not in acute distress. Appearance: Normal appearance. She is well-developed. She is not ill-appearing. HENT:      Head: Normocephalic and atraumatic. Right Ear: Tympanic membrane, ear canal and external ear normal. There is no impacted cerumen. Left Ear: Tympanic membrane, ear canal and external ear normal. There is no impacted cerumen. Nose: Nose normal. No congestion. Mouth/Throat:      Mouth: Mucous membranes are moist.      Pharynx: No oropharyngeal exudate or posterior oropharyngeal erythema. Eyes:      Extraocular Movements: Extraocular movements intact. Conjunctiva/sclera: Conjunctivae normal.      Pupils: Pupils are equal, round, and reactive to light. Cardiovascular:      Rate and Rhythm: Regular rhythm. Tachycardia present. Heart sounds: Normal heart sounds. No murmur heard. Pulmonary:      Effort: Pulmonary effort is normal. No respiratory distress. Breath sounds: Normal breath sounds. Abdominal:      Palpations: Abdomen is soft. Tenderness: There is no abdominal tenderness. Musculoskeletal:      Cervical back: Neck supple. No rigidity. Right lower leg: No edema. Left lower leg: No edema. Lymphadenopathy:      Cervical: No cervical adenopathy. Skin:     General: Skin is warm and dry. Capillary Refill: Capillary refill takes less than 2 seconds. Neurological:      General: No focal deficit present. Mental Status: She is alert and oriented to person, place, and time. Mental status is at baseline. Psychiatric:         Mood and Affect: Mood normal.         Behavior: Behavior normal.         Thought Content:  Thought content normal.         Judgment: Judgment normal.       /80 (Site: Left Upper Arm, Position: Sitting, Cuff Size: Medium Adult)   Pulse (!) 127   Temp 98.3 °F (36.8 °C) (Oral)   Ht 5' 2\" (1.575 m)   Wt 168 lb (76.2 kg)   BMI 30.73 kg/m²      ASSESSMENT:      ICD-10-CM    1. Uncontrolled type 2 diabetes mellitus with hyperglycemia, with long-term current use of insulin (HCC) -uncontrolled. E11.65 All lab ordered on AWV today. At this time, until seeing lab and additional orders made, continue Lantus 30u subq nightly and continue humalog quick acting insulin with meals per sliding scale dosing. Z79.4       2. Tachycardia  R00.0 See plan below      3. Mixed hyperlipidemia  E78.2 All lab ordered on AWV today      4. Intertriginous candidiasis  B37.2 nystatin (MYCOSTATIN) 785929 UNIT/GM cream      5. Gastroesophageal reflux disease without esophagitis -uncontrolled with OTC nexium. K21.9 pantoprazole (PROTONIX) 40 MG tablet          PLAN:    Huber Null: Check-Up (Patient is here for a diabetes check up.)  Related to adherence to meds, will hold on beta blocker with pt having intermittent cough/congestion. She will check HR at home for one full minute and report rate to us in 1wk. If remaining elevated, will need f/u with EKG. Restart protonix and stop otc nexium  Lab ordered on AWV. Further orders pending lab review. Will consider mounjaro as pt requests. Diagnosis and orders for this visit are above. Please note that this chart was generated using dragon dictationsoftware. Although every effort was made to ensure the accuracy of this automated transcription, some errors in transcription may have occurred.

## 2022-11-09 NOTE — PATIENT INSTRUCTIONS
Personalized Preventive Plan for Huber Null - 11/9/2022  Medicare offers a range of preventive health benefits. Some of the tests and screenings are paid in full while other may be subject to a deductible, co-insurance, and/or copay. Some of these benefits include a comprehensive review of your medical history including lifestyle, illnesses that may run in your family, and various assessments and screenings as appropriate. After reviewing your medical record and screening and assessments performed today your provider may have ordered immunizations, labs, imaging, and/or referrals for you. A list of these orders (if applicable) as well as your Preventive Care list are included within your After Visit Summary for your review. Other Preventive Recommendations:    A preventive eye exam performed by an eye specialist is recommended every 1-2 years to screen for glaucoma; cataracts, macular degeneration, and other eye disorders. A preventive dental visit is recommended every 6 months. Try to get at least 150 minutes of exercise per week or 10,000 steps per day on a pedometer . Order or download the FREE \"Exercise & Physical Activity: Your Everyday Guide\" from [employer]. Call 7-469.619.5134 or search [employer] online. You need 4848-9957 mg of calcium and 7580-1770 IU of vitamin D per day. It is possible to meet your calcium requirement with diet alone, but a vitamin D supplement is usually necessary to meet this goal.  When exposed to the sun, use a sunscreen that protects against both UVA and UVB radiation with an SPF of 30 or greater. Reapply every 2 to 3 hours or after sweating, drying off with a towel, or swimming. Always wear a seat belt when traveling in a car. Always wear a helmet when riding a bicycle or motorcycle.

## 2022-12-16 ENCOUNTER — HOSPITAL ENCOUNTER (INPATIENT)
Age: 62
LOS: 4 days | Discharge: HOME HEALTH | DRG: 871 | End: 2022-12-21
Attending: HOSPITALIST | Admitting: STUDENT IN AN ORGANIZED HEALTH CARE EDUCATION/TRAINING PROGRAM
Payer: MEDICARE

## 2022-12-16 ENCOUNTER — APPOINTMENT (OUTPATIENT)
Dept: CT IMAGING | Age: 62
DRG: 871 | End: 2022-12-16
Payer: MEDICARE

## 2022-12-16 ENCOUNTER — OFFICE VISIT (OUTPATIENT)
Dept: PRIMARY CARE CLINIC | Age: 62
End: 2022-12-16

## 2022-12-16 VITALS
DIASTOLIC BLOOD PRESSURE: 82 MMHG | RESPIRATION RATE: 18 BRPM | TEMPERATURE: 98.2 F | HEART RATE: 103 BPM | OXYGEN SATURATION: 96 % | SYSTOLIC BLOOD PRESSURE: 130 MMHG

## 2022-12-16 DIAGNOSIS — E08.10 DIABETIC KETOACIDOSIS WITHOUT COMA ASSOCIATED WITH DIABETES MELLITUS DUE TO UNDERLYING CONDITION (HCC): Primary | ICD-10-CM

## 2022-12-16 DIAGNOSIS — L03.317 ABSCESS AND CELLULITIS OF GLUTEAL REGION: Primary | ICD-10-CM

## 2022-12-16 DIAGNOSIS — L03.317 CELLULITIS OF BUTTOCK, LEFT: ICD-10-CM

## 2022-12-16 DIAGNOSIS — L02.416 ABSCESS OF LEFT THIGH: ICD-10-CM

## 2022-12-16 DIAGNOSIS — L02.31 ABSCESS AND CELLULITIS OF GLUTEAL REGION: Primary | ICD-10-CM

## 2022-12-16 DIAGNOSIS — G89.18 POST-OP PAIN: ICD-10-CM

## 2022-12-16 LAB
ACETONE BLOOD: ABNORMAL
ALBUMIN SERPL-MCNC: 3.7 G/DL (ref 3.5–5.2)
ALP BLD-CCNC: 195 U/L (ref 35–104)
ALT SERPL-CCNC: 21 U/L (ref 5–33)
ANION GAP SERPL CALCULATED.3IONS-SCNC: 21 MMOL/L (ref 7–19)
AST SERPL-CCNC: 23 U/L (ref 5–32)
BACTERIA: ABNORMAL /HPF
BASOPHILS ABSOLUTE: 0.1 K/UL (ref 0–0.2)
BASOPHILS RELATIVE PERCENT: 0.3 % (ref 0–1)
BILIRUB SERPL-MCNC: 0.4 MG/DL (ref 0.2–1.2)
BILIRUBIN URINE: NEGATIVE
BLOOD, URINE: NEGATIVE
BUN BLDV-MCNC: 10 MG/DL (ref 8–23)
CALCIUM SERPL-MCNC: 9.4 MG/DL (ref 8.8–10.2)
CHLORIDE BLD-SCNC: 90 MMOL/L (ref 98–111)
CLARITY: CLEAR
CO2: 18 MMOL/L (ref 22–29)
COLOR: YELLOW
CREAT SERPL-MCNC: 0.8 MG/DL (ref 0.5–0.9)
CRYSTALS, UA: ABNORMAL /HPF
EOSINOPHILS ABSOLUTE: 0 K/UL (ref 0–0.6)
EOSINOPHILS RELATIVE PERCENT: 0.1 % (ref 0–5)
EPITHELIAL CELLS, UA: ABNORMAL /HPF
GFR SERPL CREATININE-BSD FRML MDRD: >60 ML/MIN/{1.73_M2}
GLUCOSE BLD-MCNC: 577 MG/DL (ref 74–109)
GLUCOSE URINE: =>1000 MG/DL
HCT VFR BLD CALC: 38.2 % (ref 37–47)
HEMOGLOBIN: 12.9 G/DL (ref 12–16)
IMMATURE GRANULOCYTES #: 0.2 K/UL
KETONES, URINE: 80 MG/DL
LEUKOCYTE ESTERASE, URINE: ABNORMAL
LYMPHOCYTES ABSOLUTE: 1.9 K/UL (ref 1.1–4.5)
LYMPHOCYTES RELATIVE PERCENT: 10.8 % (ref 20–40)
MCH RBC QN AUTO: 32.5 PG (ref 27–31)
MCHC RBC AUTO-ENTMCNC: 33.8 G/DL (ref 33–37)
MCV RBC AUTO: 96.2 FL (ref 81–99)
MONOCYTES ABSOLUTE: 1.3 K/UL (ref 0–0.9)
MONOCYTES RELATIVE PERCENT: 7.1 % (ref 0–10)
MUCUS: ABNORMAL /LPF
NEUTROPHILS ABSOLUTE: 14.1 K/UL (ref 1.5–7.5)
NEUTROPHILS RELATIVE PERCENT: 80.8 % (ref 50–65)
NITRITE, URINE: NEGATIVE
PDW BLD-RTO: 12 % (ref 11.5–14.5)
PH UA: 5.5 (ref 5–8)
PLATELET # BLD: 296 K/UL (ref 130–400)
PMV BLD AUTO: 11.7 FL (ref 9.4–12.3)
POTASSIUM REFLEX MAGNESIUM: 3.9 MMOL/L (ref 3.5–5)
PROTEIN UA: NEGATIVE MG/DL
RBC # BLD: 3.97 M/UL (ref 4.2–5.4)
RBC UA: ABNORMAL /HPF (ref 0–2)
SARS-COV-2, NAAT: NOT DETECTED
SODIUM BLD-SCNC: 129 MMOL/L (ref 136–145)
SPECIFIC GRAVITY UA: 1.04 (ref 1–1.03)
TOTAL PROTEIN: 7.6 G/DL (ref 6.6–8.7)
UROBILINOGEN, URINE: 0.2 E.U./DL
WBC # BLD: 17.5 K/UL (ref 4.8–10.8)
WBC UA: ABNORMAL /HPF (ref 0–5)
YEAST: PRESENT /HPF

## 2022-12-16 PROCEDURE — 84443 ASSAY THYROID STIM HORMONE: CPT

## 2022-12-16 PROCEDURE — 96375 TX/PRO/DX INJ NEW DRUG ADDON: CPT

## 2022-12-16 PROCEDURE — 74177 CT ABD & PELVIS W/CONTRAST: CPT

## 2022-12-16 PROCEDURE — 83036 HEMOGLOBIN GLYCOSYLATED A1C: CPT

## 2022-12-16 PROCEDURE — 87635 SARS-COV-2 COVID-19 AMP PRB: CPT

## 2022-12-16 PROCEDURE — 6360000002 HC RX W HCPCS: Performed by: NURSE PRACTITIONER

## 2022-12-16 PROCEDURE — 99285 EMERGENCY DEPT VISIT HI MDM: CPT

## 2022-12-16 PROCEDURE — 86140 C-REACTIVE PROTEIN: CPT

## 2022-12-16 PROCEDURE — 2580000003 HC RX 258: Performed by: NURSE PRACTITIONER

## 2022-12-16 PROCEDURE — 80053 COMPREHEN METABOLIC PANEL: CPT

## 2022-12-16 PROCEDURE — 85025 COMPLETE CBC W/AUTO DIFF WBC: CPT

## 2022-12-16 PROCEDURE — 6360000004 HC RX CONTRAST MEDICATION: Performed by: NURSE PRACTITIONER

## 2022-12-16 PROCEDURE — 82306 VITAMIN D 25 HYDROXY: CPT

## 2022-12-16 PROCEDURE — 87040 BLOOD CULTURE FOR BACTERIA: CPT

## 2022-12-16 PROCEDURE — 83605 ASSAY OF LACTIC ACID: CPT

## 2022-12-16 PROCEDURE — 82009 KETONE BODYS QUAL: CPT

## 2022-12-16 PROCEDURE — 96365 THER/PROPH/DIAG IV INF INIT: CPT

## 2022-12-16 PROCEDURE — 83735 ASSAY OF MAGNESIUM: CPT

## 2022-12-16 PROCEDURE — 36415 COLL VENOUS BLD VENIPUNCTURE: CPT

## 2022-12-16 RX ORDER — 0.9 % SODIUM CHLORIDE 0.9 %
500 INTRAVENOUS SOLUTION INTRAVENOUS ONCE
Status: COMPLETED | OUTPATIENT
Start: 2022-12-16 | End: 2022-12-17

## 2022-12-16 RX ORDER — MORPHINE SULFATE 4 MG/ML
2 INJECTION, SOLUTION INTRAMUSCULAR; INTRAVENOUS ONCE
Status: COMPLETED | OUTPATIENT
Start: 2022-12-16 | End: 2022-12-16

## 2022-12-16 RX ORDER — POTASSIUM CHLORIDE 20 MEQ/1
40 TABLET, EXTENDED RELEASE ORAL ONCE
Status: COMPLETED | OUTPATIENT
Start: 2022-12-16 | End: 2022-12-17

## 2022-12-16 RX ADMIN — IOPAMIDOL 70 ML: 755 INJECTION, SOLUTION INTRAVENOUS at 23:03

## 2022-12-16 RX ADMIN — MORPHINE SULFATE 2 MG: 4 INJECTION, SOLUTION INTRAMUSCULAR; INTRAVENOUS at 22:23

## 2022-12-16 RX ADMIN — SODIUM CHLORIDE 500 ML: 9 INJECTION, SOLUTION INTRAVENOUS at 22:22

## 2022-12-16 RX ADMIN — PIPERACILLIN AND TAZOBACTAM 3375 MG: 3; .375 INJECTION, POWDER, FOR SOLUTION INTRAVENOUS at 22:23

## 2022-12-16 ASSESSMENT — PAIN DESCRIPTION - DESCRIPTORS: DESCRIPTORS: SHARP;ACHING;BURNING

## 2022-12-16 ASSESSMENT — ENCOUNTER SYMPTOMS
VOMITING: 0
GASTROINTESTINAL NEGATIVE: 1
EYES NEGATIVE: 1
RESPIRATORY NEGATIVE: 1
ALLERGIC/IMMUNOLOGIC NEGATIVE: 1

## 2022-12-16 ASSESSMENT — PAIN DESCRIPTION - LOCATION
LOCATION: BUTTOCKS
LOCATION: BUTTOCKS

## 2022-12-16 ASSESSMENT — PAIN SCALES - GENERAL
PAINLEVEL_OUTOF10: 10
PAINLEVEL_OUTOF10: 10

## 2022-12-16 ASSESSMENT — PAIN DESCRIPTION - ORIENTATION
ORIENTATION: LEFT
ORIENTATION: RIGHT

## 2022-12-16 ASSESSMENT — PAIN - FUNCTIONAL ASSESSMENT: PAIN_FUNCTIONAL_ASSESSMENT: 0-10

## 2022-12-16 NOTE — PROGRESS NOTES
Juan M Mckeon (:  1960) is a 60 y.o. female,Established patient, here for evaluation of the following chief complaint(s):  Abscess    Patient presents today complaining of an abscess that she states for started last Thursday (8 days ago). She states the area was around the size of a quarter initially. The area opened up and drained a thin brown liquid. She states the area had a darkened center after it drained. She states she placed sugar and Betadine in the area. She states after this she feels like the area closed up. The redness, firmness and size began spreading onto her left buttocks. She states she placed a heating pad on the area 2 nights ago for discomfort but feels like it made her symptoms worse. She has been unable to sit directly on her buttocks, and is having a hard time walking due to pain. She denies fever, body aches or chills. Patient recommended to go to the emergency department for evaluation and treatment for abscess in left perineal area that spreads onto left buttocks that is around 5-6 inches around. Explained to patient that she may need IV antibiotics and possible debridement. Patient verbalized understanding. She states she will go to the emergency department. Care instructions discussed. Patient verbalized understanding and agrees to plan of care. ASSESSMENT/PLAN:  1. Abscess and cellulitis of gluteal region   No orders of the defined types were placed in this encounter. Return if symptoms worsen or fail to improve. Subjective   SUBJECTIVE/OBJECTIVE:  HPI    Review of Systems   Constitutional: Negative. Negative for chills and fever. HENT: Negative. Eyes: Negative. Respiratory: Negative. Cardiovascular: Negative. Gastrointestinal: Negative. Endocrine: Negative. Genitourinary: Negative. Musculoskeletal: Negative. Negative for myalgias. Skin:  Positive for wound. Allergic/Immunologic: Negative.     Neurological: Negative. Hematological: Negative. Psychiatric/Behavioral: Negative. Objective   Physical Exam  Exam conducted with a chaperone present [employer], LPN). Genitourinary:     Labia:         Left: Tenderness present. Comments: There is an abscess/possible cellulitis also that is directly beside the left labia majora and spreads onto the left buttocks. The area appears to be 5-6 inches in diameter. There is an area directly beside the left labia majora that may have opened up at 1 point, but is now closed. Area cleaned with Hibiclens and sterile water. Patient Instructions     I recommend you go to the emergency department for evaluation and treatment. An electronic signature was used to authenticate this note. --MOOK Kruger - CNP     EMR Dragon/translation disclaimer: Much of this encounter note is an electronic transcription/translation of spoken language to printed text. The electronic translation of spoken language may be erroneous, or at times, nonsensical words or phrases may be inadvertently transcribed.   Although I have reviewed the note for such errors, some may still exist.

## 2022-12-17 PROBLEM — T73.0XXA STARVATION KETOACIDOSIS: Status: ACTIVE | Noted: 2022-12-17

## 2022-12-17 PROBLEM — N39.0 UTI (URINARY TRACT INFECTION): Status: ACTIVE | Noted: 2022-12-17

## 2022-12-17 PROBLEM — E11.65 UNCONTROLLED DIABETES MELLITUS WITH HYPERGLYCEMIA (HCC): Status: ACTIVE | Noted: 2022-12-17

## 2022-12-17 PROBLEM — E11.10 DKA, TYPE 2, NOT AT GOAL (HCC): Status: ACTIVE | Noted: 2022-12-17

## 2022-12-17 PROBLEM — E87.29 STARVATION KETOACIDOSIS: Status: ACTIVE | Noted: 2022-12-17

## 2022-12-17 PROBLEM — E87.20 METABOLIC ACIDEMIA: Status: ACTIVE | Noted: 2022-12-17

## 2022-12-17 PROBLEM — L03.317 CELLULITIS OF BUTTOCK: Status: ACTIVE | Noted: 2022-12-17

## 2022-12-17 PROBLEM — A41.9 SEPSIS (HCC): Status: ACTIVE | Noted: 2022-12-17

## 2022-12-17 LAB
ANION GAP SERPL CALCULATED.3IONS-SCNC: 13 MMOL/L (ref 7–19)
BASE EXCESS VENOUS: -4 MMOL/L
BUN BLDV-MCNC: 9 MG/DL (ref 8–23)
C-REACTIVE PROTEIN: 28.28 MG/DL (ref 0–0.5)
CALCIUM SERPL-MCNC: 8.6 MG/DL (ref 8.8–10.2)
CARBOXYHEMOGLOBIN: 2.4 %
CHLORIDE BLD-SCNC: 97 MMOL/L (ref 98–111)
CHOLESTEROL, TOTAL: 213 MG/DL (ref 160–199)
CO2: 23 MMOL/L (ref 22–29)
CREAT SERPL-MCNC: 0.6 MG/DL (ref 0.5–0.9)
FERRITIN: 514.9 NG/ML (ref 13–150)
FOLATE: 19.6 NG/ML (ref 4.8–37.3)
GFR SERPL CREATININE-BSD FRML MDRD: >60 ML/MIN/{1.73_M2}
GLUCOSE BLD-MCNC: 161 MG/DL (ref 70–99)
GLUCOSE BLD-MCNC: 167 MG/DL (ref 70–99)
GLUCOSE BLD-MCNC: 183 MG/DL (ref 70–99)
GLUCOSE BLD-MCNC: 230 MG/DL (ref 70–99)
GLUCOSE BLD-MCNC: 267 MG/DL (ref 74–109)
GLUCOSE BLD-MCNC: 275 MG/DL (ref 70–99)
GLUCOSE BLD-MCNC: 289 MG/DL (ref 70–99)
GLUCOSE BLD-MCNC: 292 MG/DL (ref 70–99)
GLUCOSE BLD-MCNC: 315 MG/DL (ref 70–99)
GLUCOSE BLD-MCNC: 358 MG/DL (ref 70–99)
GLUCOSE BLD-MCNC: 374 MG/DL (ref 70–99)
GLUCOSE BLD-MCNC: 404 MG/DL (ref 70–99)
GLUCOSE BLD-MCNC: 441 MG/DL (ref 70–99)
HBA1C MFR BLD: 15 % (ref 4–6)
HCO3 VENOUS: 20 MMOL/L (ref 23–29)
HCT VFR BLD CALC: 35.2 % (ref 37–47)
HDLC SERPL-MCNC: 32 MG/DL (ref 65–121)
HEMOGLOBIN: 11.8 G/DL (ref 12–16)
IRON SATURATION: 8 % (ref 14–50)
IRON: 15 UG/DL (ref 37–145)
LACTIC ACID: 1.1 MMOL/L (ref 0.5–1.9)
LDL CHOLESTEROL CALCULATED: 126 MG/DL
MAGNESIUM: 1.7 MG/DL (ref 1.6–2.4)
MAGNESIUM: 1.9 MG/DL (ref 1.6–2.4)
MCH RBC QN AUTO: 32.3 PG (ref 27–31)
MCHC RBC AUTO-ENTMCNC: 33.5 G/DL (ref 33–37)
MCV RBC AUTO: 96.4 FL (ref 81–99)
METHEMOGLOBIN VENOUS: 0.8 %
O2 CONTENT, VEN: 12 ML/DL
O2 SAT, VEN: 68 %
PCO2, VEN: 33 MMHG (ref 40–50)
PDW BLD-RTO: 12.1 % (ref 11.5–14.5)
PERFORMED ON: ABNORMAL
PH VENOUS: 7.39 (ref 7.35–7.45)
PHOSPHORUS: 1.6 MG/DL (ref 2.5–4.5)
PLATELET # BLD: 262 K/UL (ref 130–400)
PMV BLD AUTO: 11.9 FL (ref 9.4–12.3)
PO2, VEN: 36 MMHG
POTASSIUM SERPL-SCNC: 3.9 MMOL/L (ref 3.5–5)
RBC # BLD: 3.65 M/UL (ref 4.2–5.4)
SEDIMENTATION RATE, ERYTHROCYTE: 108 MM/HR (ref 0–25)
SODIUM BLD-SCNC: 133 MMOL/L (ref 136–145)
TOTAL IRON BINDING CAPACITY: 189 UG/DL (ref 250–400)
TRIGL SERPL-MCNC: 273 MG/DL (ref 0–149)
TSH SERPL DL<=0.05 MIU/L-ACNC: 0.79 UIU/ML (ref 0.27–4.2)
VITAMIN B-12: 631 PG/ML (ref 211–946)
VITAMIN D 25-HYDROXY: 66.2 NG/ML
WBC # BLD: 15.3 K/UL (ref 4.8–10.8)

## 2022-12-17 PROCEDURE — 2500000003 HC RX 250 WO HCPCS: Performed by: HOSPITALIST

## 2022-12-17 PROCEDURE — 82607 VITAMIN B-12: CPT

## 2022-12-17 PROCEDURE — 85652 RBC SED RATE AUTOMATED: CPT

## 2022-12-17 PROCEDURE — 82728 ASSAY OF FERRITIN: CPT

## 2022-12-17 PROCEDURE — 6370000000 HC RX 637 (ALT 250 FOR IP): Performed by: HOSPITALIST

## 2022-12-17 PROCEDURE — 80048 BASIC METABOLIC PNL TOTAL CA: CPT

## 2022-12-17 PROCEDURE — 82947 ASSAY GLUCOSE BLOOD QUANT: CPT

## 2022-12-17 PROCEDURE — 2500000003 HC RX 250 WO HCPCS: Performed by: STUDENT IN AN ORGANIZED HEALTH CARE EDUCATION/TRAINING PROGRAM

## 2022-12-17 PROCEDURE — 83550 IRON BINDING TEST: CPT

## 2022-12-17 PROCEDURE — 6370000000 HC RX 637 (ALT 250 FOR IP): Performed by: NURSE PRACTITIONER

## 2022-12-17 PROCEDURE — 99221 1ST HOSP IP/OBS SF/LOW 40: CPT | Performed by: SURGERY

## 2022-12-17 PROCEDURE — 2580000003 HC RX 258: Performed by: NURSE PRACTITIONER

## 2022-12-17 PROCEDURE — 6370000000 HC RX 637 (ALT 250 FOR IP): Performed by: STUDENT IN AN ORGANIZED HEALTH CARE EDUCATION/TRAINING PROGRAM

## 2022-12-17 PROCEDURE — 6360000002 HC RX W HCPCS: Performed by: STUDENT IN AN ORGANIZED HEALTH CARE EDUCATION/TRAINING PROGRAM

## 2022-12-17 PROCEDURE — 6370000000 HC RX 637 (ALT 250 FOR IP): Performed by: SURGERY

## 2022-12-17 PROCEDURE — 2580000003 HC RX 258: Performed by: HOSPITALIST

## 2022-12-17 PROCEDURE — 81001 URINALYSIS AUTO W/SCOPE: CPT

## 2022-12-17 PROCEDURE — 1210000000 HC MED SURG R&B

## 2022-12-17 PROCEDURE — 36415 COLL VENOUS BLD VENIPUNCTURE: CPT

## 2022-12-17 PROCEDURE — 2580000003 HC RX 258: Performed by: STUDENT IN AN ORGANIZED HEALTH CARE EDUCATION/TRAINING PROGRAM

## 2022-12-17 PROCEDURE — 83605 ASSAY OF LACTIC ACID: CPT

## 2022-12-17 PROCEDURE — 82746 ASSAY OF FOLIC ACID SERUM: CPT

## 2022-12-17 PROCEDURE — 36600 WITHDRAWAL OF ARTERIAL BLOOD: CPT

## 2022-12-17 PROCEDURE — 83540 ASSAY OF IRON: CPT

## 2022-12-17 PROCEDURE — 82803 BLOOD GASES ANY COMBINATION: CPT

## 2022-12-17 PROCEDURE — 80061 LIPID PANEL: CPT

## 2022-12-17 PROCEDURE — 84100 ASSAY OF PHOSPHORUS: CPT

## 2022-12-17 PROCEDURE — 6360000002 HC RX W HCPCS: Performed by: SURGERY

## 2022-12-17 PROCEDURE — 6360000002 HC RX W HCPCS: Performed by: HOSPITALIST

## 2022-12-17 PROCEDURE — 87086 URINE CULTURE/COLONY COUNT: CPT

## 2022-12-17 PROCEDURE — 83735 ASSAY OF MAGNESIUM: CPT

## 2022-12-17 PROCEDURE — 85027 COMPLETE CBC AUTOMATED: CPT

## 2022-12-17 RX ORDER — PANTOPRAZOLE SODIUM 40 MG/1
40 TABLET, DELAYED RELEASE ORAL
Status: DISCONTINUED | OUTPATIENT
Start: 2022-12-17 | End: 2022-12-21 | Stop reason: HOSPADM

## 2022-12-17 RX ORDER — HYDROCODONE BITARTRATE AND ACETAMINOPHEN 5; 325 MG/1; MG/1
1 TABLET ORAL EVERY 4 HOURS PRN
Status: DISCONTINUED | OUTPATIENT
Start: 2022-12-17 | End: 2022-12-19

## 2022-12-17 RX ORDER — 0.9 % SODIUM CHLORIDE 0.9 %
15 INTRAVENOUS SOLUTION INTRAVENOUS ONCE
Status: COMPLETED | OUTPATIENT
Start: 2022-12-17 | End: 2022-12-17

## 2022-12-17 RX ORDER — FLUCONAZOLE, SODIUM CHLORIDE 2 MG/ML
100 INJECTION INTRAVENOUS EVERY 24 HOURS
Status: DISCONTINUED | OUTPATIENT
Start: 2022-12-17 | End: 2022-12-17

## 2022-12-17 RX ORDER — POTASSIUM CHLORIDE 7.45 MG/ML
10 INJECTION INTRAVENOUS PRN
Status: DISCONTINUED | OUTPATIENT
Start: 2022-12-17 | End: 2022-12-21 | Stop reason: HOSPADM

## 2022-12-17 RX ORDER — INSULIN LISPRO 100 [IU]/ML
0-8 INJECTION, SOLUTION INTRAVENOUS; SUBCUTANEOUS
Status: DISCONTINUED | OUTPATIENT
Start: 2022-12-17 | End: 2022-12-21 | Stop reason: HOSPADM

## 2022-12-17 RX ORDER — FAMOTIDINE 20 MG/1
20 TABLET, FILM COATED ORAL 2 TIMES DAILY
Status: DISCONTINUED | OUTPATIENT
Start: 2022-12-17 | End: 2022-12-20

## 2022-12-17 RX ORDER — MORPHINE SULFATE 2 MG/ML
2 INJECTION, SOLUTION INTRAMUSCULAR; INTRAVENOUS
Status: DISCONTINUED | OUTPATIENT
Start: 2022-12-17 | End: 2022-12-21 | Stop reason: HOSPADM

## 2022-12-17 RX ORDER — INSULIN GLARGINE 100 [IU]/ML
15 INJECTION, SOLUTION SUBCUTANEOUS 2 TIMES DAILY
Status: DISCONTINUED | OUTPATIENT
Start: 2022-12-17 | End: 2022-12-18

## 2022-12-17 RX ORDER — SODIUM CHLORIDE 9 MG/ML
INJECTION, SOLUTION INTRAVENOUS CONTINUOUS
Status: DISCONTINUED | OUTPATIENT
Start: 2022-12-17 | End: 2022-12-17

## 2022-12-17 RX ORDER — DIPHENHYDRAMINE HCL 25 MG
12.5 TABLET ORAL EVERY 6 HOURS
Status: DISCONTINUED | OUTPATIENT
Start: 2022-12-17 | End: 2022-12-21 | Stop reason: HOSPADM

## 2022-12-17 RX ORDER — LABETALOL HYDROCHLORIDE 5 MG/ML
10 INJECTION, SOLUTION INTRAVENOUS EVERY 4 HOURS PRN
Status: DISCONTINUED | OUTPATIENT
Start: 2022-12-17 | End: 2022-12-21 | Stop reason: HOSPADM

## 2022-12-17 RX ORDER — 0.9 % SODIUM CHLORIDE 0.9 %
500 INTRAVENOUS SOLUTION INTRAVENOUS ONCE
Status: DISCONTINUED | OUTPATIENT
Start: 2022-12-17 | End: 2022-12-17

## 2022-12-17 RX ORDER — SODIUM CHLORIDE 9 MG/ML
INJECTION, SOLUTION INTRAVENOUS CONTINUOUS
Status: DISCONTINUED | OUTPATIENT
Start: 2022-12-17 | End: 2022-12-19

## 2022-12-17 RX ORDER — MAGNESIUM SULFATE IN WATER 40 MG/ML
2000 INJECTION, SOLUTION INTRAVENOUS ONCE
Status: COMPLETED | OUTPATIENT
Start: 2022-12-17 | End: 2022-12-17

## 2022-12-17 RX ORDER — METRONIDAZOLE 500 MG/100ML
500 INJECTION, SOLUTION INTRAVENOUS EVERY 8 HOURS
Status: DISCONTINUED | OUTPATIENT
Start: 2022-12-17 | End: 2022-12-21 | Stop reason: HOSPADM

## 2022-12-17 RX ORDER — ASPIRIN 81 MG/1
81 TABLET ORAL DAILY
Status: DISCONTINUED | OUTPATIENT
Start: 2022-12-17 | End: 2022-12-17

## 2022-12-17 RX ORDER — INSULIN LISPRO 100 [IU]/ML
0-4 INJECTION, SOLUTION INTRAVENOUS; SUBCUTANEOUS NIGHTLY
Status: DISCONTINUED | OUTPATIENT
Start: 2022-12-17 | End: 2022-12-21 | Stop reason: HOSPADM

## 2022-12-17 RX ORDER — MORPHINE SULFATE 2 MG/ML
1 INJECTION, SOLUTION INTRAMUSCULAR; INTRAVENOUS
Status: DISCONTINUED | OUTPATIENT
Start: 2022-12-17 | End: 2022-12-17

## 2022-12-17 RX ORDER — POLYETHYLENE GLYCOL 3350 17 G/17G
17 POWDER, FOR SOLUTION ORAL DAILY PRN
Status: DISCONTINUED | OUTPATIENT
Start: 2022-12-17 | End: 2022-12-21 | Stop reason: HOSPADM

## 2022-12-17 RX ORDER — ACETAMINOPHEN 325 MG/1
650 TABLET ORAL EVERY 4 HOURS PRN
Status: DISCONTINUED | OUTPATIENT
Start: 2022-12-17 | End: 2022-12-21 | Stop reason: HOSPADM

## 2022-12-17 RX ORDER — DEXTROSE, SODIUM CHLORIDE, AND POTASSIUM CHLORIDE 5; .45; .15 G/100ML; G/100ML; G/100ML
INJECTION INTRAVENOUS CONTINUOUS PRN
Status: DISCONTINUED | OUTPATIENT
Start: 2022-12-17 | End: 2022-12-21 | Stop reason: HOSPADM

## 2022-12-17 RX ORDER — ENOXAPARIN SODIUM 100 MG/ML
40 INJECTION SUBCUTANEOUS DAILY
Status: DISCONTINUED | OUTPATIENT
Start: 2022-12-17 | End: 2022-12-21 | Stop reason: HOSPADM

## 2022-12-17 RX ORDER — MORPHINE SULFATE 4 MG/ML
4 INJECTION, SOLUTION INTRAMUSCULAR; INTRAVENOUS EVERY 4 HOURS PRN
Status: DISCONTINUED | OUTPATIENT
Start: 2022-12-17 | End: 2022-12-17

## 2022-12-17 RX ORDER — HYDRALAZINE HYDROCHLORIDE 20 MG/ML
5 INJECTION INTRAMUSCULAR; INTRAVENOUS EVERY 4 HOURS PRN
Status: DISCONTINUED | OUTPATIENT
Start: 2022-12-17 | End: 2022-12-17

## 2022-12-17 RX ORDER — MAGNESIUM SULFATE 1 G/100ML
1000 INJECTION INTRAVENOUS PRN
Status: DISCONTINUED | OUTPATIENT
Start: 2022-12-17 | End: 2022-12-21 | Stop reason: HOSPADM

## 2022-12-17 RX ADMIN — DIPHENHYDRAMINE HYDROCHLORIDE 12.5 MG: 25 TABLET ORAL at 15:36

## 2022-12-17 RX ADMIN — MICONAZOLE NITRATE: 20 POWDER TOPICAL at 02:23

## 2022-12-17 RX ADMIN — NYSTATIN 500000 UNITS: 100000 SUSPENSION ORAL at 20:03

## 2022-12-17 RX ADMIN — MICONAZOLE NITRATE: 20 POWDER TOPICAL at 11:30

## 2022-12-17 RX ADMIN — MORPHINE SULFATE 2 MG: 2 INJECTION, SOLUTION INTRAMUSCULAR; INTRAVENOUS at 23:36

## 2022-12-17 RX ADMIN — INSULIN GLARGINE 15 UNITS: 100 INJECTION, SOLUTION SUBCUTANEOUS at 09:30

## 2022-12-17 RX ADMIN — MORPHINE SULFATE 1 MG: 2 INJECTION, SOLUTION INTRAMUSCULAR; INTRAVENOUS at 10:00

## 2022-12-17 RX ADMIN — SODIUM CHLORIDE 0.1 UNITS/KG/HR: 9 INJECTION, SOLUTION INTRAVENOUS at 01:54

## 2022-12-17 RX ADMIN — POTASSIUM CHLORIDE 40 MEQ: 1500 TABLET, EXTENDED RELEASE ORAL at 00:55

## 2022-12-17 RX ADMIN — INSULIN GLARGINE 15 UNITS: 100 INJECTION, SOLUTION SUBCUTANEOUS at 22:09

## 2022-12-17 RX ADMIN — MAGNESIUM SULFATE HEPTAHYDRATE 2000 MG: 40 INJECTION, SOLUTION INTRAVENOUS at 15:14

## 2022-12-17 RX ADMIN — MORPHINE SULFATE 4 MG: 4 INJECTION, SOLUTION INTRAMUSCULAR; INTRAVENOUS at 01:51

## 2022-12-17 RX ADMIN — MORPHINE SULFATE 1 MG: 2 INJECTION, SOLUTION INTRAMUSCULAR; INTRAVENOUS at 13:36

## 2022-12-17 RX ADMIN — SODIUM CHLORIDE 1000 ML: 9 INJECTION, SOLUTION INTRAVENOUS at 02:23

## 2022-12-17 RX ADMIN — MORPHINE SULFATE 4 MG: 4 INJECTION, SOLUTION INTRAMUSCULAR; INTRAVENOUS at 05:58

## 2022-12-17 RX ADMIN — FAMOTIDINE 20 MG: 20 TABLET ORAL at 20:03

## 2022-12-17 RX ADMIN — ACETAMINOPHEN 650 MG: 325 TABLET, FILM COATED ORAL at 05:29

## 2022-12-17 RX ADMIN — VANCOMYCIN HYDROCHLORIDE 1250 MG: 1 INJECTION, POWDER, LYOPHILIZED, FOR SOLUTION INTRAVENOUS at 04:07

## 2022-12-17 RX ADMIN — SODIUM CHLORIDE 500 ML: 9 INJECTION, SOLUTION INTRAVENOUS at 01:36

## 2022-12-17 RX ADMIN — INSULIN LISPRO 8 UNITS: 100 INJECTION, SOLUTION INTRAVENOUS; SUBCUTANEOUS at 12:16

## 2022-12-17 RX ADMIN — FLUCONAZOLE, SODIUM CHLORIDE 100 MG: 2 INJECTION INTRAVENOUS at 02:42

## 2022-12-17 RX ADMIN — NYSTATIN 500000 UNITS: 100000 SUSPENSION ORAL at 13:20

## 2022-12-17 RX ADMIN — DEXTROSE, SODIUM CHLORIDE, AND POTASSIUM CHLORIDE: 5; .45; .15 INJECTION INTRAVENOUS at 06:31

## 2022-12-17 RX ADMIN — METRONIDAZOLE 500 MG: 500 INJECTION, SOLUTION INTRAVENOUS at 23:20

## 2022-12-17 RX ADMIN — CEFEPIME 2000 MG: 2 INJECTION, POWDER, FOR SOLUTION INTRAVENOUS at 10:05

## 2022-12-17 RX ADMIN — SODIUM PHOSPHATE, MONOBASIC, MONOHYDRATE AND SODIUM PHOSPHATE, DIBASIC, ANHYDROUS 20 MMOL: 142; 276 INJECTION, SOLUTION INTRAVENOUS at 10:06

## 2022-12-17 RX ADMIN — HYDROCODONE BITARTRATE AND ACETAMINOPHEN 1 TABLET: 5; 325 TABLET ORAL at 20:08

## 2022-12-17 RX ADMIN — MORPHINE SULFATE 2 MG: 2 INJECTION, SOLUTION INTRAMUSCULAR; INTRAVENOUS at 16:06

## 2022-12-17 RX ADMIN — NYSTATIN 500000 UNITS: 100000 SUSPENSION ORAL at 17:12

## 2022-12-17 RX ADMIN — PIPERACILLIN AND TAZOBACTAM 3375 MG: 3; .375 INJECTION, POWDER, FOR SOLUTION INTRAVENOUS at 06:13

## 2022-12-17 RX ADMIN — NYSTATIN 500000 UNITS: 100000 SUSPENSION ORAL at 09:51

## 2022-12-17 RX ADMIN — INSULIN LISPRO 4 UNITS: 100 INJECTION, SOLUTION INTRAVENOUS; SUBCUTANEOUS at 22:10

## 2022-12-17 RX ADMIN — PANTOPRAZOLE SODIUM 40 MG: 40 TABLET, DELAYED RELEASE ORAL at 10:01

## 2022-12-17 RX ADMIN — METRONIDAZOLE 500 MG: 500 INJECTION, SOLUTION INTRAVENOUS at 13:43

## 2022-12-17 RX ADMIN — ASPIRIN 81 MG: 81 TABLET, COATED ORAL at 09:51

## 2022-12-17 RX ADMIN — HYDROCODONE BITARTRATE AND ACETAMINOPHEN 1 TABLET: 5; 325 TABLET ORAL at 11:33

## 2022-12-17 RX ADMIN — CEFEPIME 2000 MG: 2 INJECTION, POWDER, FOR SOLUTION INTRAVENOUS at 20:03

## 2022-12-17 RX ADMIN — ASPIRIN 325 MG: 325 TABLET, COATED ORAL at 15:36

## 2022-12-17 RX ADMIN — INSULIN LISPRO 4 UNITS: 100 INJECTION, SOLUTION INTRAVENOUS; SUBCUTANEOUS at 17:10

## 2022-12-17 RX ADMIN — DIPHENHYDRAMINE HYDROCHLORIDE 12.5 MG: 25 TABLET ORAL at 20:43

## 2022-12-17 RX ADMIN — SODIUM CHLORIDE: 9 INJECTION, SOLUTION INTRAVENOUS at 19:19

## 2022-12-17 RX ADMIN — SODIUM CHLORIDE: 9 INJECTION, SOLUTION INTRAVENOUS at 08:10

## 2022-12-17 RX ADMIN — ACETAMINOPHEN 650 MG: 325 TABLET, FILM COATED ORAL at 12:43

## 2022-12-17 RX ADMIN — MICONAZOLE NITRATE: 20 POWDER TOPICAL at 20:04

## 2022-12-17 ASSESSMENT — PAIN DESCRIPTION - FREQUENCY
FREQUENCY: CONTINUOUS
FREQUENCY: CONTINUOUS

## 2022-12-17 ASSESSMENT — PAIN DESCRIPTION - ORIENTATION
ORIENTATION: LEFT

## 2022-12-17 ASSESSMENT — PAIN SCALES - GENERAL
PAINLEVEL_OUTOF10: 10
PAINLEVEL_OUTOF10: 8
PAINLEVEL_OUTOF10: 10
PAINLEVEL_OUTOF10: 8
PAINLEVEL_OUTOF10: 10
PAINLEVEL_OUTOF10: 10
PAINLEVEL_OUTOF10: 9

## 2022-12-17 ASSESSMENT — PAIN - FUNCTIONAL ASSESSMENT
PAIN_FUNCTIONAL_ASSESSMENT: PREVENTS OR INTERFERES SOME ACTIVE ACTIVITIES AND ADLS

## 2022-12-17 ASSESSMENT — PAIN DESCRIPTION - LOCATION
LOCATION: SACRUM
LOCATION: BUTTOCKS
LOCATION: SACRUM;OTHER (COMMENT)
LOCATION: BUTTOCKS
LOCATION: SACRUM;OTHER (COMMENT)

## 2022-12-17 ASSESSMENT — ENCOUNTER SYMPTOMS
ANAL BLEEDING: 0
COUGH: 0
ABDOMINAL PAIN: 0
COLOR CHANGE: 1
NAUSEA: 0
VOMITING: 0
SHORTNESS OF BREATH: 0
CONSTIPATION: 0
DIARRHEA: 0
RECTAL PAIN: 0

## 2022-12-17 ASSESSMENT — PAIN DESCRIPTION - ONSET
ONSET: ON-GOING
ONSET: ON-GOING

## 2022-12-17 ASSESSMENT — PAIN DESCRIPTION - DESCRIPTORS
DESCRIPTORS: SHARP;SHOOTING;SORE
DESCRIPTORS: DISCOMFORT;SORE;SHOOTING
DESCRIPTORS: SORE;ACHING;TENDER

## 2022-12-17 ASSESSMENT — PAIN DESCRIPTION - PAIN TYPE
TYPE: ACUTE PAIN
TYPE: ACUTE PAIN

## 2022-12-17 NOTE — CONSULTS
[facility]     Consult Note    Patient ID: Angel Pruitt  60 y.o.  female  YOB: 1960    Admitting Diagnosis: Cellulitis of buttock, left [L03.317]  DKA, type 2, not at goal [facility]) [E11.10]  Diabetic ketoacidosis without coma associated with diabetes mellitus due to underlying condition ([address] [E08.10]    Subjective:      Ms. Trice Howard is a pleasant 23-year-old woman who was admitted to the hospital overnight with poorly controlled diabetes and a worsening infection over the posterior left thigh and left buttock. I am asked to see her at this time in regard to management of this infection and possible need for debridement or drainage. Ms. rodriguez has longstanding diabetes which she manages with insulin. She notes that up until about week ago her blood sugars have remained in good control. At that time she developed a blister over the posterior medial left thigh at the junction with the left buttock. This spontaneously drained but since that time the area has become firm and painful with erythema spreading across the upper posterior thigh and lower left buttock. She was seen in outpatient clinic yesterday and noted to have a blood sugar of more than 500. Given the associated infection she was referred to the emergency room. In the emergency room a CT scan of the area was obtained. This documents soft tissue infection but shows no evidence of fluid or gas in the tissue to suggest a need for drainage or debridement at this time. The infection does not approach the anus or rectum nor does it seem to involve the posterior wall of the vagina. Since admission Ms. Trice Howard reports ongoing pain. She was able to relieve this overnight with IV morphine. She denies any other associated symptoms.     Past Medical History:   Diagnosis Date    Back pain 6/5/2014    Carotid artery stenosis     Depression 6/5/2014    Diabetes ([facility]     GERD (gastroesophageal reflux disease) Hyperlipidemia     Hypertension 6/5/2014    Morbidly obese ([facility] 7/15/2020    Type II or unspecified type diabetes mellitus without mention of complication, not stated as uncontrolled     Uncontrolled type 2 diabetes mellitus with hyperglycemia, with long-term current use of insulin ([facility] [facility] 11/9/2022     Past Surgical History:   Procedure Laterality Date    BACK SURGERY      Dr. Akira Moulton 1st surgery and Dr. Iesha Burkett 2nd surgery    DILATION AND CURETTAGE OF UTERUS      HYSTERECTOMY (CERVIX STATUS UNKNOWN) N/A      No current facility-administered medications on file prior to encounter. Current Outpatient Medications on File Prior to Encounter   Medication Sig Dispense Refill    nystatin (MYCOSTATIN) 391465 UNIT/GM cream Apply topically 2 times daily. 60 g 5    pantoprazole (PROTONIX) 40 MG tablet Take 1 tablet by mouth every morning (before breakfast) 90 tablet 1    nystatin (MYCOSTATIN) 833809 UNIT/GM cream Apply topically 2 times daily. 60 g 1    rOPINIRole (REQUIP) 0.5 MG tablet TAKE 1-2 TABLETS BY MOUTH EVERY NIGHT AT BEDTIME FOR RESTLESS LEGS (Patient not taking: No sig reported) 180 tablet 0    insulin aspart (NOVOLOG) 100 UNIT/ML injection vial Inject 20 Units into the skin 3 times daily (before meals)       LANTUS 100 UNIT/ML injection vial Inject 60 Units into the skin nightly       Insulin Syringe-Needle U-100 (INSULIN SYRINGE .5CC/30GX5/16\") 30G X 5/16\" 0.5 ML MISC USE WITH INSULIN EVERY  each 0    B-D ULTRAFINE III SHORT PEN 31G X 8 MM MISC USE ONCE DAILY 100 each 0    blood glucose monitor strips For qid testing Type 2 Dm Dispense brand per patient request or insurance benefits.  100 strip 3    vitamin D (ERGOCALCIFEROL) 14308 units CAPS capsule TAKE 1 CAPSULE BY MOUTH 1 TIME A WEEK 4 capsule 2    glucose monitoring kit (FREESTYLE) monitoring kit For bid testing Type 2 dm 1 kit 0    Lancets MISC 1 each by Does not apply route 2 times daily Dispense brand per insurance benefits and patient request. 100 each 3    Ez Smart Blood Glucose Lancets MISC For qid and prn testing for newly diagnosed diabetes DX: Diabetes, insulin requiring 100 each 5    glucose blood VI test strips (ASCENSIA AUTODISC VI;ONE TOUCH ULTRA TEST VI) strip 1 each by In Vitro route 4 times daily (with meals and nightly) For qid and prn testing for newly diagnosed diabetes  Freestyle Freedom lite    DX: Diabetes, insulin requiring 100 each 5    aspirin EC 81 MG EC tablet Take 1 tablet by mouth daily 30 tablet 3    acetaminophen (TYLENOL) 650 MG extended release tablet Take 650 mg by mouth every 8 hours as needed for Pain. Allergies: Sulfa antibiotics    Family History   Problem Relation Age of Onset    Diabetes Mother     Stroke Mother        Social History     Tobacco Use    Smoking status: Former     Packs/day: 0.50     Years: 20.00     Pack years: 10.00     Types: Cigarettes     Start date: 1983     Quit date: 2014     Years since quittin.3    Smokeless tobacco: Never   Substance Use Topics    Alcohol use: No     Alcohol/week: 0.0 standard drinks     Review of Systems   Constitutional:  Negative for activity change, appetite change, chills, diaphoresis and fever. HENT:  Negative for congestion. Respiratory:  Negative for cough and shortness of breath. Cardiovascular:  Negative for chest pain. Gastrointestinal:  Negative for abdominal pain, anal bleeding, constipation, diarrhea, nausea, rectal pain and vomiting. Genitourinary:  Positive for frequency. Negative for difficulty urinating and dysuria. Musculoskeletal:  Positive for myalgias. Skin:  Positive for color change. Remaining systems reviewed and unremarkable.     Objective:   BP (!) 144/63   Pulse 94   Temp 98.9 °F (37.2 °C) (Temporal)   Resp 20   Ht 5' 2\" (1.575 m)   Wt 166 lb 9.6 oz (75.6 kg)   SpO2 95%   BMI 30.47 kg/m²     Intake/Output Summary (Last 24 hours) at 2022 1450  Last data filed at 2022 1200  Gross per 24 hour   Intake 870.9 ml   Output 1350 ml   Net -479.1 ml     Physical Exam  Constitutional:       General: She is not in acute distress. Appearance: Normal appearance. She is normal weight. She is not ill-appearing. Skin:     Comments: Beginning on the upper inner left thigh and extending around onto the posterior thigh and up to the posterior buttock there is a soft tissue infection with induration and associated erythema. The area is tender to even light palpation. At the center of this there is a small area of blister with some skin slough. No opening or fluctuance noted to suggest an underlying abscess. The erythema does not extend to the anal opening nor to the introitus to the vagina. Neurological:      Mental Status: She is alert. Data:  CBC:   Recent Labs     12/16/22 2132 12/17/22  0408   WBC 17.5* 15.3*   RBC 3.97* 3.65*   HGB 12.9 11.8*   HCT 38.2 35.2*   MCV 96.2 96.4   RDW 12.0 12.1    262     BMP:   Recent Labs     12/16/22 2132 12/17/22  0408   * 133*   K 3.9 3.9   CL 90* 97*   CO2 18* 23   ANIONGAP 21* 13   GLUCOSE 577* 267*   CREATININE 0.8 0.6   LABGLOM >60 >60   CALCIUM 9.4 8.6*     LFT:   Recent Labs     12/16/22 2132   PROT 7.6   LABALBU 3.7   BILITOT 0.4   ALKPHOS 195*   ALT 21   AST 23     PT/INR: No results for input(s): PROTIME, INR in the last 72 hours. Imaging:  CT ABDOMEN PELVIS W IV CONTRAST Additional Contrast? None   Final Result   Subcutaneous induration in the left buttock, CONSISTENT WITH MODERATE CELLULITIS. No fluid collection or abscess. No perianal or perirectal component. Electronically signed by Korina Mcgraw MD on 12-16-22 at 2318          Assessment:     1. Significant soft tissue infection of the left posterior medial thigh and inferior left buttock without evidence of extension to the anus rectum or vagina. There is a small blistered area where the infection began.   No history of an insect bite offered but I wonder if this may have been the inciting factor. At present there is nothing to suggest tissue necrosis or an underlying abscess that needs surgical debridement or drainage. 2.  Type 2 diabetes maintained on insulin with prior history of acceptable blood sugar control. Initial presentation to the hospital with diabetic ketoacidosis and blood sugar greater than 500 now brought under control with insulin and IV fluids. Plan:     1. As noted above no indication for drainage or debridement at this time. 2.  Continue with current antibiotic regimen. 3.  Continue with efforts at blood glucose control. 4.  On the chance that this is a spider bite I will add elements of our spider bite protocol in the hopes that these will help with the inflammation present. 5.  Agree with transfer to the medical floor. 6.  I will recheck on rounds tomorrow morning. Thank you for asking me to see Ms. Harmony Corbin in consultation.     Klarissa Maldonado MD

## 2022-12-17 NOTE — ED NOTES
..Violadaniel Nurse arrived to room # [phone]. Presented with: cellulitis, dka  Mental Status: Patient is oriented, alert, coherent, logical, thought processes intact, and able to concentrate and follow conversation. Vitals:    12/17/22 0330   BP: (!) 120/53   Pulse: 97   Resp: 27   Temp:    SpO2:      Patient safety contract and falls prevention contract reviewed with patient Yes. Oriented Patient to room. Call light within reach. Yes. Needs, issues or concerns expressed at this time: yes, pain.       Electronically signed by Ash Sanchez RN on 12/17/2022 at 3:45 AM

## 2022-12-17 NOTE — PROGRESS NOTES
[address] [facility] Pharmacokinetic Monitoring Service - Vancomycin     Robert Stark is a 60 y.o. female starting on vancomycin therapy for Skin/Soft Tissue Infection. Pharmacy consulted by Dr Tejinder Crawford for monitoring and adjustment. Target Concentration: Goal AUC/KELLY 400-600 mg*hr/L    Additional Antimicrobials: Zosyn and Fluconazole    Pertinent Laboratory Values: Wt Readings from Last 1 Encounters:   12/17/22 166 lb 9.6 oz (75.6 kg)     Temp Readings from Last 1 Encounters:   12/17/22 (!) 100.8 °F (38.2 °C) (Temporal)     Estimated Creatinine Clearance: 91 mL/min (based on SCr of 0.6 mg/dL). Recent Labs     12/16/22  2132 12/17/22  0408   CREATININE 0.8 0.6   WBC 17.5* 15.3*     Procalcitonin: N/A    Pertinent Cultures:  No current cultures at this time  Culture Date Source Results        MRSA Nasal Swab: N/A. Non-respiratory infection.     Plan:  Dosing recommendations based on Bayesian software  Start vancomycin 1250mg Q18hr  Anticipated AUC of 459 and trough concentration of 13.3 at steady state  Renal labs as indicated   Vancomycin concentration ordered for 12/19 @ 0930   Pharmacy will continue to monitor patient and adjust therapy as indicated    Thank you for the consult,  Tomy Coleman, [facility]  12/17/2022 6:34 AM

## 2022-12-17 NOTE — H&P
History and Physical    Patient Name:  Florina Garcia    :  1960    Chief Complaint:   Cellulitis     History of Present Illness:   Florina Garcia with PMH DM2 presents to [facility] with 10 day history of worsening pain, swelling and erythema in her left groin, it started as a boil that popped. Pain is 10 out 10, constant, throbbing, worse with movement and touching. Tylenol at home did not help. Morphine in ER made the pain better. Pt did not take any antibiotics as OP. She has some subjective fever and chills. No N/V/abd pain/D/C. No chest pain or dyspnea. No cough or wheezing. No urinary frequency or urgency. VS tachycardic with , febrile 101.4  Labs show hyperglycemia, acidosis, mild anion gap, acetone, leukocytosis  CT abd/pelvis shows subcutaneous induration in the left buttock, CONSISTENT WITH MODERATE CELLULITIS. No fluid collection or abscess. No perianal or perirectal component    Past Medical History:   has a past medical history of Back pain, Carotid artery stenosis, Depression, Diabetes ([address] GERD (gastroesophageal reflux disease), Hyperlipidemia, Hypertension, Morbidly obese ([address] Type II or unspecified type diabetes mellitus without mention of complication, not stated as uncontrolled, and Uncontrolled type 2 diabetes mellitus with hyperglycemia, with long-term current use of insulin ([address] Surgical History:   has a past surgical history that includes Hysterectomy (N/A); back surgery; and Dilation and curettage of uterus. Social History:   reports that she quit smoking about 8 years ago. Her smoking use included cigarettes. She started smoking about 39 years ago. She has a 10.00 pack-year smoking history. She has never used smokeless tobacco. She reports that she does not drink alcohol and does not use drugs. Family History:  family history includes Diabetes in her mother; Stroke in her mother.      Medications:  Prior to Admission medications    Medication Sig Start Date End Date Taking? Authorizing Provider   nystatin (MYCOSTATIN) 928030 UNIT/GM cream Apply topically 2 times daily. 11/9/22   MOOK Pool   pantoprazole (PROTONIX) 40 MG tablet Take 1 tablet by mouth every morning (before breakfast) 11/9/22   MOOK Pool   nystatin (MYCOSTATIN) 639265 UNIT/GM cream Apply topically 2 times daily. 11/9/22   MOOK Pool   rOPINIRole (REQUIP) 0.5 MG tablet TAKE 1-2 TABLETS BY MOUTH EVERY NIGHT AT BEDTIME FOR RESTLESS LEGS  Patient not taking: No sig reported 5/12/21   MOOK Pool   insulin aspart (NOVOLOG) 100 UNIT/ML injection vial Inject 20 Units into the skin 3 times daily (before meals)  7/1/20   Historical Provider, MD   LANTUS 100 UNIT/ML injection vial Inject 60 Units into the skin nightly  7/1/20   Historical Provider, MD   Insulin Syringe-Needle U-100 (INSULIN SYRINGE .5CC/30GX5/16\") 30G X 5/16\" 0.5 ML MISC USE WITH INSULIN EVERY DAY 1/6/20   MOOK Pool   B-D ULTRAFINE III SHORT PEN 31G X 8 MM MISC USE ONCE DAILY 10/29/19   MOOK Pool   blood glucose monitor strips For qid testing Type 2 Dm Dispense brand per patient request or insurance benefits.  10/4/19   MOOK Pool   vitamin D (ERGOCALCIFEROL) 99653 units CAPS capsule TAKE 1 CAPSULE BY MOUTH 1 TIME A WEEK 5/17/18   MOOK Pool   glucose monitoring kit (FREESTYLE) monitoring kit For bid testing Type 2 dm 12/6/17   MOOK Pool   Lancets MISC 1 each by Does not apply route 2 times daily Dispense brand per insurance benefits and patient request. 12/6/17   MOOK Huff   Ez Smart Blood Glucose Lancets MISC For qid and prn testing for newly diagnosed diabetes DX: Diabetes, insulin requiring 7/22/15   MOOK Pool   glucose blood VI test strips (ASCENSIA AUTODISC VI;ONE TOUCH ULTRA TEST VI) strip 1 each by In Vitro route 4 times daily (with meals and nightly) For qid and prn testing for newly diagnosed diabetes  Freestyle Freedom lite    DX: Diabetes, insulin requiring 7/22/15 MOOK Pool   aspirin EC 81 MG EC tablet Take 1 tablet by mouth daily 7/22/15   MOOK Pool   acetaminophen (TYLENOL) 650 MG extended release tablet Take 650 mg by mouth every 8 hours as needed for Pain. Historical Provider, MD       Allergies:  Sulfa antibiotics     Review of Systems:   Constitutional: there has been no unanticipated weight loss. There's been change in energy level, activity level. Eyes: No visual changes or diplopia. No scleral icterus. ENT: No Headaches, hearing loss or vertigo. No mouth sores or sore throat. Cardiovascular: No chest pain, palpitations or loss of consciousness. No pleuritic pain or phlebitis. No orthopnea, PND or peripheral edema. Respiratory: No cough or wheezing, no sputum production. No hemoptysis. No dyspnea     Gastrointestinal: No abdominal pain, appetite loss, blood in stools. No change in bowel habits. No N/V. No blood per rectum. Genitourinary: No dysuria, trouble voiding, or hematuria. Musculoskeletal:  No gait disturbance, weakness or joint complaints. Integumentary: erythema, swelling of left groin area   Neurological: No headache, diplopia, change in muscle strength, numbness or tingling. Psychiatric: No anxiety, or depression. Endocrine: No temperature intolerance. No excessive thirst, fluid intake, or urination. No tremor. Hematologic/Lymphatic: No abnormal bruising or bleeding, blood clots or swollen lymph nodes. Allergic/Immunologic: No nasal congestion or hives. Physical Examination:    Vital Signs: BP (!) 117/52   Pulse 99   Temp (!) 101.4 °F (38.6 °C) (Temporal)   Resp 22   Ht 5' 2\" (1.575 m)   Wt 166 lb 9.6 oz (75.6 kg)   SpO2 98%   BMI 30.47 kg/m²   General appearance: Well preserved, mesomorphic body habitus, alert, moderate distress. Skin: erythema tenderness edema of left groin area   Head: Normocephalic. No masses, lesions, tenderness or abnormalities  Eyes: conjunctivae/corneas clear. EOM's intact.  Sclera non icteric. Ears: External ears normal.  Hearing normal to finger rub. Nose/Sinuses: Nares normal. Septum midline. Mucosa normal. No drainage or sinus tenderness. Oropharynx: Lips, mucosa, and tongue normal. Oropharynx clear with no exudate seen. Neck: Neck supple, and symmetric. No adenopathy. Trachea is midline. Carotids brisk in upstroke without bruits, No abnormal JVP noted at 45°. Lungs: Lungs clear to auscultation bilaterally. No retractions or use of accessory muscles. No vocal fremitus. No ronchi, crackle or rale. Heart:  S1 > S2. Regular rate and rhythm. No gallop, murmur, rub, palpable thrill or heave noted. Abdomen: Abdomen soft, non-tender. BS normal. No masses, organomegaly. No hernia noted. Extremities: Extremities normal. No deformities, edema, or skin discoloration. No cyanosis or clubbing noted to the nails. Peripheral pulses 4/4. Musculoskeletal: Spine ROM normal. Muscular strength intact. Neuro: Motor: Strength 5/5 in all extremities. No focal weakness. Sensory: grossly normal to touch. Pertinent Labs:  CBC:   Recent Labs     12/16/22  2132   WBC 17.5*   RBC 3.97*   HGB 12.9   HCT 38.2   MCV 96.2   MCH 32.5*   MCHC 33.8   RDW 12.0      MPV 11.7     BMP:   Recent Labs     12/16/22  2132   *   K 3.9   CL 90*   CO2 18*   BUN 10   CREATININE 0.8   GLUCOSE 577*   CALCIUM 9.4     ABGs: No results for input(s): PO2, PCO2, PH, HCO3, BE, O2SAT in the last 72 hours. INR: No results for input(s): INR, PROTIME in the last 72 hours.   BNP:  No results found for: BNP  TSH:   Lab Results   Component Value Date    TSH 2.870 07/06/2020     Cardiac Injury Profile: No results found for: CKTOTAL, CKMB, CKMBINDEX, TROPONINI  Lipid Profile: No components found for: CHLPL  Lab Results   Component Value Date    TRIG 282 (H) 05/20/2021    TRIG 381 (H) 07/06/2020    TRIG 469 (H) 09/26/2019     Lab Results   Component Value Date    HDL 37 (L) 05/20/2021    HDL 39 (L) 07/06/2020    HDL 45 (L) 09/26/2019     Lab Results   Component Value Date    LDLCALC 213 05/20/2021    [address] 07/06/2020    LDLCALC see below 09/26/2019     No results found for: LABVLDL  Hemoglobin A1C:   Lab Results   Component Value Date    LABA1C 11.4 (H) 05/20/2021     No results found for: EAG      Assessment/Plan: Moderate cellulitis of left buttock- no abscess- IV ab- blood cultures, wound cultures if able  Sepsis, with leukocytosis, fever and tachycardia due to cellulitis- IVF, IV ab  Mild DKA- IVF, insulin gtt, DKA protocol  Yeast infection skin and urinary- fluconazole    Patient Active Problem List:     Hull's esophagus without dysplasia     Type 2 diabetes mellitus      HL                 I have reviewed my findings and recommendations in detail with Elena Duran.     Savana Mendez MD

## 2022-12-17 NOTE — PROGRESS NOTES
Pharmacy Adjustment per [facility] protocol    Usman Hurley is a 60 y.o. female. Pharmacy has adjusted medications per [facility] protocol. Recent Labs     12/16/22 2132 12/17/22  0408   BUN 10 9       Recent Labs     12/16/22 2132 12/17/22  0408   CREATININE 0.8 0.6       Estimated Creatinine Clearance: 91 mL/min (based on SCr of 0.6 mg/dL). Height:   Ht Readings from Last 1 Encounters:   12/17/22 5' 2\" (1.575 m)     Weight:  Wt Readings from Last 1 Encounters:   12/17/22 166 lb 9.6 oz (75.6 kg)         Plan: Adjust the following medications based on [facility] protocol:           Cefepime to 2000 mg over 30minutes X1 loading dose then 2000mg IV q12hr over 4 hours extended infusion X 13 doses.     Electronically signed by Khadra Stack [facility] on 12/17/2022 at 7:59 AM

## 2022-12-17 NOTE — PROGRESS NOTES
Daily Progress Note    Date:2022  Patient: Robert Stark  : 1960  FMI:[id number]  CODE:Full Code No additional code details  PCP:MOOK Pool    Admit Date: 2022  8:48 PM   LOS: 0 days       Subjective:   14-year-old female with uncontrolled diabetes mellitus presented to the ED after being sent from outpatient clinic with worsening cellulitis of her buttock for over 1 week, noting an initial small wound that opened up and drained followed by increased erythema, edema and tenderness around the site. On initial evaluation noted to have significant induration. CT was obtained in the ED showed subcutaneous induration of left buttock, however no fluid collection or abscess per radiology report. Patient also with relatively poor p.o. intake recently, noted dehydration with uncontrolled hyperglycemia initial glucose over 500 with starvation ketosis and mild metabolic acidosis. Cultures obtained. Empirically treated with broad-spectrum antibiotics, IV fluids and insulin. Patient seen and examined this AM.  She denies any nausea, vomiting or diarrhea. Discomfort around infected area of left buttock remains about the same per her report. No fevers or chills overnight.         Review of Systems    14 point review of systems completed and is negative except as otherwise noted          Objective:      Vital signs in last 24 hours:  Patient Vitals for the past 24 hrs:   BP Temp Temp src Pulse Resp SpO2 Height Weight   22 1200 (!) 143/70 (!) 101 °F (38.3 °C) Temporal 87 24 92 % -- --   22 1100 (!) 165/55 -- -- 82 23 92 % -- --   22 1030 (!) 154/73 -- -- 92 13 94 % -- --   22 1000 (!) 136/56 -- -- 98 23 93 % -- --   22 0930 (!) 142/58 99.2 °F (37.3 °C) Temporal 94 20 94 % -- --   22 0900 (!) 140/54 -- -- 88 24 91 % -- --   22 0800 (!) 137/53 -- -- 85 23 90 % -- --   22 0700 119/80 -- -- 97 18 91 % -- --   22 0612 -- (!) 100.8 °F (38.2 °C) Temporal -- -- -- -- --   12/17/22 0600 (!) 129/54 -- -- (!) 109 22 -- -- --   12/17/22 0500 -- (!) 102.4 °F (39.1 °C) Temporal -- -- -- -- --   12/17/22 0430 123/87 -- -- 99 27 -- -- --   12/17/22 0400 138/61 -- -- 99 23 -- -- --   12/17/22 0330 (!) 120/53 -- -- 97 27 -- -- --   12/17/22 0300 (!) 115/52 -- -- 97 26 -- -- --   12/17/22 0230 133/63 -- -- (!) 113 23 -- -- --   12/17/22 0200 (!) 108/58 -- -- 100 26 -- -- --   12/17/22 0155 (!) 121/46 -- -- 97 26 -- -- --   12/17/22 0133 -- (!) 101.4 °F (38.6 °C) Temporal -- -- -- -- --   12/17/22 0127 (!) 117/52 -- -- 99 22 -- -- --   12/17/22 0126 -- -- -- -- -- -- 5' 2\" (1.575 m) 166 lb 9.6 oz (75.6 kg)   12/16/22 1750 (!) 154/63 98.1 °F (36.7 °C) Oral (!) 108 17 98 % 5' 2\" (1.575 m) 166 lb (75.3 kg)     Physical exam    General: No acute distress  Psych: Calm and cooperative  Neck: No swelling  HEENT: NC/AT, no scleral icterus  Cardiac: Normal rate, regular rhythm, S1-S2 present  Respiratory: No wheezes rales or rhonchi  Abdomen: Soft, nontender, nondistended, bowel sounds present  Neuro: Alert, follows commands, nonfocal, normal speech  Extremities: No clubbing or cyanosis  Skin: Well demarcated area of erythema, edema, tenderness around left buttock with significant induration      Lab Review   Recent Results (from the past 24 hour(s))   Acetone    Collection Time: 12/16/22  9:30 PM   Result Value Ref Range    Acetone, Bld Moderate (A) Negative   CBC with Auto Differential    Collection Time: 12/16/22  9:32 PM   Result Value Ref Range    WBC 17.5 (H) 4.8 - 10.8 K/uL    RBC 3.97 (L) 4.20 - 5.40 M/uL    Hemoglobin 12.9 12.0 - 16.0 g/dL    Hematocrit 38.2 37.0 - 47.0 %    MCV 96.2 81.0 - 99.0 fL    MCH 32.5 (H) 27.0 - 31.0 pg    MCHC 33.8 33.0 - 37.0 g/dL    RDW 12.0 11.5 - 14.5 %    Platelets 912 035 - 384 K/uL    MPV 11.7 9.4 - 12.3 fL    Neutrophils % 80.8 (H) 50.0 - 65.0 %    Lymphocytes % 10.8 (L) 20.0 - 40.0 %    Monocytes % 7.1 0.0 - 10.0 %    Eosinophils % 0.1 0.0 - 5.0 %    Basophils % 0.3 0.0 - 1.0 %    Neutrophils Absolute 14.1 (H) 1.5 - 7.5 K/uL    Immature Granulocytes # 0.2 K/uL    Lymphocytes Absolute 1.9 1.1 - 4.5 K/uL    Monocytes Absolute 1.30 (H) 0.00 - 0.90 K/uL    Eosinophils Absolute 0.00 0.00 - 0.60 K/uL    Basophils Absolute 0.10 0.00 - 0.20 K/uL   Comprehensive Metabolic Panel w/ Reflex to MG    Collection Time: 12/16/22  9:32 PM   Result Value Ref Range    Sodium 129 (L) 136 - 145 mmol/L    Potassium reflex Magnesium 3.9 3.5 - 5.0 mmol/L    Chloride 90 (L) 98 - 111 mmol/L    CO2 18 (L) 22 - 29 mmol/L    Anion Gap 21 (H) 7 - 19 mmol/L    Glucose 577 (HH) 74 - 109 mg/dL    BUN 10 8 - 23 mg/dL    Creatinine 0.8 0.5 - 0.9 mg/dL    Est, Glom Filt Rate >60 >60    Calcium 9.4 8.8 - 10.2 mg/dL    Total Protein 7.6 6.6 - 8.7 g/dL    Albumin 3.7 3.5 - 5.2 g/dL    Total Bilirubin 0.4 0.2 - 1.2 mg/dL    Alkaline Phosphatase 195 (H) 35 - 104 U/L    ALT 21 5 - 33 U/L    AST 23 5 - 32 U/L   Magnesium    Collection Time: 12/16/22  9:32 PM   Result Value Ref Range    Magnesium 1.9 1.6 - 2.4 mg/dL   TSH    Collection Time: 12/16/22  9:32 PM   Result Value Ref Range    TSH 0.792 0.270 - 4.200 uIU/mL   Vitamin D 25 Hydroxy    Collection Time: 12/16/22  9:32 PM   Result Value Ref Range    Vit D, 25-Hydroxy 66.2 >=30 ng/mL   Hemoglobin A1C    Collection Time: 12/16/22  9:32 PM   Result Value Ref Range    Hemoglobin A1C 15.0 (H) 4.0 - 6.0 %   C-Reactive Protein    Collection Time: 12/16/22  9:32 PM   Result Value Ref Range    CRP 28.28 (H) 0.00 - 0.50 mg/dL   COVID-19, Rapid    Collection Time: 12/16/22 10:22 PM    Specimen: Nasopharyngeal Swab   Result Value Ref Range    SARS-CoV-2, NAAT Not Detected Not Detected   Urinalysis with Reflex to Culture    Collection Time: 12/16/22 11:05 PM    Specimen: Urine   Result Value Ref Range    Color, UA YELLOW Straw/Yellow    Clarity, UA CLEAR Clear    Glucose, Ur =>1000 Negative mg/dL    Bilirubin Urine Negative Negative    Ketones, Urine 80 (A) Negative mg/dL    Specific Gravity, UA 1.039 1.005 - 1.030    Blood, Urine Negative Negative    pH, UA 5.5 5.0 - 8.0    Protein, UA Negative Negative mg/dL    Urobilinogen, Urine 0.2 <2.0 E.U./dL    Nitrite, Urine Negative Negative    Leukocyte Esterase, Urine SMALL (A) Negative   Microscopic Urinalysis    Collection Time: 12/16/22 11:05 PM   Result Value Ref Range    Mucus, UA Rare (A) None Seen /LPF    WBC, UA 10-15 0 - 5 /HPF    RBC, UA 0-1 0 - 2 /HPF    Epithelial Cells, UA 3-5 /HPF    Bacteria, UA 1+ (A) None Seen /HPF    Yeast, UA Present (A) None Seen /HPF    Crystals, UA NEG (A) None Seen /HPF   Venous Blood Gas    Collection Time: 12/17/22 12:07 AM   Result Value Ref Range    pH, Brooks 7.39 7.35 - 7.45    pCO2, Brooks 33.0 (L) 40.0 - 50.0 mmHg    pO2, Brooks 36 Not Established mmHg    HCO3, Venous 20 (L) 23 - 29 mmol/L    Base Excess, Brooks -4 Not Established mmol/L    O2 Sat, Brooks 68 Not Established %    Carboxyhemoglobin 2.4 %    MetHgb, Brooks 0.8 <1.5 %    O2 Content, Brooks 12 Not Established mL/dL   POCT Glucose    Collection Time: 12/17/22 12:21 AM   Result Value Ref Range    POC Glucose 441 (H) 70 - 99 mg/dl    Performed on [employer]    POCT Glucose    Collection Time: 12/17/22  1:27 AM   Result Value Ref Range    POC Glucose 404 (H) 70 - 99 mg/dl    Performed on [employer]    POCT Glucose    Collection Time: 12/17/22  2:55 AM   Result Value Ref Range    POC Glucose 315 (H) 70 - 99 mg/dl    Performed on [employer]    POCT Glucose    Collection Time: 12/17/22  4:06 AM   Result Value Ref Range    POC Glucose 275 (H) 70 - 99 mg/dl    Performed on [employer]    Basic Metabolic Panel    Collection Time: 12/17/22  4:08 AM   Result Value Ref Range    Sodium 133 (L) 136 - 145 mmol/L    Potassium 3.9 3.5 - 5.0 mmol/L    Chloride 97 (L) 98 - 111 mmol/L    CO2 23 22 - 29 mmol/L    Anion Gap 13 7 - 19 mmol/L    Glucose 267 (H) 74 - 109 mg/dL    BUN 9 8 - 23 mg/dL    Creatinine 0.6 0.5 - 0.9 mg/dL    Est, Glom Filt Rate >60 >60 Calcium 8.6 (L) 8.8 - 10.2 mg/dL   Magnesium    Collection Time: 12/17/22  4:08 AM   Result Value Ref Range    Magnesium 1.7 1.6 - 2.4 mg/dL   Phosphorus    Collection Time: 12/17/22  4:08 AM   Result Value Ref Range    Phosphorus 1.6 (L) 2.5 - 4.5 mg/dL   Sedimentation Rate    Collection Time: 12/17/22  4:08 AM   Result Value Ref Range    Sed Rate 108 (H) 0 - 25 mm/Hr   Lipid Panel    Collection Time: 12/17/22  4:08 AM   Result Value Ref Range    Cholesterol, Total 213 (H) 160 - 199 mg/dL    Triglycerides 273 (H) 0 - 149 mg/dL    HDL 32 (L) 65 - 121 mg/dL    LDL Calculated 126 <100 mg/dL   CBC    Collection Time: 12/17/22  4:08 AM   Result Value Ref Range    WBC 15.3 (H) 4.8 - 10.8 K/uL    RBC 3.65 (L) 4.20 - 5.40 M/uL    Hemoglobin 11.8 (L) 12.0 - 16.0 g/dL    Hematocrit 35.2 (L) 37.0 - 47.0 %    MCV 96.4 81.0 - 99.0 fL    MCH 32.3 (H) 27.0 - 31.0 pg    MCHC 33.5 33.0 - 37.0 g/dL    RDW 12.1 11.5 - 14.5 %    Platelets 906 950 - 670 K/uL    MPV 11.9 9.4 - 12.3 fL   POCT Glucose    Collection Time: 12/17/22  5:08 AM   Result Value Ref Range    POC Glucose 230 (H) 70 - 99 mg/dl    Performed on [employer]    POCT Glucose    Collection Time: 12/17/22  6:14 AM   Result Value Ref Range    POC Glucose 183 (H) 70 - 99 mg/dl    Performed on [employer]    POCT Glucose    Collection Time: 12/17/22  7:19 AM   Result Value Ref Range    POC Glucose 161 (H) 70 - 99 mg/dl    Performed on [employer]    Iron and TIBC    Collection Time: 12/17/22  8:07 AM   Result Value Ref Range    Iron 15 (L) 37 - 145 ug/dL    TIBC 189 (L) 250 - 400 ug/dL    Iron Saturation 8 (L) 14 - 50 %   Ferritin    Collection Time: 12/17/22  8:07 AM   Result Value Ref Range    Ferritin 514.9 (H) 13.0 - 150.0 ng/mL   Vitamin B12    Collection Time: 12/17/22  8:07 AM   Result Value Ref Range    Vitamin B-12 631 211 - 946 pg/mL   Folate    Collection Time: 12/17/22  8:07 AM   Result Value Ref Range    Folate 19.6 4.8 - 37.3 ng/mL   Lactic Acid    Collection Time: 12/17/22  8:07 AM   Result Value Ref Range    Lactic Acid 1.1 0.5 - 1.9 mmol/L   POCT Glucose    Collection Time: 12/17/22  8:08 AM   Result Value Ref Range    POC Glucose 167 (H) 70 - 99 mg/dl    Performed on [employer]    POCT Glucose    Collection Time: 12/17/22 11:34 AM   Result Value Ref Range    POC Glucose 358 (H) 70 - 99 mg/dl    Performed on [employer]        I/O last 3 completed shifts: In: 320.6 [I.V.:20.6; IV Piggyback:300]  Out: 1050 [Urine:1050]  I/O this shift: In: 550.3 [I.V.:11.8;  IV Piggyback:538.5]  Out: 300 [Urine:300]      Current Facility-Administered Medications:     dextrose bolus 10% 125 mL, 125 mL, IntraVENous, PRN **OR** dextrose bolus 10% 250 mL, 250 mL, IntraVENous, PRN, Hector Savage MD    potassium chloride 10 mEq/100 mL IVPB (Peripheral Line), 10 mEq, IntraVENous, PRN, Hector Savage MD    magnesium sulfate 1000 mg in dextrose 5% 100 mL IVPB, 1,000 mg, IntraVENous, PRN, Hector Savage MD    sodium phosphate 10 mmol in sodium chloride 0.9 % 250 mL IVPB, 10 mmol, IntraVENous, PRN **OR** sodium phosphate 15 mmol in dextrose 5 % 250 mL IVPB, 15 mmol, IntraVENous, PRN **OR** sodium phosphate 20 mmol in dextrose 5 % 500 mL IVPB, 20 mmol, IntraVENous, PRN, Hector Savage MD, Last Rate: 83.3 mL/hr at 12/17/22 1006, 20 mmol at 12/17/22 1006    polyethylene glycol (GLYCOLAX) packet 17 g, 17 g, Oral, Daily PRN, Hector Savage MD    [facility] [place] by provider] enoxaparin (LOVENOX) injection 40 mg, 40 mg, SubCUTAneous, Daily, Hetcor Savage MD    dextrose 5 % and 0.45 % NaCl with KCl 20 mEq infusion, , IntraVENous, Continuous PRN, Hector Savage MD, Stopped at 12/17/22 0815    nystatin (MYCOSTATIN) [id number] UNIT/ML suspension 500,000 Units, 5 mL, Oral, 4x Daily, Hector Savage MD, 500,000 Units at 12/17/22 0951    miconazole (MICOTIN) 2 % powder, , Topical, BID, Hector Savage MD, Given at 12/17/22 1130    acetaminophen (TYLENOL) tablet 650 mg, 650 mg, Oral, Q4H PRN, Seferino Alvarado MD, 650 mg at 12/17/22 1243    morphine (PF) injection 1 mg, 1 mg, IntraVENous, Q3H PRN, Aretha Plascencia MD, 1 mg at 12/17/22 1000    insulin glargine (LANTUS) injection vial 15 Units, 15 Units, SubCUTAneous, BID, Aretha Plascencia MD, 15 Units at 12/17/22 0930    insulin lispro (HUMALOG) injection vial 0-8 Units, 0-8 Units, SubCUTAneous, TID WC, Aretha Plascencia MD, 8 Units at 12/17/22 1216    insulin lispro (HUMALOG) injection vial 0-4 Units, 0-4 Units, SubCUTAneous, Nightly, Aretha Plascencia MD    0.9 % sodium chloride infusion, , IntraVENous, Continuous, Aretha Plascencia MD, Last Rate: 100 mL/hr at 12/17/22 0810, New Bag at 12/17/22 0810    cefepime (MAXIPIME) 2,000 mg in sodium chloride 0.9 % 50 mL IVPB ([id number]), 2,000 mg, IntraVENous, Q12H, Aretha Plascencia MD    pantoprazole (PROTONIX) tablet 40 mg, 40 mg, Oral, QAM AC, Aretha Plascencia MD, 40 mg at 12/17/22 1001    vancomycin (VANCOCIN) 1,250 mg in sodium chloride 0.9 % 250 mL IVPB, 1,250 mg, IntraVENous, Q18H, Aretha Plascencia MD    vancomycin (VANCOCIN) intermittent dosing (placeholder), , Other, RX Placeholder, Aretha Plascencia MD    HYDROcodone-acetaminophen (NORCO) 5-325 MG per tablet 1 tablet, 1 tablet, Oral, Q4H PRN, Aretha Plascencia MD, 1 tablet at 12/17/22 1133    labetalol (NORMODYNE;TRANDATE) injection 10 mg, 10 mg, IntraVENous, Q4H PRN, Aretha Plascencia MD    metronidazole (FLAGYL) 500 mg in 0.9% NaCl 100 mL IVPB premix, 500 mg, IntraVENous, Q8H, Aretha Plascencia MD        Reviewed chart, history, vitals, labs, diagnostics and medications      Assessment/plan  Principal Problem:    Sepsis ([address]  Active Problems:    Uncontrolled diabetes mellitus with hyperglycemia ([address]    UTI (urinary tract infection)    Cellulitis of buttock    Metabolic acidemia    Starvation ketoacidosis  Resolved Problems:    * No resolved hospital problems.  *      Sepsis with soft tissue infection of buttock  Probable UTI  Crystalloid resuscitation  Normal lactate  Follow cultures  Change antibiotics, avoid concomitant vancomycin/Zosyn nephrotoxicity  - Continue on vancomycin, cefepime and Flagyl  --Continue IV fluid  --Significant induration on exam and noted on CT, although no appreciable fluid collection or abscess per Radiology report  - Continue serial exams to monitor for any developing abscess   --Will ask General Surgery to evaluate    Uncontrolled diabetes with hyperglycemia, metabolic acidosis with starvation ketosis  - Encourage adequate p.o. intake with diabetic diet  - Discontinue insulin infusion  - Switch to basal insulin regimen twice daily with corrective sliding scale insulin, may add prandial insulin pending clinical course  - IV fluid hydration    Elevated blood pressure  - Pain control as needed, add antihypertensives if persistently elevated      DVT prophylaxis Maria Teresa Rendon MD 12/17/2022 12:53 PM

## 2022-12-17 NOTE — PROGRESS NOTES
Physician Progress Note      PATIENT:               Evelyn Aguilar  CSN #:                  [id number]  :                       1960  ADMIT DATE:       2022 8:48 PM  [address] [place] DATE:  Kojo Tyler  PROVIDER #:        Derik Keith MD          QUERY TEXT:    Pt admitted with sepsis and left buttock cellulitis. Pt noted to have DM type   2 and DKA. HgA1C 15. If possible, please document in progress notes and   discharge summary the relationship, if any, between cellulitis and DM. The medical record reflects the following:  Risk Factors: DM 2 with DKA  Clinical Indicators: HgA1C 15. CT Abd Pelvis, \"Subcutaneous induration in the   left buttock, CONSISTENT WITH MODERATE CELLULITIS. \"  Treatment: Insulin drip, IV antibiotics, CT, HgA1c, CBC, Chemistry panel. Options provided:  -- Left buttock cellulitis associated with Diabetes  -- Left buttock cellulitis unrelated to Diabetes  -- Other - I will add my own diagnosis  -- Disagree - Not applicable / Not valid  -- Disagree - Clinically unable to determine / Unknown  -- Refer to Clinical Documentation Reviewer    PROVIDER RESPONSE TEXT:    Left buttock cellulitis associated with Diabetes.     Query created by: Tono Munson on 2022 3:47 PM      Electronically signed by:  Derik Keith MD 2022 4:23 PM

## 2022-12-17 NOTE — PROGRESS NOTES
..4 Eyes Skin Assessment    Taylor Harvey is being assessed upon: Admission    I agree that Trung Kasper, RN, along with Feliberto Duarte, RN, (either 2 RN's or 1 LPN and 1 RN) have performed a thorough Head to Toe Skin Assessment on the patient. ALL assessment sites listed below have been assessed. Areas assessed by both nurses:     [x]   Head, Face, and Ears   [x]   Shoulders, Back, and Chest  [x]   Arms, Elbows, and Hands   [x]   Coccyx, Sacrum, and Ischium  [x]   Legs, Feet, and Heels    Does the Patient have Skin Breakdown? Yes, wound(s) noted upon assessment. It is the responsibility of the Primary Nurse to assure that the following documentation, preventions, orders, and consults are complete on the above noted wound(s): Wound LDA initiated. LDA Flowsheet Documentation includes the Paty-wound, Wound Assessment, Measurements, Dressing Treatment, Drainage, and Color. Cellulitis on L buttocks, L inner thigh    Alex Prevention initiated: No  Wound Care Orders initiated: No    [facility] nurse consulted for Pressure Injury (Stage 3,4, Unstageable, DTI, NWPT, and Complex wounds) and New or Established Ostomies: No        Primary Nurse eSignature:  Deedee Marino RN on 12/17/2022 at 3:49 AM      Co-Signer eSignature: Electronically signed by Feliberto Duarte RN on 12/17/22 at 5:03 AM CST

## 2022-12-17 NOTE — ED PROVIDER NOTES
[facility] EMERGENCY DEPT  eMERGENCY dEPARTMENT eNCOUnter      Pt Name: Isma Khalil  MRN: [id number]  [place] 1960  Date of evaluation: 12/16/2022  Provider: Jorge Shea       Chief Complaint   Patient presents with    Wound Infection     Pt arrived to the ed with c/o wound to buttocks. Pt states she had a boil that popped. Pt states on Wednesday the redness started spreading. Area is warm to the touch. HISTORY OF PRESENT ILLNESS   (Location/Symptom, Timing/Onset,Context/Setting, Quality, Duration, Modifying Factors, Severity)  Note limiting factors. Kash Heck a 60 y.o. female who presents to the emergency department for evaluation of wound infection. Pt tells me that she has had a wound to her left buttocks over past 2 weeks. She tells me that the abscessed area began to drain over past week however over past couple of days has had increased swelling and pain. She tells me that she was not able to walk today due to symptoms. She denies fevers, vomiting as well as recent illness. She tells me that she is a diabetic. HPI    Nursing Notes were reviewed. REVIEW OF SYSTEMS    (2-9 systems for level 4, 10 or more for level 5)     Review of Systems   Constitutional:  Positive for activity change. Negative for fever. Gastrointestinal:  Negative for vomiting. Skin:  Positive for wound (left buttocks). All other systems reviewed and are negative. A complete review of systems was performed and is negative except as noted above in the HPI.        PAST MEDICAL HISTORY     Past Medical History:   Diagnosis Date    Back pain 6/5/2014    Carotid artery stenosis     Depression 6/5/2014    Diabetes ([address]     GERD (gastroesophageal reflux disease)     Hyperlipidemia     Hypertension 6/5/2014    Morbidly obese ([address] 7/15/2020    Type II or unspecified type diabetes mellitus without mention of complication, not stated as uncontrolled     Uncontrolled type 2 diabetes mellitus with hyperglycemia, with long-term current use of insulin ([facility] 11/9/2022         SURGICAL HISTORY       Past Surgical History:   Procedure Laterality Date    BACK SURGERY      Dr. oTotie Peters 1st surgery and Dr. Keren Ramirez 2nd surgery    DILATION AND CURETTAGE OF UTERUS      HYSTERECTOMY (CERVIX STATUS UNKNOWN) N/A          CURRENT MEDICATIONS       Previous Medications    ACETAMINOPHEN (TYLENOL) 650 MG EXTENDED RELEASE TABLET    Take 650 mg by mouth every 8 hours as needed for Pain. ASPIRIN EC 81 MG EC TABLET    Take 1 tablet by mouth daily    B-D ULTRAFINE III SHORT PEN 31G X 8 MM MISC    USE ONCE DAILY    BLOOD GLUCOSE MONITOR STRIPS    For qid testing Type 2 Dm Dispense brand per patient request or insurance benefits. EZ SMART BLOOD GLUCOSE LANCETS MISC    For qid and prn testing for newly diagnosed diabetes DX: Diabetes, insulin requiring    GLUCOSE BLOOD VI TEST STRIPS (ASCENSIA AUTODISC VI;ONE TOUCH ULTRA TEST VI) STRIP    1 each by In Vitro route 4 times daily (with meals and nightly) For qid and prn testing for newly diagnosed diabetes  Freestyle Freedom lite    DX: Diabetes, insulin requiring    GLUCOSE MONITORING KIT (FREESTYLE) MONITORING KIT    For bid testing Type 2 dm    INSULIN ASPART (NOVOLOG) 100 UNIT/ML INJECTION VIAL    Inject 20 Units into the skin 3 times daily (before meals)     INSULIN SYRINGE-NEEDLE U-100 (INSULIN SYRINGE .5CC/30GX5/16\") 30G X 5/16\" 0.5 ML MISC    USE WITH INSULIN EVERY DAY    LANCETS MISC    1 each by Does not apply route 2 times daily Dispense brand per insurance benefits and patient request.    LANTUS 100 UNIT/ML INJECTION VIAL    Inject 60 Units into the skin nightly     NYSTATIN (MYCOSTATIN) 937809 UNIT/GM CREAM    Apply topically 2 times daily. NYSTATIN (MYCOSTATIN) 068826 UNIT/GM CREAM    Apply topically 2 times daily.     PANTOPRAZOLE (PROTONIX) 40 MG TABLET    Take 1 tablet by mouth every morning (before breakfast)    ROPINIROLE (REQUIP) 0.5 MG TABLET    TAKE 1-2 TABLETS BY MOUTH EVERY NIGHT AT BEDTIME FOR RESTLESS LEGS    VITAMIN D (ERGOCALCIFEROL) 70689 UNITS CAPS CAPSULE    TAKE 1 CAPSULE BY MOUTH 1 TIME A WEEK       ALLERGIES     Sulfa antibiotics    FAMILY HISTORY       Family History   Problem Relation Age of Onset    Diabetes Mother     Stroke Mother           SOCIAL HISTORY       Social History     Socioeconomic History    Marital status:      Spouse name: None    Number of children: None    Years of education: None    Highest education level: None   Tobacco Use    Smoking status: Former     Packs/day: 0.50     Years: 20.00     Pack years: 10.00     Types: Cigarettes     Start date: 1983     Quit date: 2014     Years since quittin.3    Smokeless tobacco: Never   Vaping Use    Vaping Use: Never used   Substance and Sexual Activity    Alcohol use: No     Alcohol/week: 0.0 standard drinks    Drug use: No     Social Determinants of Health     Financial Resource Strain: Low Risk     Difficulty of Paying Living Expenses: Not very hard   Food Insecurity: No Food Insecurity    Worried About Running Out of Food in the Last Year: Never true    Ran Out of Food in the Last Year: Never true   Physical Activity: Inactive    Days of Exercise per Week: 0 days    Minutes of Exercise per Session: 0 min       SCREENINGS    Kavya Coma Scale  Eye Opening: Spontaneous  Best Verbal Response: Oriented  Best Motor Response: Obeys commands  Kavya Coma Scale Score: 15        PHYSICAL EXAM    (up to 7 for level 4, 8 or more for level 5)     ED Triage Vitals [22 1750]   BP Temp Temp Source Heart Rate Resp SpO2 Height Weight   (!) 154/63 98.1 °F (36.7 °C) Oral (!) 108 17 98 % 5' 2\" (1.575 m) 166 lb (75.3 kg)       Physical Exam  Vitals reviewed. Constitutional:       Appearance: Normal appearance.    HENT:      Right Ear: External ear normal.      Left Ear: External ear normal.   Eyes:      Conjunctiva/sclera: Conjunctivae normal.   Cardiovascular:      Rate and Rhythm: Regular rhythm. Tachycardia present. Pulmonary:      Effort: Pulmonary effort is normal.      Breath sounds: Normal breath sounds. Abdominal:      Tenderness: There is no abdominal tenderness. Musculoskeletal:      Cervical back: Neck supple. Comments: Left distal buttocks/gluteal fold with large area of induration with surrounding erythema extending to left medial upper perineal area/thigh adjacent to left labia, no obvious area of fluctuance or expressible discharge. Abscess brown not appear to extend to rectum on exam   Skin:     General: Skin is warm and dry. Neurological:      Mental Status: She is alert. DIAGNOSTIC RESULTS     EKG: All EKG's are interpreted by the Emergency Department Physician who either signs or Co-signs this chart in the absence of acardiologist.        RADIOLOGY:   Non-plain film images such as CT, Ultrasound andMRI are read by the radiologist. Plain radiographic images are visualized and preliminarily interpreted by the emergency physician with the below findings:        Interpretation per the Radiologist below, if available at the time of this note:    CT ABDOMEN PELVIS W IV CONTRAST Additional Contrast? None   Final Result   Subcutaneous induration in the left buttock, CONSISTENT WITH MODERATE CELLULITIS. No fluid collection or abscess. No perianal or perirectal component. Electronically signed by Janak Saab MD on 12-16-22 at 2318            ED BEDSIDE ULTRASOUND:   Performed by ED Physician - none    LABS:  Labs Reviewed   CBC WITH AUTO DIFFERENTIAL - Abnormal; Notable for the following components:       Result Value    WBC 17.5 (*)     RBC 3.97 (*)     MCH 32.5 (*)     Neutrophils % 80.8 (*)     Lymphocytes % 10.8 (*)     Neutrophils Absolute 14.1 (*)     Monocytes Absolute 1.30 (*)     All other components within normal limits   COMPREHENSIVE METABOLIC PANEL W/ REFLEX TO MG FOR LOW K - Abnormal; Notable for the following components:    Sodium 129 (*)     Chloride 90 (*)     CO2 18 (*)     Anion Gap 21 (*)     Glucose 577 (*)     Alkaline Phosphatase 195 (*)     All other components within normal limits    Narrative:     CALL  [place]olph  [facility] tel. ,  Chemistry results called to and read back by [facility] RN in ED, 12/16/2022 22:37,  by 7 Nasima Chung - Abnormal; Notable for the following components:    Ketones, Urine 80 (*)     Leukocyte Esterase, Urine SMALL (*)     All other components within normal limits   MICROSCOPIC URINALYSIS - Abnormal; Notable for the following components:    Mucus, UA Rare (*)     Bacteria, UA 1+ (*)     Yeast, UA Present (*)     Crystals, UA NEG (*)     All other components within normal limits   ACETONE - Abnormal; Notable for the following components:    Acetone, Bld Moderate (*)     All other components within normal limits   VENOUS BLD GAS - Abnormal; Notable for the following components:    pCO2, Brooks 33.0 (*)     HCO3, Venous 20 (*)     All other components within normal limits   POCT GLUCOSE - Abnormal; Notable for the following components:    POC Glucose 441 (*)     All other components within normal limits   COVID-19, RAPID   CULTURE, BLOOD 1   CULTURE, BLOOD 2   CULTURE, URINE   LACTIC ACID   BASIC METABOLIC PANEL   BASIC METABOLIC PANEL   MAGNESIUM   MAGNESIUM   PHOSPHORUS   PHOSPHORUS   MAGNESIUM   TSH   VITAMIN D 25 HYDROXY   HEMOGLOBIN A1C   C-REACTIVE PROTEIN   SEDIMENTATION RATE   BASIC METABOLIC PANEL   MAGNESIUM   PHOSPHORUS   POCT GLUCOSE   POCT GLUCOSE   POCT GLUCOSE   POCT GLUCOSE   POCT GLUCOSE   POCT GLUCOSE   POCT GLUCOSE   POCT GLUCOSE   POCT GLUCOSE   POCT GLUCOSE   POCT GLUCOSE   POCT GLUCOSE       All other labs were within normal range or not returned as of this dictation. RE-ASSESSMENT     Discussed with Dr. Ephraim Gordon who will admit patient to hospitalist service.        EMERGENCY DEPARTMENT COURSE and DIFFERENTIALDIAGNOSIS/MDM:   Vitals:    Vitals:    12/16/22 1750   BP: (!) 154/63   Pulse: (!) 108 Resp: 17   Temp: 98.1 °F (36.7 °C)   TempSrc: Oral   SpO2: 98%   Weight: 166 lb (75.3 kg)   Height: 5' 2\" (1.575 m)       MDM        CONSULTS:  None    PROCEDURES:  Unless otherwise notedbelow, none     Procedures    FINAL IMPRESSION     1. Diabetic ketoacidosis without coma associated with diabetes mellitus due to underlying condition ([facility] [address]    2. Cellulitis of buttock, left          DISPOSITION/PLAN   DISPOSITION Admitted 12/17/2022 12:02:36 AM      PATIENT REFERRED TO:  No follow-up provider specified.     DISCHARGE MEDICATIONS:       Current Discharge Medication List          (Pleasenote that portions of this note were completed with a voice recognition program.  Efforts were made to edit the dictations but occasionally words are mis-transcribed.)               MOOK Alejandro - OLIVA  12/17/22 0110

## 2022-12-18 LAB
ANION GAP SERPL CALCULATED.3IONS-SCNC: 14 MMOL/L (ref 7–19)
BUN BLDV-MCNC: 8 MG/DL (ref 8–23)
CALCIUM SERPL-MCNC: 8.2 MG/DL (ref 8.8–10.2)
CHLORIDE BLD-SCNC: 98 MMOL/L (ref 98–111)
CO2: 20 MMOL/L (ref 22–29)
CREAT SERPL-MCNC: 0.5 MG/DL (ref 0.5–0.9)
GFR SERPL CREATININE-BSD FRML MDRD: >60 ML/MIN/{1.73_M2}
GLUCOSE BLD-MCNC: 236 MG/DL (ref 70–99)
GLUCOSE BLD-MCNC: 249 MG/DL (ref 70–99)
GLUCOSE BLD-MCNC: 249 MG/DL (ref 70–99)
GLUCOSE BLD-MCNC: 253 MG/DL (ref 70–99)
GLUCOSE BLD-MCNC: 264 MG/DL (ref 70–99)
GLUCOSE BLD-MCNC: 282 MG/DL (ref 74–109)
GLUCOSE BLD-MCNC: 284 MG/DL (ref 70–99)
HCT VFR BLD CALC: 32.7 % (ref 37–47)
HEMOGLOBIN: 10.8 G/DL (ref 12–16)
MCH RBC QN AUTO: 32.1 PG (ref 27–31)
MCHC RBC AUTO-ENTMCNC: 33 G/DL (ref 33–37)
MCV RBC AUTO: 97.3 FL (ref 81–99)
PDW BLD-RTO: 12.3 % (ref 11.5–14.5)
PERFORMED ON: ABNORMAL
PLATELET # BLD: 232 K/UL (ref 130–400)
PMV BLD AUTO: 12 FL (ref 9.4–12.3)
POTASSIUM SERPL-SCNC: 3.8 MMOL/L (ref 3.5–5)
RBC # BLD: 3.36 M/UL (ref 4.2–5.4)
SODIUM BLD-SCNC: 132 MMOL/L (ref 136–145)
WBC # BLD: 16.1 K/UL (ref 4.8–10.8)

## 2022-12-18 PROCEDURE — 36415 COLL VENOUS BLD VENIPUNCTURE: CPT

## 2022-12-18 PROCEDURE — 2580000003 HC RX 258: Performed by: STUDENT IN AN ORGANIZED HEALTH CARE EDUCATION/TRAINING PROGRAM

## 2022-12-18 PROCEDURE — 2500000003 HC RX 250 WO HCPCS: Performed by: STUDENT IN AN ORGANIZED HEALTH CARE EDUCATION/TRAINING PROGRAM

## 2022-12-18 PROCEDURE — 99231 SBSQ HOSP IP/OBS SF/LOW 25: CPT | Performed by: SURGERY

## 2022-12-18 PROCEDURE — 85027 COMPLETE CBC AUTOMATED: CPT

## 2022-12-18 PROCEDURE — 1210000000 HC MED SURG R&B

## 2022-12-18 PROCEDURE — 6370000000 HC RX 637 (ALT 250 FOR IP): Performed by: HOSPITALIST

## 2022-12-18 PROCEDURE — 6360000002 HC RX W HCPCS: Performed by: SURGERY

## 2022-12-18 PROCEDURE — 6360000002 HC RX W HCPCS: Performed by: STUDENT IN AN ORGANIZED HEALTH CARE EDUCATION/TRAINING PROGRAM

## 2022-12-18 PROCEDURE — 94760 N-INVAS EAR/PLS OXIMETRY 1: CPT

## 2022-12-18 PROCEDURE — 6370000000 HC RX 637 (ALT 250 FOR IP): Performed by: SURGERY

## 2022-12-18 PROCEDURE — 6370000000 HC RX 637 (ALT 250 FOR IP): Performed by: STUDENT IN AN ORGANIZED HEALTH CARE EDUCATION/TRAINING PROGRAM

## 2022-12-18 PROCEDURE — 82947 ASSAY GLUCOSE BLOOD QUANT: CPT

## 2022-12-18 PROCEDURE — 80048 BASIC METABOLIC PNL TOTAL CA: CPT

## 2022-12-18 RX ORDER — INSULIN GLARGINE 100 [IU]/ML
20 INJECTION, SOLUTION SUBCUTANEOUS 2 TIMES DAILY
Status: DISCONTINUED | OUTPATIENT
Start: 2022-12-18 | End: 2022-12-20

## 2022-12-18 RX ORDER — INSULIN LISPRO 100 [IU]/ML
8 INJECTION, SOLUTION INTRAVENOUS; SUBCUTANEOUS
Status: DISCONTINUED | OUTPATIENT
Start: 2022-12-18 | End: 2022-12-20

## 2022-12-18 RX ORDER — DEXTROSE MONOHYDRATE 100 MG/ML
INJECTION, SOLUTION INTRAVENOUS CONTINUOUS PRN
Status: DISCONTINUED | OUTPATIENT
Start: 2022-12-18 | End: 2022-12-21 | Stop reason: HOSPADM

## 2022-12-18 RX ADMIN — DIPHENHYDRAMINE HYDROCHLORIDE 12.5 MG: 25 TABLET ORAL at 21:21

## 2022-12-18 RX ADMIN — HYDROCODONE BITARTRATE AND ACETAMINOPHEN 1 TABLET: 5; 325 TABLET ORAL at 11:27

## 2022-12-18 RX ADMIN — DIPHENHYDRAMINE HYDROCHLORIDE 12.5 MG: 25 TABLET ORAL at 16:41

## 2022-12-18 RX ADMIN — FAMOTIDINE 20 MG: 20 TABLET ORAL at 08:15

## 2022-12-18 RX ADMIN — ASPIRIN 325 MG: 325 TABLET, COATED ORAL at 08:15

## 2022-12-18 RX ADMIN — CEFEPIME 2000 MG: 2 INJECTION, POWDER, FOR SOLUTION INTRAVENOUS at 08:01

## 2022-12-18 RX ADMIN — MORPHINE SULFATE 2 MG: 2 INJECTION, SOLUTION INTRAMUSCULAR; INTRAVENOUS at 03:54

## 2022-12-18 RX ADMIN — METRONIDAZOLE 500 MG: 500 INJECTION, SOLUTION INTRAVENOUS at 05:09

## 2022-12-18 RX ADMIN — MORPHINE SULFATE 2 MG: 2 INJECTION, SOLUTION INTRAMUSCULAR; INTRAVENOUS at 16:40

## 2022-12-18 RX ADMIN — INSULIN LISPRO 4 UNITS: 100 INJECTION, SOLUTION INTRAVENOUS; SUBCUTANEOUS at 09:03

## 2022-12-18 RX ADMIN — INSULIN GLARGINE 20 UNITS: 100 INJECTION, SOLUTION SUBCUTANEOUS at 09:01

## 2022-12-18 RX ADMIN — SODIUM CHLORIDE: 9 INJECTION, SOLUTION INTRAVENOUS at 20:07

## 2022-12-18 RX ADMIN — MICONAZOLE NITRATE: 20 POWDER TOPICAL at 09:00

## 2022-12-18 RX ADMIN — MORPHINE SULFATE 2 MG: 2 INJECTION, SOLUTION INTRAMUSCULAR; INTRAVENOUS at 22:32

## 2022-12-18 RX ADMIN — MORPHINE SULFATE 2 MG: 2 INJECTION, SOLUTION INTRAMUSCULAR; INTRAVENOUS at 07:56

## 2022-12-18 RX ADMIN — VANCOMYCIN HYDROCHLORIDE 1250 MG: 5 INJECTION, POWDER, LYOPHILIZED, FOR SOLUTION INTRAVENOUS at 01:15

## 2022-12-18 RX ADMIN — INSULIN LISPRO 8 UNITS: 100 INJECTION, SOLUTION INTRAVENOUS; SUBCUTANEOUS at 17:43

## 2022-12-18 RX ADMIN — HYDROCODONE BITARTRATE AND ACETAMINOPHEN 1 TABLET: 5; 325 TABLET ORAL at 21:37

## 2022-12-18 RX ADMIN — DIPHENHYDRAMINE HYDROCHLORIDE 12.5 MG: 25 TABLET ORAL at 03:54

## 2022-12-18 RX ADMIN — INSULIN GLARGINE 20 UNITS: 100 INJECTION, SOLUTION SUBCUTANEOUS at 21:37

## 2022-12-18 RX ADMIN — NYSTATIN 500000 UNITS: 100000 SUSPENSION ORAL at 08:15

## 2022-12-18 RX ADMIN — MICONAZOLE NITRATE: 20 POWDER TOPICAL at 22:20

## 2022-12-18 RX ADMIN — PANTOPRAZOLE SODIUM 40 MG: 40 TABLET, DELAYED RELEASE ORAL at 05:07

## 2022-12-18 RX ADMIN — FAMOTIDINE 20 MG: 20 TABLET ORAL at 21:21

## 2022-12-18 RX ADMIN — INSULIN LISPRO 8 UNITS: 100 INJECTION, SOLUTION INTRAVENOUS; SUBCUTANEOUS at 11:35

## 2022-12-18 RX ADMIN — CEFEPIME 2000 MG: 2 INJECTION, POWDER, FOR SOLUTION INTRAVENOUS at 22:24

## 2022-12-18 RX ADMIN — INSULIN LISPRO 2 UNITS: 100 INJECTION, SOLUTION INTRAVENOUS; SUBCUTANEOUS at 11:17

## 2022-12-18 RX ADMIN — METRONIDAZOLE 500 MG: 500 INJECTION, SOLUTION INTRAVENOUS at 23:47

## 2022-12-18 RX ADMIN — NYSTATIN 500000 UNITS: 100000 SUSPENSION ORAL at 21:20

## 2022-12-18 RX ADMIN — VANCOMYCIN HYDROCHLORIDE 1250 MG: 5 INJECTION, POWDER, LYOPHILIZED, FOR SOLUTION INTRAVENOUS at 21:27

## 2022-12-18 RX ADMIN — MORPHINE SULFATE 2 MG: 2 INJECTION, SOLUTION INTRAMUSCULAR; INTRAVENOUS at 20:07

## 2022-12-18 RX ADMIN — DIPHENHYDRAMINE HYDROCHLORIDE 12.5 MG: 25 TABLET ORAL at 08:14

## 2022-12-18 RX ADMIN — NYSTATIN 500000 UNITS: 100000 SUSPENSION ORAL at 12:17

## 2022-12-18 RX ADMIN — MORPHINE SULFATE 2 MG: 2 INJECTION, SOLUTION INTRAMUSCULAR; INTRAVENOUS at 13:01

## 2022-12-18 RX ADMIN — MORPHINE SULFATE 2 MG: 2 INJECTION, SOLUTION INTRAMUSCULAR; INTRAVENOUS at 10:12

## 2022-12-18 RX ADMIN — NYSTATIN 500000 UNITS: 100000 SUSPENSION ORAL at 16:40

## 2022-12-18 RX ADMIN — INSULIN LISPRO 4 UNITS: 100 INJECTION, SOLUTION INTRAVENOUS; SUBCUTANEOUS at 17:45

## 2022-12-18 RX ADMIN — METRONIDAZOLE 500 MG: 500 INJECTION, SOLUTION INTRAVENOUS at 13:04

## 2022-12-18 ASSESSMENT — PAIN DESCRIPTION - LOCATION
LOCATION: GROIN

## 2022-12-18 ASSESSMENT — PAIN SCALES - GENERAL
PAINLEVEL_OUTOF10: 8
PAINLEVEL_OUTOF10: 7
PAINLEVEL_OUTOF10: 6
PAINLEVEL_OUTOF10: 6
PAINLEVEL_OUTOF10: 10
PAINLEVEL_OUTOF10: 6
PAINLEVEL_OUTOF10: 6

## 2022-12-18 ASSESSMENT — PAIN DESCRIPTION - ORIENTATION
ORIENTATION: LEFT

## 2022-12-18 NOTE — PROGRESS NOTES
[facility] General Surgery    Progress Note    Subjective:    Reports feeling much better today. Had a small area of drainage develop overnight seems to be coming out of the blistered area which was the first sign of infection to develop. Pain has diminished. She is able to move much more easily. Objective:    Vitals:    12/18/22 0546 12/18/22 0654 12/18/22 0709 12/18/22 1127   BP:   (!) 142/68    Pulse:   96    Resp:   16 18   Temp:   98.4 °F (36.9 °C)    TempSrc:   Oral    SpO2:  93% 95%    Weight: 173 lb 3 oz (78.6 kg)      Height:         I/O last 3 completed shifts: In: 1410.9 [P.O.:540; I.V.:32.4; IV Piggyback:838.5]  Out: 1350 [Urine:1350]     Area of erythema and induration over the posterior medial left thigh onto the inferior left buttock is improved. The erythema is beginning to contract and the induration is beginning to soften. No fluctuance appreciated. There is a small opening in the middle of the blistered area that was previously present. Palpation fails to show any fluctuance nor is any further drainage expressed. LABS:   CBC:   Recent Labs     12/16/22 2132 12/17/22 0408 12/18/22 0237   WBC 17.5* 15.3* 16.1*   RBC 3.97* 3.65* 3.36*   HGB 12.9 11.8* 10.8*   HCT 38.2 35.2* 32.7*    262 232   LYMPHOPCT 10.8*  --   --    MONOPCT 7.1  --   --    BASOPCT 0.3  --   --    MONOSABS 1.30*  --   --    LYMPHSABS 1.9  --   --    EOSABS 0.00  --   --    BASOSABS 0.10  --   --       BMP:   Recent Labs     12/16/22 2132 12/17/22 0408 12/18/22 0237   * 133* 132*   K 3.9 3.9 3.8   CL 90* 97* 98   CO2 18* 23 20*   ANIONGAP 21* 13 14   GLUCOSE 577* 267* 282*   CREATININE 0.8 0.6 0.5   LABGLOM >60 >60 >60   CALCIUM 9.4 8.6* 8.2*     LFT:   Recent Labs     12/16/22  2132   PROT 7.6   LABALBU 3.7   BILITOT 0.4   ALKPHOS 195*   ALT 21   AST 23       Assessment:    1. Soft tissue infection of the left thigh and buttock. Seems clinically improved.   Time course and appearance seems consistent with some type of an insect bite and she remains on her spider bite protocol. No indication for further incision and drainage at this time. 2.  Type 2 diabetes with persistent hyperglycemia. Plan:    1. As noted above no indication for drainage or debridement at this time. 2.  Continue with current antibiotic regimen. 3.  Continue with efforts at blood glucose control per the hospitalist service. 4.  Continue with medications per our spider bite protocol. I have not placed her on the Medrol Dosepak due to concerns about worsening her hyperglycemia. 5.  I rotate off service at 7 AM tomorrow morning. One of my associates will assume her surgical care at that time.

## 2022-12-18 NOTE — PROGRESS NOTES
Daily Progress Note    Date:2022  Patient: Bhavana Benedict  : 1960  U:[id number]  CODE:Full Code No additional code details  PCP:MOOK Pool    Admit Date: 2022  8:48 PM   LOS: 1 day       Subjective:   She notes some drainage from wound. Overall appears to be improving, less pain. No fevers or chills overnight. Summary: 58-year-old female with uncontrolled diabetes mellitus presented to the ED after being sent from outpatient clinic with worsening cellulitis of her buttock for over 1 week, noting an initial small wound that opened up and drained followed by increased erythema, edema and tenderness around the site. On initial evaluation noted to have significant induration. CT was obtained in the ED showed subcutaneous induration of left buttock, however no fluid collection or abscess per radiology report. Patient also with relatively poor p.o. intake recently, noted dehydration with uncontrolled hyperglycemia initial glucose over 500 with starvation ketosis and mild metabolic acidosis. Admitted with sepsis in setting of leukocytosis, tachycardia and fevers. Cultures obtained. Empirically treated with broad-spectrum antibiotics, IV fluids and insulin. General surgery consulted to further evaluate, noted no need for I&D. Soft tissue infection improved on IV antibiotics.         Review of Systems    14 point review of systems completed and is negative except as otherwise noted          Objective:      Vital signs in last 24 hours:  Patient Vitals for the past 24 hrs:   BP Temp Temp src Pulse Resp SpO2 Weight   22 1127 -- -- -- -- 18 -- --   22 0709 (!) 142/68 98.4 °F (36.9 °C) Oral 96 16 95 % --   22 0654 -- -- -- -- -- 93 % --   22 0546 -- -- -- -- -- -- 173 lb 3 oz (78.6 kg)   22 0418 128/74 99.3 °F (37.4 °C) -- 97 16 93 % --   22 2144 (!) 124/58 100.1 °F (37.8 °C) -- (!) 107 20 95 % --   22 1636 -- -- -- -- 18 -- --   22 1624 108/69 98.5 °F (36.9 °C) Temporal 91 18 94 % --   12/17/22 1606 -- -- -- 91 -- -- --   12/17/22 1500 (!) 147/69 -- -- 87 18 95 % --   12/17/22 1400 (!) 144/63 -- -- 94 20 95 % --   12/17/22 1315 -- 98.9 °F (37.2 °C) Temporal 88 22 90 % --   12/17/22 1301 -- -- -- 86 15 90 % --   12/17/22 1300 (!) 127/51 -- -- 88 25 (!) 88 % --       Physical exam    General: No acute distress  Neck: No swelling  HEENT: NC/AT, no apparent deformity  Cardiac: Normal rate, regular rhythm, S1-S2 present  Respiratory: No wheezes rales or rhonchi  Abdomen: Soft, nontender, nondistended, bowel sounds present  Neuro: Alert, follows commands, nonfocal, normal speech  Extremities: No clubbing or cyanosis  Skin: Well demarcated area of erythema, edema, tenderness around left buttock and inner left thigh-less erythema and induration      Lab Review   Recent Results (from the past 24 hour(s))   POCT Glucose    Collection Time: 12/17/22  4:24 PM   Result Value Ref Range    POC Glucose 292 (H) 70 - 99 mg/dl    Performed on AccuChek    POCT Glucose    Collection Time: 12/17/22  9:46 PM   Result Value Ref Range    POC Glucose 374 (H) 70 - 99 mg/dl    Performed on AccuChek    CBC    Collection Time: 12/18/22  2:37 AM   Result Value Ref Range    WBC 16.1 (H) 4.8 - 10.8 K/uL    RBC 3.36 (L) 4.20 - 5.40 M/uL    Hemoglobin 10.8 (L) 12.0 - 16.0 g/dL    Hematocrit 32.7 (L) 37.0 - 47.0 %    MCV 97.3 81.0 - 99.0 fL    MCH 32.1 (H) 27.0 - 31.0 pg    MCHC 33.0 33.0 - 37.0 g/dL    RDW 12.3 11.5 - 14.5 %    Platelets 328 515 - 701 K/uL    MPV 12.0 9.4 - 12.3 fL   Basic Metabolic Panel    Collection Time: 12/18/22  2:37 AM   Result Value Ref Range    Sodium 132 (L) 136 - 145 mmol/L    Potassium 3.8 3.5 - 5.0 mmol/L    Chloride 98 98 - 111 mmol/L    CO2 20 (L) 22 - 29 mmol/L    Anion Gap 14 7 - 19 mmol/L    Glucose 282 (H) 74 - 109 mg/dL    BUN 8 8 - 23 mg/dL    Creatinine 0.5 0.5 - 0.9 mg/dL    Est, Glom Filt Rate >60 >60    Calcium 8.2 (L) 8.8 - 10.2 mg/dL       I/O 500,000 Units at 12/18/22 1217    miconazole (MICOTIN) 2 % powder, , Topical, BID, Gale Beaver MD, Given at 12/18/22 0900    acetaminophen (TYLENOL) tablet 650 mg, 650 mg, Oral, Q4H PRN, Gale Beaver MD, 650 mg at 12/17/22 1243    insulin lispro (HUMALOG) injection vial 0-8 Units, 0-8 Units, SubCUTAneous, TID WC, Hany Lewis MD, 2 Units at 12/18/22 1117    insulin lispro (HUMALOG) injection vial 0-4 Units, 0-4 Units, SubCUTAneous, Nightly, Hany Lewis MD, 4 Units at 12/17/22 2210    0.9 % sodium chloride infusion, , IntraVENous, Continuous, Hany Lewis MD, Last Rate: 100 mL/hr at 12/17/22 1919, New Bag at 12/17/22 1919    cefepime (MAXIPIME) 2,000 mg in sodium chloride 0.9 % 50 mL IVPB ([id number]), 2,000 mg, IntraVENous, Q12H, Hany Lewis MD, Last Rate: 12.5 mL/hr at 12/18/22 0801, 2,000 mg at 12/18/22 0801    pantoprazole (PROTONIX) tablet 40 mg, 40 mg, Oral, QAM AC, Hany Lewis MD, 40 mg at 12/18/22 0507    vancomycin (VANCOCIN) 1,250 mg in sodium chloride 0.9 % 250 mL IVPB, 1,250 mg, IntraVENous, Q18H, Hany Lewis MD, Stopped at 12/18/22 0251    vancomycin (VANCOCIN) intermittent dosing (placeholder), , Other, RX Placeholder, Hany Lewis MD    HYDROcodone-acetaminophen (NORCO) 5-325 MG per tablet 1 tablet, 1 tablet, Oral, Q4H PRN, Hany Lewis MD, 1 tablet at 12/18/22 1127    labetalol (NORMODYNE;TRANDATE) injection 10 mg, 10 mg, IntraVENous, Q4H PRN, Hany Lewis MD    metronidazole (FLAGYL) 500 mg in 0.9% NaCl 100 mL IVPB premix, 500 mg, IntraVENous, Q8H, Hany Lewis MD, Stopped at 12/18/22 4899    famotidine (PEPCID) tablet 20 mg, 20 mg, Oral, BID, Isael Monroy MD, 20 mg at 12/18/22 0815    diphenhydrAMINE (BENADRYL) tablet 12.5 mg, 12.5 mg, Oral, Q6H, Isael Monroy MD, 12.5 mg at 12/18/22 8726    aspirin EC tablet 325 mg, 325 mg, Oral, Daily, Isael Monroy MD, 325 mg at 12/18/22 0815    morphine (PF) injection 2 mg, 2 mg, IntraVENous, Q2H PRN, Abena Claire MD, 2 mg at 12/18/22 1012        Reviewed chart, history, vitals, labs, diagnostics and medications      Assessment/plan  Principal Problem:    Sepsis ([facility] [address]  Active Problems:    Uncontrolled diabetes mellitus with hyperglycemia ([facility] [address]    UTI (urinary tract infection)    Cellulitis of buttock    Metabolic acidemia    Starvation ketoacidosis  Resolved Problems:    * No resolved hospital problems.  *      Soft tissue infection of buttock  Probable UTI  ---Follow cultures  - Continue on vancomycin, cefepime and Flagyl  --Reduce IV fluid  --Continue to monitor site of infection  -- General surgery following, appreciate assistance    Uncontrolled diabetes with hyperglycemia, metabolic acidosis with starvation ketosis  --IV fluid  - Encourage adequate p.o. intake with diabetic diet  - Increase basal insulin dosing, continuing twice daily, add prandial insulin and continue SSI    Intermittently elevated BP, suspect component related to pain  - Continue pain control and anti-inflammatory measures  - Monitor BP, initiate and adjust antihypertensive regimen if persists      DVT prophylaxis Lovenox      Farhat Baez MD 12/18/2022 12:43 PM

## 2022-12-19 ENCOUNTER — ANESTHESIA EVENT (OUTPATIENT)
Dept: OPERATING ROOM | Age: 62
DRG: 871 | End: 2022-12-19
Payer: MEDICARE

## 2022-12-19 ENCOUNTER — ANESTHESIA (OUTPATIENT)
Dept: OPERATING ROOM | Age: 62
DRG: 871 | End: 2022-12-19
Payer: MEDICARE

## 2022-12-19 PROBLEM — L02.416 ABSCESS OF LEFT THIGH: Status: ACTIVE | Noted: 2022-12-16

## 2022-12-19 PROBLEM — L02.31 ABSCESS OF BUTTOCK, LEFT: Status: RESOLVED | Noted: 2022-12-19 | Resolved: 2022-12-19

## 2022-12-19 PROBLEM — L02.31 ABSCESS OF BUTTOCK, LEFT: Status: ACTIVE | Noted: 2022-12-19

## 2022-12-19 LAB
ANION GAP SERPL CALCULATED.3IONS-SCNC: 12 MMOL/L (ref 7–19)
BUN BLDV-MCNC: 6 MG/DL (ref 8–23)
CALCIUM SERPL-MCNC: 8 MG/DL (ref 8.8–10.2)
CHLORIDE BLD-SCNC: 100 MMOL/L (ref 98–111)
CO2: 22 MMOL/L (ref 22–29)
CREAT SERPL-MCNC: 0.5 MG/DL (ref 0.5–0.9)
GFR SERPL CREATININE-BSD FRML MDRD: >60 ML/MIN/{1.73_M2}
GLUCOSE BLD-MCNC: 186 MG/DL (ref 70–99)
GLUCOSE BLD-MCNC: 210 MG/DL (ref 74–109)
GLUCOSE BLD-MCNC: 227 MG/DL (ref 70–99)
GLUCOSE BLD-MCNC: 287 MG/DL (ref 70–99)
HCT VFR BLD CALC: 33.4 % (ref 37–47)
HEMOGLOBIN: 11 G/DL (ref 12–16)
MCH RBC QN AUTO: 32.2 PG (ref 27–31)
MCHC RBC AUTO-ENTMCNC: 32.9 G/DL (ref 33–37)
MCV RBC AUTO: 97.7 FL (ref 81–99)
ORGANISM: ABNORMAL
PDW BLD-RTO: 12.2 % (ref 11.5–14.5)
PERFORMED ON: ABNORMAL
PLATELET # BLD: 235 K/UL (ref 130–400)
PMV BLD AUTO: 11.5 FL (ref 9.4–12.3)
POTASSIUM SERPL-SCNC: 3.3 MMOL/L (ref 3.5–5)
RBC # BLD: 3.42 M/UL (ref 4.2–5.4)
SODIUM BLD-SCNC: 134 MMOL/L (ref 136–145)
URINE CULTURE, ROUTINE: ABNORMAL
URINE CULTURE, ROUTINE: ABNORMAL
WBC # BLD: 14.9 K/UL (ref 4.8–10.8)

## 2022-12-19 PROCEDURE — 3700000001 HC ADD 15 MINUTES (ANESTHESIA): Performed by: SURGERY

## 2022-12-19 PROCEDURE — 87070 CULTURE OTHR SPECIMN AEROBIC: CPT

## 2022-12-19 PROCEDURE — 2500000003 HC RX 250 WO HCPCS

## 2022-12-19 PROCEDURE — 85027 COMPLETE CBC AUTOMATED: CPT

## 2022-12-19 PROCEDURE — 6370000000 HC RX 637 (ALT 250 FOR IP): Performed by: SURGERY

## 2022-12-19 PROCEDURE — 7100000000 HC PACU RECOVERY - FIRST 15 MIN: Performed by: SURGERY

## 2022-12-19 PROCEDURE — 82947 ASSAY GLUCOSE BLOOD QUANT: CPT

## 2022-12-19 PROCEDURE — 2500000003 HC RX 250 WO HCPCS: Performed by: SURGERY

## 2022-12-19 PROCEDURE — 6360000002 HC RX W HCPCS: Performed by: HOSPITALIST

## 2022-12-19 PROCEDURE — A4216 STERILE WATER/SALINE, 10 ML: HCPCS | Performed by: ANESTHESIOLOGY

## 2022-12-19 PROCEDURE — 2580000003 HC RX 258: Performed by: STUDENT IN AN ORGANIZED HEALTH CARE EDUCATION/TRAINING PROGRAM

## 2022-12-19 PROCEDURE — 2500000003 HC RX 250 WO HCPCS: Performed by: STUDENT IN AN ORGANIZED HEALTH CARE EDUCATION/TRAINING PROGRAM

## 2022-12-19 PROCEDURE — 3600000003 HC SURGERY LEVEL 3 BASE: Performed by: SURGERY

## 2022-12-19 PROCEDURE — 80048 BASIC METABOLIC PNL TOTAL CA: CPT

## 2022-12-19 PROCEDURE — 6370000000 HC RX 637 (ALT 250 FOR IP): Performed by: STUDENT IN AN ORGANIZED HEALTH CARE EDUCATION/TRAINING PROGRAM

## 2022-12-19 PROCEDURE — 6360000002 HC RX W HCPCS: Performed by: SURGERY

## 2022-12-19 PROCEDURE — 2580000003 HC RX 258: Performed by: SURGERY

## 2022-12-19 PROCEDURE — 3600000013 HC SURGERY LEVEL 3 ADDTL 15MIN: Performed by: SURGERY

## 2022-12-19 PROCEDURE — 87075 CULTR BACTERIA EXCEPT BLOOD: CPT

## 2022-12-19 PROCEDURE — 1210000000 HC MED SURG R&B

## 2022-12-19 PROCEDURE — 2580000003 HC RX 258: Performed by: ANESTHESIOLOGY

## 2022-12-19 PROCEDURE — 87205 SMEAR GRAM STAIN: CPT

## 2022-12-19 PROCEDURE — 2580000003 HC RX 258

## 2022-12-19 PROCEDURE — 7100000001 HC PACU RECOVERY - ADDTL 15 MIN: Performed by: SURGERY

## 2022-12-19 PROCEDURE — 87077 CULTURE AEROBIC IDENTIFY: CPT

## 2022-12-19 PROCEDURE — 6360000002 HC RX W HCPCS: Performed by: STUDENT IN AN ORGANIZED HEALTH CARE EDUCATION/TRAINING PROGRAM

## 2022-12-19 PROCEDURE — 94760 N-INVAS EAR/PLS OXIMETRY 1: CPT

## 2022-12-19 PROCEDURE — 99233 SBSQ HOSP IP/OBS HIGH 50: CPT | Performed by: SURGERY

## 2022-12-19 PROCEDURE — 2500000003 HC RX 250 WO HCPCS: Performed by: ANESTHESIOLOGY

## 2022-12-19 PROCEDURE — 3700000000 HC ANESTHESIA ATTENDED CARE: Performed by: SURGERY

## 2022-12-19 PROCEDURE — 0KBR0ZZ EXCISION OF LEFT UPPER LEG MUSCLE, OPEN APPROACH: ICD-10-PCS | Performed by: SURGERY

## 2022-12-19 PROCEDURE — 10061 I&D ABSCESS COMP/MULTIPLE: CPT | Performed by: SURGERY

## 2022-12-19 PROCEDURE — 2709999900 HC NON-CHARGEABLE SUPPLY: Performed by: SURGERY

## 2022-12-19 PROCEDURE — 11043 DBRDMT MUSC&/FSCA 1ST 20/<: CPT | Performed by: SURGERY

## 2022-12-19 PROCEDURE — 36415 COLL VENOUS BLD VENIPUNCTURE: CPT

## 2022-12-19 PROCEDURE — 6360000002 HC RX W HCPCS

## 2022-12-19 PROCEDURE — 6370000000 HC RX 637 (ALT 250 FOR IP): Performed by: HOSPITALIST

## 2022-12-19 RX ORDER — LIDOCAINE HYDROCHLORIDE 10 MG/ML
INJECTION, SOLUTION EPIDURAL; INFILTRATION; INTRACAUDAL; PERINEURAL PRN
Status: DISCONTINUED | OUTPATIENT
Start: 2022-12-19 | End: 2022-12-19 | Stop reason: SDUPTHER

## 2022-12-19 RX ORDER — METOCLOPRAMIDE HYDROCHLORIDE 5 MG/ML
INJECTION INTRAMUSCULAR; INTRAVENOUS
Status: COMPLETED
Start: 2022-12-19 | End: 2022-12-19

## 2022-12-19 RX ORDER — FAMOTIDINE 10 MG/ML
INJECTION, SOLUTION INTRAVENOUS
Status: COMPLETED
Start: 2022-12-19 | End: 2022-12-19

## 2022-12-19 RX ORDER — EPHEDRINE SULFATE 50 MG/ML
INJECTION, SOLUTION INTRAVENOUS PRN
Status: DISCONTINUED | OUTPATIENT
Start: 2022-12-19 | End: 2022-12-19 | Stop reason: SDUPTHER

## 2022-12-19 RX ORDER — SODIUM CHLORIDE 9 MG/ML
INJECTION, SOLUTION INTRAVENOUS PRN
Status: DISCONTINUED | OUTPATIENT
Start: 2022-12-19 | End: 2022-12-21 | Stop reason: HOSPADM

## 2022-12-19 RX ORDER — ONDANSETRON 2 MG/ML
INJECTION INTRAMUSCULAR; INTRAVENOUS PRN
Status: DISCONTINUED | OUTPATIENT
Start: 2022-12-19 | End: 2022-12-19 | Stop reason: SDUPTHER

## 2022-12-19 RX ORDER — ONDANSETRON 2 MG/ML
4 INJECTION INTRAMUSCULAR; INTRAVENOUS
Status: DISCONTINUED | OUTPATIENT
Start: 2022-12-19 | End: 2022-12-19 | Stop reason: HOSPADM

## 2022-12-19 RX ORDER — HYDROMORPHONE HYDROCHLORIDE 1 MG/ML
0.5 INJECTION, SOLUTION INTRAMUSCULAR; INTRAVENOUS; SUBCUTANEOUS EVERY 5 MIN PRN
Status: DISCONTINUED | OUTPATIENT
Start: 2022-12-19 | End: 2022-12-19 | Stop reason: HOSPADM

## 2022-12-19 RX ORDER — SODIUM CHLORIDE, SODIUM LACTATE, POTASSIUM CHLORIDE, CALCIUM CHLORIDE 600; 310; 30; 20 MG/100ML; MG/100ML; MG/100ML; MG/100ML
INJECTION, SOLUTION INTRAVENOUS CONTINUOUS PRN
Status: DISCONTINUED | OUTPATIENT
Start: 2022-12-19 | End: 2022-12-19 | Stop reason: SDUPTHER

## 2022-12-19 RX ORDER — SODIUM CHLORIDE 0.9 % (FLUSH) 0.9 %
5-40 SYRINGE (ML) INJECTION EVERY 12 HOURS SCHEDULED
Status: DISCONTINUED | OUTPATIENT
Start: 2022-12-19 | End: 2022-12-21 | Stop reason: HOSPADM

## 2022-12-19 RX ORDER — FENTANYL CITRATE 50 UG/ML
INJECTION, SOLUTION INTRAMUSCULAR; INTRAVENOUS PRN
Status: DISCONTINUED | OUTPATIENT
Start: 2022-12-19 | End: 2022-12-19 | Stop reason: SDUPTHER

## 2022-12-19 RX ORDER — SODIUM CHLORIDE 0.9 % (FLUSH) 0.9 %
5-40 SYRINGE (ML) INJECTION PRN
Status: DISCONTINUED | OUTPATIENT
Start: 2022-12-19 | End: 2022-12-19 | Stop reason: HOSPADM

## 2022-12-19 RX ORDER — SODIUM HYPOCHLORITE 1.25 MG/ML
SOLUTION TOPICAL PRN
Status: DISCONTINUED | OUTPATIENT
Start: 2022-12-19 | End: 2022-12-19 | Stop reason: ALTCHOICE

## 2022-12-19 RX ORDER — HYDROCODONE BITARTRATE AND ACETAMINOPHEN 5; 325 MG/1; MG/1
2 TABLET ORAL EVERY 4 HOURS PRN
Status: DISCONTINUED | OUTPATIENT
Start: 2022-12-19 | End: 2022-12-21 | Stop reason: HOSPADM

## 2022-12-19 RX ORDER — SODIUM CHLORIDE 9 MG/ML
INJECTION, SOLUTION INTRAVENOUS CONTINUOUS
Status: DISCONTINUED | OUTPATIENT
Start: 2022-12-19 | End: 2022-12-21 | Stop reason: HOSPADM

## 2022-12-19 RX ORDER — SODIUM CHLORIDE 0.9 % (FLUSH) 0.9 %
5-40 SYRINGE (ML) INJECTION PRN
Status: DISCONTINUED | OUTPATIENT
Start: 2022-12-19 | End: 2022-12-21 | Stop reason: HOSPADM

## 2022-12-19 RX ORDER — PROPOFOL 10 MG/ML
INJECTION, EMULSION INTRAVENOUS PRN
Status: DISCONTINUED | OUTPATIENT
Start: 2022-12-19 | End: 2022-12-19 | Stop reason: SDUPTHER

## 2022-12-19 RX ORDER — METRONIDAZOLE 500 MG/100ML
INJECTION, SOLUTION INTRAVENOUS PRN
Status: DISCONTINUED | OUTPATIENT
Start: 2022-12-19 | End: 2022-12-19 | Stop reason: SDUPTHER

## 2022-12-19 RX ORDER — AMLODIPINE BESYLATE 5 MG/1
5 TABLET ORAL DAILY
Status: DISCONTINUED | OUTPATIENT
Start: 2022-12-19 | End: 2022-12-21 | Stop reason: HOSPADM

## 2022-12-19 RX ORDER — SODIUM CHLORIDE 0.9 % (FLUSH) 0.9 %
5-40 SYRINGE (ML) INJECTION EVERY 12 HOURS SCHEDULED
Status: DISCONTINUED | OUTPATIENT
Start: 2022-12-19 | End: 2022-12-19 | Stop reason: HOSPADM

## 2022-12-19 RX ORDER — DEXAMETHASONE SODIUM PHOSPHATE 10 MG/ML
INJECTION, SOLUTION INTRAMUSCULAR; INTRAVENOUS PRN
Status: DISCONTINUED | OUTPATIENT
Start: 2022-12-19 | End: 2022-12-19 | Stop reason: SDUPTHER

## 2022-12-19 RX ORDER — DEXMEDETOMIDINE HYDROCHLORIDE 100 UG/ML
INJECTION, SOLUTION INTRAVENOUS PRN
Status: DISCONTINUED | OUTPATIENT
Start: 2022-12-19 | End: 2022-12-19 | Stop reason: SDUPTHER

## 2022-12-19 RX ORDER — METOCLOPRAMIDE HYDROCHLORIDE 5 MG/ML
5 INJECTION INTRAMUSCULAR; INTRAVENOUS EVERY 6 HOURS
Status: DISCONTINUED | OUTPATIENT
Start: 2022-12-19 | End: 2022-12-19 | Stop reason: HOSPADM

## 2022-12-19 RX ORDER — HYDROMORPHONE HYDROCHLORIDE 1 MG/ML
0.25 INJECTION, SOLUTION INTRAMUSCULAR; INTRAVENOUS; SUBCUTANEOUS EVERY 5 MIN PRN
Status: DISCONTINUED | OUTPATIENT
Start: 2022-12-19 | End: 2022-12-19 | Stop reason: HOSPADM

## 2022-12-19 RX ORDER — SODIUM CHLORIDE 9 MG/ML
INJECTION, SOLUTION INTRAVENOUS PRN
Status: DISCONTINUED | OUTPATIENT
Start: 2022-12-19 | End: 2022-12-19 | Stop reason: HOSPADM

## 2022-12-19 RX ADMIN — FAMOTIDINE 20 MG: 10 INJECTION, SOLUTION INTRAVENOUS at 14:08

## 2022-12-19 RX ADMIN — NYSTATIN 500000 UNITS: 100000 SUSPENSION ORAL at 09:27

## 2022-12-19 RX ADMIN — FENTANYL CITRATE 25 MCG: 50 INJECTION, SOLUTION INTRAMUSCULAR; INTRAVENOUS at 14:23

## 2022-12-19 RX ADMIN — INSULIN LISPRO 4 UNITS: 100 INJECTION, SOLUTION INTRAVENOUS; SUBCUTANEOUS at 09:38

## 2022-12-19 RX ADMIN — METOCLOPRAMIDE HYDROCHLORIDE 5 MG: 5 INJECTION INTRAMUSCULAR; INTRAVENOUS at 14:05

## 2022-12-19 RX ADMIN — METRONIDAZOLE 500 MG: 500 INJECTION, SOLUTION INTRAVENOUS at 22:29

## 2022-12-19 RX ADMIN — MORPHINE SULFATE 2 MG: 2 INJECTION, SOLUTION INTRAMUSCULAR; INTRAVENOUS at 11:38

## 2022-12-19 RX ADMIN — ENOXAPARIN SODIUM 40 MG: 100 INJECTION SUBCUTANEOUS at 09:27

## 2022-12-19 RX ADMIN — FAMOTIDINE 20 MG: 20 TABLET ORAL at 20:45

## 2022-12-19 RX ADMIN — MICONAZOLE NITRATE: 20 POWDER TOPICAL at 09:30

## 2022-12-19 RX ADMIN — INSULIN GLARGINE 20 UNITS: 100 INJECTION, SOLUTION SUBCUTANEOUS at 09:43

## 2022-12-19 RX ADMIN — METRONIDAZOLE 500 MG: 500 INJECTION, SOLUTION INTRAVENOUS at 05:15

## 2022-12-19 RX ADMIN — SODIUM CHLORIDE, SODIUM LACTATE, POTASSIUM CHLORIDE, AND CALCIUM CHLORIDE: 600; 310; 30; 20 INJECTION, SOLUTION INTRAVENOUS at 13:53

## 2022-12-19 RX ADMIN — INSULIN LISPRO 8 UNITS: 100 INJECTION, SOLUTION INTRAVENOUS; SUBCUTANEOUS at 09:43

## 2022-12-19 RX ADMIN — EPHEDRINE SULFATE 10 MG: 50 INJECTION INTRAMUSCULAR; INTRAVENOUS; SUBCUTANEOUS at 14:57

## 2022-12-19 RX ADMIN — FENTANYL CITRATE 25 MCG: 50 INJECTION, SOLUTION INTRAMUSCULAR; INTRAVENOUS at 14:27

## 2022-12-19 RX ADMIN — POTASSIUM CHLORIDE 10 MEQ: 7.46 INJECTION, SOLUTION INTRAVENOUS at 06:28

## 2022-12-19 RX ADMIN — MORPHINE SULFATE 2 MG: 2 INJECTION, SOLUTION INTRAMUSCULAR; INTRAVENOUS at 04:30

## 2022-12-19 RX ADMIN — INSULIN GLARGINE 20 UNITS: 100 INJECTION, SOLUTION SUBCUTANEOUS at 21:31

## 2022-12-19 RX ADMIN — FAMOTIDINE 20 MG: 20 TABLET ORAL at 09:26

## 2022-12-19 RX ADMIN — PROPOFOL 50 MG: 10 INJECTION, EMULSION INTRAVENOUS at 14:24

## 2022-12-19 RX ADMIN — METRONIDAZOLE 500 MG: 500 SOLUTION INTRAVENOUS at 14:05

## 2022-12-19 RX ADMIN — ONDANSETRON 4 MG: 2 INJECTION INTRAMUSCULAR; INTRAVENOUS at 14:03

## 2022-12-19 RX ADMIN — FENTANYL CITRATE 50 MCG: 50 INJECTION, SOLUTION INTRAMUSCULAR; INTRAVENOUS at 13:57

## 2022-12-19 RX ADMIN — SODIUM CHLORIDE: 9 INJECTION, SOLUTION INTRAVENOUS at 16:14

## 2022-12-19 RX ADMIN — PROPOFOL 150 MG: 10 INJECTION, EMULSION INTRAVENOUS at 13:58

## 2022-12-19 RX ADMIN — NYSTATIN 500000 UNITS: 100000 SUSPENSION ORAL at 20:45

## 2022-12-19 RX ADMIN — EPHEDRINE SULFATE 10 MG: 50 INJECTION INTRAMUSCULAR; INTRAVENOUS; SUBCUTANEOUS at 14:53

## 2022-12-19 RX ADMIN — DIPHENHYDRAMINE HYDROCHLORIDE 12.5 MG: 25 TABLET ORAL at 20:44

## 2022-12-19 RX ADMIN — ASPIRIN 325 MG: 325 TABLET, COATED ORAL at 09:26

## 2022-12-19 RX ADMIN — CEFEPIME 2000 MG: 2 INJECTION, POWDER, FOR SOLUTION INTRAVENOUS at 20:50

## 2022-12-19 RX ADMIN — FAMOTIDINE 10 MG: 10 INJECTION, SOLUTION INTRAVENOUS at 14:07

## 2022-12-19 RX ADMIN — DEXAMETHASONE SODIUM PHOSPHATE 10 MG: 10 INJECTION, SOLUTION INTRAMUSCULAR; INTRAVENOUS at 14:03

## 2022-12-19 RX ADMIN — HYDROCODONE BITARTRATE AND ACETAMINOPHEN 2 TABLET: 5; 325 TABLET ORAL at 20:44

## 2022-12-19 RX ADMIN — VANCOMYCIN HYDROCHLORIDE 1250 MG: 5 INJECTION, POWDER, LYOPHILIZED, FOR SOLUTION INTRAVENOUS at 14:05

## 2022-12-19 RX ADMIN — MORPHINE SULFATE 2 MG: 2 INJECTION, SOLUTION INTRAMUSCULAR; INTRAVENOUS at 16:18

## 2022-12-19 RX ADMIN — MORPHINE SULFATE 2 MG: 2 INJECTION, SOLUTION INTRAMUSCULAR; INTRAVENOUS at 00:43

## 2022-12-19 RX ADMIN — DEXMEDETOMIDINE HYDROCHLORIDE 10 MCG: 100 INJECTION, SOLUTION, CONCENTRATE INTRAVENOUS at 14:28

## 2022-12-19 RX ADMIN — DIPHENHYDRAMINE HYDROCHLORIDE 12.5 MG: 25 TABLET ORAL at 09:44

## 2022-12-19 RX ADMIN — DEXMEDETOMIDINE HYDROCHLORIDE 10 MCG: 100 INJECTION, SOLUTION, CONCENTRATE INTRAVENOUS at 14:09

## 2022-12-19 RX ADMIN — DIPHENHYDRAMINE HYDROCHLORIDE 12.5 MG: 25 TABLET ORAL at 04:30

## 2022-12-19 RX ADMIN — CEFEPIME 2000 MG: 2 INJECTION, POWDER, FOR SOLUTION INTRAVENOUS at 09:27

## 2022-12-19 RX ADMIN — PANTOPRAZOLE SODIUM 40 MG: 40 TABLET, DELAYED RELEASE ORAL at 05:14

## 2022-12-19 RX ADMIN — MORPHINE SULFATE 2 MG: 2 INJECTION, SOLUTION INTRAMUSCULAR; INTRAVENOUS at 09:36

## 2022-12-19 RX ADMIN — LIDOCAINE HYDROCHLORIDE 50 MG: 10 INJECTION, SOLUTION EPIDURAL; INFILTRATION; INTRACAUDAL; PERINEURAL at 13:58

## 2022-12-19 ASSESSMENT — PAIN SCALES - GENERAL
PAINLEVEL_OUTOF10: 10
PAINLEVEL_OUTOF10: 10
PAINLEVEL_OUTOF10: 4
PAINLEVEL_OUTOF10: 6

## 2022-12-19 ASSESSMENT — PAIN DESCRIPTION - LOCATION
LOCATION: GROIN
LOCATION: GROIN
LOCATION: LEG
LOCATION: GROIN

## 2022-12-19 ASSESSMENT — PAIN DESCRIPTION - DESCRIPTORS
DESCRIPTORS: BURNING
DESCRIPTORS: BURNING

## 2022-12-19 ASSESSMENT — PAIN DESCRIPTION - ORIENTATION: ORIENTATION: LEFT

## 2022-12-19 ASSESSMENT — LIFESTYLE VARIABLES: SMOKING_STATUS: 0

## 2022-12-19 NOTE — BRIEF OP NOTE
Brief Postoperative Note      Patient: Brent Qureshi  YOB: 1960  MRN: [id number]    Date of Procedure: 12/19/2022    Pre-Op Diagnosis: Abscess of left thigh [L02.416]    Post-Op Diagnosis: Same and with necrotic tissue       Procedure(s):  INCISION AND DRAINAGE OF LEFT UPPER THIGH ABSCESS WITH DEBRIDEMENT    Surgeon(s):  Kimberly New MD    Assistant:  * No surgical staff found *    Anesthesia: General    Estimated Blood Loss (mL): less than 022     Complications: None    Specimens:   ID Type Source Tests Collected by Time Destination   1 : Left buttock wound Swab Buttock CULTURE, SURGICAL Kimberly New MD 12/19/2022 1432        Implants:  * No implants in log *      Drains: * No LDAs found *    Findings: large abscess tunneling toward vulva with necrotic tissue, drained large amount of very thick tan purulence    Electronically signed by Kimberly New MD on 12/19/2022 at 2:41 PM

## 2022-12-19 NOTE — PROGRESS NOTES
Subjective:  Left upper thigh/buttock abscess causing more pain today, difficult to keep it controlled. Objective:  BP (!) 162/70   Pulse 92   Temp 97.9 °F (36.6 °C) (Temporal)   Resp 18   Ht 5' 2\" (1.575 m)   Wt 175 lb 8 oz (79.6 kg)   SpO2 97%   BMI 32.10 kg/m²     General: mild distress due to pain, AAO  Chest: Regular, non-labored  Abdomen: Soft, ND  Left upper thigh/buttock: erythema, tense induration, area that may have been previous opening with no real opening, minimal drainage currently    CBC:   Lab Results   Component Value Date/Time    WBC 14.9 12/19/2022 02:23 AM    RBC 3.42 12/19/2022 02:23 AM    HGB 11.0 12/19/2022 02:23 AM    HCT 33.4 12/19/2022 02:23 AM    MCV 97.7 12/19/2022 02:23 AM    MCH 32.2 12/19/2022 02:23 AM    MCHC 32.9 12/19/2022 02:23 AM    RDW 12.2 12/19/2022 02:23 AM     12/19/2022 02:23 AM    MPV 11.5 12/19/2022 02:23 AM     BMP:    Lab Results   Component Value Date/Time     12/19/2022 02:23 AM    K 3.3 12/19/2022 02:23 AM    K 3.9 12/16/2022 09:32 PM     12/19/2022 02:23 AM    CO2 22 12/19/2022 02:23 AM    BUN 6 12/19/2022 02:23 AM    LABALBU 3.7 12/16/2022 09:32 PM    CREATININE 0.5 12/19/2022 02:23 AM    CALCIUM 8.0 12/19/2022 02:23 AM    GFRAA >59 05/20/2021 07:11 AM    LABGLOM >60 12/19/2022 02:23 AM    GLUCOSE 210 12/19/2022 02:23 AM     Assessment and plan:  60year old female with abscess left upper thigh/buttock  Her WBC is going down a bit, but her pain is worsening, significant erythema, induration, and appearance of evolving abscess. I discussed with the patient going to OR for I&D, risks and benefits discussed especially bleeding. Patient expressed understanding and agreement with plan.

## 2022-12-19 NOTE — PROGRESS NOTES
Returned patient's dentures and ring to room at 1545.  present in room with patient.   Electronically signed by Alicia Rucker RN on 12/19/2022 at 3:48 PM

## 2022-12-19 NOTE — CARE COORDINATION
12/19/22 1136   Service Assessment   Patient Orientation Alert and Oriented;Person;Place;Situation;Self   Cognition Alert   History Provided By Patient   Primary Caregiver Self   Support Systems Spouse/Significant Other   Patient's Healthcare Decision Maker is: Legal Next of Kin   PCP Verified by CM Yes   Last Visit to PCP Within last year   Prior Functional Level Independent in ADLs/IADLs   Current Functional Level Independent in ADLs/IADLs   Ability to make needs known: Good   Family able to assist with home care needs: Yes   Would you like for me to discuss the discharge plan with any other family members/significant others, and if so, who? Yes   Social/Functional History   Lives With Significant other   Type of [address] Shower/Tub Tub/Shower unit   [address]   Homemaking Assistance Independent   Homemaking Responsibilities Yes   Ambulation Assistance Independent   Transfer Assistance Independent   Active  Yes   Mode of Transportation Car   Discharge Planning   Living Arrangements Spouse/Significant Other   One/Two Story Residence One story   [facility] Discharge   Mode of Transport at Discharge Other (see comment)   Confirm Follow Up Transport Family   Condition of Participation: Discharge Planning   The Patient and/or Patient Representative was provided with a Choice of Provider? Patient   The Patient and/Or Patient Representative agree with the Discharge Plan? Yes   Freedom of Choice list was provided with basic dialogue that supports the patient's individualized plan of care/goals, treatment preferences, and shares the quality data associated with the providers? Yes   Patient Contact Information:  [address]  [phone] (home)   Above information verified?     [x]   Yes  []   No    Had HOME OXYGEN prior to admission:    Mat Guerrero 262:       Has a pulse oximetry unit at home:   []   Yes  [x]   No    Informed of need

## 2022-12-19 NOTE — ANESTHESIA PRE PROCEDURE
Department of Anesthesiology  Preprocedure Note       Name:  Constantino Cerrato   Age:  60 y.o.  :  1960                                          MRN:  [id number]         Date:  2022      Surgeon: Candido Irwin):  Debbie Gaffney MD    Procedure: Procedure(s):  LEG DEBRIDEMENT INCISION AND DRAINAGE    Medications prior to admission:   Prior to Admission medications    Medication Sig Start Date End Date Taking? Authorizing Provider   nystatin (MYCOSTATIN) 663541 UNIT/GM cream Apply topically 2 times daily. 22   MOOK Pool   pantoprazole (PROTONIX) 40 MG tablet Take 1 tablet by mouth every morning (before breakfast) 22   MOOK Pool   nystatin (MYCOSTATIN) 595574 UNIT/GM cream Apply topically 2 times daily. 22   MOOK Pool   rOPINIRole (REQUIP) 0.5 MG tablet TAKE 1-2 TABLETS BY MOUTH EVERY NIGHT AT BEDTIME FOR RESTLESS LEGS  Patient not taking: No sig reported 21   MOOK Pool   insulin aspart (NOVOLOG) 100 UNIT/ML injection vial Inject 20 Units into the skin 3 times daily (before meals)  20   Historical Provider, MD   LANTUS 100 UNIT/ML injection vial Inject 60 Units into the skin nightly  20   Historical Provider, MD   Insulin Syringe-Needle U-100 (INSULIN SYRINGE .5CC/30GX5/16\") 30G X 516\" 0.5 ML MISC USE WITH INSULIN EVERY DAY 20   MOOK Pool   B-D ULTRAFINE III SHORT PEN 31G X 8 MM MISC USE ONCE DAILY 10/29/19   MOOK Pool   blood glucose monitor strips For qid testing Type 2 Dm Dispense brand per patient request or insurance benefits.  10/4/19   MOOK Pool   vitamin D (ERGOCALCIFEROL) 18763 units CAPS capsule TAKE 1 CAPSULE BY MOUTH 1 TIME A WEEK 18   MOOK Pool   glucose monitoring kit (FREESTYLE) monitoring kit For bid testing Type 2 dm 17   MOOK Pool   Lancets MISC 1 each by Does not apply route 2 times daily Dispense brand per insurance benefits and patient request. 17   MOOK Romo   Ez Smart Blood Glucose Lancets MISC For qid and prn testing for newly diagnosed diabetes DX: Diabetes, insulin requiring 7/22/15   MOOK Pool   glucose blood VI test strips (ASCENSIA AUTODISC VI;ONE TOUCH ULTRA TEST VI) strip 1 each by In Vitro route 4 times daily (with meals and nightly) For qid and prn testing for newly diagnosed diabetes  Freestyle Freedom lite    DX: Diabetes, insulin requiring 7/22/15   MOOK Pool   aspirin EC 81 MG EC tablet Take 1 tablet by mouth daily 7/22/15   MOOK Pool   acetaminophen (TYLENOL) 650 MG extended release tablet Take 650 mg by mouth every 8 hours as needed for Pain.     Historical Provider, MD       Current medications:    Current Facility-Administered Medications   Medication Dose Route Frequency Provider Last Rate Last Admin    sodium chloride flush 0.9 % injection 5-40 mL  5-40 mL IntraVENous 2 times per day Tony Gay MD        sodium chloride flush 0.9 % injection 5-40 mL  5-40 mL IntraVENous PRN Tony Gay MD        0.9 % sodium chloride infusion   IntraVENous PRN Tony Gay MD        insulin glargine (LANTUS) injection vial 20 Units  20 Units SubCUTAneous BID Aretha Plascencia MD   20 Units at 12/19/22 0943    glucose chewable tablet 16 g  4 tablet Oral PRN Aretha Plascencia MD        dextrose bolus 10% 125 mL  125 mL IntraVENous PRN Aretha Plascencia MD        Or    dextrose bolus 10% 250 mL  250 mL IntraVENous PRN Aretha Plascencia MD        glucagon (rDNA) injection 1 mg  1 mg SubCUTAneous PRN Aretha Plascencia MD        dextrose 10 % infusion   IntraVENous Continuous PRN Aretha Plascencia MD        insulin lispro (HUMALOG) injection vial 8 Units  8 Units SubCUTAneous TID WC Aretha Plascencia MD   8 Units at 12/19/22 0943    dextrose bolus 10% 125 mL  125 mL IntraVENous PRN Sefernio Alvarado MD        Or    dextrose bolus 10% 250 mL  250 mL IntraVENous PRN Seferino Alvarado MD        potassium chloride 10 mEq/100 mL IVPB (Peripheral Line)  10 mEq IntraVENous PRN Emma Garcia  mL/hr at 12/19/22 0628 10 mEq at 12/19/22 6942    magnesium sulfate 1000 mg in dextrose 5% 100 mL IVPB  1,000 mg IntraVENous PRN Emma Garcia MD        sodium phosphate 10 mmol in sodium chloride 0.9 % 250 mL IVPB  10 mmol IntraVENous PRN Emma Garcia MD        Or    sodium phosphate 15 mmol in dextrose 5 % 250 mL IVPB  15 mmol IntraVENous PRN Emma Garcia MD        Or    sodium phosphate 20 mmol in dextrose 5 % 500 mL IVPB  20 mmol IntraVENous PRN Emma Garcia MD   Stopped at 12/17/22 1517    polyethylene glycol (GLYCOLAX) packet 17 g  17 g Oral Daily PRN Emma Garcia MD        enoxaparin (LOVENOX) injection 40 mg  40 mg SubCUTAneous Daily Emma Garcia MD   40 mg at 12/19/22 0927    dextrose 5 % and 0.45 % NaCl with KCl 20 mEq infusion   IntraVENous Continuous PRN Emma Garcia MD   Stopped at 12/17/22 0815    nystatin (MYCOSTATIN) 121933 UNIT/ML suspension 500,000 Units  5 mL Oral 4x Daily Emma Garcia MD   500,000 Units at 12/19/22 0927    miconazole (MICOTIN) 2 % powder   Topical BID Emma Garcia MD   Given at 12/19/22 0930    acetaminophen (TYLENOL) tablet 650 mg  650 mg Oral Q4H PRN Emma Garcia MD   650 mg at 12/17/22 1243    insulin lispro (HUMALOG) injection vial 0-8 Units  0-8 Units SubCUTAneous TID WC Nora Middleton MD   4 Units at 12/19/22 0938    insulin lispro (HUMALOG) injection vial 0-4 Units  0-4 Units SubCUTAneous Nightly Nora Middleton MD   4 Units at 12/17/22 2210    0.9 % sodium chloride infusion   IntraVENous Continuous Nora Middleton MD 75 mL/hr at 12/18/22 2007 New Bag at 12/18/22 2007    cefepime (MAXIPIME) 2,000 mg in sodium chloride 0.9 % 50 mL IVPB ([id number])  2,000 mg IntraVENous Q12H Nora Middleton MD 12.5 mL/hr at 12/19/22 0927 2,000 mg at 12/19/22 0927    pantoprazole (PROTONIX) tablet 40 mg  40 mg Oral QAM AC Joya Urban MD   40 mg at 12/19/22 0514    vancomycin (VANCOCIN) 1,250 mg in sodium chloride 0.9 % 250 mL IVPB  1,250 mg IntraVENous Q18H Joya Urban MD   Stopped at 12/19/22 0013    vancomycin (VANCOCIN) intermittent dosing (placeholder)   Other RX Placeholder Joya Urban MD        HYDROcodone-acetaminophen (NORCO) 5-325 MG per tablet 1 tablet  1 tablet Oral Q4H PRN Joya Urban MD   1 tablet at 12/18/22 2137    labetalol (NORMODYNE;TRANDATE) injection 10 mg  10 mg IntraVENous Q4H PRN Joya Urban MD        metronidazole (FLAGYL) 500 mg in 0.9% NaCl 100 mL IVPB premix  500 mg IntraVENous Q8H Joya Urban MD   Stopped at 12/19/22 0622    famotidine (PEPCID) tablet 20 mg  20 mg Oral BID Yovany Marie MD   20 mg at 12/19/22 0926    diphenhydrAMINE (BENADRYL) tablet 12.5 mg  12.5 mg Oral Q6H Yovany Marie MD   12.5 mg at 12/19/22 0944    aspirin EC tablet 325 mg  325 mg Oral Daily Yovany Marie MD   325 mg at 12/19/22 0926    morphine (PF) injection 2 mg  2 mg IntraVENous Q2H PRN Yovany Marie MD   2 mg at 12/19/22 1138       Allergies: Allergies   Allergen Reactions    Sulfa Antibiotics Hives       Problem List:    Patient Active Problem List   Diagnosis Code    Hypertension I10    Back pain M54.9    Depression F32. A    Hull's esophagus without dysplasia K22.70    History of pleural empyema Z87.09    Thoracotomy scar of left chest L90.5    Morbidly obese ([facility]) E66.01    Uncontrolled type 2 diabetes mellitus with hyperglycemia, with long-term current use of insulin ([facility]) E11.65, Z79.4    Type 2 diabetes mellitus E11.9    Uncontrolled diabetes mellitus with hyperglycemia ([facility]) E11.65    UTI (urinary tract infection) N39.0    Cellulitis of buttock L03.317    Sepsis ([facility]) I60.3    Metabolic acidemia W08.63    Starvation ketoacidosis T73. 0XXA, E87.29    Abscess of left thigh L02.416       Past Medical History: Diagnosis Date    Back pain 2014    Carotid artery stenosis     Depression 2014    Diabetes ([facility]     GERD (gastroesophageal reflux disease)     Hyperlipidemia     Hypertension 2014    Morbidly obese ([facility] 7/15/2020    Type II or unspecified type diabetes mellitus without mention of complication, not stated as uncontrolled     Uncontrolled type 2 diabetes mellitus with hyperglycemia, with long-term current use of insulin ([facility] 2022       Past Surgical History:        Procedure Laterality Date   Makenna Morgan 1st surgery and Dr. Joanna Joyner 2nd surgery    DILATION AND CURETTAGE OF UTERUS      HYSTERECTOMY (CERVIX STATUS UNKNOWN) N/A        Social History:    Social History     Tobacco Use    Smoking status: Former     Packs/day: 0.50     Years: 20.00     Pack years: 10.00     Types: Cigarettes     Start date: 1983     Quit date: 2014     Years since quittin.3    Smokeless tobacco: Never   Substance Use Topics    Alcohol use: No     Alcohol/week: 0.0 standard drinks                                Counseling given: Not Answered      Vital Signs (Current):   Vitals:    22 0408 22 0529 22 0718 22 0736   BP: 117/69   (!) 162/70   Pulse: 87   92   Resp: 20   18   Temp: 97.7 °F (36.5 °C)   97.9 °F (36.6 °C)   TempSrc: Temporal   Temporal   SpO2: 92%  94% 97%   Weight:  175 lb 8 oz (79.6 kg)     Height:                                                  BP Readings from Last 3 Encounters:   22 (!) 162/70   22 130/82   22 122/80       NPO Status:                                                                                 BMI:   Wt Readings from Last 3 Encounters:   22 175 lb 8 oz (79.6 kg)   22 168 lb (76.2 kg)   22 168 lb 12.8 oz (76.6 kg)     Body mass index is 32.1 kg/m².     CBC:   Lab Results   Component Value Date/Time    WBC 14.9 2022 02:23 AM    RBC 3.42 2022 02:23 AM    HGB 11.0 2022 02:23 AM    HCT 33.4 12/19/2022 02:23 AM    MCV 97.7 12/19/2022 02:23 AM    RDW 12.2 12/19/2022 02:23 AM     12/19/2022 02:23 AM       CMP:   Lab Results   Component Value Date/Time     12/19/2022 02:23 AM    K 3.3 12/19/2022 02:23 AM    K 3.9 12/16/2022 09:32 PM     12/19/2022 02:23 AM    CO2 22 12/19/2022 02:23 AM    BUN 6 12/19/2022 02:23 AM    CREATININE 0.5 12/19/2022 02:23 AM    GFRAA >59 05/20/2021 07:11 AM    LABGLOM >60 12/19/2022 02:23 AM    GLUCOSE 210 12/19/2022 02:23 AM    PROT 7.6 12/16/2022 09:32 PM    CALCIUM 8.0 12/19/2022 02:23 AM    BILITOT 0.4 12/16/2022 09:32 PM    ALKPHOS 195 12/16/2022 09:32 PM    AST 23 12/16/2022 09:32 PM    ALT 21 12/16/2022 09:32 PM       POC Tests:   Recent Labs     12/17/22  2146   POCGLU 374*       Coags: No results found for: PROTIME, INR, APTT    HCG (If Applicable): No results found for: PREGTESTUR, PREGSERUM, HCG, HCGQUANT     ABGs: No results found for: PHART, PO2ART, WRH0QWE, DPP8SWC, BEART, W3IQGKHA     Type & Screen (If Applicable):  No results found for: LABABO, LABRH    Drug/Infectious Status (If Applicable):  No results found for: HIV, HEPCAB    COVID-19 Screening (If Applicable):   Lab Results   Component Value Date/Time    COVID19 Not Detected 12/16/2022 10:22 PM           Anesthesia Evaluation  Patient summary reviewed no history of anesthetic complications:   Airway: Mallampati: II  TM distance: >3 FB   Neck ROM: full  Mouth opening: > = 3 FB   Dental:    (+) edentulous      Pulmonary:normal exam  breath sounds clear to auscultation      (-) asthma, recent URI, sleep apnea and not a current smoker          Patient did not smoke on day of surgery.                 ROS comment: Pleurodesis 2020   Cardiovascular:  Exercise tolerance: good (>4 METS),   (+) hypertension:,     (-) pacemaker, past MI, CABG/stent and  angina      Rhythm: regular  Rate: normal           Beta Blocker:  Not on Beta Blocker         Neuro/Psych:   (+) psychiatric history (Depression):   (-) seizures, TIA and CVA           GI/Hepatic/Renal:   (+) GERD: well controlled,      (-) liver disease and no renal disease      ROS comment: Hull's Esophagus. Endo/Other:    (+) DiabetesType II DM, poorly controlled, using insulin, .    (-) hypothyroidism, hyperthyroidism               Abdominal:             Vascular:     - DVT. Other Findings:           Anesthesia Plan      general     ASA 3 - emergent     (Preop famotidine, metoclopramide)  Induction: intravenous. MIPS: Postoperative opioids intended and Prophylactic antiemetics administered. Anesthetic plan and risks discussed with patient. Use of blood products discussed with patient whom consented to blood products.                      Layla Hernandez MD   12/19/2022

## 2022-12-19 NOTE — ANESTHESIA POSTPROCEDURE EVALUATION
Department of Anesthesiology  Postprocedure Note    Patient: Moses Mary  MRN: [id number]  YOB: 1960  Date of evaluation: 12/19/2022      Procedure Summary     Date: 12/19/22 Room / Location: [address]    Anesthesia Start: 5816 Anesthesia Stop: 0435    Procedure: INCISION AND DRAINAGE OF LEFT UPPER THIGH ABSCESS WITH DEBRIDEMENT (Left) Diagnosis:       Abscess of left thigh      (Abscess of left thigh [L02.416])    Surgeons: Benji Sanchez MD Responsible Provider: MOOK Quevedo CRNA    Anesthesia Type: general ASA Status: 3 - Emergent          Anesthesia Type: No value filed.     Pam Phase I: Pam Score: 5    Pam Phase II:        Anesthesia Post Evaluation    Patient location during evaluation: PACU  Patient participation: waiting for patient participation  Level of consciousness: responsive to painful stimuli  Pain score: 0  Airway patency: patent (LMA still in place)  Nausea & Vomiting: no nausea and no vomiting  Complications: no  Cardiovascular status: blood pressure returned to baseline  Respiratory status: acceptable and nasal cannula  Hydration status: euvolemic

## 2022-12-20 LAB
ANION GAP SERPL CALCULATED.3IONS-SCNC: 15 MMOL/L (ref 7–19)
BUN BLDV-MCNC: 12 MG/DL (ref 8–23)
CALCIUM SERPL-MCNC: 8.3 MG/DL (ref 8.8–10.2)
CHLORIDE BLD-SCNC: 97 MMOL/L (ref 98–111)
CO2: 21 MMOL/L (ref 22–29)
CREAT SERPL-MCNC: 0.6 MG/DL (ref 0.5–0.9)
GFR SERPL CREATININE-BSD FRML MDRD: >60 ML/MIN/{1.73_M2}
GLUCOSE BLD-MCNC: 209 MG/DL (ref 70–99)
GLUCOSE BLD-MCNC: 302 MG/DL (ref 70–99)
GLUCOSE BLD-MCNC: 336 MG/DL (ref 70–99)
GLUCOSE BLD-MCNC: 350 MG/DL (ref 70–99)
GLUCOSE BLD-MCNC: 355 MG/DL (ref 70–99)
GLUCOSE BLD-MCNC: 413 MG/DL (ref 70–99)
GLUCOSE BLD-MCNC: 493 MG/DL (ref 74–109)
HCT VFR BLD CALC: 35.4 % (ref 37–47)
HEMOGLOBIN: 11.7 G/DL (ref 12–16)
MCH RBC QN AUTO: 32.3 PG (ref 27–31)
MCHC RBC AUTO-ENTMCNC: 33.1 G/DL (ref 33–37)
MCV RBC AUTO: 97.8 FL (ref 81–99)
PDW BLD-RTO: 12.2 % (ref 11.5–14.5)
PERFORMED ON: ABNORMAL
PLATELET # BLD: 281 K/UL (ref 130–400)
PMV BLD AUTO: 11.2 FL (ref 9.4–12.3)
POTASSIUM SERPL-SCNC: 4.4 MMOL/L (ref 3.5–5)
RBC # BLD: 3.62 M/UL (ref 4.2–5.4)
SODIUM BLD-SCNC: 133 MMOL/L (ref 136–145)
VANCOMYCIN TROUGH: 5.6 UG/ML (ref 10–20)
WBC # BLD: 10.6 K/UL (ref 4.8–10.8)

## 2022-12-20 PROCEDURE — 6370000000 HC RX 637 (ALT 250 FOR IP): Performed by: HOSPITALIST

## 2022-12-20 PROCEDURE — 6360000002 HC RX W HCPCS: Performed by: SURGERY

## 2022-12-20 PROCEDURE — 80048 BASIC METABOLIC PNL TOTAL CA: CPT

## 2022-12-20 PROCEDURE — 2580000003 HC RX 258: Performed by: STUDENT IN AN ORGANIZED HEALTH CARE EDUCATION/TRAINING PROGRAM

## 2022-12-20 PROCEDURE — 6370000000 HC RX 637 (ALT 250 FOR IP): Performed by: SURGERY

## 2022-12-20 PROCEDURE — 99024 POSTOP FOLLOW-UP VISIT: CPT | Performed by: SURGERY

## 2022-12-20 PROCEDURE — 1210000000 HC MED SURG R&B

## 2022-12-20 PROCEDURE — 85027 COMPLETE CBC AUTOMATED: CPT

## 2022-12-20 PROCEDURE — 6370000000 HC RX 637 (ALT 250 FOR IP): Performed by: STUDENT IN AN ORGANIZED HEALTH CARE EDUCATION/TRAINING PROGRAM

## 2022-12-20 PROCEDURE — 2580000003 HC RX 258: Performed by: HOSPITALIST

## 2022-12-20 PROCEDURE — 2500000003 HC RX 250 WO HCPCS: Performed by: SURGERY

## 2022-12-20 PROCEDURE — 80202 ASSAY OF VANCOMYCIN: CPT

## 2022-12-20 PROCEDURE — 2580000003 HC RX 258: Performed by: SURGERY

## 2022-12-20 PROCEDURE — 6360000002 HC RX W HCPCS: Performed by: HOSPITALIST

## 2022-12-20 PROCEDURE — 94760 N-INVAS EAR/PLS OXIMETRY 1: CPT

## 2022-12-20 PROCEDURE — 36415 COLL VENOUS BLD VENIPUNCTURE: CPT

## 2022-12-20 PROCEDURE — 82947 ASSAY GLUCOSE BLOOD QUANT: CPT

## 2022-12-20 RX ORDER — INSULIN GLARGINE 100 [IU]/ML
30 INJECTION, SOLUTION SUBCUTANEOUS 2 TIMES DAILY
Status: DISCONTINUED | OUTPATIENT
Start: 2022-12-20 | End: 2022-12-21 | Stop reason: HOSPADM

## 2022-12-20 RX ORDER — SODIUM HYPOCHLORITE 1.25 MG/ML
SOLUTION TOPICAL 2 TIMES DAILY
Status: DISCONTINUED | OUTPATIENT
Start: 2022-12-20 | End: 2022-12-21 | Stop reason: HOSPADM

## 2022-12-20 RX ORDER — INSULIN GLARGINE 100 [IU]/ML
20 INJECTION, SOLUTION SUBCUTANEOUS 2 TIMES DAILY
Status: DISCONTINUED | OUTPATIENT
Start: 2022-12-20 | End: 2022-12-20

## 2022-12-20 RX ORDER — SODIUM BICARBONATE 650 MG/1
650 TABLET ORAL 4 TIMES DAILY
Status: DISCONTINUED | OUTPATIENT
Start: 2022-12-20 | End: 2022-12-20

## 2022-12-20 RX ORDER — INSULIN LISPRO 100 [IU]/ML
10 INJECTION, SOLUTION INTRAVENOUS; SUBCUTANEOUS
Status: DISCONTINUED | OUTPATIENT
Start: 2022-12-20 | End: 2022-12-21 | Stop reason: HOSPADM

## 2022-12-20 RX ADMIN — METRONIDAZOLE 500 MG: 500 INJECTION, SOLUTION INTRAVENOUS at 06:30

## 2022-12-20 RX ADMIN — AMLODIPINE BESYLATE 5 MG: 5 TABLET ORAL at 08:02

## 2022-12-20 RX ADMIN — HYDROCODONE BITARTRATE AND ACETAMINOPHEN 2 TABLET: 5; 325 TABLET ORAL at 20:30

## 2022-12-20 RX ADMIN — SODIUM BICARBONATE 650 MG: 650 TABLET ORAL at 08:02

## 2022-12-20 RX ADMIN — HYDROCODONE BITARTRATE AND ACETAMINOPHEN 2 TABLET: 5; 325 TABLET ORAL at 00:45

## 2022-12-20 RX ADMIN — METRONIDAZOLE 500 MG: 500 INJECTION, SOLUTION INTRAVENOUS at 15:03

## 2022-12-20 RX ADMIN — INSULIN GLARGINE 30 UNITS: 100 INJECTION, SOLUTION SUBCUTANEOUS at 20:49

## 2022-12-20 RX ADMIN — INSULIN LISPRO 6 UNITS: 100 INJECTION, SOLUTION INTRAVENOUS; SUBCUTANEOUS at 08:16

## 2022-12-20 RX ADMIN — INSULIN LISPRO 8 UNITS: 100 INJECTION, SOLUTION INTRAVENOUS; SUBCUTANEOUS at 08:16

## 2022-12-20 RX ADMIN — CEFEPIME 2000 MG: 2 INJECTION, POWDER, FOR SOLUTION INTRAVENOUS at 08:01

## 2022-12-20 RX ADMIN — MICONAZOLE NITRATE: 20 POWDER TOPICAL at 23:18

## 2022-12-20 RX ADMIN — SODIUM CHLORIDE, PRESERVATIVE FREE 10 ML: 5 INJECTION INTRAVENOUS at 10:55

## 2022-12-20 RX ADMIN — NYSTATIN 500000 UNITS: 100000 SUSPENSION ORAL at 20:29

## 2022-12-20 RX ADMIN — INSULIN LISPRO 6 UNITS: 100 INJECTION, SOLUTION INTRAVENOUS; SUBCUTANEOUS at 15:12

## 2022-12-20 RX ADMIN — INSULIN GLARGINE 20 UNITS: 100 INJECTION, SOLUTION SUBCUTANEOUS at 08:03

## 2022-12-20 RX ADMIN — INSULIN GLARGINE 20 UNITS: 100 INJECTION, SOLUTION SUBCUTANEOUS at 03:11

## 2022-12-20 RX ADMIN — HYDROCODONE BITARTRATE AND ACETAMINOPHEN 2 TABLET: 5; 325 TABLET ORAL at 04:50

## 2022-12-20 RX ADMIN — MORPHINE SULFATE 2 MG: 2 INJECTION, SOLUTION INTRAMUSCULAR; INTRAVENOUS at 12:01

## 2022-12-20 RX ADMIN — DIPHENHYDRAMINE HYDROCHLORIDE 12.5 MG: 25 TABLET ORAL at 10:55

## 2022-12-20 RX ADMIN — HYDROCODONE BITARTRATE AND ACETAMINOPHEN 2 TABLET: 5; 325 TABLET ORAL at 10:54

## 2022-12-20 RX ADMIN — ENOXAPARIN SODIUM 40 MG: 100 INJECTION SUBCUTANEOUS at 08:02

## 2022-12-20 RX ADMIN — SODIUM CHLORIDE: 9 INJECTION, SOLUTION INTRAVENOUS at 04:32

## 2022-12-20 RX ADMIN — CEFEPIME 2000 MG: 2 INJECTION, POWDER, FOR SOLUTION INTRAVENOUS at 20:34

## 2022-12-20 RX ADMIN — DIPHENHYDRAMINE HYDROCHLORIDE 12.5 MG: 25 TABLET ORAL at 16:18

## 2022-12-20 RX ADMIN — SODIUM HYPOCHLORITE: 1.25 SOLUTION TOPICAL at 15:08

## 2022-12-20 RX ADMIN — PANTOPRAZOLE SODIUM 40 MG: 40 TABLET, DELAYED RELEASE ORAL at 06:27

## 2022-12-20 RX ADMIN — DIPHENHYDRAMINE HYDROCHLORIDE 12.5 MG: 25 TABLET ORAL at 04:31

## 2022-12-20 RX ADMIN — VANCOMYCIN HYDROCHLORIDE 1250 MG: 5 INJECTION, POWDER, LYOPHILIZED, FOR SOLUTION INTRAVENOUS at 15:04

## 2022-12-20 RX ADMIN — INSULIN HUMAN 5 UNITS: 100 INJECTION, SOLUTION PARENTERAL at 03:11

## 2022-12-20 RX ADMIN — NYSTATIN 500000 UNITS: 100000 SUSPENSION ORAL at 16:18

## 2022-12-20 RX ADMIN — HYDROCODONE BITARTRATE AND ACETAMINOPHEN 2 TABLET: 5; 325 TABLET ORAL at 15:01

## 2022-12-20 RX ADMIN — INSULIN LISPRO 10 UNITS: 100 INJECTION, SOLUTION INTRAVENOUS; SUBCUTANEOUS at 15:08

## 2022-12-20 RX ADMIN — NYSTATIN 500000 UNITS: 100000 SUSPENSION ORAL at 08:02

## 2022-12-20 RX ADMIN — MORPHINE SULFATE 2 MG: 2 INJECTION, SOLUTION INTRAMUSCULAR; INTRAVENOUS at 16:17

## 2022-12-20 RX ADMIN — ASPIRIN 325 MG: 325 TABLET, COATED ORAL at 08:02

## 2022-12-20 RX ADMIN — FAMOTIDINE 20 MG: 20 TABLET ORAL at 08:02

## 2022-12-20 ASSESSMENT — PAIN DESCRIPTION - ORIENTATION
ORIENTATION: LEFT

## 2022-12-20 ASSESSMENT — PAIN DESCRIPTION - LOCATION
LOCATION: LEG
LOCATION: LEG
LOCATION: BUTTOCKS

## 2022-12-20 ASSESSMENT — PAIN DESCRIPTION - DESCRIPTORS
DESCRIPTORS: THROBBING
DESCRIPTORS: ACHING
DESCRIPTORS: TENDER;THROBBING
DESCRIPTORS: DULL

## 2022-12-20 ASSESSMENT — PAIN SCALES - GENERAL
PAINLEVEL_OUTOF10: 8
PAINLEVEL_OUTOF10: 4
PAINLEVEL_OUTOF10: 6
PAINLEVEL_OUTOF10: 5
PAINLEVEL_OUTOF10: 8
PAINLEVEL_OUTOF10: 4

## 2022-12-20 NOTE — PROGRESS NOTES
Daily Progress Note    Date:2022  Patient: Akash Patterson  : 1960  DTA:[id number]  CODE:Full Code No additional code details  PCP:MOOK Pool    Admit Date: 2022  8:48 PM   LOS: 2 days       Subjective:   She had interval worsening with more drainage from wound and increased pain this a.m. Patient taken to the OR for I&D due to developing abscess. Patient seen and examined following procedure this afternoon. She notes some postoperative pain. Summary: 23-year-old female with uncontrolled diabetes mellitus presented to the ED after being sent from outpatient clinic with worsening cellulitis of her buttock for over 1 week, noting an initial small wound that opened up and drained followed by increased erythema, edema and tenderness around the site. On initial evaluation noted to have significant induration. CT was obtained in the ED showed subcutaneous induration of left buttock, however no fluid collection or abscess per radiology report. Patient also with relatively poor p.o. intake recently, noted dehydration with uncontrolled hyperglycemia initial glucose over 500 with starvation ketosis and mild metabolic acidosis. Admitted with sepsis in setting of leukocytosis, tachycardia and fevers. Cultures obtained. Empirically treated with broad-spectrum antibiotics, IV fluids and insulin. General surgery consulted to further evaluate, noted no need for I&D on initial evaluation, however noted interval worsening of wound with concern for developing abscess on  and taken to the OR for I&D of abscess with debridement. Large abscess noted tunneling toward vulva with necrotic tissue with large amount of thick purulence. Surgical culture obtained.         Review of Systems    14 point review of systems completed and is negative except as otherwise noted          Objective:      Vital signs in last 24 hours:  Patient Vitals for the past 24 hrs:   BP Temp Temp src Pulse Resp SpO2 Weight 12/19/22 1634 (!) 160/72 97.9 °F (36.6 °C) Temporal 88 18 94 % --   12/19/22 1555 (!) 145/60 97.7 °F (36.5 °C) Temporal 88 18 94 % --   12/19/22 1535 (!) 115/53 -- -- 82 15 93 % --   12/19/22 1530 (!) 118/48 -- -- 81 13 95 % --   12/19/22 1525 (!) 112/49 -- -- 79 16 96 % --   12/19/22 1524 -- -- -- 79 16 96 % --   12/19/22 1520 (!) 119/54 -- -- 80 18 97 % --   12/19/22 1515 (!) 125/54 -- -- 82 17 97 % --   12/19/22 1510 (!) 113/53 -- -- 89 18 92 % --   12/19/22 1505 (!) 127/56 -- -- 90 17 96 % --   12/19/22 1500 (!) 99/51 -- -- 87 16 96 % --   12/19/22 1458 (!) 84/48 97.9 °F (36.6 °C) Temporal 88 18 97 % --   12/19/22 1457 -- -- -- 82 -- -- --   12/19/22 1455 (!) 69/44 -- -- 74 17 95 % --   12/19/22 1454 (!) 69/44 97.9 °F (36.6 °C) Temporal 74 16 95 % --   12/19/22 1450 (!) 65/42 -- -- 76 17 (!) 74 % --   12/19/22 1335 (!) 149/75 -- -- 93 16 96 % --   12/19/22 0736 (!) 162/70 97.9 °F (36.6 °C) Temporal 92 18 97 % --   12/19/22 0718 -- -- -- -- -- 94 % --   12/19/22 0529 -- -- -- -- -- -- 175 lb 8 oz (79.6 kg)   12/19/22 0408 117/69 97.7 °F (36.5 °C) Temporal 87 20 92 % --   12/18/22 1926 121/75 99.3 °F (37.4 °C) Temporal (!) 105 18 96 % --     Physical exam    General: No acute distress  Neck: No swelling  HEENT: NC/AT  Cardiac: Normal rate, regular rhythm, S1-S2 present  Respiratory: No wheezes rales or rhonchi, normal effort  Abdomen: Soft, nontender, nondistended, bowel sounds present  Neuro: Alert, follows commands, nonfocal  Extremities: No clubbing or cyanosis  Skin: Dressing in place over wound following I&D      Lab Review   Recent Results (from the past 24 hour(s))   CBC    Collection Time: 12/19/22  2:23 AM   Result Value Ref Range    WBC 14.9 (H) 4.8 - 10.8 K/uL    RBC 3.42 (L) 4.20 - 5.40 M/uL    Hemoglobin 11.0 (L) 12.0 - 16.0 g/dL    Hematocrit 33.4 (L) 37.0 - 47.0 %    MCV 97.7 81.0 - 99.0 fL    MCH 32.2 (H) 27.0 - 31.0 pg    MCHC 32.9 (L) 33.0 - 37.0 g/dL    RDW 12.2 11.5 - 14.5 %    Platelets 780 130 - 400 K/uL    MPV 11.5 9.4 - 12.3 fL   Basic Metabolic Panel    Collection Time: 12/19/22  2:23 AM   Result Value Ref Range    Sodium 134 (L) 136 - 145 mmol/L    Potassium 3.3 (L) 3.5 - 5.0 mmol/L    Chloride 100 98 - 111 mmol/L    CO2 22 22 - 29 mmol/L    Anion Gap 12 7 - 19 mmol/L    Glucose 210 (H) 74 - 109 mg/dL    BUN 6 (L) 8 - 23 mg/dL    Creatinine 0.5 0.5 - 0.9 mg/dL    Est, Glom Filt Rate >60 >60    Calcium 8.0 (L) 8.8 - 10.2 mg/dL   Culture, Surgical    Collection Time: 12/19/22  1:32 PM    Specimen: Buttock; Swab   Result Value Ref Range    Gram Stain Result       Moderate WBC's (Polymorphonuclear) present  Moderate Gram positive rods present         No intake/output data recorded. I/O this shift:   In: 400 [I.V.:400]  Out: 5 [Blood:5]      Current Facility-Administered Medications:     sodium chloride flush 0.9 % injection 5-40 mL, 5-40 mL, IntraVENous, 2 times per day, Landon Hammans, MD    sodium chloride flush 0.9 % injection 5-40 mL, 5-40 mL, IntraVENous, PRN, Landon Hammans, MD    0.9 % sodium chloride infusion, , IntraVENous, PRN, Landon Hammans, MD    0.9 % sodium chloride infusion, , IntraVENous, Continuous, Clifford Caceres MD, Last Rate: 100 mL/hr at 12/19/22 1614, New Bag at 12/19/22 1614    HYDROcodone-acetaminophen (NORCO) 5-325 MG per tablet 2 tablet, 2 tablet, Oral, Q4H PRN, Landon Hammans, MD    amLODIPine (NORVASC) tablet 5 mg, 5 mg, Oral, Daily, Clifford Caceres MD    insulin glargine (LANTUS) injection vial 20 Units, 20 Units, SubCUTAneous, BID, Landon Hammans, MD, 20 Units at 12/19/22 0943    glucose chewable tablet 16 g, 4 tablet, Oral, PRN, Landon Hammans, MD    dextrose bolus 10% 125 mL, 125 mL, IntraVENous, PRN **OR** dextrose bolus 10% 250 mL, 250 mL, IntraVENous, PRN, Landon Hammans, MD    glucagon (rDNA) injection 1 mg, 1 mg, SubCUTAneous, PRN, Landon Hammans, MD    dextrose 10 % infusion, , IntraVENous, Continuous PRN, Landon Hammans, MD    insulin lispro (HUMALOG) injection vial 8 Units, 8 Units, SubCUTAneous, TID WC, Kimberly New MD, 8 Units at 12/19/22 0943    dextrose bolus 10% 125 mL, 125 mL, IntraVENous, PRN **OR** dextrose bolus 10% 250 mL, 250 mL, IntraVENous, PRN, Kimberly New MD    potassium chloride 10 mEq/100 mL IVPB (Peripheral Line), 10 mEq, IntraVENous, PRN, Kimberly New MD, Last Rate: 100 mL/hr at 12/19/22 0628, 10 mEq at 12/19/22 7116    magnesium sulfate 1000 mg in dextrose 5% 100 mL IVPB, 1,000 mg, IntraVENous, PRN, Kimberly New MD    sodium phosphate 10 mmol in sodium chloride 0.9 % 250 mL IVPB, 10 mmol, IntraVENous, PRN **OR** sodium phosphate 15 mmol in dextrose 5 % 250 mL IVPB, 15 mmol, IntraVENous, PRN **OR** sodium phosphate 20 mmol in dextrose 5 % 500 mL IVPB, 20 mmol, IntraVENous, PRN, Kimberly New MD, Stopped at 12/17/22 1517    polyethylene glycol (GLYCOLAX) packet 17 g, 17 g, Oral, Daily PRN, Kimberly New MD    enoxaparin (LOVENOX) injection 40 mg, 40 mg, SubCUTAneous, Daily, Kimberly New MD, 40 mg at 12/19/22 0927    dextrose 5 % and 0.45 % NaCl with KCl 20 mEq infusion, , IntraVENous, Continuous PRN, Kimberly New MD, Stopped at 12/17/22 0815    nystatin (MYCOSTATIN) 848167 UNIT/ML suspension 500,000 Units, 5 mL, Oral, 4x Daily, Kimberly New MD, 500,000 Units at 12/19/22 0927    miconazole (MICOTIN) 2 % powder, , Topical, BID, Kimberly New MD, Given at 12/19/22 0930    acetaminophen (TYLENOL) tablet 650 mg, 650 mg, Oral, Q4H PRN, Kimberly New MD, 650 mg at 12/17/22 1243    insulin lispro (HUMALOG) injection vial 0-8 Units, 0-8 Units, SubCUTAneous, TID WC, Kimberly New MD, 4 Units at 12/19/22 0938    insulin lispro (HUMALOG) injection vial 0-4 Units, 0-4 Units, SubCUTAneous, Nightly, Kimberly New MD, 4 Units at 12/17/22 2210    cefepime (MAXIPIME) 2,000 mg in sodium chloride 0.9 % 50 mL IVPB ([id number]), 2,000 mg, IntraVENous, Q12H, Kimberly New MD, Stopped at 12/19/22 1400    pantoprazole (PROTONIX) tablet further healing    Hypertension  - Add amlodipine, monitor BP, as needed antihypertensives    Candida albicans from urine culture--no need for treatment    DVT prophylaxis Maria Teresa Duenas MD 12/19/2022 6:20 PM

## 2022-12-20 NOTE — PROGRESS NOTES
Glucose 493 on AM labs. Message sent to Dr. Ephraim Gordon, awaiting callback.  Electronically signed by Buck Meyer RN on 12/20/2022 at 2:43 AM

## 2022-12-20 NOTE — PROGRESS NOTES
Subjective:  No acute events or changes. Feeling better than before surgery, but still sore. Objective:  /68   Pulse 77   Temp (!) 96.3 °F (35.7 °C) (Temporal)   Resp 16   Ht 5' 2\" (1.575 m)   Wt 175 lb 8 oz (79.6 kg)   SpO2 98%   BMI 32.10 kg/m²   General: NAD, AAO  Chest: regular, non-labored  Extremities: examined with chaperone/wound care nurse Carter Pyle- improved erythema and induration, packing with expected serosanguinous drainage    Assessment and plan:  60year old female with abscess of left upper thigh/buttock  Improved appearance today. Continue with wide spectrum IV antibiotics, follow up surgical culture results. Work with wound care- continue dakins packing for today and tomorrow, but work towards home wound vac. Will place referral for home care to have that set up as well. Discussed with patient that she has to get better control of her blood sugar- a1c of 15 will not heal this well.  Continue with other cares per the medicine team.

## 2022-12-20 NOTE — PROGRESS NOTES
[address] [facility] Pharmacokinetic Monitoring Service - Vancomycin    Consulting Provider: Yee Fernando MD / Lloyd Toth    Indication: Skin and Soft Tissue Infection  Target Concentration: Goal AUC/KELLY 400-600 mg*hr/L  Day of Therapy: 4  Additional Antimicrobials: Cefepime / Metronidazole    Pertinent Laboratory Values: Wt Readings from Last 1 Encounters:   12/19/22 175 lb 8 oz (79.6 kg)     Temp Readings from Last 1 Encounters:   12/20/22 (!) 96.3 °F (35.7 °C) (Temporal)     Estimated Creatinine Clearance: 94 mL/min (based on SCr of 0.6 mg/dL). Recent Labs     12/19/22  0223 12/20/22  0141   CREATININE 0.5 0.6   WBC 14.9* 10.6     Procalcitonin: None ordered at this time    Pertinent Cultures:  Culture Date Source Results   12/16/22 Blood x 2 No growth   12/16/22 Urine Candida albicans   12/19/22 Swab from Buttock No growth     MRSA Nasal Swab: N/A. Non-respiratory infection.     Recent vancomycin administrations                     vancomycin (VANCOCIN) 1,250 mg in sodium chloride 0.9 % 250 mL IVPB (mg) 1,250 mg Bolus 12/19/22 1405     1,250 mg New Bag 12/18/22 2127     1,250 mg New Bag  0115                    Assessment:  Date/Time Current Dose Concentration Timing of Concentration (h) AUC   12/20/22 1250 mg IV every 18 hours 5.6 17 h 16 m 329   Note: Serum concentrations collected for AUC dosing may appear elevated if collected in close proximity to the dose administered, this is not necessarily an indication of toxicity    Plan:  Current dosing regimen is sub-therapeutic  Increase dose to Vancomycin 1250 mg IV every 12 hours  Repeat vancomycin concentration ordered for  12/21/22 @ 1000   Pharmacy will continue to monitor patient and adjust therapy as indicated    Thank you for the consult,  JUAN Silva [facility] [place]  12/20/2022 9:47 AM

## 2022-12-20 NOTE — CARE COORDINATION
Spoke with patient regarding MD orders for [facility] services. Patient agreeable and has chosen [facility]. Referral Faxed. [address] [place] [address] [phone]. [phone]. Please notify [address] [place] [address] when patient discharges and fax DC Summary,  DC med list and any new [facility] orders. The Patient and/or patient representative was provided with a choice of provider and agrees   with the discharge plan. [x] Yes [] No    Freedom of choice list was provided with basic dialogue that supports the patient's individualized plan of care/goals, treatment preferences and shares the quality data associated with the providers.  [x] Yes [] No  Electronically signed by Quincy Woodard on 12/20/2022 at 1:14 PM

## 2022-12-20 NOTE — PROGRESS NOTES
Daily Progress Note    Date:2022  Patient: Reilly Sanchez  : 1960  ULO:[id number]  CODE:Full Code No additional code details  PCP:[facility] Pool    Admit Date: 2022  8:48 PM   LOS: 3 days       Subjective: No acute events overnight. She feels better today. Pain now under better control. No fevers or chills. Summary: 18-year-old female with uncontrolled diabetes mellitus presented to the ED after being sent from outpatient clinic with worsening cellulitis of her buttock for over 1 week, noting an initial small wound that opened up and drained followed by increased erythema, edema and tenderness around the site. On initial evaluation noted to have significant induration. CT was obtained in the ED showed subcutaneous induration of left buttock, however no fluid collection or abscess per radiology report. Patient also with relatively poor p.o. intake recently, noted dehydration with uncontrolled hyperglycemia initial glucose over 500 with starvation ketosis and mild metabolic acidosis. Admitted with sepsis in setting of leukocytosis, tachycardia and fevers. Cultures obtained. Empirically treated with broad-spectrum antibiotics, IV fluids and insulin. General surgery consulted to further evaluate, noted no need for I&D on initial evaluation, however noted interval worsening of wound with concern for developing abscess on  and taken to the OR for I&D of abscess with debridement. Large abscess noted tunneling toward vulva with necrotic tissue with large amount of thick purulence. Surgical culture obtained. Continue with wound care with ultimate plan for wound VAC and home health at discharge. Insulin regimen was adjusted to achieve better glycemic control patient understands she needs better glycemic control to promote healing.         Review of Systems    14 point review of systems completed and is negative except as otherwise noted          Objective:      Vital signs in last 24 hours:  Patient Vitals for the past 24 hrs:   BP Temp Temp src Pulse Resp SpO2   12/20/22 1147 -- -- -- -- -- 98 %   12/20/22 0701 128/68 (!) 96.3 °F (35.7 °C) Temporal 77 -- 93 %   12/20/22 0434 (!) 169/95 97.3 °F (36.3 °C) -- 90 16 98 %   12/19/22 1921 92/74 96.8 °F (36 °C) -- 92 18 95 %   12/19/22 1634 (!) 160/72 97.9 °F (36.6 °C) Temporal 88 18 94 %   12/19/22 1555 (!) 145/60 97.7 °F (36.5 °C) Temporal 88 18 94 %   12/19/22 1535 (!) 115/53 -- -- 82 15 93 %   12/19/22 1530 (!) 118/48 -- -- 81 13 95 %   12/19/22 1525 (!) 112/49 -- -- 79 16 96 %   12/19/22 1524 -- -- -- 79 16 96 %   12/19/22 1520 (!) 119/54 -- -- 80 18 97 %   12/19/22 1515 (!) 125/54 -- -- 82 17 97 %   12/19/22 1510 (!) 113/53 -- -- 89 18 92 %   12/19/22 1505 (!) 127/56 -- -- 90 17 96 %   12/19/22 1500 (!) 99/51 -- -- 87 16 96 %   12/19/22 1458 (!) 84/48 97.9 °F (36.6 °C) Temporal 88 18 97 %   12/19/22 1457 -- -- -- 82 -- --   12/19/22 1455 (!) 69/44 -- -- 74 17 95 %   12/19/22 1454 (!) 69/44 97.9 °F (36.6 °C) Temporal 74 16 95 %   12/19/22 1450 (!) 65/42 -- -- 76 17 (!) 74 %       Physical exam    General: No acute distress  HEENT: No deformities  Cardiac: Normal rate, regular rhythm, S1-S2 present  Respiratory: No wheezes rales or rhonchi, normal effort, no use of accessory muscles  Abdomen: Soft, nontender, nondistended, bowel sounds present  Neuro: Alert, follows commands, normal speech  Extremities: No clubbing or cyanosis  Skin: Erythema and induration around upper left thigh, wound packed with some drainage      Lab Review   Recent Results (from the past 24 hour(s))   POCT Glucose    Collection Time: 12/19/22  4:44 PM   Result Value Ref Range    POC Glucose 186 (H) 70 - 99 mg/dl    Performed on AccuChek    POCT Glucose    Collection Time: 12/19/22  7:24 PM   Result Value Ref Range    POC Glucose 287 (H) 70 - 99 mg/dl    Performed on AccuChek    Basic Metabolic Panel    Collection Time: 12/20/22  1:41 AM   Result Value Ref Range    Sodium 133 (L) 136 - 145 mmol/L    Potassium 4.4 3.5 - 5.0 mmol/L    Chloride 97 (L) 98 - 111 mmol/L    CO2 21 (L) 22 - 29 mmol/L    Anion Gap 15 7 - 19 mmol/L    Glucose 493 (H) 74 - 109 mg/dL    BUN 12 8 - 23 mg/dL    Creatinine 0.6 0.5 - 0.9 mg/dL    Est, Glom Filt Rate >60 >60    Calcium 8.3 (L) 8.8 - 10.2 mg/dL   CBC    Collection Time: 12/20/22  1:41 AM   Result Value Ref Range    WBC 10.6 4.8 - 10.8 K/uL    RBC 3.62 (L) 4.20 - 5.40 M/uL    Hemoglobin 11.7 (L) 12.0 - 16.0 g/dL    Hematocrit 35.4 (L) 37.0 - 47.0 %    MCV 97.8 81.0 - 99.0 fL    MCH 32.3 (H) 27.0 - 31.0 pg    MCHC 33.1 33.0 - 37.0 g/dL    RDW 12.2 11.5 - 14.5 %    Platelets 252 139 - 264 K/uL    MPV 11.2 9.4 - 12.3 fL   POCT Glucose    Collection Time: 12/20/22  3:10 AM   Result Value Ref Range    POC Glucose 413 (H) 70 - 99 mg/dl    Performed on AccuChek    POCT Glucose    Collection Time: 12/20/22  5:28 AM   Result Value Ref Range    POC Glucose 336 (H) 70 - 99 mg/dl    Performed on AccuChek    POCT Glucose    Collection Time: 12/20/22  7:05 AM   Result Value Ref Range    POC Glucose 350 (H) 70 - 99 mg/dl    Performed on AccuChek    Vancomycin Level, Trough    Collection Time: 12/20/22  7:31 AM   Result Value Ref Range    Vancomycin Tr 5.6 (L) 10.0 - 20.0 ug/mL   POCT Glucose    Collection Time: 12/20/22 10:53 AM   Result Value Ref Range    POC Glucose 355 (H) 70 - 99 mg/dl    Performed on AccuChek        I/O last 3 completed shifts: In: 400 [I.V.:400]  Out: 5 [Blood:5]  No intake/output data recorded.       Current Facility-Administered Medications:     insulin glargine (LANTUS) injection vial 30 Units, 30 Units, SubCUTAneous, BID, Mihaela Whitman MD    insulin lispro (HUMALOG) injection vial 10 Units, 10 Units, SubCUTAneous, TID WC, Mihaela Whitman MD    vancomycin (VANCOCIN) 1,250 mg in sodium chloride 0.9 % 250 mL IVPB, 1,250 mg, IntraVENous, Q12H, Beth Cheng MD    sodium hypochlorite (DAKINS) 0.125 % external solution, , Irrigation, BID, Ioana Locke MD    sodium chloride flush 0.9 % injection 5-40 mL, 5-40 mL, IntraVENous, 2 times per day, Ioana Locke MD, 10 mL at 12/20/22 1055    sodium chloride flush 0.9 % injection 5-40 mL, 5-40 mL, IntraVENous, PRN, Ioana Locke MD    0.9 % sodium chloride infusion, , IntraVENous, PRN, Ioana Locke MD    0.9 % sodium chloride infusion, , IntraVENous, Continuous, Derik Keith MD, Last Rate: 75 mL/hr at 12/20/22 0432, New Bag at 12/20/22 0432    HYDROcodone-acetaminophen (NORCO) 5-325 MG per tablet 2 tablet, 2 tablet, Oral, Q4H PRN, Ioana Locke MD, 2 tablet at 12/20/22 1054    amLODIPine (NORVASC) tablet 5 mg, 5 mg, Oral, Daily, Derik Keith MD, 5 mg at 12/20/22 0802    glucose chewable tablet 16 g, 4 tablet, Oral, PRN, Ioana Locke MD    dextrose bolus 10% 125 mL, 125 mL, IntraVENous, PRN **OR** dextrose bolus 10% 250 mL, 250 mL, IntraVENous, PRN, Ioana Locke MD    glucagon (rDNA) injection 1 mg, 1 mg, SubCUTAneous, PRN, Ioana Locke MD    dextrose 10 % infusion, , IntraVENous, Continuous PRN, Ioana Locke MD    dextrose bolus 10% 125 mL, 125 mL, IntraVENous, PRN **OR** dextrose bolus 10% 250 mL, 250 mL, IntraVENous, PRN, Ioana Locke MD    potassium chloride 10 mEq/100 mL IVPB (Peripheral Line), 10 mEq, IntraVENous, PRN, Ioana Locke MD, Last Rate: 100 mL/hr at 12/19/22 0628, 10 mEq at 12/19/22 4841    magnesium sulfate 1000 mg in dextrose 5% 100 mL IVPB, 1,000 mg, IntraVENous, PRN, Ioana Locke MD    sodium phosphate 10 mmol in sodium chloride 0.9 % 250 mL IVPB, 10 mmol, IntraVENous, PRN **OR** sodium phosphate 15 mmol in dextrose 5 % 250 mL IVPB, 15 mmol, IntraVENous, PRN **OR** sodium phosphate 20 mmol in dextrose 5 % 500 mL IVPB, 20 mmol, IntraVENous, PRN, Ioana Locke MD, Stopped at 12/17/22 1517    polyethylene glycol (GLYCOLAX) packet 17 g, 17 g, Oral, Daily PRN, Ioana Locke MD    enoxaparin (LOVENOX) injection 40 mg, 40 mg, SubCUTAneous, Daily, Priyanka Minaya MD, 40 mg at 12/20/22 0802    dextrose 5 % and 0.45 % NaCl with KCl 20 mEq infusion, , IntraVENous, Continuous PRN, Priyanka Minaya MD, Stopped at 12/17/22 0815    nystatin (MYCOSTATIN) 225102 UNIT/ML suspension 500,000 Units, 5 mL, Oral, 4x Daily, Priyanka Minaya MD, 500,000 Units at 12/20/22 0802    miconazole (MICOTIN) 2 % powder, , Topical, BID, Priyanka Minaya MD, Given at 12/19/22 0930    acetaminophen (TYLENOL) tablet 650 mg, 650 mg, Oral, Q4H PRN, Priyanka Minaya MD, 650 mg at 12/17/22 1243    insulin lispro (HUMALOG) injection vial 0-8 Units, 0-8 Units, SubCUTAneous, TID WC, Priyanka Minaya MD, 6 Units at 12/20/22 0816    insulin lispro (HUMALOG) injection vial 0-4 Units, 0-4 Units, SubCUTAneous, Nightly, Priyanka Minaya MD, 4 Units at 12/17/22 2210    cefepime (MAXIPIME) 2,000 mg in sodium chloride 0.9 % 50 mL IVPB (Bfeb2Aai), 2,000 mg, IntraVENous, Q12H, Priyanka Minaya MD, Stopped at 12/20/22 1242    pantoprazole (PROTONIX) tablet 40 mg, 40 mg, Oral, QAM AC, Priyanka Minaya MD, 40 mg at 12/20/22 7102    vancomycin (VANCOCIN) intermittent dosing (placeholder), , Other, RX Placeholder, Priyanka Minaya MD    labetalol (NORMODYNE;TRANDATE) injection 10 mg, 10 mg, IntraVENous, Q4H PRN, Priyanka Minaya MD    metronidazole (FLAGYL) 500 mg in 0.9% NaCl 100 mL IVPB premix, 500 mg, IntraVENous, Q8H, Priyanka Minaya MD, Stopped at 12/20/22 4177    diphenhydrAMINE (BENADRYL) tablet 12.5 mg, 12.5 mg, Oral, Q6H, Priyanka Minaya MD, 12.5 mg at 12/20/22 1055    aspirin EC tablet 325 mg, 325 mg, Oral, Daily, Priyanka Minaya MD, 325 mg at 12/20/22 0802    morphine (PF) injection 2 mg, 2 mg, IntraVENous, Q2H PRN, Priyanka Minaya MD, 2 mg at 12/20/22 1201        Reviewed chart, history, vitals, labs, diagnostics and medications      Assessment/plan  Principal Problem:    Abscess of left thigh  Active Problems:    Uncontrolled diabetes mellitus with hyperglycemia ([facility])    Cellulitis of buttock    Sepsis ([facility] [address]    Metabolic acidemia    Starvation ketoacidosis    Hypertension  Resolved Problems:    Abscess of buttock, left      Soft tissue infection of left thigh and buttock  Abscess of left thigh  --- S/p I&D of abscess by general surgery  --Wound care, ultimate plan for wound VAC  - Follow surgical culture, results pending  - Continue to monitor on vancomycin, cefepime and Flagyl-further de-escalation of antibiotics pending culture results    Uncontrolled diabetes with hyperglycemia, metabolic acidosis with starvation ketosis on presentation  --diabetic diet  - Increase basal insulin, continue with twice daily dosing, increase prandial insulin and corrective SSI  - Patient counseled on need for improved glycemic control to promote healing    Hypertension  - Continue amlodipine (added to regimen), monitor BP with additional antihypertensives as needed    Candida albicans from urine culture--no need for treatment    Home health ordered    Case discussed with case management    DVT prophylaxis Maria Teresa Molina MD 12/20/2022 2:43 PM

## 2022-12-21 VITALS
SYSTOLIC BLOOD PRESSURE: 163 MMHG | HEART RATE: 72 BPM | HEIGHT: 62 IN | WEIGHT: 182.01 LBS | DIASTOLIC BLOOD PRESSURE: 81 MMHG | OXYGEN SATURATION: 98 % | TEMPERATURE: 96.3 F | BODY MASS INDEX: 33.49 KG/M2 | RESPIRATION RATE: 18 BRPM

## 2022-12-21 LAB
GLUCOSE BLD-MCNC: 192 MG/DL (ref 70–99)
GLUCOSE BLD-MCNC: 270 MG/DL (ref 70–99)
PERFORMED ON: ABNORMAL
PERFORMED ON: ABNORMAL
VANCOMYCIN TROUGH: 12.2 UG/ML (ref 10–20)

## 2022-12-21 PROCEDURE — 6370000000 HC RX 637 (ALT 250 FOR IP): Performed by: SURGERY

## 2022-12-21 PROCEDURE — 6360000002 HC RX W HCPCS: Performed by: SURGERY

## 2022-12-21 PROCEDURE — 6360000002 HC RX W HCPCS: Performed by: HOSPITALIST

## 2022-12-21 PROCEDURE — 94760 N-INVAS EAR/PLS OXIMETRY 1: CPT

## 2022-12-21 PROCEDURE — 82947 ASSAY GLUCOSE BLOOD QUANT: CPT

## 2022-12-21 PROCEDURE — 2580000003 HC RX 258: Performed by: SURGERY

## 2022-12-21 PROCEDURE — 6370000000 HC RX 637 (ALT 250 FOR IP): Performed by: STUDENT IN AN ORGANIZED HEALTH CARE EDUCATION/TRAINING PROGRAM

## 2022-12-21 PROCEDURE — 2500000003 HC RX 250 WO HCPCS: Performed by: SURGERY

## 2022-12-21 PROCEDURE — 80202 ASSAY OF VANCOMYCIN: CPT

## 2022-12-21 PROCEDURE — 2580000003 HC RX 258: Performed by: STUDENT IN AN ORGANIZED HEALTH CARE EDUCATION/TRAINING PROGRAM

## 2022-12-21 PROCEDURE — 2580000003 HC RX 258: Performed by: HOSPITALIST

## 2022-12-21 PROCEDURE — 36415 COLL VENOUS BLD VENIPUNCTURE: CPT

## 2022-12-21 RX ORDER — DOXYCYCLINE HYCLATE 100 MG
100 TABLET ORAL 2 TIMES DAILY
Qty: 14 TABLET | Refills: 0 | Status: SHIPPED | OUTPATIENT
Start: 2022-12-21 | End: 2022-12-28

## 2022-12-21 RX ORDER — CEPHALEXIN 500 MG/1
500 CAPSULE ORAL 4 TIMES DAILY
Qty: 28 CAPSULE | Refills: 0 | Status: SHIPPED | OUTPATIENT
Start: 2022-12-21 | End: 2022-12-28 | Stop reason: SDUPTHER

## 2022-12-21 RX ORDER — HYDROCODONE BITARTRATE AND ACETAMINOPHEN 10; 325 MG/1; MG/1
1 TABLET ORAL EVERY 4 HOURS
Qty: 18 TABLET | Refills: 0 | Status: SHIPPED | OUTPATIENT
Start: 2022-12-21 | End: 2022-12-24

## 2022-12-21 RX ORDER — AMLODIPINE BESYLATE 5 MG/1
5 TABLET ORAL DAILY
Qty: 30 TABLET | Refills: 0 | Status: SHIPPED | OUTPATIENT
Start: 2022-12-22

## 2022-12-21 RX ORDER — SODIUM HYPOCHLORITE 1.25 MG/ML
SOLUTION TOPICAL 2 TIMES DAILY
Qty: 473 ML | Refills: 0 | Status: SHIPPED | OUTPATIENT
Start: 2022-12-21

## 2022-12-21 RX ADMIN — ASPIRIN 325 MG: 325 TABLET, COATED ORAL at 08:59

## 2022-12-21 RX ADMIN — CEFEPIME 2000 MG: 2 INJECTION, POWDER, FOR SOLUTION INTRAVENOUS at 08:56

## 2022-12-21 RX ADMIN — VANCOMYCIN HYDROCHLORIDE 1250 MG: 5 INJECTION, POWDER, LYOPHILIZED, FOR SOLUTION INTRAVENOUS at 01:50

## 2022-12-21 RX ADMIN — INSULIN LISPRO 10 UNITS: 100 INJECTION, SOLUTION INTRAVENOUS; SUBCUTANEOUS at 09:06

## 2022-12-21 RX ADMIN — MORPHINE SULFATE 2 MG: 2 INJECTION, SOLUTION INTRAMUSCULAR; INTRAVENOUS at 10:43

## 2022-12-21 RX ADMIN — NYSTATIN 500000 UNITS: 100000 SUSPENSION ORAL at 09:20

## 2022-12-21 RX ADMIN — SODIUM HYPOCHLORITE: 1.25 SOLUTION TOPICAL at 09:08

## 2022-12-21 RX ADMIN — NYSTATIN 500000 UNITS: 100000 SUSPENSION ORAL at 13:26

## 2022-12-21 RX ADMIN — HYDROCODONE BITARTRATE AND ACETAMINOPHEN 2 TABLET: 5; 325 TABLET ORAL at 13:21

## 2022-12-21 RX ADMIN — HYDROCODONE BITARTRATE AND ACETAMINOPHEN 2 TABLET: 5; 325 TABLET ORAL at 01:44

## 2022-12-21 RX ADMIN — MICONAZOLE NITRATE: 20 POWDER TOPICAL at 09:09

## 2022-12-21 RX ADMIN — SODIUM CHLORIDE: 9 INJECTION, SOLUTION INTRAVENOUS at 08:51

## 2022-12-21 RX ADMIN — AMLODIPINE BESYLATE 5 MG: 5 TABLET ORAL at 08:58

## 2022-12-21 RX ADMIN — DIPHENHYDRAMINE HYDROCHLORIDE 12.5 MG: 25 TABLET ORAL at 08:58

## 2022-12-21 RX ADMIN — PANTOPRAZOLE SODIUM 40 MG: 40 TABLET, DELAYED RELEASE ORAL at 09:23

## 2022-12-21 RX ADMIN — VANCOMYCIN HYDROCHLORIDE 1250 MG: 5 INJECTION, POWDER, LYOPHILIZED, FOR SOLUTION INTRAVENOUS at 13:26

## 2022-12-21 RX ADMIN — METRONIDAZOLE 500 MG: 500 INJECTION, SOLUTION INTRAVENOUS at 09:42

## 2022-12-21 RX ADMIN — INSULIN GLARGINE 30 UNITS: 100 INJECTION, SOLUTION SUBCUTANEOUS at 09:03

## 2022-12-21 RX ADMIN — INSULIN LISPRO 4 UNITS: 100 INJECTION, SOLUTION INTRAVENOUS; SUBCUTANEOUS at 09:06

## 2022-12-21 RX ADMIN — ENOXAPARIN SODIUM 40 MG: 100 INJECTION SUBCUTANEOUS at 08:59

## 2022-12-21 RX ADMIN — HYDROCODONE BITARTRATE AND ACETAMINOPHEN 2 TABLET: 5; 325 TABLET ORAL at 09:22

## 2022-12-21 RX ADMIN — DIPHENHYDRAMINE HYDROCHLORIDE 12.5 MG: 25 TABLET ORAL at 01:57

## 2022-12-21 ASSESSMENT — PAIN SCALES - GENERAL
PAINLEVEL_OUTOF10: 9
PAINLEVEL_OUTOF10: 8
PAINLEVEL_OUTOF10: 10
PAINLEVEL_OUTOF10: 10

## 2022-12-21 ASSESSMENT — PAIN DESCRIPTION - LOCATION
LOCATION: GROIN
LOCATION: PERINEUM
LOCATION: BUTTOCKS

## 2022-12-21 ASSESSMENT — PAIN DESCRIPTION - DESCRIPTORS
DESCRIPTORS: BURNING
DESCRIPTORS: CRAMPING;STABBING;THROBBING

## 2022-12-21 ASSESSMENT — PAIN DESCRIPTION - ORIENTATION: ORIENTATION: LEFT

## 2022-12-21 NOTE — DISCHARGE INSTR - DIET
Good nutrition is important when healing from an illness, injury, or surgery. Follow any nutrition recommendations given to you during your hospital stay. If you were given an oral nutrition supplement while in the hospital, continue to take this supplement at home. You can take it with meals, in-between meals, and/or before bedtime. These supplements can be purchased at most local grocery stores, pharmacies, and chain [employer]-stores. If you have any questions about your diet or nutrition, call the hospital and ask for the dietitian.    - Diet: High protein, low carb diet. - Fluids: Make sure to drink no sugary drinks or soda.

## 2022-12-21 NOTE — DISCHARGE INSTR - ACTIVITY
No soaking or submerging wound area. No disconnecting or clamping of wound vac system unless its being changed.

## 2022-12-21 NOTE — CARE COORDINATION
12/21/22 1137   IMM Letter   IMM Letter given to Patient/Family/Significant other/Guardian/POA/by: Given to and explained to patient by Matt Jacobo RN CM. Patient verbalized understanding. Patient chose to not stay additional 4 hours.    IMM Letter date given: 12/21/22   IMM Letter time given: [phone]

## 2022-12-21 NOTE — PROGRESS NOTES
Home wound vac [id number] delivered to bedside. Patient signed POD paperwork for [employer]. Patient educated on the following:    Unit is rental and must be returned. How to return unit. How to charge unit. How to change canister. Who to call for assistance. Change to alternative dressing if unable to solve issues. Reviewed f/u appoint with wound care center. Patient verbalized understanding and returned demonstration of charging and canister change. All questions asked, answered.        Electronically signed by Porfirio Bloch, RN, [facility] on 12/21/2022 at 2:46 PM

## 2022-12-21 NOTE — DISCHARGE SUMMARY
Discharge Summary    NAME: Robert Stark  :  1960  MRN:  [id number]    Admit date:  2022  Discharge date:    2022    Advance Directive: Full Code    Consults: general surgery    Primary Care Physician:  MOOK Oconnor    Discharge Diagnoses:  Principal Problem:    Abscess of left thigh  Active Problems:    Uncontrolled diabetes mellitus with hyperglycemia ([address]    Cellulitis of buttock    Sepsis ([address]    Metabolic acidemia    Starvation ketoacidosis    Hypertension        Significant Diagnostic Studies:   CT ABDOMEN PELVIS W IV CONTRAST Additional Contrast? None    Result Date: 2022  Patient: Fernie Range  Time Out: 23:18Exam(s): CT ABDOMEN + PELVIS With Contrast EXAM:  CT Abdomen and Pelvis With Intravenous ContrastCLINICAL HISTORY:   Reason for exam: induration and abscess of left buttocks extending to medial inner upper left thigh. TECHNIQUE:  Axial computed tomography images of the abdomen and pelvis with intravenous contrast.COMPARISON:  No relevant prior studies available. FINDINGS:  Lung bases:  Unremarkable. No mass. No consolidation. ABDOMEN:  Liver:  Hepatic steatosis. Gallbladder and bile ducts:  Unremarkable. No calcified stones. No ductal dilation. Pancreas:  Unremarkable. No mass. No ductal dilation. Spleen:  Unremarkable. No splenomegaly. Adrenals:  Unremarkable. No mass. Kidneys and ureters:   Unremarkable. No solid mass. No hydronephrosis. Stomach and bowel:  Mild-moderate fecal retention, correlate for constipation. No small bowel obstruction. No mucosal thickening. PELVIS:  Appendix:  Normal appendix. Posterior lumbar fusion L4-5. Bladder:  Unremarkable. No mass. Reproductive:  Unremarkable as visualized. ABDOMEN and PELVIS:  Intraperitoneal space:  Unremarkable. No free air. No significant fluid collection. Bones/joints:  Degenerative changes of the spine. No acute fracture. No dislocation.   Soft tissues:  Subcutaneous induration in the left buttock, CONSISTENT WITH MODERATE CELLULITIS. No fluid collection or abscess. No perianal or perirectal component. Vasculature:  Unremarkable. No abdominal aortic aneurysm. Lymph nodes:  Unremarkable. No enlarged lymph nodes. Subcutaneous induration in the left buttock, CONSISTENT WITH MODERATE CELLULITIS. No fluid collection or abscess. No perianal or perirectal component. Electronically signed by Carrie Oconnell MD on 12-16-22 at 2318      Pertinent Labs:   CBC:   Recent Labs     12/19/22 0223 12/20/22  0141   WBC 14.9* 10.6   HGB 11.0* 11.7*    281     BMP:    Recent Labs     12/19/22 0223 12/20/22  0141   * 133*   K 3.3* 4.4    97*   CO2 22 21*   BUN 6* 12   CREATININE 0.5 0.6   GLUCOSE 210* 493*             Hospital Course:    57-year-old female with uncontrolled diabetes mellitus presented to the ED after being sent from outpatient clinic with worsening cellulitis of her buttock for over 1 week, noting an initial small wound that opened up and drained followed by increased erythema, edema and tenderness around the site. On initial evaluation noted to have significant induration. CT was obtained in the ED showed subcutaneous induration of left buttock, however no fluid collection or abscess per radiology report. Patient also with relatively poor p.o. intake recently, noted dehydration with uncontrolled hyperglycemia initial glucose over 500 with starvation ketosis and mild metabolic acidosis. Admitted with sepsis in setting of leukocytosis, tachycardia and fevers. Cultures obtained. Empirically treated with broad-spectrum IV antibiotics, IV fluids and insulin. General surgery consulted to further evaluate, noted no need for I&D on initial evaluation, however after a couple more days noted interval worsening of wound with concern for developing abscess on 12/19 and taken to the OR for I&D of abscess with debridement.   Large abscess noted tunneling toward vulva with necrotic tissue with large amount of thick purulence. Surgical culture obtained. Continued with wound care with ultimate plan for wound VAC and home health at discharge. Insulin regimen was adjusted to achieve better glycemic control patient understands she needs better glycemic control to promote healing. She continued to improve clinically. Medically stable for discharge home with home health and wound VAC arranged with wound care instructions and close outpatient follow-up and wound care center following week on 12/27. Discharge with additional 7 days of oral Keflex and doxycycline. Amlodipine was added to medication regimen for hypertension. Counseled on compliance with insulin regimen for diabetes. She will follow-up with PCP within a few days. Physical Exam:  Vital Signs: BP (!) 163/81   Pulse 72   Temp (!) 96.3 °F (35.7 °C)   Resp 18   Ht 5' 2\" (1.575 m)   Wt 182 lb 0.2 oz (82.6 kg)   SpO2 97%   BMI 33.29 kg/m²   General: No acute distress  Cardiac: Normal rate, regular rhythm, S1-S2 present  Respiratory: Normal effort, no use of accessory muscles  Abdomen: Soft, nontender, nondistended, bowel sounds present  Extremities: No clubbing or cyanosis  Skin: Erythema and induration with improvement around upper left thigh, wound packed       Discharge Medications:         Medication List        START taking these medications      amLODIPine 5 MG tablet  Commonly known as: NORVASC  Take 1 tablet by mouth daily  Start taking on: December 22, 2022     cephALEXin 500 MG capsule  Commonly known as: KEFLEX  Take 1 capsule by mouth 4 times daily for 7 days     doxycycline hyclate 100 MG tablet  Commonly known as: VIBRA-TABS  Take 1 tablet by mouth 2 times daily for 7 days     HYDROcodone-acetaminophen  MG per tablet  Commonly known as: Norco  Take 1 tablet by mouth every 4 hours for 3 days.  Intended supply: 30 days Max Daily Amount: 6 tablets     sodium hypochlorite 0.125 % Soln external solution  Commonly known as: DAKINS  Apply topically 2 times daily            CHANGE how you take these medications      nystatin 797452 UNIT/GM cream  Commonly known as: MYCOSTATIN  Apply topically 2 times daily. What changed: Another medication with the same name was removed. Continue taking this medication, and follow the directions you see here. CONTINUE taking these medications      aspirin EC 81 MG EC tablet     B-D ULTRAFINE III SHORT PEN 31G X 8 MM Misc  Generic drug: Insulin Pen Needle  USE ONCE DAILY     * blood glucose test strips strip  Commonly known as: ASCENSIA AUTODISC VI;ONE TOUCH ULTRA TEST VI  1 each by In Vitro route 4 times daily (with meals and nightly) For qid and prn testing for newly diagnosed diabetes  Freestyle Freedom lite    DX: Diabetes, insulin requiring     * blood glucose test strips  For qid testing Type 2 Dm Dispense brand per patient request or insurance benefits. * Ez Smart Blood Glucose Lancets Misc  For qid and prn testing for newly diagnosed diabetes DX: Diabetes, insulin requiring     * Lancets Misc  1 each by Does not apply route 2 times daily Dispense brand per insurance benefits and patient request.     glucose monitoring kit  For bid testing Type 2 dm     insulin aspart 100 UNIT/ML injection vial  Commonly known as: NOVOLOG     INSULIN SYRINGE .5CC/30GX5/16\" 30G X 5/16\" 0.5 ML Misc  USE WITH INSULIN EVERY DAY     Lantus 100 UNIT/ML injection vial  Generic drug: insulin glargine     pantoprazole 40 MG tablet  Commonly known as: PROTONIX  Take 1 tablet by mouth every morning (before breakfast)     vitamin D 1.25 MG (32635 UT) Caps capsule  Commonly known as: ERGOCALCIFEROL  TAKE 1 CAPSULE BY MOUTH 1 TIME A WEEK           * This list has 4 medication(s) that are the same as other medications prescribed for you. Read the directions carefully, and ask your doctor or other care provider to review them with you.                 STOP taking these medications      acetaminophen 650 MG extended release tablet  Commonly known as: TYLENOL     rOPINIRole 0.5 MG tablet  Commonly known as: REQUIP               Where to Get Your Medications        These medications were sent to [facility], [address]  [address], [facility] [address]      Phone: [phone]   amLODIPine 5 MG tablet  cephALEXin 500 MG capsule  doxycycline hyclate 100 MG tablet  HYDROcodone-acetaminophen  MG per tablet  sodium hypochlorite 0.125 % Soln external solution         Discharge Instructions: Follow up with MOOK Loya within 2 to 3 days. Take medications as directed. Resume activity as tolerated. Diet: ADULT DIET; Regular; 3 carb choices (45 gm/meal)     Disposition: Patient is medically stable and will be discharged home with home health.     Time spent on discharge 41 minutes    Signed:  Luana Mixon MD  12/21/2022 10:35 AM

## 2022-12-21 NOTE — PROGRESS NOTES
[facility] Pharmacokinetic Monitoring Service - Vancomycin    Consulting Provider: Chang Smiley MD / Paul See     Indication: Skin and Soft Tissue Infection  Target Concentration: Goal trough of 10-15 mg/L and AUC/KELLY <500 mg*hr/L  Day of Therapy: 5  Additional Antimicrobials: Cefepime / Metronidazole    Pertinent Laboratory Values: Wt Readings from Last 1 Encounters:   12/21/22 182 lb 0.2 oz (82.6 kg)     Temp Readings from Last 1 Encounters:   12/21/22 (!) 96.3 °F (35.7 °C)     Estimated Creatinine Clearance: 96 mL/min (based on SCr of 0.6 mg/dL). Recent Labs     12/19/22  0223 12/20/22  0141   CREATININE 0.5 0.6   WBC 14.9* 10.6     Procalcitonin: None ordered at this time    Pertinent Cultures:  Culture Date Source Results   12/16/22 Blood x 2 No growth   12/16/22 Urine Candida albicans   12/19/22 Swab from Buttock No growth   MRSA Nasal Swab: N/A. Non-respiratory infection. Recent vancomycin administrations                     vancomycin (VANCOCIN) 1,250 mg in sodium chloride 0.9 % 250 mL IVPB (mg) 1,250 mg New Bag 12/21/22 0150     1,250 mg New Bag 12/20/22 1504    vancomycin (VANCOCIN) 1,250 mg in sodium chloride 0.9 % 250 mL IVPB (mg) 1,250 mg Bolus 12/19/22 1405     1,250 mg New Bag 12/18/22 2127                Loading dose: N/A  Regimen: 1250 mg IV every 12 hours.   Start time: 13:50 on 12/21/2022  Exposure target: AUC24 (range)400-600 mg/L.hr   AUC24,ss: 463 mg/L.hr  Probability of AUC24 > 400: 81 %  Ctrough,ss: 12.4 mg/L  Probability of Ctrough,ss > 20: 1 %  Probability of nephrotoxicity (Lodise YOSEF 2009): 8 %    Assessment:  Date/Time Current Dose Concentration Timing of Concentration (h) AUC   12/21/22 1250 mg IV q 12 hours 12.2 8 h 26 m 463   Note: Serum concentrations collected for AUC dosing may appear elevated if collected in close proximity to the dose administered, this is not necessarily an indication of toxicity    Plan:  Current dosing regimen is therapeutic  Continue current dose  Pharmacy will continue to monitor patient and adjust therapy as indicated    Thank you for the consult,  Dale Oliveros, [facility]  12/21/2022 12:23 PM

## 2022-12-21 NOTE — PROGRESS NOTES
CLINICAL PHARMACY NOTE: MEDS TO BEDS    Total # of Prescriptions Filled: 4   The following medications were delivered to the patient:  Hydrocodone-acetaminophen   Amlodipine 5mg  Cephalexin 500mg  Doxycycline hyclate 100mg    Additional Documentation:   The patients  paid with a credit card and was handed the medications in the patients room

## 2022-12-21 NOTE — PROGRESS NOTES
[facility]  Nurse  Consult Note       NAME:  Ru Smith RECORD NUMBER:  [id number]  AGE: 60 y.o.    GENDER: female  : 1960  TODAY'S DATE:  2022    Subjective   Reason for Wound Nurse Evaluation and Assessment: left groin NPWT placement      Felipe Cano is a 60 y.o. female referred by:   [x] Physician  [] Nursing  [] Other:     Wound Identification:  Wound Type:  surgical s/p I&D of abscess  Contributing Factors: diabetes and poor glucose control    Wound History: Patient had I&D of abscess on 22  Current Wound Care Treatment:  NPWT    Patient Goal of Care:  [x] Wound Healing  [] Odor Control  [] Palliative Care  [x] Pain Control   [] Other:         PAST MEDICAL HISTORY        Diagnosis Date    Back pain 2014    Carotid artery stenosis     Depression 2014    Diabetes ([facility]     GERD (gastroesophageal reflux disease)     Hyperlipidemia     Hypertension 2014    Morbidly obese ([facility] 7/15/2020    Type II or unspecified type diabetes mellitus without mention of complication, not stated as uncontrolled     Uncontrolled type 2 diabetes mellitus with hyperglycemia, with long-term current use of insulin ([facility] 2022       PAST SURGICAL HISTORY    Past Surgical History:   Procedure Laterality Date    BACK SURGERY      Dr. Paul Haile 1st surgery and Dr. Clarissa Aquino 2nd surgery    DILATION AND CURETTAGE OF UTERUS      HYSTERECTOMY (CERVIX STATUS UNKNOWN) N/A     LEG SURGERY Left 2022    INCISION AND DRAINAGE OF LEFT UPPER THIGH ABSCESS WITH DEBRIDEMENT performed by Loly Mota MD at [address] HISTORY    Family History   Problem Relation Age of Onset    Diabetes Mother     Stroke Mother        SOCIAL HISTORY    Social History     Tobacco Use    Smoking status: Former     Packs/day: 0.50     Years: 20.00     Pack years: 10.00     Types: Cigarettes     Start date: 1983     Quit date: 2014     Years since quittin.3    Smokeless tobacco: Never   Vaping Use    Vaping Use: Never used   Substance Use Topics    Alcohol use: No     Alcohol/week: 0.0 standard drinks    Drug use: No       ALLERGIES    Allergies   Allergen Reactions    Sulfa Antibiotics Hives       MEDICATIONS    No current facility-administered medications on file prior to encounter. Current Outpatient Medications on File Prior to Encounter   Medication Sig Dispense Refill    nystatin (MYCOSTATIN) 357601 UNIT/GM cream Apply topically 2 times daily. 60 g 5    pantoprazole (PROTONIX) 40 MG tablet Take 1 tablet by mouth every morning (before breakfast) 90 tablet 1    insulin aspart (NOVOLOG) 100 UNIT/ML injection vial Inject 20 Units into the skin 3 times daily (before meals)       LANTUS 100 UNIT/ML injection vial Inject 60 Units into the skin nightly       Insulin Syringe-Needle U-100 (INSULIN SYRINGE .5CC/30GX5/16\") 30G X 5/16\" 0.5 ML MISC USE WITH INSULIN EVERY  each 0    B-D ULTRAFINE III SHORT PEN 31G X 8 MM MISC USE ONCE DAILY 100 each 0    blood glucose monitor strips For qid testing Type 2 Dm Dispense brand per patient request or insurance benefits.  100 strip 3    vitamin D (ERGOCALCIFEROL) 63286 units CAPS capsule TAKE 1 CAPSULE BY MOUTH 1 TIME A WEEK 4 capsule 2    glucose monitoring kit (FREESTYLE) monitoring kit For bid testing Type 2 dm 1 kit 0    Lancets MISC 1 each by Does not apply route 2 times daily Dispense brand per insurance benefits and patient request. 100 each 3    Ez Smart Blood Glucose Lancets MISC For qid and prn testing for newly diagnosed diabetes DX: Diabetes, insulin requiring 100 each 5    glucose blood VI test strips (ASCENSIA AUTODISC VI;ONE TOUCH ULTRA TEST VI) strip 1 each by In Vitro route 4 times daily (with meals and nightly) For qid and prn testing for newly diagnosed diabetes  Freestyle Freedom lite    DX: Diabetes, insulin requiring 100 each 5    aspirin EC 81 MG EC tablet Take 1 tablet by mouth daily 30 tablet 3       Objective    BP (!) 163/81   Pulse 72   Temp Oriana Vasquez ) 96.3 °F (35.7 °C)   Resp 18   Ht 5' 2\" (1.575 m)   Wt 182 lb 0.2 oz (82.6 kg)   SpO2 97%   BMI 33.29 kg/m²     LABS:  WBC:    Lab Results   Component Value Date/Time    WBC 10.6 12/20/2022 01:41 AM     H/H:    Lab Results   Component Value Date/Time    HGB 11.7 12/20/2022 01:41 AM    HCT 35.4 12/20/2022 01:41 AM     PTT:  No results found for: APTT, PTT[APTT}  PT/INR:  No results found for: PROTIME, INR  HgBA1c:    Lab Results   Component Value Date/Time    LABA1C 15.0 12/16/2022 09:32 PM       Assessment   Alex Risk Score: Alex Scale Score: 20    Patient Active Problem List   Diagnosis Code    Hypertension I10    Back pain M54.9    Depression F32. A    Hull's esophagus without dysplasia K22.70    History of pleural empyema Z87.09    Thoracotomy scar of left chest L90.5    Morbidly obese ([facility]) E66.01    Uncontrolled type 2 diabetes mellitus with hyperglycemia, with long-term current use of insulin ([facility]) E11.65, Z79.4    Type 2 diabetes mellitus E11.9    Uncontrolled diabetes mellitus with hyperglycemia ([facility]) E11.65    Cellulitis of buttock L03.317    Sepsis ([facility]) L97.8    Metabolic acidemia I54.65    Starvation ketoacidosis T73. 0XXA, E87.29    Abscess of left thigh L02.416       Measurements:  Negative Pressure Wound Therapy Leg Left;Proximal;Upper;Posterior (Active)   $ Standard NPWT <=50 sq cm PER TX $ Yes 12/21/22 1111   Wound Type Surgical 12/21/22 1111   Unit Type 3M VacUlta 12/21/22 1111   Dressing Type Black Foam 12/21/22 1111   Number of pieces used 1 12/21/22 1111   Cycle Continuous 12/21/22 1111   Target Pressure (mmHg) 125 12/21/22 1111   Canister changed? Yes 12/21/22 1111   Dressing Status New dressing applied 12/21/22 1111   Dressing Changed Changed/New 12/21/22 1111   Drainage Amount Small 12/21/22 1111   Drainage Description Thick; Serosanguinous 12/21/22 1111   Dressing Change Due 12/23/22 12/21/22 1111   Wound Assessment Subcutaneous; [facility] 12/21/22 1111   Paty-wound Assessment Blanchable erythema; Induration 12/21/22 1111   Odor None 12/21/22 1111   Number of days: 0       Wound 12/18/22 Thigh Left;Proximal;Dorsal (Active)   Wound Image   12/21/22 1111   Wound Etiology Surgical 12/21/22 1111   Dressing Status New dressing applied 12/21/22 1111   Wound Cleansed Cleansed with saline;Irrigated with saline 12/21/22 1111   Dressing/Treatment Negative pressure wound therapy 12/21/22 1111   Dressing Change Due 12/23/22 12/21/22 1111   Wound Length (cm) 7.5 cm 12/21/22 1111   Wound Width (cm) 2 cm 12/21/22 1111   Wound Depth (cm) 4 cm 12/21/22 1111   Wound Surface Area (cm^2) 15 cm^2 12/21/22 1111   Wound Volume (cm^3) 60 cm^3 12/21/22 1111   Wound Assessment Pink/red;Slough 12/21/22 1111   Drainage Amount Small 12/21/22 1111   Drainage Description Serosanguinous; Thick 12/21/22 1111   Odor None 12/21/22 1111   Paty-wound Assessment Blanchable erythema; Induration 12/21/22 1111   Margins Attached edges; Defined edges 12/21/22 1111   Wound Thickness Description not for Pressure Injury Full thickness 12/21/22 1111   Number of days: 3            Response to treatment:  Well tolerated by patient., With complaints of pain. Pain Assessment:  Severity:  8 / 10  Quality of pain: sharp, tender  Wound Pain Timing/Severity: intermittent  Premedicated: Yes    Plan   Plan of Care: Wound 12/18/22 Thigh Left;Proximal;Dorsal-Dressing/Treatment: Negative pressure wound therapy    Specialty Bed Required : N/A   [] Low Air Loss   [] Pressure Redistribution  [] Fluid Immersion  [] Bariatric  [] Total Pressure Relief  [] Other:     Current Diet: ADULT DIET;  Regular; 3 carb choices (45 gm/meal)  Dietician consult:  No    Discharge Plan:  Placement for patient upon discharge: home with support    Patient appropriate for [address]: Yes    Has appoint at [address] 12/27    Referrals:  []   [x] [address] [place] [employer] [place] [employer] [facility]  [] Supplies  [] Other    Patient/Caregiver Teaching:  Level of patient/caregiver understanding able to:   [] Indicates understanding       [] Needs reinforcement  [] Unsuccessful      [] Verbal Understanding  [] Demonstrated understanding       [] No evidence of learning  [] Refused teaching         [x] N/A    Patient had I&D of abscess to the left groin/buttock on 12/19/22. Patient HgbA1C=15. Patient has been counseled by multiple providers on importance of glucose control for healing. Patient understands. Wound bed is red with small amount of slough. Periwound is red, indurated, and warm to touch. Patient states that pain is improving to the area. Home wound vac application submitted. Awaiting approval. [facility] selected for home care and wound vac dressing changes. Primary nurses pretreated with pain medication and  trimmed pubic hairs in area where vac drape will go. Wound and periwound cleansed with NS. Skin prep applied to periwound skin. Ostomy ring used to window pane wound. Black foam to wound bed and all sealed with Dermatac drape. -125 mmHg pressure applied. No leaks or alarms on wound vac. Patient tolerated with intermittent pain that improved after procedure was complete.        Electronically signed by   Michael Navarro RN, [address] [place] [address],[address] 12/21/2022

## 2022-12-21 NOTE — DISCHARGE INSTRUCTIONS
Negative Pressure Wound Therapy:  Wound Location: Left Groin  Last Changed: 12/21/2022  Clean: Wash wound bed and at least 4 inches of surrounding skin with soap and water. Flush wound with NS. Pat dry. Periwound: Apply Skin Prep to periwound skin and under all drape. Window-pane surrounding area with vac drape. May use hydrocolloid instead of drape. If skin becomes red due to tape irritation, apply light dusting of antifungal powder to periwound skin, then cover with skin prep to form crusting. May repeat steps until proper crusting is made to protect skin. Dressing Application: DO NOT PUT FOAM ON GOOD SKIN. Apply black foam to wound bed. Bridge to anterior portion of thigh for TracPAD placement. NPWT Settings: Set wound vac to 125 mmHg. Continuous. Change Frequency: Change wound vac dressing 3 times per week (MWF or TTS). Reapply vac dressing if NPWT system stops working or dressing begins to leak or cannot be reinforced. Alternative dressing: If unable to maintain NPWT, remove vac dressing. Wash with soap and water. Apply 1/4 strength Dakin's moistened gauze to wound bed. Cover with dry dressing. Secure with medipore tape. Change twice daily.

## 2022-12-22 ENCOUNTER — CARE COORDINATION (OUTPATIENT)
Dept: CASE MANAGEMENT | Age: 62
End: 2022-12-22

## 2022-12-22 LAB
BLOOD CULTURE, ROUTINE: NORMAL
CULTURE, BLOOD 2: NORMAL

## 2022-12-23 ENCOUNTER — CARE COORDINATION (OUTPATIENT)
Dept: CASE MANAGEMENT | Age: 62
End: 2022-12-23

## 2022-12-23 DIAGNOSIS — L02.416 ABSCESS OF LEFT THIGH: Primary | ICD-10-CM

## 2022-12-23 LAB
ANAEROBIC CULTURE: ABNORMAL
CULTURE SURGICAL: ABNORMAL
GRAM STAIN RESULT: ABNORMAL
ORGANISM: ABNORMAL

## 2022-12-23 PROCEDURE — 1111F DSCHRG MED/CURRENT MED MERGE: CPT | Performed by: NURSE PRACTITIONER

## 2022-12-23 NOTE — OP NOTE
Operative Report    PATIENT NAME: Ru Smith RECORD NO. [id number]  SURGEON: Ioana Locke MD MD   Primary Care Physician: MOOK Swenson  Date: 12/19/2022     PROCEDURE PERFORMED: incision and drainage of left upper thigh abscess with debridement of wound, sharp debridement through skin, subcutaneous tissue, and muscle 6 cm by 3.5 cm and 3 cm deep  PREOPERATIVE DIAGNOSIS: left upper thigh abscess with necrosis  POSTOPERATIVE DIAGNOSIS: Same  SURGEON:  Ioana Locke MD   ANESTHESIA:  GEA  ESTIMATED BLOOD LOSS: less than 100ml  SPECIMEN:  culture  COMPLICATIONS:  None; patient tolerated the procedure well. FINDINGS: large abscess tunneling towards vulva with necrotic tissue   DISPOSITION: PACU - hemodynamically stable. CONDITION: stable      Indications: The patient is a 60year old female with uncontrolled diabetes who presented with a complaint of a tender area on the left upper thigh/buttock area. She was found to have cellulitis and was receiving treatment for this. This morning, on exam, the patient was found to have a large area that was very tense, edematous, indurated, with central necrosis developing. She is taken at this time for incision and drainage as well as debridement. Procedure Details     After the risks and benefits of the procedure were explained, informed consent was obtained, and the patient was taken to the operating room. The patient was placed in supine position and anesthesia was begun. She was then placed in lithotomy position. The left upper thigh and perineal area was prepped and draped in normal sterile fashion. A time out was performed. There was an opening on the left upper thigh that had close over as well as an opening towards the external genitalia. These two openings were probed and found to connect. Cautery was used to connect these two openings. There was drainage of a large amount of thick, tan-colored purulent fluid. This was cultured for surgical culture. The wound was thoroughly irrigated with a pulsevac. Once this was complete, the wound was inspected. There was necrotic tissue around the edges. This was sharply debrided through skin, subcutaneous tissue, and muscle. See above for measurements. Hemostasis was achieved using cautery. The wound was thoroughly probed to make sure all pockets of purulence had been opened. The wound was once again irrigated and suctioned. The wound was then gently packed with dakins dampened kerlex, covered with dry guaze and abds. The patient tolerated the procedure well, was removed from anesthesia, and transferred to the recovery area in stable condition.                   Briana Drew MD   Electronically signed 12/23/22 11:31 AM

## 2022-12-23 NOTE — CARE COORDINATION
[address] Care Transitions Initial Follow Up Call    Call within 2 business days of discharge: Yes    LPN Care Coordinator contacted the patient by telephone to perform post hospital discharge assessment. Verified name and  with patient as identifiers. Provided introduction to self, and explanation of the LPN Care Coordinator role. Patient: Janna Putnam Patient : 1960   MRN: [id number]  Reason for Admission: Abscess of left thigh  Discharge Date: 22 RARS: Readmission Risk Score: 8.2      Last Discharge  [address]       Date Complaint Diagnosis Description Type Department Provider    22 Wound Infection Diabetic ketoacidosis without coma associated with diabetes mellitus due to underlying condition ([facility] [address] . .. ED to Hosp-Admission (Discharged) (ADMITTED) [facility] Jason Mo MD; Anthony Calderón ... Was this an external facility discharge? No Discharge Facility: [facility]     Challenges to be reviewed by the provider   Additional needs identified to be addressed with provider: No  none               Method of communication with provider: none. Spoke with Janna Putnam who reported that she is doing alright. Patient reported that she had lost the seal on wound vac but her daughter was able to get it back. Patient stated that with wound seems to be doing good, it just burns a little in some spots but was doing that in the hospital also. Patient reported that BS this morning was 236 and she do check BS 4 times a day. Patient denied cp, sob, cough, dizziness, headache, n/v, diarrhea, abdominal pains, fever, or chills. Patient report that appetite and fluid intake is good and denied any problems with bowel or bladder. Patient reported that he is taking all medications as ordered. Writer and patient did a complete medication review and 1111f was completed. Patient is aware of her follow up with the wound clinic on 2022 and appointment with PCP on 2022.  Patient denied any other needs at this time. Patient instructed to continue to monitor s/s, reporting any that may present to MD immediately for early intervention. Patient is agreeable to f/u calls. [facility] provided contact information for future needs. Patient reported that she had not heard from [facility] as of yet. Writer did a 3 way call to [facility] and spoke with Ryne Torrez who took a message and will have Delmis Walls in intake to give writer a call to verify. N Care Coordinator reviewed discharge instructions, medical action plan, and red flags with patient who verbalized understanding. The patient was given an opportunity to ask questions and does not have any further questions or concerns at this time. Were discharge instructions available to patient? Yes. Reviewed appropriate site of care based on symptoms and resources available to patient including: PCP  Specialist  Home health  When to call 911. The patient agrees to contact the PCP office for questions related to their healthcare. Advance Care Planning:   Does patient have an Advance Directive: not on file; education provided and decision maker updated. Advance Care Planning   Healthcare Decision Maker:    Primary Decision Maker: Pee López - [phone]    Secondary Decision Maker: Nena Lu Child - [phone]       Medication reconciliation was performed with patient, who verbalizes understanding of administration of home medications. Medications reviewed, 1111F entered: yes    Was patient discharged with a pulse oximeter? no    Non-face-to-face services provided:  Obtained and reviewed discharge summary and/or continuity of care documents  Education of patient/family/caregiver/guardian to support self-management-s/s of Covid and when to contact the doctor  Assessment and support for treatment adherence and medication management-.     Offered patient enrollment in the Remote Patient Monitoring (RPM) program for in-home monitoring: NA.    Care Transitions 24 Hour Call    Do you have a copy of your discharge instructions?: Yes  Do you have all of your prescriptions and are they filled?: Yes  Have you been contacted by a [address]?: No  Have you scheduled your follow up appointment?: Yes  How are you going to get to your appointment?: Car - family or friend to transport  Do you feel like you have everything you need to keep you well at home?: Yes  Care Transitions Interventions   Home Care Waiver: Completed              Follow Up  Future Appointments   Date Time Provider Olvin Brown   12/27/2022  8:45 AM Levy Moreau DO [facility] [address] [facility] [address]   12/28/2022 11:20 AM Rebecca Castro, [address] [facility] provided contact information. Plan for follow-up call in 5-7 days based on severity of symptoms and risk factors.   Plan for next call: symptom management-,  self management-.    Anjana Banks, TEDDY

## 2022-12-27 ENCOUNTER — HOSPITAL ENCOUNTER (OUTPATIENT)
Dept: WOUND CARE | Age: 62
Discharge: HOME | End: 2022-12-27
Payer: MEDICARE

## 2022-12-27 VITALS
BODY MASS INDEX: 33.49 KG/M2 | TEMPERATURE: 97.7 F | HEART RATE: 106 BPM | SYSTOLIC BLOOD PRESSURE: 140 MMHG | DIASTOLIC BLOOD PRESSURE: 90 MMHG | HEIGHT: 62 IN | RESPIRATION RATE: 18 BRPM | WEIGHT: 182 LBS

## 2022-12-27 DIAGNOSIS — L02.416 ABSCESS OF LEFT THIGH: Primary | ICD-10-CM

## 2022-12-27 PROCEDURE — 99214 OFFICE O/P EST MOD 30 MIN: CPT

## 2022-12-27 RX ORDER — OXYCODONE AND ACETAMINOPHEN 7.5; 325 MG/1; MG/1
1 TABLET ORAL EVERY 4 HOURS PRN
Qty: 18 TABLET | Refills: 0 | Status: SHIPPED | OUTPATIENT
Start: 2022-12-27 | End: 2022-12-30

## 2022-12-27 ASSESSMENT — PAIN DESCRIPTION - FREQUENCY: FREQUENCY: INTERMITTENT

## 2022-12-27 ASSESSMENT — PAIN DESCRIPTION - ORIENTATION: ORIENTATION: LEFT

## 2022-12-27 ASSESSMENT — PAIN SCALES - GENERAL: PAINLEVEL_OUTOF10: 7

## 2022-12-27 ASSESSMENT — PAIN DESCRIPTION - LOCATION: LOCATION: BUTTOCKS;GROIN

## 2022-12-27 ASSESSMENT — PAIN DESCRIPTION - PAIN TYPE: TYPE: ACUTE PAIN

## 2022-12-27 NOTE — DISCHARGE INSTRUCTIONS
[address] [facility]   Physician Orders and Discharge Instructions  [address]  Telephone: [phone]    NAME:  Ngozi Simpson OF BIRTH:  1960  MEDICAL RECORD NUMBER:  [id number]  DATE:  12/27/2022    Discharge condition: Stable    Discharge to: Home    Left via:Private automobile    Accompanied by:  family    [facility]: [facility]       Apply moist to moist dressing in clinic today, Danielle Gonzalez to reapply wound vac dressing. May use strip paste or stoma paste to help seal the dressing in the groin area      May take vac dressing off to shower before vac dressing reapplication      Dressing Orders:  Location of Wound: Left groin/buttocks    Wash with soap and water  Apply wound vac as follows: Apply Skin Prep to periwound. Apply drape - window pane to surrounding area (periwound). (May use duoderm instead of drape to prevent skin breakdown. Use ostomy paste to fill any creases to prevent leakage. If skin becomes red due to tape irritation please apply skin prep. Then apply light dusting of ostomy powder or antifungal powder to periwound. Then blot with skin prep to form crusting to protect skin. May repeat skin prep, powdering steps until proper crusting is made. )  Then apply drape to periwound. DO NOT PUT FOAM ON GOOD SKIN. Apply black foam to wound bed. BRIDGE away from wound off bony prominence Set wound vac to 125 mmHG, continuous. Change wound vac dressing 3 times per week (MWF or TTS)  Reapply vac dressing if vac stops working or dressing begins to leak or cannot be reinforced. Alternative dressing: Wash with soap and water. Apply 1/4 STR Dakins moistened gauze to wound bed. Cover with gauze. Secure with roll gauze and medipore tape. Change twice daily . Treatment Orders:  Protein rich diet (unless restricted by your physician); Multivitamin daily;  Elevate legs above the level of your heart when sitting 3-4 times daily for at least one hour each time, avoid standing for long periods of time. [facility] follow up visit ___________1 week with Dr. Stout__________________  (Please note your next appointment above and if you are unable to keep, kindly give a 24 hour notice. Thank you.)          If you experience any of the following, please call the [facility] during business hours:    * Increase in Pain  * Temperature over 101  * Increase in drainage from your wound  * Drainage with a foul odor  * Bleeding  * Increase in swelling  * Need for compression bandage changes due to slippage, breakthrough drainage. If you need medical attention outside of the business hours of the [facility] please contact your PCP or go to the nearest emergency room.

## 2022-12-28 ENCOUNTER — OFFICE VISIT (OUTPATIENT)
Dept: FAMILY MEDICINE CLINIC | Age: 62
End: 2022-12-28

## 2022-12-28 VITALS
SYSTOLIC BLOOD PRESSURE: 118 MMHG | WEIGHT: 163 LBS | HEART RATE: 100 BPM | DIASTOLIC BLOOD PRESSURE: 72 MMHG | RESPIRATION RATE: 20 BRPM | TEMPERATURE: 97.3 F | BODY MASS INDEX: 30 KG/M2 | OXYGEN SATURATION: 95 % | HEIGHT: 62 IN

## 2022-12-28 DIAGNOSIS — Z09 HOSPITAL DISCHARGE FOLLOW-UP: ICD-10-CM

## 2022-12-28 DIAGNOSIS — I10 ESSENTIAL HYPERTENSION: ICD-10-CM

## 2022-12-28 DIAGNOSIS — Z79.4 UNCONTROLLED TYPE 2 DIABETES MELLITUS WITH HYPERGLYCEMIA, WITH LONG-TERM CURRENT USE OF INSULIN (HCC): ICD-10-CM

## 2022-12-28 DIAGNOSIS — L02.416 ABSCESS OF LEFT THIGH: ICD-10-CM

## 2022-12-28 DIAGNOSIS — Z09 HOSPITAL DISCHARGE FOLLOW-UP: Primary | ICD-10-CM

## 2022-12-28 DIAGNOSIS — F41.9 ANXIETY DISORDER, UNSPECIFIED TYPE: ICD-10-CM

## 2022-12-28 DIAGNOSIS — Z46.89 ENCOUNTER FOR MANAGEMENT OF VACUUM-ASSISTED CLOSURE (VAC) OF WOUND: ICD-10-CM

## 2022-12-28 DIAGNOSIS — E11.65 UNCONTROLLED TYPE 2 DIABETES MELLITUS WITH HYPERGLYCEMIA, WITH LONG-TERM CURRENT USE OF INSULIN (HCC): ICD-10-CM

## 2022-12-28 DIAGNOSIS — F33.0 MAJOR DEPRESSIVE DISORDER, RECURRENT, MILD (HCC): ICD-10-CM

## 2022-12-28 PROBLEM — F33.9 MAJOR DEPRESSIVE DISORDER, RECURRENT, UNSPECIFIED (HCC): Status: ACTIVE | Noted: 2022-12-28

## 2022-12-28 PROBLEM — F33.1 MAJOR DEPRESSIVE DISORDER, RECURRENT, MODERATE (HCC): Status: ACTIVE | Noted: 2022-12-28

## 2022-12-28 LAB
ALBUMIN SERPL-MCNC: 4.1 G/DL (ref 3.5–5.2)
ALP BLD-CCNC: 157 U/L (ref 35–104)
ALT SERPL-CCNC: 26 U/L (ref 5–33)
ANION GAP SERPL CALCULATED.3IONS-SCNC: 12 MMOL/L (ref 7–19)
AST SERPL-CCNC: 28 U/L (ref 5–32)
BASOPHILS ABSOLUTE: 0.1 K/UL (ref 0–0.2)
BASOPHILS RELATIVE PERCENT: 0.8 % (ref 0–1)
BILIRUB SERPL-MCNC: 0.3 MG/DL (ref 0.2–1.2)
BUN BLDV-MCNC: 11 MG/DL (ref 8–23)
CALCIUM SERPL-MCNC: 9.8 MG/DL (ref 8.8–10.2)
CHLORIDE BLD-SCNC: 95 MMOL/L (ref 98–111)
CO2: 28 MMOL/L (ref 22–29)
CREAT SERPL-MCNC: 0.6 MG/DL (ref 0.5–0.9)
EOSINOPHILS ABSOLUTE: 0.2 K/UL (ref 0–0.6)
EOSINOPHILS RELATIVE PERCENT: 1.6 % (ref 0–5)
GFR SERPL CREATININE-BSD FRML MDRD: >60 ML/MIN/{1.73_M2}
GLUCOSE BLD-MCNC: 376 MG/DL (ref 74–109)
HCT VFR BLD CALC: 42.6 % (ref 37–47)
HEMOGLOBIN: 13.7 G/DL (ref 12–16)
IMMATURE GRANULOCYTES #: 0.1 K/UL
LYMPHOCYTES ABSOLUTE: 3.3 K/UL (ref 1.1–4.5)
LYMPHOCYTES RELATIVE PERCENT: 32.4 % (ref 20–40)
MCH RBC QN AUTO: 32.2 PG (ref 27–31)
MCHC RBC AUTO-ENTMCNC: 32.2 G/DL (ref 33–37)
MCV RBC AUTO: 100.2 FL (ref 81–99)
MONOCYTES ABSOLUTE: 0.5 K/UL (ref 0–0.9)
MONOCYTES RELATIVE PERCENT: 4.8 % (ref 0–10)
NEUTROPHILS ABSOLUTE: 6.1 K/UL (ref 1.5–7.5)
NEUTROPHILS RELATIVE PERCENT: 59 % (ref 50–65)
PDW BLD-RTO: 13.2 % (ref 11.5–14.5)
PLATELET # BLD: 371 K/UL (ref 130–400)
PMV BLD AUTO: 11.3 FL (ref 9.4–12.3)
POTASSIUM SERPL-SCNC: 4 MMOL/L (ref 3.5–5)
RBC # BLD: 4.25 M/UL (ref 4.2–5.4)
SODIUM BLD-SCNC: 135 MMOL/L (ref 136–145)
TOTAL PROTEIN: 7.2 G/DL (ref 6.6–8.7)
WBC # BLD: 10.3 K/UL (ref 4.8–10.8)

## 2022-12-28 RX ORDER — BLOOD-GLUCOSE SENSOR
1 EACH MISCELLANEOUS CONTINUOUS
Qty: 3 EACH | Refills: 5 | Status: SHIPPED | OUTPATIENT
Start: 2022-12-28

## 2022-12-28 RX ORDER — FLUOXETINE 10 MG/1
10 CAPSULE ORAL DAILY
Qty: 90 CAPSULE | Refills: 1 | Status: SHIPPED | OUTPATIENT
Start: 2022-12-28

## 2022-12-28 RX ORDER — CEPHALEXIN 500 MG/1
500 CAPSULE ORAL 4 TIMES DAILY
Qty: 28 CAPSULE | Refills: 0 | Status: SHIPPED | OUTPATIENT
Start: 2022-12-28 | End: 2023-01-04

## 2022-12-28 RX ORDER — BLOOD-GLUCOSE,RECEIVER,CONT
1 EACH MISCELLANEOUS CONTINUOUS
Qty: 1 EACH | Refills: 5 | Status: SHIPPED | OUTPATIENT
Start: 2022-12-28

## 2022-12-28 RX ORDER — BLOOD-GLUCOSE TRANSMITTER
EACH MISCELLANEOUS
Qty: 1 EACH | Refills: 0 | Status: SHIPPED | OUTPATIENT
Start: 2022-12-28

## 2022-12-28 NOTE — PROGRESS NOTES
[address]   Progress Note and Procedure Note      [facility] RECORD NUMBER:  [id number]  AGE: 60 y.o. GENDER: female  : 1960  EPISODE DATE:  2022    Subjective:     Chief Complaint   Patient presents with    Wound Check     New patient - c/o wound left groin / buttock. States it states it started as a boil while she was on vacation. HISTORY of PRESENT ILLNESS HPI     Jone Jensen is a 60 y.o. female who presents today for wound/ulcer evaluation. History of Wound Context: Patient presents today as a follow-up from the hospital.  She underwent incision and drainage along with sharp debridement of the left upper thigh/perineum with Dr. Ashley Fitch on . Was discharged home from the hospital with a wound VAC. Has been having little issue with the wound VAC.       Wound/Ulcer Pain Timing/Severity: mild  Quality of pain: burning  Severity:  3 / 10   Modifying Factors: Pain worsens with walking  Associated Signs/Symptoms: none    Ulcer Identification:  Ulcer Type: non-healing surgical and    Contributing Factors: none    Wound:  Abscess        PAST MEDICAL HISTORY        Diagnosis Date    Back pain 2014    Carotid artery stenosis     Depression 2014    Diabetes ([address]     GERD (gastroesophageal reflux disease)     Hyperlipidemia     Hypertension 2014    Morbidly obese ([address] 7/15/2020    Type II or unspecified type diabetes mellitus without mention of complication, not stated as uncontrolled     Uncontrolled type 2 diabetes mellitus with hyperglycemia, with long-term current use of insulin ([address] 2022       PAST SURGICAL HISTORY    Past Surgical History:   Procedure Laterality Date    BACK SURGERY      Dr. Akira Moulton 1st surgery and Dr. Iesha Burkett 2nd surgery    DILATION AND CURETTAGE OF UTERUS      HYSTERECTOMY (CERVIX STATUS UNKNOWN) N/A     LEG SURGERY Left 2022    INCISION AND DRAINAGE OF LEFT UPPER THIGH ABSCESS WITH DEBRIDEMENT performed by Priyanka Minaya MD at [address] HISTORY    Family History   Problem Relation Age of Onset    Diabetes Mother     Stroke Mother        SOCIAL HISTORY    Social History     Tobacco Use    Smoking status: Former     Packs/day: 0.50     Years: 20.00     Pack years: 10.00     Types: Cigarettes     Start date: 1983     Quit date: 2014     Years since quittin.4    Smokeless tobacco: Never   Vaping Use    Vaping Use: Never used   Substance Use Topics    Alcohol use: No     Alcohol/week: 0.0 standard drinks    Drug use: No       ALLERGIES    Allergies   Allergen Reactions    Sulfa Antibiotics Hives       MEDICATIONS    Current Outpatient Medications on File Prior to Encounter   Medication Sig Dispense Refill    sodium hypochlorite (DAKINS) 0.125 % SOLN external solution Apply topically 2 times daily (Patient not taking: Reported on 2022) 473 mL 0    amLODIPine (NORVASC) 5 MG tablet Take 1 tablet by mouth daily 30 tablet 0    doxycycline hyclate (VIBRA-TABS) 100 MG tablet Take 1 tablet by mouth 2 times daily for 7 days 14 tablet 0    nystatin (MYCOSTATIN) 028195 UNIT/GM cream Apply topically 2 times daily. 60 g 5    pantoprazole (PROTONIX) 40 MG tablet Take 1 tablet by mouth every morning (before breakfast) 90 tablet 1    insulin aspart (NOVOLOG) 100 UNIT/ML injection vial Inject 20 Units into the skin 3 times daily (before meals)       LANTUS 100 UNIT/ML injection vial Inject 60 Units into the skin nightly       Insulin Syringe-Needle U-100 (INSULIN SYRINGE .5CC/30GX5/16\") 30G X 5/16\" 0.5 ML MISC USE WITH INSULIN EVERY  each 0    B-D ULTRAFINE III SHORT PEN 31G X 8 MM MISC USE ONCE DAILY 100 each 0    blood glucose monitor strips For qid testing Type 2 Dm Dispense brand per patient request or insurance benefits.  100 strip 3    vitamin D (ERGOCALCIFEROL) 05577 units CAPS capsule TAKE 1 CAPSULE BY MOUTH 1 TIME A WEEK 4 capsule 2    glucose monitoring kit (FREESTYLE) monitoring kit For bid testing Type 2 dm 1 kit 0    Lancets MISC 1 each by Does not apply route 2 times daily Dispense brand per insurance benefits and patient request. 100 each 3    Ez Smart Blood Glucose Lancets MISC For qid and prn testing for newly diagnosed diabetes DX: Diabetes, insulin requiring 100 each 5    glucose blood VI test strips (ASCENSIA AUTODISC VI;ONE TOUCH ULTRA TEST VI) strip 1 each by In Vitro route 4 times daily (with meals and nightly) For qid and prn testing for newly diagnosed diabetes  Freestyle Freedom lite    DX: Diabetes, insulin requiring 100 each 5    aspirin EC 81 MG EC tablet Take 1 tablet by mouth daily 30 tablet 3     No current facility-administered medications on file prior to encounter. REVIEW OF SYSTEMS    Pertinent items are noted in HPI. Objective:      BP (!) 140/90   Pulse (!) 106   Temp 97.7 °F (36.5 °C) (Temporal)   Resp 18   Ht 5' 2\" (1.575 m)   Wt 182 lb (82.6 kg)   BMI 33.29 kg/m²     Wt Readings from Last 3 Encounters:   12/28/22 163 lb (73.9 kg)   12/27/22 182 lb (82.6 kg)   12/21/22 182 lb 0.2 oz (82.6 kg)       PHYSICAL EXAM    General Appearance: alert and oriented to person, place and time, well-developed and well-nourished, in no acute distress  Skin: As documented  Pulmonary/Chest: no chest wall tenderness  Abdomen: soft, non-tender, non-distended, normal bowel sounds, no masses or organomegaly  Musculoskeletal: normal range of motion, no joint swelling, deformity or tenderness      Assessment:      Patient Active Problem List   Diagnosis Code    Hypertension I10    Back pain M54.9    Depression F32. A    Hull's esophagus without dysplasia K22.70    History of pleural empyema Z87.09    Thoracotomy scar of left chest L90.5    Morbidly obese ([facility]) E66.01    Uncontrolled type 2 diabetes mellitus with hyperglycemia, with long-term current use of insulin ([facility]) E11.65, Z79.4    Type 2 diabetes mellitus E11.9    Uncontrolled diabetes mellitus with hyperglycemia ([facility] [address] E11.65    Cellulitis of buttock L03.317    Sepsis (HCC) D57.7    Metabolic acidemia U60.55    Starvation ketoacidosis T73. 0XXA, E87.29    Abscess of left thigh L02.416    Major depressive disorder, recurrent, mild F33.0    Major depressive disorder, recurrent, moderate F33.1    Major depressive disorder, recurrent, unspecified F33.9       Negative Pressure Wound Therapy Leg Left;Proximal;Upper;Posterior (Active)   $ Standard NPWT <=50 sq cm PER TX $ Yes 12/21/22 1111   Wound Type Surgical 12/27/22 0910   Unit Type 3M VacUlta 12/27/22 0910   Dressing Type Black Foam 12/27/22 0910   Number of pieces used 1 12/21/22 1111   Number of pieces removed 1 12/27/22 0910   Cycle Continuous 12/27/22 0910   Target Pressure (mmHg) 125 12/27/22 0910   Canister changed? No 12/27/22 0910   Dressing Status Old drainage noted 12/27/22 0910   Dressing Changed Changed/New 12/21/22 1111   Drainage Amount Moderate 12/27/22 0910   Drainage Description Serosanguinous 12/27/22 0910   Dressing Change Due 12/23/22 12/21/22 1111   Wound Assessment [facility] 12/27/22 0910   Paty-wound Assessment Blanchable erythema; Induration 12/27/22 0910   Shape irregular 12/27/22 0910   Odor None 12/27/22 0910   Number of days: 7       Wound 12/18/22 Groin Left Wound 1, left groin / buttock, surgical (Active)   Wound Image   12/27/22 0854   Wound Etiology Surgical 12/27/22 0854   Dressing Status New dressing applied 12/27/22 1020   Wound Cleansed Soap and water 12/27/22 0854   Dressing/Treatment Roll gauze; Moist to moist;ABD;Tape/Soft cloth adhesive tape 12/27/22 1020   Dressing Change Due 12/23/22 12/21/22 1111   Wound Length (cm) 5.7 cm 12/27/22 0854   Wound Width (cm) 1 cm 12/27/22 0854   Wound Depth (cm) 2.2 cm 12/27/22 0854   Wound Surface Area (cm^2) 5.7 cm^2 12/27/22 0854   Change in Wound Size % (l*w) 62 12/27/22 0854   Wound Volume (cm^3) 12.54 cm^3 12/27/22 0854   Wound Healing % 79 12/27/22 0854   Distance Tunneling (cm) 0 cm 12/27/22 0854   Tunneling Position ___ O'Clock 0 12/27/22 0854   Undermining Starts ___ O'Clock 12 12/27/22 0854   Undermining Ends___ O'Clock 2 12/27/22 0854   Undermining Maxium Distance (cm) 2 12/27/22 0854   Wound Assessment Pink/red;Slough 12/27/22 0854   Drainage Amount Moderate 12/27/22 0854   Drainage Description Serosanguinous 12/27/22 0854   Odor None 12/27/22 0854   Paty-wound Assessment Blanchable erythema; Induration 12/27/22 0854   Margins Defined edges 12/27/22 0854   Wound Thickness Description not for Pressure Injury Full thickness 12/27/22 0854   Number of days: 10                Plan:     Problem List Items Addressed This Visit          Other    Abscess of left thigh - Primary    Relevant Medications    oxyCODONE-acetaminophen (PERCOCET) 7.5-325 MG per tablet       Treatment Note please see attached Discharge Instructions    In my professional opinion this patient would benefit from HBO Therapy: No    Written patient dismissal instructions given to patient and signed by patient or POA. Continue local wound care with wound VAC.     Electronically signed by Roberta Ortiz DO on 12/28/2022 at 4:44 PM

## 2022-12-28 NOTE — PROGRESS NOTES
Post-Discharge Transitional Care Follow Up      Dianne Lopez   YOB: 1960    Date of Office Visit:  12/28/2022  Date of Hospital Admission: 12/16/22  Date of Hospital Discharge: 12/21/22  Readmission Risk Score (high >=14%. Medium >=10%):Readmission Risk Score: 8.2      Care management risk score Rising risk (score 2-5) and Complex Care (Scores >=6): No Risk Score On File     Non face to face  following discharge, date last encounter closed (first attempt may have been earlier): 12/23/2022     Call initiated 2 business days of discharge: Yes     Hospital discharge follow-up  -     ND DISCHARGE MEDS RECONCILED W/ CURRENT OUTPATIENT MED LIST  -     CBC with Auto Differential; Future  -     Comprehensive Metabolic Panel; Future  Encounter for management of vacuum-assisted closure (VAC) of wound  -     CBC with Auto Differential; Future  -     Comprehensive Metabolic Panel; Future  Major depressive disorder, recurrent, mild  Major depressive disorder, recurrent, moderate  Mild episode of recurrent major depressive disorder (HCC)  -     FLUoxetine (PROZAC) 10 MG capsule; Take 1 capsule by mouth daily NEVER ABRUPTLY STOP, Disp-90 capsule, R-1Normal  Abscess of left thigh  -     cephALEXin (KEFLEX) 500 MG capsule; Take 1 capsule by mouth 4 times daily for 7 days, Disp-28 capsule, R-0Normal  -     CBC with Auto Differential; Future  -     Comprehensive Metabolic Panel; Future  Anxiety disorder, unspecified type  -     FLUoxetine (PROZAC) 10 MG capsule; Take 1 capsule by mouth daily NEVER ABRUPTLY STOP, Disp-90 capsule, R-1Normal  Uncontrolled type 2 diabetes mellitus with hyperglycemia, with long-term current use of insulin ([facility])  -     Continuous Blood Gluc Sensor (DEXCOM G6 SENSOR) MISC; 1 Device by Does not apply route continuous, Disp-3 each, R-5Normal  -     Continuous Blood Gluc Transmit (DEXCOM G6 TRANSMITTER) MISC;  For glucose monitoring qid and prn, Disp-1 each, R-0Normal  -     Continuous Blood Gluc  ([address]) ADARSH; 1 Device by Does not apply route continuous, Disp-1 each, R-5Normal  Essential hypertension  -     amLODIPine (NORVASC) 5 MG tablet; Take 1 tablet by mouth daily For blood pressure, Disp-90 tablet, R-1Normal  Non-pressure chronic ulcer of unspecified part of left lower leg with other specified severity (HCC)  -     cephALEXin (KEFLEX) 500 MG capsule; Take 1 capsule by mouth 4 times daily for 7 days, Disp-28 capsule, R-0Normal; continue wound vac and home health    Medical Decision Making: high complexity    Protein shakes (diabetic)  Lab today  Increase dietary protein  INcrease long acting insulin from 30 units bid to 40 units bid by increasing 2units nightly until 40u is reached. Follow up in approx 3wks with glucose log. Return in about 3 weeks (around 1/18/2023). Subjective:   HPI  Was admitted to [facility] and discharged with diagnoses of abscess of left thigh, uncontrolled diabetes, sepsis, metabolic acidemia, starvation ketoacidosis, and hypertension. She presents here in office today with her spouse. She does have a wound biopsy at this time. She does have home health visiting regularly as well as this morning. She has been hospitalized for diabetic ketoacidosis in the past.  She does state that she is currently using 30 units twice daily   2-10units with meals  Is trying to be more adherent and intentional with diabetic intake. She was discharged with additional 7 days of oral Keflex as well as doxycycline. She also had medication in addition for hypertension with amlodipine. She had been taking fluoxetine for anxiety/depression. She has voiced to me that she may need to restart this medication and we will certainly do so she had tolerated it well previously. Inpatient course: Discharge summary reviewed- see chart.         Interval history/Current status: wound improving    Patient Active Problem List   Diagnosis    Hypertension Back pain    Depression    Hull's esophagus without dysplasia    History of pleural empyema    Thoracotomy scar of left chest    Morbidly obese (HCC)    Uncontrolled type 2 diabetes mellitus with hyperglycemia, with long-term current use of insulin (HCC)    Type 2 diabetes mellitus    Uncontrolled diabetes mellitus with hyperglycemia ([facility])    Cellulitis of buttock    Sepsis ([facility] [address]    Metabolic acidemia    Starvation ketoacidosis    Abscess of left thigh    Major depressive disorder, recurrent, mild    Major depressive disorder, recurrent, moderate    Major depressive disorder, recurrent, unspecified    Non-pressure chronic ulcer of unspecified part of left lower leg with other specified severity ([facility] [address]       Medications listed as ordered at the time of discharge from hospital     Medication List            Accurate as of December 28, 2022 11:59 PM. If you have any questions, ask your nurse or doctor.                 START taking these medications      Dexcom G6  Vanita  1 Device by Does not apply route continuous  Started by: MOOK Mosquera     Dexcom G6 Sensor Misc  1 Device by Does not apply route continuous  Started by: MOOK Mosquera     Dexcom G6 Transmitter Misc  For glucose monitoring qid and prn  Started by: MOOK Mosquera     FLUoxetine 10 MG capsule  Commonly known as: PROZAC  Take 1 capsule by mouth daily NEVER ABRUPTLY STOP  Started by: MOOK Mosquera            CHANGE how you take these medications      amLODIPine 5 MG tablet  Commonly known as: NORVASC  Take 1 tablet by mouth daily For blood pressure  What changed: additional instructions  Changed by: MOOK Mosquera            CONTINUE taking these medications      aspirin EC 81 MG EC tablet     B-D ULTRAFINE III SHORT PEN 31G X 8 MM Misc  Generic drug: Insulin Pen Needle  USE ONCE DAILY     * blood glucose test strips strip  Commonly known as: ASCENSIA AUTODISC VI;ONE TOUCH ULTRA TEST VI  1 each by In Vitro route 4 times daily (with meals and nightly) For qid and prn testing for newly diagnosed diabetes  Freestyle Freedom lite    DX: Diabetes, insulin requiring     * blood glucose test strips  For qid testing Type 2 Dm Dispense brand per patient request or insurance benefits. cephALEXin 500 MG capsule  Commonly known as: KEFLEX  Take 1 capsule by mouth 4 times daily for 7 days     doxycycline hyclate 100 MG tablet  Commonly known as: VIBRA-TABS  Take 1 tablet by mouth 2 times daily for 7 days     * Ez Smart Blood Glucose Lancets Misc  For qid and prn testing for newly diagnosed diabetes DX: Diabetes, insulin requiring     * Lancets Misc  1 each by Does not apply route 2 times daily Dispense brand per insurance benefits and patient request.     glucose monitoring kit  For bid testing Type 2 dm     insulin aspart 100 UNIT/ML injection vial  Commonly known as: NOVOLOG     INSULIN SYRINGE .5CC/30GX5/16\" 30G X 5/16\" 0.5 ML Misc  USE WITH INSULIN EVERY DAY     Lantus 100 UNIT/ML injection vial  Generic drug: insulin glargine     Lidocaine 4 % Soln  Apply 5 mLs topically See Admin Instructions Mix with 10cc of Saline and apply to wound vac sponge prior to removal of dressing. nystatin 016097 UNIT/GM cream  Commonly known as: MYCOSTATIN  Apply topically 2 times daily. oxyCODONE-acetaminophen 7.5-325 MG per tablet  Commonly known as: Percocet  Take 1 tablet by mouth every 4 hours as needed for Pain for up to 3 days. Intended supply: 28 days Max Daily Amount: 6 tablets     pantoprazole 40 MG tablet  Commonly known as: PROTONIX  Take 1 tablet by mouth every morning (before breakfast)     sodium hypochlorite 0.125 % Soln external solution  Commonly known as: DAKINS  Apply topically 2 times daily     vitamin D 1.25 MG (13122 UT) Caps capsule  Commonly known as: ERGOCALCIFEROL  TAKE 1 CAPSULE BY MOUTH 1 TIME A WEEK           * This list has 4 medication(s) that are the same as other medications prescribed for you.  Read the directions carefully, and ask your doctor or other care provider to review them with you. Where to Get Your Medications        These medications were sent to [address], [address] [facility] -  [phone] [address], [address] [place] [address]      Phone: [phone]   amLODIPine 5 MG tablet  cephALEXin 500 MG capsule  Dexcom G6  Daarsh  Dexcom G6 Sensor Misc  Dexcom G6 Transmitter Misc  FLUoxetine 10 MG capsule          Medications marked \"taking\" at this time  Outpatient Medications Marked as Taking for the 22 encounter (Office Visit) with MOOK Hua   Medication Sig Dispense Refill    amLODIPine (NORVASC) 5 MG tablet Take 1 tablet by mouth daily For blood pressure 90 tablet 1    Continuous Blood Gluc Sensor (DEXCOM G6 SENSOR) MISC 1 Device by Does not apply route continuous 3 each 5    Continuous Blood Gluc Transmit (DEXCOM G6 TRANSMITTER) MISC For glucose monitoring qid and prn 1 each 0    Continuous Blood Gluc  (DEXCOM G6 ) ADARSH 1 Device by Does not apply route continuous 1 each 5    [] cephALEXin (KEFLEX) 500 MG capsule Take 1 capsule by mouth 4 times daily for 7 days 28 capsule 0    FLUoxetine (PROZAC) 10 MG capsule Take 1 capsule by mouth daily NEVER ABRUPTLY STOP 90 capsule 1    [] oxyCODONE-acetaminophen (PERCOCET) 7.5-325 MG per tablet Take 1 tablet by mouth every 4 hours as needed for Pain for up to 3 days. Intended supply: 28 days Max Daily Amount: 6 tablets 18 tablet 0    Lidocaine 4 % SOLN Apply 5 mLs topically See Admin Instructions Mix with 10cc of Saline and apply to wound vac sponge prior to removal of dressing. 50 mL 2    [] doxycycline hyclate (VIBRA-TABS) 100 MG tablet Take 1 tablet by mouth 2 times daily for 7 days 14 tablet 0    nystatin (MYCOSTATIN) 580711 UNIT/GM cream Apply topically 2 times daily.  60 g 5    pantoprazole (PROTONIX) 40 MG tablet Take 1 tablet by mouth every morning (before breakfast) 90 tablet 1    insulin aspart (NOVOLOG) 100 UNIT/ML injection vial Inject 20 Units into the skin 3 times daily (before meals)       LANTUS 100 UNIT/ML injection vial Inject 60 Units into the skin nightly       Insulin Syringe-Needle U-100 (INSULIN SYRINGE .5CC/30GX5/16\") 30G X 5/16\" 0.5 ML MISC USE WITH INSULIN EVERY  each 0    B-D ULTRAFINE III SHORT PEN 31G X 8 MM MISC USE ONCE DAILY 100 each 0    blood glucose monitor strips For qid testing Type 2 Dm Dispense brand per patient request or insurance benefits. 100 strip 3    vitamin D (ERGOCALCIFEROL) 07789 units CAPS capsule TAKE 1 CAPSULE BY MOUTH 1 TIME A WEEK 4 capsule 2    glucose monitoring kit (FREESTYLE) monitoring kit For bid testing Type 2 dm 1 kit 0    Lancets MISC 1 each by Does not apply route 2 times daily Dispense brand per insurance benefits and patient request. 100 each 3    Ez Smart Blood Glucose Lancets MISC For qid and prn testing for newly diagnosed diabetes DX: Diabetes, insulin requiring 100 each 5    glucose blood VI test strips (ASCENSIA AUTODISC VI;ONE TOUCH ULTRA TEST VI) strip 1 each by In Vitro route 4 times daily (with meals and nightly) For qid and prn testing for newly diagnosed diabetes  Freestyle Freedom lite    DX: Diabetes, insulin requiring 100 each 5    aspirin EC 81 MG EC tablet Take 1 tablet by mouth daily 30 tablet 3        Medications patient taking as of now reconciled against medications ordered at time of hospital discharge: Yes    Review of Systems   Constitutional:  Positive for fatigue. Negative for unexpected weight change. Respiratory: Negative. Cardiovascular: Negative. Skin:  Positive for wound. Neurological: Negative. Psychiatric/Behavioral:  The patient is nervous/anxious.       Objective:    /72 (Site: Left Upper Arm, Position: Sitting, Cuff Size: Large Adult)   Pulse 100   Temp 97.3 °F (36.3 °C) (Temporal)   Resp 20   Ht 5' 2\" (1.575 m)   Wt 163 lb (73.9 kg) SpO2 95%   BMI 29.81 kg/m²   Physical Exam  Vitals and nursing note reviewed. Constitutional:       General: She is not in acute distress. Appearance: Normal appearance. She is well-developed. She is not ill-appearing or toxic-appearing. HENT:      Head: Normocephalic and atraumatic. Eyes:      Extraocular Movements: Extraocular movements intact. Conjunctiva/sclera: Conjunctivae normal.      Pupils: Pupils are equal, round, and reactive to light. Neck:      Trachea: No tracheal deviation. Cardiovascular:      Rate and Rhythm: Normal rate and regular rhythm. Heart sounds: Normal heart sounds. No murmur heard. Pulmonary:      Effort: Pulmonary effort is normal.      Breath sounds: Normal breath sounds. Abdominal:      Palpations: Abdomen is soft. Tenderness: There is no abdominal tenderness. Musculoskeletal:      Cervical back: Neck supple. No rigidity. Right lower leg: No edema. Left lower leg: No edema. Legs:    Lymphadenopathy:      Cervical: No cervical adenopathy. Skin:     General: Skin is warm and dry. Capillary Refill: Capillary refill takes less than 2 seconds. Neurological:      General: No focal deficit present. Mental Status: She is alert and oriented to person, place, and time. Mental status is at baseline. Psychiatric:         Mood and Affect: Mood normal. Mood is not anxious or depressed. Affect is not angry. Speech: Speech normal.         Behavior: Behavior normal.         Thought Content: Thought content normal.         Judgment: Judgment normal.       An electronic signature was used to authenticate this note.   --MOOK Pisano

## 2022-12-30 ENCOUNTER — CARE COORDINATION (OUTPATIENT)
Dept: CASE MANAGEMENT | Age: 62
End: 2022-12-30

## 2022-12-30 NOTE — CARE COORDINATION
[facility] Care Transitions Follow Up Call    Care Transition Nurse contacted the patient by telephone to follow up. Verified name and  with patient as identifiers. Patient: Usman Hurley  Patient : 1960   MRN: [id number]  Reason for Admission:   Discharge Date: 22 RARS: Readmission Risk Score: 8.2      Needs to be reviewed by the provider   Additional needs identified to be addressed with provider: No  none             Method of communication with provider: none. Spoke with: Usman Hurley      Follow Up : Spoke with patient today for follow up call. She says she is doing good and wound vac was changed today. She has home health and also goes to wound care clinic. She is next scheduled for wound care on 1/10. She says blood sugars are coming down, says reading of 210 today. She says she is doing better, feeling better. She is getting the added protein in her diet. No problems or complaints. CTN will follow up at a later time. Future Appointments   Date Time Provider Olvin Brown   1/10/2023  8:30 AM Teri Boss DO [facility] Transitions Subsequent and Final Call    Subsequent and Final Calls  Do you have any ongoing symptoms?: No  Have your medications changed?: No  Do you have any questions related to your medications?: No  Do you currently have any active services?: Yes  Are you currently active with any services?: Home Health  Do you have any needs or concerns that I can assist you with?: No  Identified Barriers: None  Care Transitions Interventions   Home Care Waiver: Completed     Other Interventions:             Care Transition Nurse provided contact information for future needs. Plan for follow-up call in 5-7 days based on severity of symptoms and risk factors.   Plan for next call:  wound vac, pain, blood sugars    Ani Patel RN

## 2023-01-06 ENCOUNTER — CARE COORDINATION (OUTPATIENT)
Dept: CASE MANAGEMENT | Age: 63
End: 2023-01-06

## 2023-01-06 NOTE — CARE COORDINATION
[facility] Transitions Follow Up Call    2023    Patient: Kamar Frias  Patient : 1960   MRN: [id number]  Reason for Admission: Abscess of left thigh  Discharge Date: 22 RARS: Readmission Risk Score: 8.2         Spoke with: Taylor  Patient stated she is feeling good. Wound is better. It doesn't have to be packed as much. Eating and drinking good. Has hhc on . Wound care on Tuesday. FBS today was 196. Taking medication as prescribed. No questions or needs at this time. Will continue to follow. Care Transitions Follow Up Call    Needs to be reviewed by the provider   Additional needs identified to be addressed with provider: No  none             Method of communication with provider : none      Care Transition Nurse (CTN) contacted the patient by telephone to follow up after admission on 2022. Verified name and  with patient as identifiers. Addressed changes since last contact: none  Discussed follow-up appointments. If no appointment was previously scheduled, appointment scheduling offered: na.   Is follow up appointment scheduled within 7 days of discharge? Yes. Advance Care Planning:   Does patient have an Advance Directive: not on file. CTN provided contact information for future needs. Plan for follow-up call in 5-7 days based on severity of symptoms and risk factors. Plan for next call: symptom management-   self management-           Care Transitions Subsequent and Final Call    Subsequent and Final Calls  Do you have any ongoing symptoms?: No  Have your medications changed?: No  Do you have any questions related to your medications?: No  Do you currently have any active services?: Yes  Are you currently active with any services?: Home Health  Do you have any needs or concerns that I can assist you with?: No  Identified Barriers: None  Care Transitions Interventions   Home Care Waiver: Completed     Other Interventions:              Follow Up  Future Appointments Date Time Provider Olvin Stephanie   1/10/2023  8:30 AM Mandie Gutiérrez DO [address]

## 2023-01-08 PROBLEM — L97.928: Status: ACTIVE | Noted: 2023-01-08

## 2023-01-08 RX ORDER — AMLODIPINE BESYLATE 5 MG/1
5 TABLET ORAL DAILY
Qty: 90 TABLET | Refills: 1 | Status: SHIPPED | OUTPATIENT
Start: 2023-01-08

## 2023-01-08 ASSESSMENT — ENCOUNTER SYMPTOMS: RESPIRATORY NEGATIVE: 1

## 2023-01-09 NOTE — DISCHARGE INSTRUCTIONS
[address] [facility]   Physician Orders and Discharge Instructions  [address]  Telephone: [phone]    NAME:  Cheko Beltrán OF BIRTH:  1960  MEDICAL RECORD NUMBER:  [id number]  DATE:  1/9/2023    Discharge condition: Stable    Discharge to: Home    Left via:Private automobile    Accompanied by:  self      EC[facility]: [facility] - please change to original KCI drape, please discontinue use of Dermatic drape        Apply moist to moist dressing in clinic today, C to reapply wound vac dressing. May use strip paste or stoma paste to help seal the dressing in the groin area        May take vac dressing off to shower before vac dressing reapplication        Dressing Orders:  Location of Wound: Left groin/buttocks     Wash with soap and water  Apply wound vac as follows: Apply Skin Prep to periwound. Apply drape - window pane to surrounding area (periwound). (May use duoderm instead of drape to prevent skin breakdown. Use ostomy paste to fill any creases to prevent leakage. If skin becomes red due to tape irritation please apply skin prep. Then apply light dusting of ostomy powder or antifungal powder to periwound. Then blot with skin prep to form crusting to protect skin. May repeat skin prep, powdering steps until proper crusting is made. )  Then apply drape to periwound. DO NOT PUT FOAM ON GOOD SKIN. Apply black foam to wound bed. BRIDGE away from wound off bony prominence Set wound vac to 125 mmHG, continuous. Change wound vac dressing 3 times per week (MWF or TTS)  Reapply vac dressing if vac stops working or dressing begins to leak or cannot be reinforced. Alternative dressing: Wash with soap and water. Apply 1/4 STR Dakins moistened gauze to wound bed. Cover with gauze. Secure with roll gauze and medipore tape. Change twice daily .       Treatment Orders:  Protein rich diet (unless restricted by your physician); Multivitamin daily; Elevate legs above the level of your heart when sitting 3-4 times daily for at least one hour each time, avoid standing for long periods of time.        [facility] follow up visit ___________2 weeks with Dr. Stout__________________  (Please note your next appointment above and if you are unable to keep, kindly give a 24 hour notice. Thank you.)          If you experience any of the following, please call the Wound Care Center during business hours:    * Increase in Pain  * Temperature over 101  * Increase in drainage from your wound  * Drainage with a foul odor  * Bleeding  * Increase in swelling  * Need for compression bandage changes due to slippage, breakthrough drainage.    If you need medical attention outside of the business hours of the Wound Care Centers please contact your PCP or go to the nearest emergency room.

## 2023-01-10 ENCOUNTER — HOSPITAL ENCOUNTER (OUTPATIENT)
Dept: WOUND CARE | Age: 63
Discharge: HOME | End: 2023-01-10
Payer: MEDICARE

## 2023-01-10 VITALS
RESPIRATION RATE: 20 BRPM | TEMPERATURE: 98.1 F | DIASTOLIC BLOOD PRESSURE: 87 MMHG | HEART RATE: 111 BPM | SYSTOLIC BLOOD PRESSURE: 133 MMHG

## 2023-01-10 DIAGNOSIS — L02.416 ABSCESS OF LEFT THIGH: Primary | ICD-10-CM

## 2023-01-10 PROCEDURE — 99213 OFFICE O/P EST LOW 20 MIN: CPT

## 2023-01-10 PROCEDURE — 99212 OFFICE O/P EST SF 10 MIN: CPT | Performed by: SURGERY

## 2023-01-10 RX ORDER — HYDROCODONE BITARTRATE AND ACETAMINOPHEN 5; 325 MG/1; MG/1
1 TABLET ORAL EVERY 4 HOURS PRN
Qty: 12 TABLET | Refills: 0 | Status: SHIPPED | OUTPATIENT
Start: 2023-01-10 | End: 2023-01-13

## 2023-01-10 ASSESSMENT — PAIN DESCRIPTION - ONSET: ONSET: ON-GOING

## 2023-01-10 ASSESSMENT — PAIN DESCRIPTION - PAIN TYPE: TYPE: ACUTE PAIN

## 2023-01-10 ASSESSMENT — PAIN DESCRIPTION - FREQUENCY: FREQUENCY: INTERMITTENT

## 2023-01-10 ASSESSMENT — PAIN SCALES - GENERAL: PAINLEVEL_OUTOF10: 6

## 2023-01-10 ASSESSMENT — PAIN - FUNCTIONAL ASSESSMENT: PAIN_FUNCTIONAL_ASSESSMENT: PREVENTS OR INTERFERES SOME ACTIVE ACTIVITIES AND ADLS

## 2023-01-10 ASSESSMENT — PAIN DESCRIPTION - ORIENTATION: ORIENTATION: LEFT

## 2023-01-10 ASSESSMENT — PAIN DESCRIPTION - LOCATION: LOCATION: BUTTOCKS;GROIN

## 2023-01-10 ASSESSMENT — PAIN DESCRIPTION - DESCRIPTORS: DESCRIPTORS: CRAMPING;THROBBING

## 2023-01-10 NOTE — PROGRESS NOTES
[address]   Progress Note and Procedure Note      [facility] RECORD NUMBER:  [id number]  AGE: 60 y.o. GENDER: female  : 1960  EPISODE DATE:  1/10/2023    Subjective:     Chief Complaint   Patient presents with    Wound Check      HISTORY of PRESENT ILLNESS HPI     Juan M Mckeon is a 60 y.o. female who presents today for wound/ulcer evaluation. History of Wound Context: Patient presents today as a follow-up from the hospital.  She underwent incision and drainage along with sharp debridement of the left upper thigh/perineum with Dr. Lobo Alexander on . Was discharged home from the hospital with a wound VAC. Has been having little issue with the wound VAC.    1/10/2022: Doing well. Wound showing significant improvement.      Wound/Ulcer Pain Timing/Severity: mild  Quality of pain: burning  Severity:  3 / 10   Modifying Factors: Pain worsens with walking  Associated Signs/Symptoms: none    Ulcer Identification:  Ulcer Type: non-healing surgical and    Contributing Factors: none    Wound:  Abscess        PAST MEDICAL HISTORY        Diagnosis Date    Back pain 2014    Carotid artery stenosis     Depression 2014    Diabetes ([address]     GERD (gastroesophageal reflux disease)     Hyperlipidemia     Hypertension 2014    Morbidly obese ([address] 7/15/2020    Type II or unspecified type diabetes mellitus without mention of complication, not stated as uncontrolled     Uncontrolled type 2 diabetes mellitus with hyperglycemia, with long-term current use of insulin ([address] 2022       PAST SURGICAL HISTORY    Past Surgical History:   Procedure Laterality Date    BACK SURGERY      Dr. Kel Morgan 1st surgery and Dr. Joanna Joyner 2nd surgery    DILATION AND CURETTAGE OF UTERUS      HYSTERECTOMY (CERVIX STATUS UNKNOWN) N/A     LEG SURGERY Left 2022    INCISION AND DRAINAGE OF LEFT UPPER THIGH ABSCESS WITH DEBRIDEMENT performed by Keyanna Eubanks MD at [address] [place] [address] HISTORY    Family History   Problem Relation Age of Onset    Diabetes Mother     Stroke Mother        SOCIAL HISTORY    Social History     Tobacco Use    Smoking status: Former     Packs/day: 0.50     Years: 20.00     Pack years: 10.00     Types: Cigarettes     Start date: 1983     Quit date: 2014     Years since quittin.4    Smokeless tobacco: Never   Vaping Use    Vaping Use: Never used   Substance Use Topics    Alcohol use: No     Alcohol/week: 0.0 standard drinks    Drug use: No       ALLERGIES    Allergies   Allergen Reactions    Sulfa Antibiotics Hives       MEDICATIONS    Current Outpatient Medications on File Prior to Encounter   Medication Sig Dispense Refill    amLODIPine (NORVASC) 5 MG tablet Take 1 tablet by mouth daily For blood pressure 90 tablet 1    Continuous Blood Gluc Sensor (DEXCOM G6 SENSOR) MISC 1 Device by Does not apply route continuous 3 each 5    Continuous Blood Gluc Transmit (DEXCOM G6 TRANSMITTER) MISC For glucose monitoring qid and prn 1 each 0    Continuous Blood Gluc  (DEXCOM G6 ) ADARSH 1 Device by Does not apply route continuous 1 each 5    FLUoxetine (PROZAC) 10 MG capsule Take 1 capsule by mouth daily NEVER ABRUPTLY STOP 90 capsule 1    Lidocaine 4 % SOLN Apply 5 mLs topically See Admin Instructions Mix with 10cc of Saline and apply to wound vac sponge prior to removal of dressing. 50 mL 2    sodium hypochlorite (DAKINS) 0.125 % SOLN external solution Apply topically 2 times daily (Patient not taking: Reported on 2022) 473 mL 0    nystatin (MYCOSTATIN) 071957 UNIT/GM cream Apply topically 2 times daily.  60 g 5    pantoprazole (PROTONIX) 40 MG tablet Take 1 tablet by mouth every morning (before breakfast) 90 tablet 1    insulin aspart (NOVOLOG) 100 UNIT/ML injection vial Inject 20 Units into the skin 3 times daily (before meals)       LANTUS 100 UNIT/ML injection vial Inject 60 Units into the skin nightly       Insulin Syringe-Needle U-100 (INSULIN SYRINGE .5CC/30GX5/16\") 30G X 5/16\" 0.5 ML MISC USE WITH INSULIN EVERY  each 0    B-D ULTRAFINE III SHORT PEN 31G X 8 MM MISC USE ONCE DAILY 100 each 0    blood glucose monitor strips For qid testing Type 2 Dm Dispense brand per patient request or insurance benefits. 100 strip 3    vitamin D (ERGOCALCIFEROL) 41086 units CAPS capsule TAKE 1 CAPSULE BY MOUTH 1 TIME A WEEK 4 capsule 2    glucose monitoring kit (FREESTYLE) monitoring kit For bid testing Type 2 dm 1 kit 0    Lancets MISC 1 each by Does not apply route 2 times daily Dispense brand per insurance benefits and patient request. 100 each 3    Ez Smart Blood Glucose Lancets MISC For qid and prn testing for newly diagnosed diabetes DX: Diabetes, insulin requiring 100 each 5    glucose blood VI test strips (ASCENSIA AUTODISC VI;ONE TOUCH ULTRA TEST VI) strip 1 each by In Vitro route 4 times daily (with meals and nightly) For qid and prn testing for newly diagnosed diabetes  Freestyle Freedom lite    DX: Diabetes, insulin requiring 100 each 5    aspirin EC 81 MG EC tablet Take 1 tablet by mouth daily 30 tablet 3     No current facility-administered medications on file prior to encounter. REVIEW OF SYSTEMS    Pertinent items are noted in HPI.     Objective:      /87   Pulse (!) 111   Temp 98.1 °F (36.7 °C) (Temporal)   Resp 20     Wt Readings from Last 3 Encounters:   12/28/22 163 lb (73.9 kg)   12/27/22 182 lb (82.6 kg)   12/21/22 182 lb 0.2 oz (82.6 kg)       PHYSICAL EXAM    General Appearance: alert and oriented to person, place and time, well-developed and well-nourished, in no acute distress  Skin: As documented  Pulmonary/Chest: no chest wall tenderness  Abdomen: soft, non-tender, non-distended, normal bowel sounds, no masses or organomegaly  Musculoskeletal: normal range of motion, no joint swelling, deformity or tenderness      Assessment:      Patient Active Problem List   Diagnosis Code    Hypertension I10    Back pain M54.9 Depression F32. A    Hull's esophagus without dysplasia K22.70    History of pleural empyema Z87.09    Thoracotomy scar of left chest L90.5    Morbidly obese ([facility]) E66.01    Uncontrolled type 2 diabetes mellitus with hyperglycemia, with long-term current use of insulin ([facility]) E11.65, Z79.4    Type 2 diabetes mellitus E11.9    Uncontrolled diabetes mellitus with hyperglycemia ([facility]) E11.65    Cellulitis of buttock L03.317    Sepsis ([facility]) P58.7    Metabolic acidemia V02.77    Starvation ketoacidosis T73. 0XXA, E87.29    Abscess of left thigh L02.416    Major depressive disorder, recurrent, mild F33.0    Major depressive disorder, recurrent, moderate F33.1    Major depressive disorder, recurrent, unspecified F33.9    Non-pressure chronic ulcer of unspecified part of left lower leg with other specified severity ([facility]) L97.928       Negative Pressure Wound Therapy Leg Left;Proximal;Upper;Posterior (Active)   $ Standard NPWT <=50 sq cm PER TX $ Yes 12/21/22 1111   Wound Type Surgical 01/10/23 0838   Unit Type 3M VacUlta 01/10/23 0838   Dressing Type Black Foam 01/10/23 0838   Number of pieces used 1 12/21/22 1111   Number of pieces removed 1 01/10/23 0838   Cycle Continuous 01/10/23 0838   Target Pressure (mmHg) 125 01/10/23 0838   Canister changed? No 01/10/23 0838   Dressing Status Old drainage noted 01/10/23 0838   Dressing Changed Changed/New 12/21/22 1111   Drainage Amount Moderate 01/10/23 0838   Drainage Description Serosanguinous 01/10/23 0838   Dressing Change Due 12/23/22 12/21/22 1111   Wound Assessment [facility] 12/27/22 0910   Paty-wound Assessment Blanchable erythema; Induration 12/27/22 0910   Shape irregular 12/27/22 0910   Odor None 01/10/23 0838   Number of days: 19       Wound 12/18/22 Groin Left Wound 1, left groin / buttock, surgical (Active)   Wound Image   01/10/23 0838   Wound Etiology Surgical 01/10/23 0838   Dressing Status New dressing applied 01/10/23 0929   Wound Cleansed Soap and water 01/10/23 2749   Dressing/Treatment Gauze dressing/dressing sponge;Moist to moist;Tape/Soft cloth adhesive tape 01/10/23 0929   Dressing Change Due 12/23/22 12/21/22 1111   Wound Length (cm) 4.5 cm 01/10/23 0838   Wound Width (cm) 0.4 cm 01/10/23 0838   Wound Depth (cm) 0.7 cm 01/10/23 0838   Wound Surface Area (cm^2) 1.8 cm^2 01/10/23 0838   Change in Wound Size % (l*w) 88 01/10/23 0838   Wound Volume (cm^3) 1.26 cm^3 01/10/23 0838   Wound Healing % 98 01/10/23 0838   Distance Tunneling (cm) 0 cm 01/10/23 0838   Tunneling Position ___ O'Clock 0 01/10/23 0838   Undermining Starts ___ O'Clock 0 01/10/23 0838   Undermining Ends___ O'Clock 0 01/10/23 0838   Undermining Maxium Distance (cm) 0 01/10/23 0838   Wound Assessment Pink/red;Slough 01/10/23 0838   Drainage Amount Moderate 01/10/23 0838   Drainage Description Serosanguinous 01/10/23 0838   Odor None 01/10/23 0838   Paty-wound Assessment Blanchable erythema; Induration 01/10/23 0838   Margins Defined edges 01/10/23 0838   Wound Thickness Description not for Pressure Injury Full thickness 01/10/23 0838   Number of days: 22                Plan:     Problem List Items Addressed This Visit          Other    Abscess of left thigh - Primary    Relevant Medications    HYDROcodone-acetaminophen (NORCO) 5-325 MG per tablet       Treatment Note please see attached Discharge Instructions    In my professional opinion this patient would benefit from HBO Therapy: No    Written patient dismissal instructions given to patient and signed by patient or POA. Continue local wound care with wound VAC.     Electronically signed by Adeline Oh DO on 1/10/2023 at 9:52 AM

## 2023-01-12 ENCOUNTER — CARE COORDINATION (OUTPATIENT)
Dept: CASE MANAGEMENT | Age: 63
End: 2023-01-12

## 2023-01-12 NOTE — CARE COORDINATION
[facility] 45 Transitions Follow Up Call    2023    Patient: Brandon Nurse  Patient : 1960   MRN: [id number]  Reason for Admission:  Abscess of left thigh  Discharge Date: 22 RARS: Readmission Risk Score: 8.2       [facility] 45 Transitions Follow Up Call    2023    Patient: Brandon Nurse  Patient : 1960   MRN: [id number]  Reason for Admission:  Abscess of left thigh  Discharge Date: 22 RARS: Readmission Risk Score: 8.2       Attempted to contact patient for follow up transition call. Left voicemail message to return call with an update on condition since discharge. Contact information provided. Will continue to follow up. Care Transitions Subsequent and Final Call    Subsequent and Final Calls  Are you currently active with any services?: Home Health  Care Transitions Interventions   Home Care Waiver: Completed     Other Interventions:              Follow Up  Future Appointments   Date Time Provider Olvin Brown   2023  8:15 AM DO VLADIMIR Alexander [address]

## 2023-01-19 ENCOUNTER — CARE COORDINATION (OUTPATIENT)
Dept: CASE MANAGEMENT | Age: 63
End: 2023-01-19

## 2023-01-19 NOTE — CARE COORDINATION
[facility] Transitions Follow Up Call    2023    Patient: Mamie Oneal  Patient : 1960   MRN: [id number]  Reason for Admission: Abscess of left thigh  Discharge Date: 22 RARS: Readmission Risk Score: 8.2         Attempted to call patient for follow up transition call. Left voicemail message requesting a return call and stating this is the final call we will be making. If you have any health concerns or problems arise please call your PCP to schedule an appointment. No further outreach scheduled.       Follow Up  Future Appointments   Date Time Provider Olvin Brown   2023  8:15 AM DO VLADIMIR Buenrostro [address]

## 2023-01-23 ENCOUNTER — TELEPHONE (OUTPATIENT)
Dept: WOUND CARE | Age: 63
End: 2023-01-23

## 2023-01-23 NOTE — PROGRESS NOTES
Radha LEROY from [facility] called and stated patient has been d/c'd from [facility] today as she is healed. Discussed patient has follow up with Dr. Ivan Arias 1/24/23 at the [address] [place] [address],[address].

## 2023-01-23 NOTE — TELEPHONE ENCOUNTER
Radha LEROY from [facility] called and stated patient has been d/c'd from [place] [address] today as she is healed. Discussed patient has follow up with Dr. Elda Carmichael 1/24/23 at the [address] [place] [address],[address].

## 2023-01-23 NOTE — DISCHARGE INSTRUCTIONS
[address] [facility]   Physician Orders and Discharge Instructions  [address]  Telephone: [phone]    NAME:  Faby Handler OF BIRTH:  1960  MEDICAL RECORD NUMBER:  [id number]  DATE:  1/23/2023    Discharge condition: Stable    Discharge to: Home    Left via:Private automobile    Accompanied by:  self    ECF/HHA:       Moisturize and protect        [address] [place] [address],[address] follow up visit ___________as needed__________________  (Please note your next appointment above and if you are unable to keep, kindly give a 24 hour notice. Thank you.)          If you experience any of the following, please call the [facility] during business hours:    * Increase in Pain  * Temperature over 101  * Increase in drainage from your wound  * Drainage with a foul odor  * Bleeding  * Increase in swelling  * Need for compression bandage changes due to slippage, breakthrough drainage. If you need medical attention outside of the business hours of the [facility] please contact your PCP or go to the nearest emergency room.

## 2023-01-24 ENCOUNTER — HOSPITAL ENCOUNTER (OUTPATIENT)
Dept: WOUND CARE | Age: 63
Discharge: HOME | End: 2023-01-24
Payer: MEDICARE

## 2023-01-24 VITALS
HEIGHT: 62 IN | BODY MASS INDEX: 30 KG/M2 | DIASTOLIC BLOOD PRESSURE: 95 MMHG | HEART RATE: 117 BPM | TEMPERATURE: 98 F | WEIGHT: 163 LBS | RESPIRATION RATE: 18 BRPM | SYSTOLIC BLOOD PRESSURE: 175 MMHG

## 2023-01-24 PROBLEM — L02.215 PERINEAL ABSCESS: Status: ACTIVE | Noted: 2023-01-24

## 2023-01-24 PROCEDURE — 99211 OFF/OP EST MAY X REQ PHY/QHP: CPT | Performed by: SURGERY

## 2023-01-24 PROCEDURE — 99212 OFFICE O/P EST SF 10 MIN: CPT

## 2023-01-24 ASSESSMENT — PAIN SCALES - GENERAL: PAINLEVEL_OUTOF10: 0

## 2023-01-24 NOTE — PROGRESS NOTES
[address]   Progress Note and Procedure Note      [facility] RECORD NUMBER:  [id number]  AGE: 60 y.o. GENDER: female  : 1960  EPISODE DATE:  2023    Subjective:     Chief Complaint   Patient presents with    Wound Check     Patient presents today for recheck left groin wound. HISTORY of PRESENT ILLNESS HPI     Arthuro Duane is a 60 y.o. female who presents today for wound/ulcer evaluation. History of Wound Context: Patient presents today as a follow-up from the hospital.  She underwent incision and drainage along with sharp debridement of the left upper thigh/perineum with Dr. Yoana Zaragoza on . Was discharged home from the hospital with a wound VAC. Has been having little issue with the wound VAC.    1/10/2022: Doing well. Wound showing significant improvement. 2023: Wound healed.      Wound/Ulcer Pain Timing/Severity: mild  Quality of pain: burning  Severity:  3 / 10   Modifying Factors: Pain worsens with walking  Associated Signs/Symptoms: none    Ulcer Identification:  Ulcer Type: non-healing surgical and    Contributing Factors: none    Wound:  Abscess        PAST MEDICAL HISTORY        Diagnosis Date    Back pain 2014    Carotid artery stenosis     Depression 2014    Diabetes ([address]     GERD (gastroesophageal reflux disease)     Hyperlipidemia     Hypertension 2014    Morbidly obese ([address] 7/15/2020    Type II or unspecified type diabetes mellitus without mention of complication, not stated as uncontrolled     Uncontrolled type 2 diabetes mellitus with hyperglycemia, with long-term current use of insulin ([address] 2022       PAST SURGICAL HISTORY    Past Surgical History:   Procedure Laterality Date    BACK SURGERY      Dr. Jayson Bacon 1st surgery and Dr. Jitendra Duggan 2nd surgery    DILATION AND CURETTAGE OF UTERUS      HYSTERECTOMY (CERVIX STATUS UNKNOWN) N/A     LEG SURGERY Left 2022    INCISION AND DRAINAGE OF LEFT UPPER THIGH ABSCESS WITH DEBRIDEMENT performed by Gem Lucas MD at [address] HISTORY    Family History   Problem Relation Age of Onset    Diabetes Mother     Stroke Mother        SOCIAL HISTORY    Social History     Tobacco Use    Smoking status: Former     Packs/day: 0.50     Years: 20.00     Pack years: 10.00     Types: Cigarettes     Start date: 1983     Quit date: 2014     Years since quittin.4    Smokeless tobacco: Never   Vaping Use    Vaping Use: Never used   Substance Use Topics    Alcohol use: No     Alcohol/week: 0.0 standard drinks    Drug use: No       ALLERGIES    Allergies   Allergen Reactions    Sulfa Antibiotics Hives       MEDICATIONS    Current Outpatient Medications on File Prior to Encounter   Medication Sig Dispense Refill    amLODIPine (NORVASC) 5 MG tablet Take 1 tablet by mouth daily For blood pressure 90 tablet 1    Continuous Blood Gluc Sensor (DEXCOM G6 SENSOR) MISC 1 Device by Does not apply route continuous 3 each 5    Continuous Blood Gluc Transmit (DEXCOM G6 TRANSMITTER) MISC For glucose monitoring qid and prn 1 each 0    Continuous Blood Gluc  (DEXCOM G6 ) ADARSH 1 Device by Does not apply route continuous 1 each 5    FLUoxetine (PROZAC) 10 MG capsule Take 1 capsule by mouth daily NEVER ABRUPTLY STOP 90 capsule 1    Lidocaine 4 % SOLN Apply 5 mLs topically See Admin Instructions Mix with 10cc of Saline and apply to wound vac sponge prior to removal of dressing. 50 mL 2    sodium hypochlorite (DAKINS) 0.125 % SOLN external solution Apply topically 2 times daily (Patient not taking: No sig reported) 473 mL 0    nystatin (MYCOSTATIN) 651287 UNIT/GM cream Apply topically 2 times daily.  60 g 5    pantoprazole (PROTONIX) 40 MG tablet Take 1 tablet by mouth every morning (before breakfast) 90 tablet 1    insulin aspart (NOVOLOG) 100 UNIT/ML injection vial Inject 20 Units into the skin 3 times daily (before meals)       LANTUS 100 UNIT/ML injection vial Inject 60 Units into the skin nightly       Insulin Syringe-Needle U-100 (INSULIN SYRINGE .5CC/30GX5/16\") 30G X 5/16\" 0.5 ML MISC USE WITH INSULIN EVERY  each 0    B-D ULTRAFINE III SHORT PEN 31G X 8 MM MISC USE ONCE DAILY 100 each 0    blood glucose monitor strips For qid testing Type 2 Dm Dispense brand per patient request or insurance benefits. 100 strip 3    vitamin D (ERGOCALCIFEROL) 57184 units CAPS capsule TAKE 1 CAPSULE BY MOUTH 1 TIME A WEEK 4 capsule 2    glucose monitoring kit (FREESTYLE) monitoring kit For bid testing Type 2 dm 1 kit 0    Lancets MISC 1 each by Does not apply route 2 times daily Dispense brand per insurance benefits and patient request. 100 each 3    Ez Smart Blood Glucose Lancets MISC For qid and prn testing for newly diagnosed diabetes DX: Diabetes, insulin requiring 100 each 5    glucose blood VI test strips (ASCENSIA AUTODISC VI;ONE TOUCH ULTRA TEST VI) strip 1 each by In Vitro route 4 times daily (with meals and nightly) For qid and prn testing for newly diagnosed diabetes  Freestyle Freedom lite    DX: Diabetes, insulin requiring 100 each 5    aspirin EC 81 MG EC tablet Take 1 tablet by mouth daily 30 tablet 3     No current facility-administered medications on file prior to encounter. REVIEW OF SYSTEMS    Pertinent items are noted in HPI.     Objective:      BP (!) 175/95   Pulse (!) 117   Temp 98 °F (36.7 °C) (Temporal)   Resp 18   Ht 5' 2\" (1.575 m)   Wt 163 lb (73.9 kg)   BMI 29.81 kg/m²     Wt Readings from Last 3 Encounters:   01/24/23 163 lb (73.9 kg)   12/28/22 163 lb (73.9 kg)   12/27/22 182 lb (82.6 kg)       PHYSICAL EXAM    General Appearance: alert and oriented to person, place and time, well-developed and well-nourished, in no acute distress  Skin: As documented  Pulmonary/Chest: no chest wall tenderness  Abdomen: soft, non-tender, non-distended, normal bowel sounds, no masses or organomegaly  Musculoskeletal: normal range of motion, no joint swelling, deformity or tenderness      Assessment:      Patient Active Problem List   Diagnosis Code    Hypertension I10    Back pain M54.9    Depression F32. A    Hull's esophagus without dysplasia K22.70    History of pleural empyema Z87.09    Thoracotomy scar of left chest L90.5    Morbidly obese ([facility]) E66.01    Uncontrolled type 2 diabetes mellitus with hyperglycemia, with long-term current use of insulin ([facility]) E11.65, Z79.4    Type 2 diabetes mellitus E11.9    Uncontrolled diabetes mellitus with hyperglycemia ([facility]) E11.65    Cellulitis of buttock L03.317    Sepsis ([facility]) T05.1    Metabolic acidemia E73.80    Starvation ketoacidosis T73. 0XXA, E87.29    Abscess of left thigh L02.416    Major depressive disorder, recurrent, mild F33.0    Major depressive disorder, recurrent, moderate F33.1    Major depressive disorder, recurrent, unspecified F33.9    Non-pressure chronic ulcer of unspecified part of left lower leg with other specified severity ([facility] L97.928       Wound 12/18/22 Groin Left Wound 1, left groin / buttock, surgical (Active)   Wound Image   01/24/23 0823   Wound Etiology Surgical 01/24/23 0823   Dressing Status Dry; Intact 01/24/23 0823   Wound Cleansed Soap and water 01/24/23 0823   Dressing/Treatment Gauze dressing/dressing sponge;Moist to moist;Tape/Soft cloth adhesive tape 01/10/23 0929   Wound Length (cm) 0 cm 01/24/23 0823   Wound Width (cm) 0 cm 01/24/23 0823   Wound Depth (cm) 0 cm 01/24/23 0823   Wound Surface Area (cm^2) 0 cm^2 01/24/23 0823   Change in Wound Size % (l*w) 100 01/24/23 0823   Wound Volume (cm^3) 0 cm^3 01/24/23 0823   Wound Healing % 100 01/24/23 0823   Distance Tunneling (cm) 0 cm 01/24/23 0823   Tunneling Position ___ O'Clock 0 01/24/23 0823   Undermining Starts ___ O'Clock 0 01/24/23 0823   Undermining Ends___ O'Clock 0 01/24/23 0823   Undermining Maxium Distance (cm) 0 01/24/23 0823   Wound Assessment Epithelialization;Dry 01/24/23 0823   Drainage Amount None 01/24/23 0823 Drainage Description Serosanguinous 01/10/23 0838   Odor None 01/24/23 0823   Paty-wound Assessment Intact 01/24/23 0823   Margins Defined edges 01/10/23 0838   Wound Thickness Description not for Pressure Injury Full thickness 01/10/23 0838   Number of days: 36                Plan:     Problem List Items Addressed This Visit    None      Treatment Note please see attached Discharge Instructions    In my professional opinion this patient would benefit from HBO Therapy: No    Written patient dismissal instructions given to patient and signed by patient or POA.          FU PRN    Electronically signed by Adriana Rand DO on 1/24/2023 at 8:32 AM

## 2023-01-24 NOTE — PLAN OF CARE
Problem: Chronic Conditions and Co-morbidities  Goal: Patient's chronic conditions and co-morbidity symptoms are monitored and maintained or improved  1/24/2023 0843 by Jason Cartwright RN  Outcome: Completed  1/24/2023 0822 by Odilon Lorenzo RN  Outcome: Progressing     Problem: Discharge Planning  Goal: Discharge to home or other facility with appropriate resources  1/24/2023 0843 by Jason Cartwright RN  Outcome: Completed  1/24/2023 0822 by Odilon Lorenzo RN  Outcome: Progressing     Problem: Wound:  Goal: Will show signs of wound healing; wound closure and no evidence of infection  Description: Will show signs of wound healing; wound closure and no evidence of infection  1/24/2023 0843 by Jason Cartwright RN  Outcome: Completed  1/24/2023 0822 by Odilon Lorenzo RN  Outcome: Progressing     Problem: Weight control:  Goal: Ability to maintain an optimal weight for height and age will be supported  Description: Ability to maintain an optimal weight for height and age will be supported  1/24/2023 0843 by Jason Cartwright RN  Outcome: Completed  1/24/2023 0822 by Odilon Lorenzo RN  Outcome: Progressing     Problem: Falls - Risk of:  Goal: Will remain free from falls  Description: Will remain free from falls  1/24/2023 0843 by Jason Cartwright RN  Outcome: Completed  1/24/2023 0822 by Odilon Lorenzo RN  Outcome: Progressing     Problem: Blood Glucose:  Goal: Ability to maintain appropriate glucose levels will improve  Description: Ability to maintain appropriate glucose levels will improve  1/24/2023 0843 by Jason Cartwright RN  Outcome: Completed  1/24/2023 0822 by Odilon Lorenzo RN  Outcome: Progressing       Healed today, follow up as needed.

## 2023-02-22 ENCOUNTER — HOSPITAL ENCOUNTER (OUTPATIENT)
Dept: GENERAL RADIOLOGY | Age: 63
Discharge: HOME | End: 2023-02-22
Payer: MEDICARE

## 2023-02-22 ENCOUNTER — TELEMEDICINE (OUTPATIENT)
Dept: FAMILY MEDICINE CLINIC | Age: 63
End: 2023-02-22
Payer: MEDICARE

## 2023-02-22 VITALS
SYSTOLIC BLOOD PRESSURE: 98 MMHG | BODY MASS INDEX: 26.13 KG/M2 | DIASTOLIC BLOOD PRESSURE: 62 MMHG | HEIGHT: 62 IN | TEMPERATURE: 98.1 F | WEIGHT: 142 LBS | RESPIRATION RATE: 20 BRPM | OXYGEN SATURATION: 98 % | HEART RATE: 109 BPM

## 2023-02-22 DIAGNOSIS — E11.65 UNCONTROLLED TYPE 2 DIABETES MELLITUS WITH HYPERGLYCEMIA, WITH LONG-TERM CURRENT USE OF INSULIN (HCC): ICD-10-CM

## 2023-02-22 DIAGNOSIS — R13.14 PHARYNGOESOPHAGEAL DYSPHAGIA: Primary | ICD-10-CM

## 2023-02-22 DIAGNOSIS — R63.4 WEIGHT LOSS, UNINTENTIONAL: ICD-10-CM

## 2023-02-22 DIAGNOSIS — J02.9 ACUTE PHARYNGITIS, UNSPECIFIED ETIOLOGY: ICD-10-CM

## 2023-02-22 DIAGNOSIS — R53.81 MALAISE: ICD-10-CM

## 2023-02-22 DIAGNOSIS — Z79.4 UNCONTROLLED TYPE 2 DIABETES MELLITUS WITH HYPERGLYCEMIA, WITH LONG-TERM CURRENT USE OF INSULIN (HCC): ICD-10-CM

## 2023-02-22 DIAGNOSIS — R13.14 PHARYNGOESOPHAGEAL DYSPHAGIA: ICD-10-CM

## 2023-02-22 DIAGNOSIS — R93.89 ABNORMAL CXR: ICD-10-CM

## 2023-02-22 DIAGNOSIS — K21.9 GASTROESOPHAGEAL REFLUX DISEASE WITHOUT ESOPHAGITIS: ICD-10-CM

## 2023-02-22 DIAGNOSIS — K22.70 BARRETT'S ESOPHAGUS WITHOUT DYSPLASIA: ICD-10-CM

## 2023-02-22 LAB
ALBUMIN SERPL-MCNC: 4.5 G/DL (ref 3.5–5.2)
ALP BLD-CCNC: 155 U/L (ref 35–104)
ALT SERPL-CCNC: 27 U/L (ref 5–33)
ANION GAP SERPL CALCULATED.3IONS-SCNC: 18 MMOL/L (ref 7–19)
AST SERPL-CCNC: 22 U/L (ref 5–32)
BASOPHILS ABSOLUTE: 0.3 K/UL (ref 0–0.2)
BASOPHILS RELATIVE PERCENT: 2 % (ref 0–1)
BILIRUB SERPL-MCNC: 0.3 MG/DL (ref 0.2–1.2)
BILIRUBIN URINE: NEGATIVE
BLOOD, URINE: NEGATIVE
BUN BLDV-MCNC: 16 MG/DL (ref 8–23)
CALCIUM SERPL-MCNC: 10.8 MG/DL (ref 8.8–10.2)
CHLORIDE BLD-SCNC: 91 MMOL/L (ref 98–111)
CLARITY: CLEAR
CO2: 25 MMOL/L (ref 22–29)
COLOR: ABNORMAL
CREAT SERPL-MCNC: 0.9 MG/DL (ref 0.5–0.9)
EOSINOPHILS ABSOLUTE: 0.13 K/UL (ref 0–0.6)
EOSINOPHILS RELATIVE PERCENT: 1 % (ref 0–5)
GFR SERPL CREATININE-BSD FRML MDRD: >60 ML/MIN/{1.73_M2}
GLUCOSE BLD-MCNC: 154 MG/DL (ref 74–109)
GLUCOSE URINE: >=1000 MG/DL
HCT VFR BLD CALC: 48.6 % (ref 37–47)
HEMOGLOBIN: 17 G/DL (ref 12–16)
IMMATURE GRANULOCYTES #: 0.1 K/UL
KETONES, URINE: 15 MG/DL
LEUKOCYTE ESTERASE, URINE: NEGATIVE
LIPASE: 45 U/L (ref 13–60)
LYMPHOCYTES ABSOLUTE: 5.7 K/UL (ref 1.1–4.5)
LYMPHOCYTES RELATIVE PERCENT: 43 % (ref 20–40)
MCH RBC QN AUTO: 31.3 PG (ref 27–31)
MCHC RBC AUTO-ENTMCNC: 35 G/DL (ref 33–37)
MCV RBC AUTO: 89.5 FL (ref 81–99)
MONOCYTES ABSOLUTE: 0.5 K/UL (ref 0–0.9)
MONOCYTES RELATIVE PERCENT: 4 % (ref 0–10)
NEUTROPHILS ABSOLUTE: 6.7 K/UL (ref 1.5–7.5)
NEUTROPHILS RELATIVE PERCENT: 50 % (ref 50–65)
NITRITE, URINE: NEGATIVE
PDW BLD-RTO: 12.4 % (ref 11.5–14.5)
PH UA: 5 (ref 5–8)
PLATELET # BLD: 275 K/UL (ref 130–400)
PLATELET SLIDE REVIEW: ADEQUATE
PMV BLD AUTO: 13 FL (ref 9.4–12.3)
POTASSIUM SERPL-SCNC: 3.5 MMOL/L (ref 3.5–5)
PROTEIN UA: NEGATIVE MG/DL
RBC # BLD: 5.43 M/UL (ref 4.2–5.4)
RBC # BLD: NORMAL 10*6/UL
S PYO AG THROAT QL: NORMAL
SARS-COV-2, PCR: NOT DETECTED
SODIUM BLD-SCNC: 134 MMOL/L (ref 136–145)
SPECIFIC GRAVITY UA: 1.01 (ref 1–1.03)
TOTAL PROTEIN: 7.7 G/DL (ref 6.6–8.7)
TSH REFLEX FT4: 2.05 UIU/ML (ref 0.35–5.5)
URINE TYPE: ABNORMAL
UROBILINOGEN, URINE: 0.2 E.U./DL
WBC # BLD: 13.3 K/UL (ref 4.8–10.8)

## 2023-02-22 PROCEDURE — 71046 X-RAY EXAM CHEST 2 VIEWS: CPT

## 2023-02-22 PROCEDURE — 71046 X-RAY EXAM CHEST 2 VIEWS: CPT | Performed by: RADIOLOGY

## 2023-02-22 RX ORDER — PANTOPRAZOLE SODIUM 40 MG/1
40 TABLET, DELAYED RELEASE ORAL
Qty: 180 TABLET | Refills: 0 | Status: SHIPPED | OUTPATIENT
Start: 2023-02-22

## 2023-02-22 RX ORDER — BLOOD-GLUCOSE,RECEIVER,CONT
EACH MISCELLANEOUS
Qty: 1 EACH | Refills: 0 | Status: SHIPPED | OUTPATIENT
Start: 2023-02-22

## 2023-02-22 RX ORDER — SUCRALFATE ORAL 1 G/10ML
1 SUSPENSION ORAL
Qty: 1200 ML | Refills: 3 | Status: SHIPPED | OUTPATIENT
Start: 2023-02-22

## 2023-02-22 RX ORDER — BLOOD-GLUCOSE SENSOR
EACH MISCELLANEOUS
Qty: 3 EACH | Refills: 11 | Status: SHIPPED | OUTPATIENT
Start: 2023-02-22

## 2023-02-22 SDOH — ECONOMIC STABILITY: HOUSING INSECURITY
IN THE LAST 12 MONTHS, WAS THERE A TIME WHEN YOU DID NOT HAVE A STEADY PLACE TO SLEEP OR SLEPT IN A SHELTER (INCLUDING NOW)?: NO

## 2023-02-22 SDOH — ECONOMIC STABILITY: INCOME INSECURITY: HOW HARD IS IT FOR YOU TO PAY FOR THE VERY BASICS LIKE FOOD, HOUSING, MEDICAL CARE, AND HEATING?: NOT HARD AT ALL

## 2023-02-22 SDOH — ECONOMIC STABILITY: FOOD INSECURITY: WITHIN THE PAST 12 MONTHS, YOU WORRIED THAT YOUR FOOD WOULD RUN OUT BEFORE YOU GOT MONEY TO BUY MORE.: NEVER TRUE

## 2023-02-22 SDOH — ECONOMIC STABILITY: FOOD INSECURITY: WITHIN THE PAST 12 MONTHS, THE FOOD YOU BOUGHT JUST DIDN'T LAST AND YOU DIDN'T HAVE MONEY TO GET MORE.: NEVER TRUE

## 2023-02-22 ASSESSMENT — PATIENT HEALTH QUESTIONNAIRE - PHQ9
SUM OF ALL RESPONSES TO PHQ9 QUESTIONS 1 & 2: 0
9. THOUGHTS THAT YOU WOULD BE BETTER OFF DEAD, OR OF HURTING YOURSELF: 0
8. MOVING OR SPEAKING SO SLOWLY THAT OTHER PEOPLE COULD HAVE NOTICED. OR THE OPPOSITE, BEING SO FIGETY OR RESTLESS THAT YOU HAVE BEEN MOVING AROUND A LOT MORE THAN USUAL: 0
1. LITTLE INTEREST OR PLEASURE IN DOING THINGS: 0
SUM OF ALL RESPONSES TO PHQ QUESTIONS 1-9: 3
2. FEELING DOWN, DEPRESSED OR HOPELESS: 0
SUM OF ALL RESPONSES TO PHQ QUESTIONS 1-9: 3
4. FEELING TIRED OR HAVING LITTLE ENERGY: 3
SUM OF ALL RESPONSES TO PHQ QUESTIONS 1-9: 3
7. TROUBLE CONCENTRATING ON THINGS, SUCH AS READING THE NEWSPAPER OR WATCHING TELEVISION: 0
5. POOR APPETITE OR OVEREATING: 0
10. IF YOU CHECKED OFF ANY PROBLEMS, HOW DIFFICULT HAVE THESE PROBLEMS MADE IT FOR YOU TO DO YOUR WORK, TAKE CARE OF THINGS AT HOME, OR GET ALONG WITH OTHER PEOPLE: 2
SUM OF ALL RESPONSES TO PHQ QUESTIONS 1-9: 3
3. TROUBLE FALLING OR STAYING ASLEEP: 0
6. FEELING BAD ABOUT YOURSELF - OR THAT YOU ARE A FAILURE OR HAVE LET YOURSELF OR YOUR FAMILY DOWN: 0

## 2023-02-22 ASSESSMENT — ENCOUNTER SYMPTOMS
TROUBLE SWALLOWING: 1
VOMITING: 0
SORE THROAT: 1
RESPIRATORY NEGATIVE: 1

## 2023-02-22 NOTE — PROGRESS NOTES
SUBJECTIVE:    Patient ID: Bhavana Benedict is a56 y.o. female. Bhavana Benedict is here today for Diabetes (Diabetic check up - blood sugar has been running 120-140 never above 160. Patient hasn't been using short acting insulin.) and Choking (Patient states that she is sometimes choking and having trouble swallowing and unexplained weight loss. Patient has upcoming appointment with [facility] - Dr. Carmen Torres on 03/22/2023)  . HPI:   HPI     Patient originally had visit that we had changed to a telephone visit but after speaking with her were for very short time today I did not feel that was appropriate. She has come into the office now to discuss which she states started on February 14. She states that last week on February 14 she was on her way home from [place] from a fun trip that she had with her friends. She states that she began feeling ill from head to toe not feeling well. In addition, she noted having more difficulty swallowing and food feeling like it was hanging in her chest and causing a great deal of pain. She also states that since then it does not matter if it is food or liquid it causes pain. She also states if she coughs or sneezes hard she feels pain similarly. She does have a gastroenterologist, Dr. Carmen Torres but her next appointment is not until March 22. Patient does have history of Hull's esophagus and does state adherence to pantoprazole daily. Interestingly, when I was mentioning that I would like to do a COVID swab since this did come on very quickly on her drive home from [place], she states that she did do a COVID test while in [place] because she did have a potential exposure. At that time, the test was negative. Since then she developed symptoms which was on her way home on the 14th. States that at the time of the acute onset of feeling unwell she did check her blood sugar and it was less than 200. She is reporting today that it is running 120s to 140s and never above 160.   I am very concerned as she has had a 21 pound weight loss that is showing to be within the last month but she states that it seems to have been within the last week not the last month. She does state that she is trying to make herself eat from time to time but it does hurt whether that is drinking fluids or eating food. We have attempted to get Dexcom for patient in the past but we were unsuccessful. We are going to try again with the new Dexcom G7. Past Medical History:   Diagnosis Date    Back pain 6/5/2014    Carotid artery stenosis     Depression 6/5/2014    Diabetes ([facility]     GERD (gastroesophageal reflux disease)     Hyperlipidemia     Hypertension 6/5/2014    Morbidly obese ([facility] [address] 7/15/2020    Type II or unspecified type diabetes mellitus without mention of complication, not stated as uncontrolled     Uncontrolled type 2 diabetes mellitus with hyperglycemia, with long-term current use of insulin ([facility] 11/9/2022     Prior to Visit Medications    Medication Sig Taking? Authorizing Provider   vitamin D (CHOLECALCIFEROL) 25 MCG (1000 UT) TABS tablet Take 4,000 Units by mouth daily Yes Historical Provider, MD   Continuous Blood Gluc Sensor (DEXCOM G7 SENSOR) MISC Change sensors every 10 days Yes MOOK Pool   Continuous Blood Gluc  ([address]) ADARSH Use  with sensors 2 - 3 times a day Yes MOOK Pool   pantoprazole (PROTONIX) 40 MG tablet Take 1 tablet by mouth 2 times daily (before meals) Yes MOOK Pool   sucralfate (CARAFATE) 1 GM/10ML suspension Take 10 mLs by mouth 4 times daily (before meals and nightly) Yes MOOK Pool   amLODIPine (NORVASC) 5 MG tablet Take 1 tablet by mouth daily For blood pressure Yes MOOK Pool   FLUoxetine (PROZAC) 10 MG capsule Take 1 capsule by mouth daily NEVER ABRUPTLY STOP Yes MOOK Pool   nystatin (MYCOSTATIN) 178455 UNIT/GM cream Apply topically 2 times daily.  Yes MOOK Pool   B-D ULTRAFINE III SHORT PEN 31G X 8 MM MISC USE ONCE DAILY Yes MOOK Pool   blood glucose monitor strips For qid testing Type 2 Dm Dispense brand per patient request or insurance benefits.  Yes MOOK Pool   glucose monitoring kit (FREESTYLE) monitoring kit For bid testing Type 2 dm Yes MOOK Pool   Lancets MISC 1 each by Does not apply route 2 times daily Dispense brand per insurance benefits and patient request. Yes MOOK Pool   Ez Smart Blood Glucose Lancets MISC For qid and prn testing for newly diagnosed diabetes DX: Diabetes, insulin requiring Yes MOOK Pool   glucose blood VI test strips (ASCENSIA AUTODISC VI;ONE TOUCH ULTRA TEST VI) strip 1 each by In Vitro route 4 times daily (with meals and nightly) For qid and prn testing for newly diagnosed diabetes  Freestyle Freedom lite    DX: Diabetes, insulin requiring Yes MOOK Pool   aspirin EC 81 MG EC tablet Take 1 tablet by mouth daily Yes MOOK Pool   Continuous Blood Gluc Sensor (DEXCOM G6 SENSOR) MISC 1 Device by Does not apply route continuous  Patient not taking: Reported on 2/22/2023  MOOK Pool   Continuous Blood Gluc Transmit (DEXCOM G6 TRANSMITTER) MISC For glucose monitoring qid and prn  Patient not taking: Reported on 2/22/2023  MOOK Pool   Continuous Blood Gluc  (DEXCOM G6 ) ADARSH 1 Device by Does not apply route continuous  Patient not taking: Reported on 2/22/2023  MOOK Pool   insulin aspart (NOVOLOG) 100 UNIT/ML injection vial Inject 20 Units into the skin 3 times daily (before meals)   Patient not taking: Reported on 2/22/2023  Historical Provider, MD   LANTUS 100 UNIT/ML injection vial Inject 60 Units into the skin nightly   Patient not taking: Reported on 2/22/2023  Historical Provider, MD   Insulin Syringe-Needle U-100 (INSULIN SYRINGE .5CC/30GX5/16\") 30G X 5/16\" 0.5 ML MISC USE WITH INSULIN EVERY DAY  Patient not taking: Reported on 2/22/2023  MOOK Thompson     Allergies   Allergen Reactions    Sulfa Antibiotics Hives     Past Surgical History:   Procedure Laterality Date    BACK SURGERY      Dr. Tootie Peters 1st surgery and Dr. Keren Ramirez 2nd surgery    DILATION AND CURETTAGE OF UTERUS      HYSTERECTOMY (CERVIX STATUS UNKNOWN) N/A     LEG SURGERY Left 2022    INCISION AND DRAINAGE OF LEFT UPPER THIGH ABSCESS WITH DEBRIDEMENT performed by Gregory Patel MD at [facility] History   Problem Relation Age of Onset    Diabetes Mother     Stroke Mother      Social History     Socioeconomic History    Marital status:      Spouse name: Not on file    Number of children: Not on file    Years of education: Not on file    Highest education level: Not on file   Occupational History    Not on file   Tobacco Use    Smoking status: Former     Packs/day: 0.50     Years: 20.00     Pack years: 10.00     Types: Cigarettes     Start date: 1983     Quit date: 2014     Years since quittin.5    Smokeless tobacco: Never   Vaping Use    Vaping Use: Never used   Substance and Sexual Activity    Alcohol use: No     Alcohol/week: 0.0 standard drinks    Drug use: No    Sexual activity: Not on file   Other Topics Concern    Not on file   Social History Narrative    Not on file     Social Determinants of Health     Financial Resource Strain: Low Risk     Difficulty of Paying Living Expenses: Not hard at all   Food Insecurity: No Food Insecurity    Worried About [address] in the Last Year: Never true    [address] in the Last Year: Never true   Transportation Needs: Unknown    Lack of Transportation (Medical): Not on file    Lack of Transportation (Non-Medical):  No   Physical Activity: Inactive    Days of Exercise per Week: 0 days    Minutes of Exercise per Session: 0 min   Stress: Not on file   Social Connections: Not on file   Intimate Partner Violence: Not on file   Housing Stability: Unknown    Unable to Pay for Housing in the Last Year: Not on file    Number of Places Lived in the Last Year: Not on file Unstable Housing in the Last Year: No       Review of Systems   Constitutional:  Positive for appetite change, fatigue and unexpected weight change (states loss in the last 8 days). Negative for fever. HENT:  Positive for sore throat (\"pain\":) and trouble swallowing. Respiratory: Negative. Cardiovascular: Negative. Gastrointestinal:  Negative for vomiting. Genitourinary:  Negative for difficulty urinating. Neurological: Negative. Psychiatric/Behavioral:  The patient is nervous/anxious (about health). OBJECTIVE:    Physical Exam  Vitals and nursing note reviewed. Constitutional:       General: She is not in acute distress. Appearance: Normal appearance. She is well-developed. She is not diaphoretic. HENT:      Head: Normocephalic and atraumatic. Right Ear: Tympanic membrane, ear canal and external ear normal.      Left Ear: Tympanic membrane, ear canal and external ear normal.      Nose: Nose normal.      Right Sinus: No maxillary sinus tenderness or frontal sinus tenderness. Left Sinus: No maxillary sinus tenderness or frontal sinus tenderness. Mouth/Throat:      Mouth: Mucous membranes are moist.      Pharynx: Oropharynx is clear. Uvula midline. No oropharyngeal exudate or posterior oropharyngeal erythema. Eyes:      Extraocular Movements: Extraocular movements intact. Conjunctiva/sclera: Conjunctivae normal.      Pupils: Pupils are equal, round, and reactive to light. Neck:      Trachea: No tracheal deviation. Cardiovascular:      Rate and Rhythm: Normal rate and regular rhythm. Pulses: Normal pulses. Heart sounds: Normal heart sounds. Pulmonary:      Effort: Pulmonary effort is normal. No respiratory distress. Breath sounds: Normal breath sounds. Abdominal:      General: Bowel sounds are normal.      Palpations: Abdomen is soft. Tenderness: There is no abdominal tenderness. Musculoskeletal:      Cervical back: Neck supple.  No rigidity. Lymphadenopathy:      Cervical: No cervical adenopathy. Skin:     General: Skin is warm and dry. Capillary Refill: Capillary refill takes less than 2 seconds. Neurological:      General: No focal deficit present. Mental Status: She is alert and oriented to person, place, and time. Psychiatric:         Mood and Affect: Mood normal.         Behavior: Behavior normal.         Thought Content: Thought content normal.         Judgment: Judgment normal.       BP 98/62 (Site: Left Upper Arm, Position: Sitting, Cuff Size: Small Adult)   Pulse (!) 109   Temp 98.1 °F (36.7 °C) (Oral)   Resp 20   Ht 5' 2\" (1.575 m)   Wt 142 lb (64.4 kg)   SpO2 98%   BMI 25.97 kg/m²      ASSESSMENT:      ICD-10-CM    1. Pharyngoesophageal dysphagia  R13.14 pantoprazole (PROTONIX) 40 MG tablet     EKG 12 Lead - Clinic Performed     XR CHEST STANDARD (2 VW)     H. Pylori Antigen, Stool     Lipase     sucralfate (CARAFATE) 1 GM/10ML suspension     External Referral To Gastroenterology      2. Weight loss, unintentional  R63.4 CBC with Auto Differential     Comprehensive Metabolic Panel     TSH with Reflex to FT4     Urinalysis     External Referral To Gastroenterology      3. Acute pharyngitis, unspecified etiology  J02.9 POCT rapid strep A     COVID-19      4. Gastroesophageal reflux disease without esophagitis  K21.9 pantoprazole (PROTONIX) 40 MG tablet     sucralfate (CARAFATE) 1 GM/10ML suspension      5. Hull's esophagus without dysplasia  K22.70 External Referral To Gastroenterology      6. Uncontrolled type 2 diabetes mellitus with hyperglycemia, with long-term current use of insulin ([facility])  E11.65 Continuous Blood Gluc Sensor (DEXCOM G7 SENSOR) MISC    Z79.4 Continuous Blood Gluc  (DEXCOM G7 ) ADARSH     Comprehensive Metabolic Panel      7. Malaise  R53.81 Urinalysis          PLAN:    Yumiko Rizvi: Diabetes (Diabetic check up - blood sugar has been running 120-140 never above 160.  Patient hasn't been using short acting insulin.) and Choking (Patient states that she is sometimes choking and having trouble swallowing and unexplained weight loss. Patient has upcoming appointment with Gastro - Dr. Kenny on 03/22/2023)  POCT strep-negative  CXR stat  Lab  EKG--reviewed and sinus tachycardia noted.  No acute finding at this time.  Covid testing---precautions for 1 more day related to potential contagious aspects.  GI eval asap---pt of Dr. Kenny.  Increase protonix to bid before meals until eval with GI  Begin liquid carafate QID  Monitor glucose 4x/day  Increase high calorie intake as you can!  If not seeing GI within 5-7days, please f/u in office here.   ER if any severe worsening.      Diagnosis and orders for this visit are above.      Please note that this chart was generated using dragon dictationsoftware.  Although every effort was made to ensure the accuracy of this automated transcription, some errors in transcription may have occurred.

## 2023-02-22 NOTE — ADDENDUM NOTE
Addended by: Jaylin Jean-Baptiste on: 2/22/2023 04:16 PM     Modules accepted: Orders, Level of Service

## 2023-02-23 ENCOUNTER — TELEPHONE (OUTPATIENT)
Dept: FAMILY MEDICINE CLINIC | Age: 63
End: 2023-02-23

## 2023-02-23 RX ORDER — LEVOFLOXACIN 500 MG/1
500 TABLET, FILM COATED ORAL DAILY
Qty: 7 TABLET | Refills: 0 | Status: SHIPPED | OUTPATIENT
Start: 2023-02-23 | End: 2023-03-02

## 2023-02-23 NOTE — TELEPHONE ENCOUNTER
Prior auth submitted to insurance to see if they will cover [address] [facility] - see scanning sent through [facility] health provider portal

## 2023-02-24 ENCOUNTER — TELEPHONE (OUTPATIENT)
Dept: FAMILY MEDICINE CLINIC | Age: 63
End: 2023-02-24

## 2023-02-24 DIAGNOSIS — R13.14 PHARYNGOESOPHAGEAL DYSPHAGIA: ICD-10-CM

## 2023-02-24 RX ORDER — FLUCONAZOLE 150 MG/1
TABLET ORAL
Qty: 2 TABLET | Refills: 0 | Status: SHIPPED | OUTPATIENT
Start: 2023-02-24

## 2023-02-24 NOTE — TELEPHONE ENCOUNTER
Patient came into clinic and states that she always gets a yeast infection from antibiotics and would like diflucan sent to [address]

## 2023-02-27 LAB — H PYLORI ANTIGEN STOOL: NEGATIVE

## 2023-03-06 ENCOUNTER — HOSPITAL ENCOUNTER (OUTPATIENT)
Dept: GENERAL RADIOLOGY | Age: 63
Discharge: HOME | End: 2023-03-06
Payer: MEDICARE

## 2023-03-06 DIAGNOSIS — R93.89 ABNORMAL CXR: ICD-10-CM

## 2023-03-06 PROCEDURE — 71260 CT THORAX DX C+: CPT | Performed by: RADIOLOGY

## 2023-03-06 PROCEDURE — 71260 CT THORAX DX C+: CPT

## 2023-03-06 PROCEDURE — G1010 CDSM STANSON: HCPCS | Performed by: RADIOLOGY

## 2023-03-06 PROCEDURE — 6360000004 HC RX CONTRAST MEDICATION: Performed by: NURSE PRACTITIONER

## 2023-03-06 RX ADMIN — IOPAMIDOL 75 ML: 755 INJECTION, SOLUTION INTRAVENOUS at 11:39

## 2023-03-07 ENCOUNTER — TELEPHONE (OUTPATIENT)
Dept: FAMILY MEDICINE CLINIC | Age: 63
End: 2023-03-07

## 2023-03-07 NOTE — TELEPHONE ENCOUNTER
----- Message from MOOK Johnson sent at 3/6/2023  1:11 PM CST -----  CT of the chest has been done and is stated that lungs are normal with no visible pulmonary disease! Vasculature is stated to be normal.  There are no masses or adenopathy present. The pleura is stated to be normal with no mass or effusion. Overall, no acute findings. Patient needs to keep the GI appointment.

## 2023-03-08 ENCOUNTER — OFFICE VISIT (OUTPATIENT)
Dept: GASTROENTEROLOGY | Facility: CLINIC | Age: 63
End: 2023-03-08
Payer: MEDICARE

## 2023-03-08 VITALS
SYSTOLIC BLOOD PRESSURE: 142 MMHG | OXYGEN SATURATION: 98 % | DIASTOLIC BLOOD PRESSURE: 76 MMHG | WEIGHT: 140 LBS | TEMPERATURE: 96.9 F | HEIGHT: 62 IN | BODY MASS INDEX: 25.76 KG/M2 | HEART RATE: 106 BPM

## 2023-03-08 DIAGNOSIS — R63.4 WEIGHT LOSS: ICD-10-CM

## 2023-03-08 DIAGNOSIS — K22.70 BARRETT'S ESOPHAGUS WITHOUT DYSPLASIA: ICD-10-CM

## 2023-03-08 DIAGNOSIS — R13.19 ESOPHAGEAL DYSPHAGIA: Primary | ICD-10-CM

## 2023-03-08 DIAGNOSIS — D12.6 ADENOMATOUS POLYP OF COLON, UNSPECIFIED PART OF COLON: ICD-10-CM

## 2023-03-08 PROCEDURE — 1160F RVW MEDS BY RX/DR IN RCRD: CPT | Performed by: NURSE PRACTITIONER

## 2023-03-08 PROCEDURE — 1159F MED LIST DOCD IN RCRD: CPT | Performed by: NURSE PRACTITIONER

## 2023-03-08 PROCEDURE — 99214 OFFICE O/P EST MOD 30 MIN: CPT | Performed by: NURSE PRACTITIONER

## 2023-03-08 RX ORDER — SUCRALFATE ORAL 1 G/10ML
1 SUSPENSION ORAL
COMMUNITY
Start: 2023-02-22 | End: 2023-03-23 | Stop reason: HOSPADM

## 2023-03-08 NOTE — H&P (VIEW-ONLY)
"Primary Physician: Elinor Richter, APRN    Chief Complaint   Patient presents with   • Difficulty Swallowing     Pt c/o trouble swallowing for the couple of months-states it feels like food/liquids get \"hung\" in her throat and won't go down-states it also feels like her food \"stops\" in her esophagus   • Weight Loss     Pt also c/o issues with weight loss-states she has lost 30-40 pounds over the last couple of months without trying; Pt's last colon was 7/24/2017 and last endo was 8/21/2020       Subjective     Carlee Vee is a 62 y.o. female.    HPI   Weight Loss   Pt reports that she has lost about 30-40 pounds over the past few months un intentional.    Garza's esophagus  Last endoscopy 8/21/2020: small HH, duodenum and stomach normal, esophageal mucosal changes secondary to Garza's in lower third of the esophagus. 3 year recall.  She does reports some acid indigestion in her mid esophagus.  It will awake her at times during the night. She is taking Protonix BID and Seble    Dysphagia  Pt reports having trouble swallowing foods and liquids. Foods are much worse. It seems to get hung in her upper esophagus.      Personal Hx Adenomatous Colon Polyps  Last colonoscopy 7/24/2017 with a tubular adenomatous polyp of transverse colon and 1 hyperplastic polyp in the sigmoid colon. 5 year recall.  Pt reports that her bowels are moving well today.  No blood seen in her stools.  No diarrhea or constipation.      Past Medical History:   Diagnosis Date   • Garza's esophagus    • Depression    • GERD (gastroesophageal reflux disease)    • History of adenomatous polyp of colon    • History of colon polyps    • Hyperlipidemia    • Hypertension    • Kidney stone    • PONV (postoperative nausea and vomiting)    • Type 2 diabetes mellitus (HCC)        Past Surgical History:   Procedure Laterality Date   • BACK SURGERY     • COLONOSCOPY  10/18/2006    Thickened fold in the ascending colon-path shows hyperplastic polyp; Repeat 7 " years   • COLONOSCOPY N/A 07/24/2017    One 4mm tubular adenomatous polyp in the transverse colon; One 5mm hyperplastic polyp in the sigmoid colon; The examination was otherwise normal on direct and retroflexion views; Repeat 5 years   • DILATATION AND CURETTAGE     • ENDOSCOPY N/A 07/24/2017    Esophageal mucosal changes secondary to established short-segment Garza's disease-biopsied; Small HH; Normal stomach; Normal examined duodenum; Repeat 3 years   • ENDOSCOPY  12/08/2011    Esophagitis-biopsied; Garza's-biopsied; HH; Repeat 3 years   • ENDOSCOPY  10/27/2008    Garza's esophagus-biopsied; HH; Repeat 2 years   • ENDOSCOPY  10/23/2006    HH; Probable Garza's esophagus-biopsied; Repeat 2 years   • ENDOSCOPY N/A 08/21/2020    Small HH; Esophageal mucosal changes secondary to established long-segment Garza's disease-biopsied; Normal stomach; Normal examined duodenum; Repeat 3 years   • HYSTERECTOMY     • THORACOTOMY Left 03/27/2018    Procedure: THORACOTOMY, drainage of empyema and decortication;  Surgeon: Abel Stark MD;  [facility];  Service: Cardiothoracic   • TUBAL ABDOMINAL LIGATION          Current Outpatient Medications:   •  amLODIPine (NORVASC) 10 MG tablet, Take 1 tablet by mouth Daily., Disp: , Rfl:   •  aspirin 81 MG EC tablet, Take 1 tablet by mouth Daily., Disp: , Rfl:   •  FLUoxetine (PROzac) 20 MG capsule, Take 1 capsule by mouth Daily., Disp: , Rfl:   •  insulin aspart (novoLOG) 100 UNIT/ML injection, Inject  under the skin 3 (Three) Times a Day Before Meals. 150 - 199 = 4 units 200 - 249 = 8 units 250 - 299 = 12 units 300 - 349 = 16 units 349 - 400 = 20 units > 400 = 24 units, Disp: , Rfl:   •  insulin detemir (LEVEMIR) 100 UNIT/ML injection, Inject 17 Units under the skin Every 12 (Twelve) Hours., Disp: 1 each, Rfl: 1  •  pantoprazole (PROTONIX) 40 MG EC tablet, TAKE 1 TABLET BY MOUTH TWICE DAILY (Patient taking differently: Take 1 tablet by mouth 2 (Two) Times a Day.),  "Disp: 180 tablet, Rfl: 0  •  rosuvastatin (CRESTOR) 10 MG tablet, Take 1 tablet by mouth Daily., Disp: , Rfl:   •  sucralfate (CARAFATE) 1 GM/10ML suspension, Take 10 mL by mouth 4 (Four) Times a Day Before Meals & at Bedtime., Disp: , Rfl:   •  vitamin D (ERGOCALCIFEROL) 97252 units capsule capsule, Take 1 capsule by mouth 1 (One) Time Per Week. Fridays, Disp: , Rfl:   •  famotidine (PEPCID) 20 MG tablet, Take 1 tablet by mouth Every Night., Disp: , Rfl:     Allergies   Allergen Reactions   • Sulfa Antibiotics Hives       Social History     Socioeconomic History   • Marital status:    Tobacco Use   • Smoking status: Former     Types: Cigarettes   • Smokeless tobacco: Never   Vaping Use   • Vaping Use: Never used   Substance and Sexual Activity   • Alcohol use: Not Currently   • Drug use: No   • Sexual activity: Not Currently     Partners: Female     Birth control/protection: Tubal ligation, Hysterectomy       Family History   Problem Relation Age of Onset   • Colon cancer Neg Hx    • Colon polyps Neg Hx    • Esophageal cancer Neg Hx    • Liver cancer Neg Hx    • Liver disease Neg Hx    • Rectal cancer Neg Hx    • Stomach cancer Neg Hx        Review of Systems   Constitutional: Positive for unexpected weight change.   HENT: Positive for trouble swallowing.    Respiratory: Negative for shortness of breath.    Cardiovascular: Negative for chest pain.       Objective     /76 (BP Location: Left arm, Patient Position: Sitting, Cuff Size: Adult)   Pulse 106   Temp 96.9 °F (36.1 °C) (Infrared)   Ht 157.5 cm (62\")   Wt 63.5 kg (140 lb)   SpO2 98%   Breastfeeding No   BMI 25.61 kg/m²     Physical Exam  Vitals reviewed.   Constitutional:       Appearance: Normal appearance.   Cardiovascular:      Rate and Rhythm: Normal rate and regular rhythm.      Heart sounds: Murmur heard.   Pulmonary:      Effort: Pulmonary effort is normal.      Breath sounds: Normal breath sounds.   Neurological:      Mental Status: " "She is alert.         Lab Results - Last 18 Months   Lab Units 12/17/22  0408 12/16/22  2132   CRP mg/dL  --  28.28*   SED RATE mm/Hr 108*  --        Lab Results - Last 18 Months   Lab Units 02/22/23  1603 12/28/22  1225 12/20/22  0141 12/19/22  0223 12/18/22  0237 12/17/22  0408   HEMOGLOBIN g/dL 17.0* 13.7 11.7* 11.0* 10.8* 11.8*   HEMATOCRIT % 48.6* 42.6 35.4* 33.4* 32.7* 35.2*   MCV fL 89.5 100.2* 97.8 97.7 97.3 96.4   WBC K/uL 13.3* 10.3 10.6 14.9* 16.1* 15.3*   RDW % 12.4 13.2 12.2 12.2 12.3 12.1   MPV fL 13.0* 11.3 11.2 11.5 12.0 11.9   PLATELETS K/uL 275 371 281 235 232 262       Lab Results - Last 18 Months   Lab Units 12/17/22  0807 12/16/22  2132   IRON ug/dL 15*  --    TIBC ug/dL 189*  --    IRON SATURATION % 8*  --    FERRITIN ng/mL 514.9*  --    TSH uIU/mL  --  0.792   FOLATE ng/mL 19.6  --    VIT D 25 HYDROXY ng/mL  --  66.2        Lab Results - Last 18 Months   Lab Units 12/17/22  0807   FERRITIN ng/mL 514.9*           IMPRESSION/PLAN:    Assessment & Plan      Problem List Items Addressed This Visit        Endocrine and Metabolic    Weight loss    Overview     30-40 pounds over \"past few months\" without trying            Gastrointestinal Abdominal     Garza's esophagus without dysplasia    Overview     Last endoscopy 8/21/2020: small HH, duodenum and stomach normal, esophageal mucosal changes secondary to Garza's in lower third of the esophagus         Relevant Medications    sucralfate (CARAFATE) 1 GM/10ML suspension    Adenomatous colon polyp    Overview     Last colonoscopy 7/24/2017 with a tubular adenomatous polyp of transverse colon and 1 hyperplastic polyp in the sigmoid colon         Esophageal dysphagia - Primary    Overview     Esophageal dysphagia for 2-3 months, with foods and liquids         Relevant Medications    sucralfate (CARAFATE) 1 GM/10ML suspension     Colonoscopy and Endoscopy per Dr Luis Cuello Prep (sample given)  Continue Protonix and Carafate daily and avoid " NSAIDS    ..Pt is instructed to avoid caffeine, chocolate, peppermint and nicotine.  They are to avoid eating within 2-3 hours prior to bedtime.            ..The risks, benefits, and alternatives of colonoscopy were reviewed with the patient today.  Risks including perforation of the colon possibly requiring surgery or colostomy.  Additional risks include risk of bleeding from biopsies or removal of colon tissue.  There is also the risk of a drug reaction or problems with anesthesia.  This will be discussed with the further by the anesthesia team on the day of the procedure.  Lastly there is a possibility of missing a colon polyp or cancer.  The benefits include the diagnosis and management of disease of the colon and rectum.  Alternatives to colonoscopy include barium enema, laboratory testing, radiographic evaluation, or no intervention.  The patient verbalizes understanding and agrees.    In accordance with requirements under the Affordable Care Act, [facility] has provided pricing for all hospital services and items on each of its websites. However, a patient's actual cost may differ based on the services the patient receives to meet individual healthcare needs and based on the benefits provided under the patient’s insurance coverage.        Erna Estevez, APRN  03/08/23  13:43 CST    Part of this note may be an electronic transcription/translation of spoken language to printed text.

## 2023-03-15 ENCOUNTER — OFFICE VISIT (OUTPATIENT)
Dept: FAMILY MEDICINE CLINIC | Age: 63
End: 2023-03-15

## 2023-03-15 VITALS
WEIGHT: 151.5 LBS | SYSTOLIC BLOOD PRESSURE: 128 MMHG | OXYGEN SATURATION: 98 % | HEART RATE: 96 BPM | DIASTOLIC BLOOD PRESSURE: 76 MMHG | HEIGHT: 62 IN | BODY MASS INDEX: 27.88 KG/M2 | TEMPERATURE: 97.6 F

## 2023-03-15 DIAGNOSIS — Z79.899 MEDICATION MANAGEMENT: ICD-10-CM

## 2023-03-15 DIAGNOSIS — G47.00 INSOMNIA, UNSPECIFIED TYPE: ICD-10-CM

## 2023-03-15 DIAGNOSIS — E11.65 UNCONTROLLED TYPE 2 DIABETES MELLITUS WITH HYPERGLYCEMIA, WITH LONG-TERM CURRENT USE OF INSULIN (HCC): Primary | ICD-10-CM

## 2023-03-15 DIAGNOSIS — Z79.4 UNCONTROLLED TYPE 2 DIABETES MELLITUS WITH HYPERGLYCEMIA, WITH LONG-TERM CURRENT USE OF INSULIN (HCC): Primary | ICD-10-CM

## 2023-03-15 DIAGNOSIS — F41.9 ANXIETY DISORDER, UNSPECIFIED TYPE: ICD-10-CM

## 2023-03-15 DIAGNOSIS — R09.89 LEFT CAROTID BRUIT: ICD-10-CM

## 2023-03-15 RX ORDER — FLUOXETINE HYDROCHLORIDE 20 MG/1
20 CAPSULE ORAL DAILY
Qty: 30 CAPSULE | Refills: 2 | Status: SHIPPED | OUTPATIENT
Start: 2023-03-15

## 2023-03-15 RX ORDER — BLOOD-GLUCOSE,RECEIVER,CONT
EACH MISCELLANEOUS
Qty: 1 EACH | Refills: 0 | Status: SHIPPED | OUTPATIENT
Start: 2023-03-15

## 2023-03-15 RX ORDER — ZOLPIDEM TARTRATE 5 MG/1
5 TABLET ORAL NIGHTLY PRN
Qty: 30 TABLET | Refills: 0 | Status: SHIPPED | OUTPATIENT
Start: 2023-03-15 | End: 2023-04-14

## 2023-03-15 RX ORDER — BLOOD-GLUCOSE SENSOR
EACH MISCELLANEOUS
Qty: 3 EACH | Refills: 11 | Status: SHIPPED | OUTPATIENT
Start: 2023-03-15

## 2023-03-15 ASSESSMENT — ENCOUNTER SYMPTOMS: RESPIRATORY NEGATIVE: 1

## 2023-03-15 NOTE — PROGRESS NOTES
SUBJECTIVE:    Patient ID: Mc Garcia is a56 y.o. female. Mc Garcia is here today for Depression (Pt said it has been going on a few months, pt says she can not sleep. ) and Medication Adjustment  . HPI:   HPI         Previous visit we had discussed weight loss as well as some difficulty swallowing. It does appear that on March 8, 2023 patient did follow-up with gastroenterology. It was relayed at that time the difficulty swallowing as well as the weight loss. She does have history of Hull's esophagus and at that point the previous study was 2020. She was on a 3-year recall. She had reported to continue Protonix twice a day as well as Carafate. Reports appear that colonoscopy and endoscopy by Dr. Adilene Galarza are going to be scheduled. She was to continue Protonix and Carafate daily and avoid all NSAIDs. She was also to avoid caffeine, chocolate, peppermint, and nicotine. She was also to avoid eating within 2 to 3 hours of bedtime. Pt has gained wt since our last visit. States that I will be mad bc she stopped her insulin a few days and \"went crazy\" and worked 8hrs and does recall the day and states that after 2days back on insulin regimen she became clear minded and \"much better\"  \"I'm in a funk and sad since not getting to work\"---Thursday and Saturday and work family noticed on Saturday. In exploring why she stopped insulin \"thought she could control it and fingers hurt\"  This AM glucose less than 200 and reports 128. We have attempted to get Dexcom G7 for continuous monitoring. She has not been made aware through pharmacy that that was approved. She is interested in even perhaps buying out-of-pocket if necessary. She is testing 4-6 times per day now that she is back on track with glucose testing and treatment. She even has history of ketoacidosis. We will try to get this covered again. Not sleeping well. Has had insomnia in the past and did take [place] without difficulty.  Has tried otc and trazodone and no success. Spouse is home with her. She doesn't feel hypoglycemia will be an issue and will check glucose prior to bedtime. Past Medical History:   Diagnosis Date    Back pain 6/5/2014    Carotid artery stenosis     Depression 6/5/2014    Diabetes ([facility]     GERD (gastroesophageal reflux disease)     Hyperlipidemia     Hypertension 6/5/2014    Morbidly obese ([facility] 7/15/2020    Type II or unspecified type diabetes mellitus without mention of complication, not stated as uncontrolled     Uncontrolled type 2 diabetes mellitus with hyperglycemia, with long-term current use of insulin ([facility] 11/9/2022     Prior to Visit Medications    Medication Sig Taking? Authorizing Provider   FLUoxetine (PROZAC) 20 MG capsule Take 1 capsule by mouth daily NEVER ABRUPTLY STOP Yes MOOK Pool   zolpidem (AMBIEN) 5 MG tablet Take 1 tablet by mouth nightly as needed for Sleep for up to 30 days. Max Daily Amount: 5 mg Yes MOOK Pool   Continuous Blood Gluc  ([address]) [address] For continuous glucose monitoring Yes MOOK Pool   Continuous Blood Gluc Sensor (DEXCOM G7 SENSOR) MISC For continuous glucose monitoring Yes MOOK Pool   vitamin D (CHOLECALCIFEROL) 25 MCG (1000 UT) TABS tablet Take 4,000 Units by mouth daily Yes Historical Provider, MD   pantoprazole (PROTONIX) 40 MG tablet Take 1 tablet by mouth 2 times daily (before meals) Yes MOOK Pool   sucralfate (CARAFATE) 1 GM/10ML suspension Take 10 mLs by mouth 4 times daily (before meals and nightly) Yes MOOK Pool   amLODIPine (NORVASC) 5 MG tablet Take 1 tablet by mouth daily For blood pressure Yes MOOK Pool   nystatin (MYCOSTATIN) 598502 UNIT/GM cream Apply topically 2 times daily.  Yes MOOK Pool   B-D ULTRAFINE III SHORT PEN 31G X 8 MM MISC USE ONCE DAILY Yes MOOK Pool   blood glucose monitor strips For qid testing Type 2 Dm Dispense brand per patient request or insurance benefits.  Yes MOOK Pool   glucose monitoring kit (FREESTYLE) monitoring kit For bid testing Type 2 dm Yes MOOK Pool   Lancets MISC 1 each by Does not apply route 2 times daily Dispense brand per insurance benefits and patient request. Yes MOOK Pool   Ez Smart Blood Glucose Lancets MISC For qid and prn testing for newly diagnosed diabetes DX: Diabetes, insulin requiring Yes MOOK Pool   glucose blood VI test strips (ASCENSIA AUTODISC VI;ONE TOUCH ULTRA TEST VI) strip 1 each by In Vitro route 4 times daily (with meals and nightly) For qid and prn testing for newly diagnosed diabetes  Freestyle Freedom lite    DX: Diabetes, insulin requiring Yes MOOK Pool   aspirin EC 81 MG EC tablet Take 1 tablet by mouth daily Yes MOOK Pool   fluconazole (DIFLUCAN) 150 MG tablet 1 po every 3days for yeast infection  Patient not taking: Reported on 3/15/2023  MOOK Pool   Continuous Blood Gluc Sensor (DEXCOM G7 SENSOR) MISC Change sensors every 10 days  Patient not taking: Reported on 3/15/2023  MOOK Pool   Continuous Blood Gluc  (DEXCOM G7 ) ADARSH Use  with sensors 2 - 3 times a day  Patient not taking: Reported on 3/15/2023  MOOK Pool   Continuous Blood Gluc Sensor (DEXCOM G6 SENSOR) MISC 1 Device by Does not apply route continuous  Patient not taking: No sig reported  MOOK Pool   Continuous Blood Gluc Transmit (DEXCOM G6 TRANSMITTER) MISC For glucose monitoring qid and prn  Patient not taking: No sig reported  MOOK Pool   Continuous Blood Gluc  (DEXCOM G6 ) ADARSH 1 Device by Does not apply route continuous  Patient not taking: No sig reported  MOOK Pool   insulin aspart (NOVOLOG) 100 UNIT/ML injection vial Inject 20 Units into the skin 3 times daily (before meals)   Patient not taking: No sig reported  Historical Provider, MD   LANTUS 100 UNIT/ML injection vial Inject 60 Units into the skin nightly   Patient not taking: No sig reported  Historical Provider, MD   Insulin Syringe-Needle U-100 (INSULIN SYRINGE .5CC/30GX5/16\") 30G X 5\" 0.5 ML MISC USE WITH INSULIN EVERY DAY  Patient not taking: No sig reported  MOOK Pool     Allergies   Allergen Reactions    Sulfa Antibiotics Hives     Past Surgical History:   Procedure Laterality Date    Francisco Javier Parson 1st surgery and Dr. Lynne eBdolla 2nd surgery    DILATION AND CURETTAGE OF UTERUS      HYSTERECTOMY (CERVIX STATUS UNKNOWN) N/A     LEG SURGERY Left 2022    INCISION AND DRAINAGE OF LEFT UPPER THIGH ABSCESS WITH DEBRIDEMENT performed by Abi Costello MD at [facility] History   Problem Relation Age of Onset    Diabetes Mother     Stroke Mother      Social History     Socioeconomic History    Marital status:      Spouse name: Not on file    Number of children: Not on file    Years of education: Not on file    Highest education level: Not on file   Occupational History    Not on file   Tobacco Use    Smoking status: Former     Packs/day: 0.50     Years: 20.00     Pack years: 10.00     Types: Cigarettes     Start date: 1983     Quit date: 2014     Years since quittin.6    Smokeless tobacco: Never   Vaping Use    Vaping Use: Never used   Substance and Sexual Activity    Alcohol use: No     Alcohol/week: 0.0 standard drinks    Drug use: No    Sexual activity: Not on file   Other Topics Concern    Not on file   Social History Narrative    Not on file     Social Determinants of Health     Financial Resource Strain: Low Risk     Difficulty of Paying Living Expenses: Not hard at all   Food Insecurity: No Food Insecurity    Worried About [address] in the Last Year: Never true    [facility] in the Last Year: Never true   Transportation Needs: Unknown    Lack of Transportation (Medical): Not on file    Lack of Transportation (Non-Medical):  No   Physical Activity: Inactive    Days of Exercise per Week: 0 days    Minutes of Exercise per Session: 0 min   Stress: Not on file   Social Connections: Not on file   Intimate Partner Violence: Not on file   Housing Stability: Unknown    Unable to Pay for Housing in the Last Year: Not on file    Number of Jia in the Last Year: Not on file    Unstable Housing in the Last Year: No       Review of Systems   Constitutional: Negative. Unexpected weight change: increasing since last visit. Respiratory: Negative. Cardiovascular: Negative. Neurological: Negative. Psychiatric/Behavioral:  Positive for sleep disturbance. Negative for self-injury. The patient is nervous/anxious. OBJECTIVE:    Physical Exam  Vitals and nursing note reviewed. Constitutional:       General: She is not in acute distress. Appearance: Normal appearance. She is well-developed. She is not ill-appearing or diaphoretic. HENT:      Head: Normocephalic and atraumatic. Right Ear: Tympanic membrane and external ear normal. There is no impacted cerumen. Left Ear: Tympanic membrane and external ear normal. There is no impacted cerumen. Nose: Nose normal. No congestion. Eyes:      Extraocular Movements: Extraocular movements intact. Conjunctiva/sclera: Conjunctivae normal.      Pupils: Pupils are equal, round, and reactive to light. Neck:      Vascular: Carotid bruit (left) present. Cardiovascular:      Rate and Rhythm: Normal rate and regular rhythm. Heart sounds: Normal heart sounds. No murmur heard. Pulmonary:      Effort: Pulmonary effort is normal. No respiratory distress. Breath sounds: Normal breath sounds. Musculoskeletal:      Cervical back: Normal range of motion and neck supple. No rigidity. Lymphadenopathy:      Cervical: No cervical adenopathy. Skin:     General: Skin is warm and dry. Capillary Refill: Capillary refill takes less than 2 seconds. Neurological:      General: No focal deficit present.       Mental Status: She is alert and oriented to person, place, and time. Mental status is at baseline. Psychiatric:         Mood and Affect: Mood normal.         Behavior: Behavior normal.         Thought Content: Thought content normal.         Judgment: Judgment normal.       /76   Pulse 96   Temp 97.6 °F (36.4 °C) (Temporal)   Ht 5' 2\" (1.575 m)   Wt 151 lb 8 oz (68.7 kg)   SpO2 98%   BMI 27.71 kg/m²      ASSESSMENT:      ICD-10-CM    1. Uncontrolled type 2 diabetes mellitus with hyperglycemia, with long-term current use of insulin (HCC)  E11.65 Comprehensive Metabolic Panel w/ Reflex to MG    Z79.4 Hemoglobin A1C     Continuous Blood Gluc  (DEXCOM G7 ) ADARSH     Continuous Blood Gluc Sensor (DEXCOM G7 SENSOR) MISC      2. Anxiety disorder, unspecified type  F41.9 FLUoxetine (PROZAC) 20 MG capsule      3. Left carotid bruit  R09.89 US CAROTID ARTERY BILATERAL      4. Insomnia, unspecified type  G47.00 POCT Rapid Drug Screen     zolpidem (AMBIEN) 5 MG tablet      5. Medication management  Z79.899 POCT Rapid Drug Screen          PLAN:    Derryl Medal: Depression (Pt said it has been going on a few months, pt says she can not sleep. ) and Medication Adjustment  Check [facility]'s related to [facility] that was sent in Feb and see if we can get approval and pt to check cash pay  Pt declines CT head. Contract  [facility]---reviewed and going for confirmation  Use least amount of ambien possible  F/u in [place] and asap if need    Diagnosis and orders for this visit are above. Please note that this chart was generated using dragon dictationsoftware. Although every effort was made to ensure the accuracy of this automated transcription, some errors in transcription may have occurred.

## 2023-03-15 NOTE — LETTER
March 15, 2023       Brent Qureshi YOB: 1960   [address] [facility] Date of Visit:  3/15/2023       To Whom It May Concern:    Brent Qureshi was seen in my clinic on 3/15/2023. She may return to work on her next scheduled shift. No restrictions. .    If you have any questions or concerns, please don't hesitate to call.     Sincerely,        Varghese Clark, APRN

## 2023-03-17 ENCOUNTER — HOSPITAL ENCOUNTER (OUTPATIENT)
Dept: GENERAL RADIOLOGY | Age: 63
Discharge: HOME | End: 2023-03-17
Payer: MEDICARE

## 2023-03-17 DIAGNOSIS — R09.89 LEFT CAROTID BRUIT: ICD-10-CM

## 2023-03-17 DIAGNOSIS — E11.65 UNCONTROLLED TYPE 2 DIABETES MELLITUS WITH HYPERGLYCEMIA, WITH LONG-TERM CURRENT USE OF INSULIN (HCC): ICD-10-CM

## 2023-03-17 DIAGNOSIS — Z79.4 UNCONTROLLED TYPE 2 DIABETES MELLITUS WITH HYPERGLYCEMIA, WITH LONG-TERM CURRENT USE OF INSULIN (HCC): ICD-10-CM

## 2023-03-17 LAB
ALBUMIN SERPL-MCNC: 3.4 G/DL (ref 3.5–5.2)
ALP SERPL-CCNC: 128 U/L (ref 35–104)
ALT SERPL-CCNC: 48 U/L (ref 5–33)
ANION GAP SERPL CALCULATED.3IONS-SCNC: 10 MMOL/L (ref 7–19)
AST SERPL-CCNC: 42 U/L (ref 5–32)
BILIRUB SERPL-MCNC: <0.2 MG/DL (ref 0.2–1.2)
BUN SERPL-MCNC: 8 MG/DL (ref 8–23)
CALCIUM SERPL-MCNC: 9.1 MG/DL (ref 8.8–10.2)
CHLORIDE SERPL-SCNC: 104 MMOL/L (ref 98–111)
CO2 SERPL-SCNC: 28 MMOL/L (ref 22–29)
CREAT SERPL-MCNC: 0.7 MG/DL (ref 0.5–0.9)
GLUCOSE SERPL-MCNC: 174 MG/DL (ref 74–109)
HBA1C MFR BLD: >18 % (ref 4–6)
POTASSIUM SERPL-SCNC: 3.9 MMOL/L (ref 3.5–5)
PROT SERPL-MCNC: 5.9 G/DL (ref 6.6–8.7)
SODIUM SERPL-SCNC: 142 MMOL/L (ref 136–145)

## 2023-03-17 PROCEDURE — 93880 EXTRACRANIAL BILAT STUDY: CPT

## 2023-03-17 PROCEDURE — 93880 EXTRACRANIAL BILAT STUDY: CPT | Performed by: RADIOLOGY

## 2023-03-19 LAB
AMPHET CTO UR CFM-MCNC: NEGATIVE NG/ML
BARBITURATES CTO UR CFM-MCNC: NEGATIVE NG/ML
BENZODIAZ CTO UR CFM-MCNC: NEGATIVE NG/ML
BUPRENORPHINE UR QL SCN: NEGATIVE NG/ML
CANNABINOIDS CTO UR CFM-MCNC: POSITIVE NG/ML
CARISOPRODOL UR QL: NEGATIVE NG/ML
COCAINE CTO UR CFM-MCNC: NEGATIVE NG/ML
CREAT UR-MCNC: 74.4 MG/DL (ref 20–400)
DRUG SCREEN COMMENT UR-IMP: NORMAL
ETHYL GLUCURONIDE UR QL SCN: NEGATIVE NG/ML
FENTANYL UR QL: NEGATIVE NG/ML
MEPERIDINE UR QL: NEGATIVE NG/ML
METHADONE CTO UR CFM-MCNC: NEGATIVE NG/ML
OPIATES UR QL SCN: NEGATIVE NG/ML
OXYCODONE SCREEN URINE: NEGATIVE NG/ML
PCP CTO UR CFM-MCNC: NEGATIVE NG/ML
PROPOXYPH CTO UR CFM-MCNC: NEGATIVE NG/ML
TAPENTADOL UR QL SCN: NEGATIVE NG/ML
THC UR CFM-MCNC: >500 NG/ML
TRAMADOL SCREEN URINE: NEGATIVE NG/ML
ZOLPIDEM UR QL SCN: NEGATIVE NG/ML

## 2023-03-20 DIAGNOSIS — E11.65 UNCONTROLLED TYPE 2 DIABETES MELLITUS WITH HYPERGLYCEMIA, WITH LONG-TERM CURRENT USE OF INSULIN (HCC): ICD-10-CM

## 2023-03-20 DIAGNOSIS — Z79.4 UNCONTROLLED TYPE 2 DIABETES MELLITUS WITH HYPERGLYCEMIA, WITH LONG-TERM CURRENT USE OF INSULIN (HCC): ICD-10-CM

## 2023-03-20 NOTE — TELEPHONE ENCOUNTER
aJson Rai called to request a refill on her medication.       Last office visit : 3/15/2023   Next office visit : Visit date not found     Requested Prescriptions     Pending Prescriptions Disp Refills    Continuous Blood Gluc  (DEXCOM G7 ) ADARSH 1 each 0     Sig: For continuous glucose monitoring            [place], [place]

## 2023-03-22 NOTE — TELEPHONE ENCOUNTER
See previous note--check pharmacy for receipt and check with pt if able to obtain. If not, a PA may be needed.

## 2023-03-23 ENCOUNTER — HOSPITAL ENCOUNTER (OUTPATIENT)
Facility: HOSPITAL | Age: 63
Setting detail: HOSPITAL OUTPATIENT SURGERY
Discharge: HOME | End: 2023-03-23
Attending: INTERNAL MEDICINE | Admitting: INTERNAL MEDICINE
Payer: MEDICARE

## 2023-03-23 ENCOUNTER — ANESTHESIA (OUTPATIENT)
Dept: GASTROENTEROLOGY | Facility: HOSPITAL | Age: 63
End: 2023-03-23
Payer: MEDICARE

## 2023-03-23 ENCOUNTER — ANESTHESIA EVENT (OUTPATIENT)
Dept: GASTROENTEROLOGY | Facility: HOSPITAL | Age: 63
End: 2023-03-23
Payer: MEDICARE

## 2023-03-23 ENCOUNTER — TELEPHONE (OUTPATIENT)
Dept: GASTROENTEROLOGY | Facility: CLINIC | Age: 63
End: 2023-03-23
Payer: MEDICARE

## 2023-03-23 VITALS
TEMPERATURE: 98.2 F | HEART RATE: 73 BPM | RESPIRATION RATE: 16 BRPM | DIASTOLIC BLOOD PRESSURE: 72 MMHG | SYSTOLIC BLOOD PRESSURE: 145 MMHG | WEIGHT: 152 LBS | BODY MASS INDEX: 27.97 KG/M2 | HEIGHT: 62 IN | OXYGEN SATURATION: 98 %

## 2023-03-23 DIAGNOSIS — R63.4 WEIGHT LOSS: ICD-10-CM

## 2023-03-23 DIAGNOSIS — R13.19 ESOPHAGEAL DYSPHAGIA: ICD-10-CM

## 2023-03-23 DIAGNOSIS — D12.6 ADENOMATOUS POLYP OF COLON, UNSPECIFIED PART OF COLON: ICD-10-CM

## 2023-03-23 DIAGNOSIS — K22.70 BARRETT'S ESOPHAGUS WITHOUT DYSPLASIA: ICD-10-CM

## 2023-03-23 LAB — GLUCOSE BLDC GLUCOMTR-MCNC: 316 MG/DL (ref 70–130)

## 2023-03-23 PROCEDURE — 82962 GLUCOSE BLOOD TEST: CPT

## 2023-03-23 PROCEDURE — 88305 TISSUE EXAM BY PATHOLOGIST: CPT | Performed by: INTERNAL MEDICINE

## 2023-03-23 PROCEDURE — 25010000002 PROPOFOL 10 MG/ML EMULSION: Performed by: NURSE ANESTHETIST, CERTIFIED REGISTERED

## 2023-03-23 PROCEDURE — 43239 EGD BIOPSY SINGLE/MULTIPLE: CPT | Performed by: INTERNAL MEDICINE

## 2023-03-23 PROCEDURE — 45385 COLONOSCOPY W/LESION REMOVAL: CPT | Performed by: INTERNAL MEDICINE

## 2023-03-23 DEVICE — DEV CLIP ENDO RESOLUTION360 CONTRL ROT 235CM: Type: IMPLANTABLE DEVICE | Site: ASCENDING COLON | Status: FUNCTIONAL

## 2023-03-23 RX ORDER — SODIUM CHLORIDE 0.9 % (FLUSH) 0.9 %
10 SYRINGE (ML) INJECTION AS NEEDED
Status: DISCONTINUED | OUTPATIENT
Start: 2023-03-23 | End: 2023-03-23 | Stop reason: HOSPADM

## 2023-03-23 RX ORDER — PROPOFOL 10 MG/ML
VIAL (ML) INTRAVENOUS AS NEEDED
Status: DISCONTINUED | OUTPATIENT
Start: 2023-03-23 | End: 2023-03-23 | Stop reason: SURG

## 2023-03-23 RX ORDER — LIDOCAINE HYDROCHLORIDE 20 MG/ML
INJECTION, SOLUTION EPIDURAL; INFILTRATION; INTRACAUDAL; PERINEURAL AS NEEDED
Status: DISCONTINUED | OUTPATIENT
Start: 2023-03-23 | End: 2023-03-23 | Stop reason: SURG

## 2023-03-23 RX ORDER — BLOOD-GLUCOSE,RECEIVER,CONT
EACH MISCELLANEOUS
Qty: 1 EACH | Refills: 0 | OUTPATIENT
Start: 2023-03-23

## 2023-03-23 RX ORDER — SODIUM CHLORIDE 9 MG/ML
500 INJECTION, SOLUTION INTRAVENOUS CONTINUOUS PRN
Status: DISCONTINUED | OUTPATIENT
Start: 2023-03-23 | End: 2023-03-23 | Stop reason: HOSPADM

## 2023-03-23 RX ADMIN — LIDOCAINE HYDROCHLORIDE 100 MG: 20 INJECTION, SOLUTION EPIDURAL; INFILTRATION; INTRACAUDAL; PERINEURAL at 11:40

## 2023-03-23 RX ADMIN — PROPOFOL INJECTABLE EMULSION 40 MG: 10 INJECTION, EMULSION INTRAVENOUS at 12:07

## 2023-03-23 RX ADMIN — PROPOFOL INJECTABLE EMULSION 30 MG: 10 INJECTION, EMULSION INTRAVENOUS at 11:51

## 2023-03-23 RX ADMIN — PROPOFOL INJECTABLE EMULSION 30 MG: 10 INJECTION, EMULSION INTRAVENOUS at 12:06

## 2023-03-23 RX ADMIN — PROPOFOL INJECTABLE EMULSION 30 MG: 10 INJECTION, EMULSION INTRAVENOUS at 11:43

## 2023-03-23 RX ADMIN — PROPOFOL INJECTABLE EMULSION 40 MG: 10 INJECTION, EMULSION INTRAVENOUS at 12:10

## 2023-03-23 RX ADMIN — PROPOFOL INJECTABLE EMULSION 30 MG: 10 INJECTION, EMULSION INTRAVENOUS at 11:47

## 2023-03-23 RX ADMIN — PROPOFOL INJECTABLE EMULSION 30 MG: 10 INJECTION, EMULSION INTRAVENOUS at 11:44

## 2023-03-23 RX ADMIN — PROPOFOL INJECTABLE EMULSION 30 MG: 10 INJECTION, EMULSION INTRAVENOUS at 11:49

## 2023-03-23 RX ADMIN — PROPOFOL INJECTABLE EMULSION 30 MG: 10 INJECTION, EMULSION INTRAVENOUS at 12:03

## 2023-03-23 RX ADMIN — PROPOFOL INJECTABLE EMULSION 30 MG: 10 INJECTION, EMULSION INTRAVENOUS at 11:57

## 2023-03-23 RX ADMIN — PROPOFOL INJECTABLE EMULSION 40 MG: 10 INJECTION, EMULSION INTRAVENOUS at 12:08

## 2023-03-23 RX ADMIN — PROPOFOL INJECTABLE EMULSION 40 MG: 10 INJECTION, EMULSION INTRAVENOUS at 11:41

## 2023-03-23 RX ADMIN — PROPOFOL INJECTABLE EMULSION 30 MG: 10 INJECTION, EMULSION INTRAVENOUS at 12:05

## 2023-03-23 RX ADMIN — PROPOFOL INJECTABLE EMULSION 30 MG: 10 INJECTION, EMULSION INTRAVENOUS at 11:42

## 2023-03-23 RX ADMIN — PROPOFOL INJECTABLE EMULSION 70 MG: 10 INJECTION, EMULSION INTRAVENOUS at 11:40

## 2023-03-23 RX ADMIN — PROPOFOL INJECTABLE EMULSION 30 MG: 10 INJECTION, EMULSION INTRAVENOUS at 11:45

## 2023-03-23 RX ADMIN — PROPOFOL INJECTABLE EMULSION 30 MG: 10 INJECTION, EMULSION INTRAVENOUS at 12:01

## 2023-03-23 RX ADMIN — PROPOFOL INJECTABLE EMULSION 30 MG: 10 INJECTION, EMULSION INTRAVENOUS at 11:55

## 2023-03-23 RX ADMIN — SODIUM CHLORIDE 500 ML: 9 INJECTION, SOLUTION INTRAVENOUS at 10:18

## 2023-03-23 RX ADMIN — PROPOFOL INJECTABLE EMULSION 30 MG: 10 INJECTION, EMULSION INTRAVENOUS at 11:59

## 2023-03-23 RX ADMIN — PROPOFOL INJECTABLE EMULSION 40 MG: 10 INJECTION, EMULSION INTRAVENOUS at 11:54

## 2023-03-23 RX ADMIN — PROPOFOL INJECTABLE EMULSION 30 MG: 10 INJECTION, EMULSION INTRAVENOUS at 11:53

## 2023-03-23 NOTE — DISCHARGE INSTRUCTIONS
Cllp card/instructions given to you today.    CT Scan ordered, hosspital will call you at home with appt time/date. Ask for any prep test instructions.This test has been callcelled.    Return to see Johnna on ay 23 at 1:30

## 2023-03-23 NOTE — ANESTHESIA PREPROCEDURE EVALUATION
Anesthesia Evaluation     Patient summary reviewed   history of anesthetic complications: PONV  NPO Solid Status: > 8 hours             Airway   Mallampati: II  Dental    (+) upper dentures and lower dentures    Pulmonary - negative pulmonary ROS   Cardiovascular   Exercise tolerance: excellent (>7 METS)    (+) hypertension, hyperlipidemia,       Neuro/Psych- negative ROS  GI/Hepatic/Renal/Endo    (+)   diabetes mellitus using insulin,     Musculoskeletal     Abdominal    Substance History      OB/GYN          Other                        Anesthesia Plan    ASA 3     MAC       Anesthetic plan, risks, benefits, and alternatives have been provided, discussed and informed consent has been obtained with: patient.        CODE STATUS:

## 2023-03-23 NOTE — TELEPHONE ENCOUNTER
She reports that she had a CT scan done at [facility].  Can you please locate that result.  It was done within the last week.  Please cancel the CT that I had ordered.  Please make sure that she has an office visit with Johnna in a couple months.    Jenniffer Smith MD

## 2023-03-23 NOTE — TELEPHONE ENCOUNTER
Call patient to find out where to look.  She was very clear that it had just been done.    Jenniffer Smith MD

## 2023-03-24 LAB
LAB AP CASE REPORT: NORMAL
Lab: NORMAL
PATH REPORT.FINAL DX SPEC: NORMAL
PATH REPORT.GROSS SPEC: NORMAL

## 2023-03-24 NOTE — PROGRESS NOTES
Send Garza's esophagus 3 year recall letter.  Also add polyps benign--repeat colonoscopy 3 years at same time as endo.   Small bowel biopsies good.

## 2023-04-07 ENCOUNTER — TELEPHONE (OUTPATIENT)
Dept: GASTROENTEROLOGY | Facility: CLINIC | Age: 63
End: 2023-04-07
Payer: MEDICARE

## 2023-04-07 ENCOUNTER — HOSPITAL ENCOUNTER (OUTPATIENT)
Dept: CT IMAGING | Facility: HOSPITAL | Age: 63
Discharge: HOME | End: 2023-04-07
Admitting: INTERNAL MEDICINE
Payer: MEDICARE

## 2023-04-07 DIAGNOSIS — R63.4 WEIGHT LOSS: Primary | ICD-10-CM

## 2023-04-07 DIAGNOSIS — R63.4 WEIGHT LOSS: ICD-10-CM

## 2023-04-07 PROCEDURE — 82565 ASSAY OF CREATININE: CPT

## 2023-04-07 PROCEDURE — 74177 CT ABD & PELVIS W/CONTRAST: CPT

## 2023-04-07 PROCEDURE — 25510000001 IOPAMIDOL 61 % SOLUTION: Performed by: INTERNAL MEDICINE

## 2023-04-07 RX ADMIN — IOPAMIDOL 100 ML: 612 INJECTION, SOLUTION INTRAVENOUS at 08:35

## 2023-04-07 NOTE — TELEPHONE ENCOUNTER
Called and spoke to pt re: results-she VU. She is aware that scheduling will call her to arrange CT Angiogram and that it needs to be about 2 weeks apart from her last CT. I did advise that if she hasn't heard from them in 1 week to call me so I can check on status-she VU. Pt also advised to call me back with any further questions/problems.

## 2023-04-07 NOTE — TELEPHONE ENCOUNTER
Please let her know that her CT scan did not show any masses that would explain her weight loss.  It did show that a few of the blood vessels on her abdomen might be a little bit narrowed.  A better way to assess this would be with a CT scan dedicated to the blood vessels.  I am going to have the hospital call her to schedule this.  I would like to postpone it for approximately 2 weeks just to have her kidneys recover from the dye that they just had for this CT scan.    Jennifefr Smith MD

## 2023-04-10 LAB — CREAT BLDA-MCNC: 0.6 MG/DL (ref 0.6–1.3)

## 2023-04-14 NOTE — ANESTHESIA POSTPROCEDURE EVALUATION
Patient: Carlee Vee    Procedure Summary     Date: 03/23/23 Room / Location:  [facility] /  [facility]    Anesthesia Start: 1136 Anesthesia Stop: 1223    Procedures:       ESOPHAGOGASTRODUODENOSCOPY WITH ANESTHESIA      COLONOSCOPY WITH ANESTHESIA Diagnosis:       Esophageal dysphagia      Weight loss      Garza's esophagus without dysplasia      Adenomatous polyp of colon, unspecified part of colon      (Esophageal dysphagia [R13.19])      (Weight loss [R63.4])      (Garza's esophagus without dysplasia [K22.70])      (Adenomatous polyp of colon, unspecified part of colon [D12.6])    Surgeons: Jenniffer Smith MD Provider: Abel Newman CRNA    Anesthesia Type: MAC ASA Status: 3          Anesthesia Type: MAC    Vitals  Vitals Value Taken Time   BP 91/57 03/23/23 1231   Temp     Pulse 80 03/23/23 1236   Resp 14 03/23/23 1235   SpO2 97 % 03/23/23 1236   Vitals shown include unvalidated device data.        Post Anesthesia Care and Evaluation    Patient location during evaluation: PHASE II  Patient participation: complete - patient participated  Level of consciousness: awake  Pain score: 0  Pain management: adequate    Airway patency: patent  Anesthetic complications: No anesthetic complications  PONV Status: none  Cardiovascular status: acceptable and stable  Respiratory status: acceptable and room air  Hydration status: acceptable       Radiologists confirmed results off hand x-ray which remain the same as the immediate care physicians results and patient was notified of these results at the time of visit.

## 2023-04-25 ENCOUNTER — HOSPITAL ENCOUNTER (OUTPATIENT)
Dept: CT IMAGING | Facility: HOSPITAL | Age: 63
Discharge: HOME | End: 2023-04-25
Admitting: INTERNAL MEDICINE
Payer: MEDICARE

## 2023-04-25 DIAGNOSIS — R63.4 WEIGHT LOSS: ICD-10-CM

## 2023-04-25 LAB — CREAT BLDA-MCNC: 0.7 MG/DL (ref 0.6–1.3)

## 2023-04-25 PROCEDURE — 25510000001 IOPAMIDOL PER 1 ML: Performed by: INTERNAL MEDICINE

## 2023-04-25 PROCEDURE — 74175 CTA ABDOMEN W/CONTRAST: CPT

## 2023-04-25 PROCEDURE — 82565 ASSAY OF CREATININE: CPT

## 2023-04-25 RX ADMIN — IOPAMIDOL 100 ML: 755 INJECTION, SOLUTION INTRAVENOUS at 08:40

## 2023-05-11 ENCOUNTER — TELEPHONE (OUTPATIENT)
Dept: GASTROENTEROLOGY | Facility: CLINIC | Age: 63
End: 2023-05-11
Payer: MEDICARE

## 2023-05-11 DIAGNOSIS — R63.4 WEIGHT LOSS: Primary | ICD-10-CM

## 2023-05-11 DIAGNOSIS — R93.5 ABNORMAL CT OF THE ABDOMEN: ICD-10-CM

## 2023-05-11 NOTE — TELEPHONE ENCOUNTER
Please let her know that the CT angiogram does show that there is narrowing of 1 blood vessels in the top part of her abdomen.  I am going to refer her to vascular surgery for an opinion in this regard.    CTA results as follows:  IMPRESSION:  1. 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 Superior and inferior mesenteric arteries appear  patent. Less than 50% stenosis of the proximal left renal artery with an  accessory left renal artery. No significant right renal artery stenosis.  No aneurysm or dissection.  This report was finalized on 04/25/2023 09:25 by Dr. Vanessa Jones MD.    Jenniffer Smith MD

## 2023-05-11 NOTE — TELEPHONE ENCOUNTER
Called and spoke to pt re: results-she VU. She is aware the vascular group will be reaching out to her to schedule an appt to discuss further and she was advise to call me in about a week or 2 if she hasn't heard from them. Pt advised to call me back with any further questions/problems, otherwise she will keep f/u with Johnna as scheduled later this month.

## 2023-05-31 NOTE — PROGRESS NOTES
06/01/2023      Jenniffer Smith MD  [address]  [address]  [place] [address]    Carlee Vee  1960    Chief Complaint   Patient presents with    New Patient      Ref from Dr. Smith for Abnormal ct of abdomen. PT had a  CT abd/pelvis and Ct Angio Abd done on  4/25/23. Pt states they are having a lot of pain the lower belly which is why they tested the abd. Pt states that both of their goes numb especially when laying in bed.       Dear Jenniffer Smith MD    HPI  I had the pleasure of seeing your patient Carlee Vee in the office today.  Thank you kindly for this consultation.  As you recall, Carlee Vee is a 62 y.o.  female who you are currently following for difficulty swallowing and weight loss.  She reports previous weight loss of about 30 to 40 pounds unintentionally.  She did undergo an endoscopy recently.  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  Upon further discussion, she reports abdominal pain off-and-on which is not associated with eating.  She is recently started to regain weight about 15 or 20 pounds.  With regards to her lower extremities.  She does have neuropathy and has already tried Neurontin.  She also complains of whooshing sounds in her ears, her carotid duplex was within normal limits.        Past Medical History:   Diagnosis Date    Garza's esophagus     Depression     Diverticulosis     GERD (gastroesophageal reflux disease)     History of adenomatous polyp of colon     History of colon polyps     Hyperlipidemia     Hypertension     Kidney stone     PONV (postoperative nausea and vomiting)     Type 2 diabetes mellitus        Past Surgical History:   Procedure Laterality Date    BACK SURGERY      COLONOSCOPY  10/18/2006    Thickened fold in the ascending colon-path shows hyperplastic polyp; Repeat 7 years    COLONOSCOPY N/A 07/24/2017    One 4mm tubular adenomatous polyp in the transverse colon; One 5mm hyperplastic polyp in the sigmoid colon; The  examination was otherwise normal on direct and retroflexion views; Repeat 5 years    COLONOSCOPY N/A 03/23/2023    Diverticulosis in the entire examined colon; One 5mm tubular adenomatous polyp in the ascending colon-Clip (MR conditional) was placed; One 12mm tubular adenomatous polyp in the transverse colon; Repeat 3 years    DILATATION AND CURETTAGE      ENDOSCOPY N/A 07/24/2017    Esophageal mucosal changes secondary to established short-segment Garza's disease-biopsied; Small HH; Normal stomach; Normal examined duodenum; Repeat 3 years    ENDOSCOPY  12/08/2011    Esophagitis-biopsied; Garza's-biopsied; HH; Repeat 3 years    ENDOSCOPY  10/27/2008    Garza's esophagus-biopsied; HH; Repeat 2 years    ENDOSCOPY  10/23/2006    HH; Probable Garza's esophagus-biopsied; Repeat 2 years    ENDOSCOPY N/A 08/21/2020    Small HH; Esophageal mucosal changes secondary to established long-segment Garza's disease-biopsied; Normal stomach; Normal examined duodenum; Repeat 3 years    ENDOSCOPY N/A 03/23/2023    Esophageal mucosal changes secondary to established long-segment Garza's disease-biopsied; Medium-sized HH; Normal stomach; Normal examined duodenum-biopsied; Repeat 3 years    HYSTERECTOMY      THORACOTOMY Left 03/27/2018    Procedure: THORACOTOMY, drainage of empyema and decortication;  Surgeon: Abel Stark MD;  Location: [facility] OR;  Service: Cardiothoracic    TUBAL ABDOMINAL LIGATION         Family History   Problem Relation Age of Onset    Colon cancer Neg Hx     Colon polyps Neg Hx     Esophageal cancer Neg Hx     Liver cancer Neg Hx     Liver disease Neg Hx     Rectal cancer Neg Hx     Stomach cancer Neg Hx        Social History     Socioeconomic History    Marital status:    Tobacco Use    Smoking status: Former     Types: Cigarettes    Smokeless tobacco: Never   Vaping Use    Vaping Use: Never used   Substance and Sexual Activity    Alcohol use: Not Currently    Drug use: No    Sexual  "activity: Not Currently     Partners: Female     Birth control/protection: Tubal ligation, Hysterectomy       Allergies   Allergen Reactions    Sulfa Antibiotics Hives         Current Outpatient Medications:     amLODIPine (NORVASC) 10 MG tablet, Take 1 tablet by mouth Daily., Disp: , Rfl:     aspirin 81 MG EC tablet, Take 1 tablet by mouth Daily., Disp: , Rfl:     famotidine (PEPCID) 20 MG tablet, Take 1 tablet by mouth Every Night., Disp: , Rfl:     FLUoxetine (PROzac) 20 MG capsule, Take 1 capsule by mouth Daily., Disp: , Rfl:     insulin aspart (novoLOG) 100 UNIT/ML injection, Inject  under the skin 3 (Three) Times a Day Before Meals. 150 - 199 = 4 units 200 - 249 = 8 units 250 - 299 = 12 units 300 - 349 = 16 units 349 - 400 = 20 units > 400 = 24 units, Disp: , Rfl:     insulin detemir (LEVEMIR) 100 UNIT/ML injection, Inject 17 Units under the skin Every 12 (Twelve) Hours., Disp: 1 each, Rfl: 1    pantoprazole (PROTONIX) 40 MG EC tablet, TAKE 1 TABLET BY MOUTH TWICE DAILY (Patient taking differently: Take 1 tablet by mouth 2 (Two) Times a Day.), Disp: 180 tablet, Rfl: 0    rosuvastatin (CRESTOR) 10 MG tablet, Take 1 tablet by mouth Daily., Disp: , Rfl:     vitamin D (ERGOCALCIFEROL) 63082 units capsule capsule, Take 1 capsule by mouth 1 (One) Time Per Week. Fridays, Disp: , Rfl:     Review of Systems   Constitutional: Negative.    HENT: Negative.     Eyes: Negative.    Respiratory: Negative.     Cardiovascular: Negative.    Gastrointestinal:  Positive for abdominal pain.   Endocrine: Negative.    Genitourinary: Negative.    Musculoskeletal: Negative.    Skin: Negative.    Allergic/Immunologic: Negative.    Neurological: Negative.    Hematological: Negative.    Psychiatric/Behavioral: Negative.     All other systems reviewed and are negative.  /80   Pulse 66   Ht 157.5 cm (62\")   Wt 68 kg (150 lb)   SpO2 97%   BMI 27.44 kg/m²     Physical Exam  Vitals and nursing note reviewed.   Constitutional:       " Appearance: She is well-developed.   HENT:      Head: Normocephalic and atraumatic.   Eyes:      General: No scleral icterus.     Pupils: Pupils are equal, round, and reactive to light.   Neck:      Thyroid: No thyromegaly.      Vascular: No carotid bruit or JVD.   Cardiovascular:      Rate and Rhythm: Normal rate and regular rhythm.      Pulses:           Carotid pulses are 2+ on the right side and 2+ on the left side.       Femoral pulses are 2+ on the right side and 2+ on the left side.       Popliteal pulses are 2+ on the right side and 2+ on the left side.        Dorsalis pedis pulses are 2+ on the right side and 2+ on the left side.        Posterior tibial pulses are 2+ on the right side and 2+ on the left side.      Heart sounds: Normal heart sounds.   Pulmonary:      Effort: Pulmonary effort is normal.      Breath sounds: Normal breath sounds.   Abdominal:      General: Bowel sounds are normal. There is no distension or abdominal bruit.      Palpations: Abdomen is soft. There is no mass.      Tenderness: There is no abdominal tenderness.   Musculoskeletal:         General: Normal range of motion.      Cervical back: Neck supple.   Lymphadenopathy:      Cervical: No cervical adenopathy.   Skin:     General: Skin is warm and dry.   Neurological:      Mental Status: She is alert and oriented to person, place, and time.      Cranial Nerves: No cranial nerve deficit.      Sensory: No sensory deficit.     Diagnostic data:  Narrative & Impression   CT ANGIOGRAM ABDOMEN- 4/25/2023 8:13 AM CDT     HISTORY: weight loss and ?? celiac stenosis on CT; R63.4-Abnormal weight  loss      COMPARISON: 04/07/2023     DOSE LENGTH PRODUCT: 223 mGy cm. Automated exposure control was also  utilized to decrease patient radiation dose.     TECHNIQUE: Axial images of the abdomen performed following IV contrast.  2-D, 3-D and maximal intensity projection reconstructed images are  reviewed     FINDINGS:  There are moderate diffuse  atherosclerotic changes. There are  high-grade stenosis of the proximal celiac trunk narrowing of  approximately 70% with mild poststenotic distention. Appropriate  opacification of the common hepatic, left gastric, and splenic arteries.  Catheter atherosclerotic changes of the splenic artery which does remain  patent. Mild atherosclerotic changes of the superior mesenteric artery  with no focal high-grade stenosis. Calcification at the origin of the  left renal artery resulting in less than 50% stenosis. Accessory left  renal artery identified. No prominent right renal artery stenosis.  Appropriate opacification of the proximal inferior mesenteric artery. No  aneurysm or dissection.     No suspicious focal liver splenic mass. No pancreatic cyst or mass.  Gallbladder unremarkable. No adrenal nodule. No dilated loops of bowel  seen within the upper abdomen. No free air or abscess.     Cardiomegaly. Mild basilar atelectasis.     Postoperative changes lower lumbar spine. No aggressive regional bony  lesions.     IMPRESSION:  1. 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 Superior and inferior mesenteric arteries appear  patent. Less than 50% stenosis of the proximal left renal artery with an  accessory left renal artery. No significant right renal artery stenosis.  No aneurysm or dissection.  This report was finalized on 04/25/2023 09:25 by Dr. Vanessa Jones MD.         Patient Active Problem List   Diagnosis    Garza's esophagus without dysplasia    Gastroesophageal reflux disease without esophagitis    Adenomatous colon polyp    Pneumonia    Empyema of pleura    Hyperglycemia    Esophageal dysphagia    Weight loss    Celiac artery stenosis    Essential hypertension    Mixed hyperlipidemia    Bilateral carotid artery stenosis    PAD (peripheral artery disease)        Diagnosis Plan   1. Celiac artery stenosis        2. Esophageal dysphagia        3. PAD (peripheral artery  disease)        4. Bilateral carotid artery stenosis        5. Mixed hyperlipidemia        6. Essential hypertension            Plan: After thoroughly evaluating Carlee Vee, I believe the best course of action is to remain conservative from a vascular surgery standpoint.  We did have a lengthy discussion regarding her celiac stenosis in regards to either median arcuate ligament syndrome versus atherosclerotic stenosis.  She does not appear to be displaying any signs of mesenteric ischemia and is starting to improve with gaining weight back.  She does have stenosis of the celiac artery which looks like a combination of both soft plaque and external compression.  I would like to hold off at this time with any sort of intervention.  I would like for her to start taking her 81 mg baby aspirin daily again.  With regards to her lower extremities, I reassured her that her symptoms were related to neuropathy from her degenerative disc disease and diabetes.  Her feet are nice and warm with palpable pedal pulses.  She is also asymptomatic from a strokelike standpoint and her carotid duplex is less than 50% bilaterally.  I will see her back in 1 years time with an CHRISTIE and carotid duplex for continued surveillance.  If her symptomatology worsens at any time then we can always proceed with celiac artery stenting. I did discuss vascular risk factors as they pertain to the progression of vascular disease including controlling hypertension and hyperlipidemia.  These risk factors are currently controlled and stable. The patient is to continue taking their medications as previously discussed.   This was all discussed in full with complete understanding.  Thank you for allowing me to participate in the care of your patient.  Please do not hesitate to call with any questions or concerns.  We will keep you aware of any further encounters with Carlee Vee.        Sincerely yours,         Geoffrey Gold, DO [place], Elinor OCHOA, APRN

## 2023-06-01 ENCOUNTER — OFFICE VISIT (OUTPATIENT)
Dept: VASCULAR SURGERY | Facility: CLINIC | Age: 63
End: 2023-06-01

## 2023-06-01 VITALS
HEIGHT: 62 IN | BODY MASS INDEX: 27.6 KG/M2 | HEART RATE: 66 BPM | WEIGHT: 150 LBS | SYSTOLIC BLOOD PRESSURE: 150 MMHG | DIASTOLIC BLOOD PRESSURE: 80 MMHG | OXYGEN SATURATION: 97 %

## 2023-06-01 DIAGNOSIS — I73.9 PAD (PERIPHERAL ARTERY DISEASE): ICD-10-CM

## 2023-06-01 DIAGNOSIS — I77.1 CELIAC ARTERY STENOSIS: Primary | ICD-10-CM

## 2023-06-01 DIAGNOSIS — I10 ESSENTIAL HYPERTENSION: ICD-10-CM

## 2023-06-01 DIAGNOSIS — E78.2 MIXED HYPERLIPIDEMIA: ICD-10-CM

## 2023-06-01 DIAGNOSIS — I65.23 BILATERAL CAROTID ARTERY STENOSIS: ICD-10-CM

## 2023-06-01 DIAGNOSIS — R13.19 ESOPHAGEAL DYSPHAGIA: ICD-10-CM

## 2023-06-01 PROBLEM — I77.4 CELIAC ARTERY STENOSIS: Status: ACTIVE | Noted: 2023-06-01

## 2023-06-01 RX ORDER — ASPIRIN 81 MG/1
81 TABLET ORAL DAILY
Start: 2023-06-01

## 2023-06-01 NOTE — LETTER
June 1, 2023     Jenniffer Smith MD  [address]  [facility] [address]  [place] [address]    Patient: Carlee Vee   YOB: 1960   Date of Visit: 6/1/2023       Dear Dr. Luis MD:    Thank you for referring Carlee Vee to me for evaluation. Below are the relevant portions of my assessment and plan of care.    If you have questions, please do not hesitate to call me. I look forward to following Carlee along with you.         Sincerely,        Geoffrey Gold,         CC: CLEMENTE Gonzalez Griffin K, DO  06/01/23 1334  Sign when Signing Visit  06/01/2023      Jenniffer Smith MD  [address]  [facility] [address]  [place] [address]    Carlee Vee  1960    Chief Complaint   Patient presents with   • New Patient      Ref from Dr. Smith for Abnormal ct of abdomen. PT had a  CT abd/pelvis and Ct Angio Abd done on  4/25/23. Pt states they are having a lot of pain the lower belly which is why they tested the abd. Pt states that both of their goes numb especially when laying in bed.       Dear Jenniffer Smith MD    HPI  I had the pleasure of seeing your patient Carlee Vee in the office today.  Thank you kindly for this consultation.  As you recall, Carlee Vee is a 62 y.o.  female who you are currently following for difficulty swallowing and weight loss.  She reports previous weight loss of about 30 to 40 pounds unintentionally.  She did undergo an endoscopy recently.  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  Upon further discussion, she reports abdominal pain off-and-on which is not associated with eating.  She is recently started to regain weight about 15 or 20 pounds.  With regards to her lower extremities.  She does have neuropathy and has already tried Neurontin.  She also complains of whooshing sounds in her ears, her carotid duplex was within normal limits.        Past Medical History:   Diagnosis Date   • Garza's esophagus    • Depression    • Diverticulosis    • GERD  (gastroesophageal reflux disease)    • History of adenomatous polyp of colon    • History of colon polyps    • Hyperlipidemia    • Hypertension    • Kidney stone    • PONV (postoperative nausea and vomiting)    • Type 2 diabetes mellitus        Past Surgical History:   Procedure Laterality Date   • BACK SURGERY     • COLONOSCOPY  10/18/2006    Thickened fold in the ascending colon-path shows hyperplastic polyp; Repeat 7 years   • COLONOSCOPY N/A 07/24/2017    One 4mm tubular adenomatous polyp in the transverse colon; One 5mm hyperplastic polyp in the sigmoid colon; The examination was otherwise normal on direct and retroflexion views; Repeat 5 years   • COLONOSCOPY N/A 03/23/2023    Diverticulosis in the entire examined colon; One 5mm tubular adenomatous polyp in the ascending colon-Clip (MR conditional) was placed; One 12mm tubular adenomatous polyp in the transverse colon; Repeat 3 years   • DILATATION AND CURETTAGE     • ENDOSCOPY N/A 07/24/2017    Esophageal mucosal changes secondary to established short-segment Garza's disease-biopsied; Small HH; Normal stomach; Normal examined duodenum; Repeat 3 years   • ENDOSCOPY  12/08/2011    Esophagitis-biopsied; Garza's-biopsied; HH; Repeat 3 years   • ENDOSCOPY  10/27/2008    Garza's esophagus-biopsied; HH; Repeat 2 years   • ENDOSCOPY  10/23/2006    HH; Probable Garza's esophagus-biopsied; Repeat 2 years   • ENDOSCOPY N/A 08/21/2020    Small HH; Esophageal mucosal changes secondary to established long-segment Garza's disease-biopsied; Normal stomach; Normal examined duodenum; Repeat 3 years   • ENDOSCOPY N/A 03/23/2023    Esophageal mucosal changes secondary to established long-segment Garza's disease-biopsied; Medium-sized HH; Normal stomach; Normal examined duodenum-biopsied; Repeat 3 years   • HYSTERECTOMY     • THORACOTOMY Left 03/27/2018    Procedure: THORACOTOMY, drainage of empyema and decortication;  Surgeon: Abel Stark MD;  Location: [facility]  OR;  Service: Cardiothoracic   • TUBAL ABDOMINAL LIGATION         Family History   Problem Relation Age of Onset   • Colon cancer Neg Hx    • Colon polyps Neg Hx    • Esophageal cancer Neg Hx    • Liver cancer Neg Hx    • Liver disease Neg Hx    • Rectal cancer Neg Hx    • Stomach cancer Neg Hx        Social History     Socioeconomic History   • Marital status:    Tobacco Use   • Smoking status: Former     Types: Cigarettes   • Smokeless tobacco: Never   Vaping Use   • Vaping Use: Never used   Substance and Sexual Activity   • Alcohol use: Not Currently   • Drug use: No   • Sexual activity: Not Currently     Partners: Female     Birth control/protection: Tubal ligation, Hysterectomy       Allergies   Allergen Reactions   • Sulfa Antibiotics Hives         Current Outpatient Medications:   •  amLODIPine (NORVASC) 10 MG tablet, Take 1 tablet by mouth Daily., Disp: , Rfl:   •  aspirin 81 MG EC tablet, Take 1 tablet by mouth Daily., Disp: , Rfl:   •  famotidine (PEPCID) 20 MG tablet, Take 1 tablet by mouth Every Night., Disp: , Rfl:   •  FLUoxetine (PROzac) 20 MG capsule, Take 1 capsule by mouth Daily., Disp: , Rfl:   •  insulin aspart (novoLOG) 100 UNIT/ML injection, Inject  under the skin 3 (Three) Times a Day Before Meals. 150 - 199 = 4 units 200 - 249 = 8 units 250 - 299 = 12 units 300 - 349 = 16 units 349 - 400 = 20 units > 400 = 24 units, Disp: , Rfl:   •  insulin detemir (LEVEMIR) 100 UNIT/ML injection, Inject 17 Units under the skin Every 12 (Twelve) Hours., Disp: 1 each, Rfl: 1  •  pantoprazole (PROTONIX) 40 MG EC tablet, TAKE 1 TABLET BY MOUTH TWICE DAILY (Patient taking differently: Take 1 tablet by mouth 2 (Two) Times a Day.), Disp: 180 tablet, Rfl: 0  •  rosuvastatin (CRESTOR) 10 MG tablet, Take 1 tablet by mouth Daily., Disp: , Rfl:   •  vitamin D (ERGOCALCIFEROL) 75487 units capsule capsule, Take 1 capsule by mouth 1 (One) Time Per Week. Fridays, Disp: , Rfl:     Review of Systems   Constitutional:  "Negative.    HENT: Negative.     Eyes: Negative.    Respiratory: Negative.     Cardiovascular: Negative.    Gastrointestinal:  Positive for abdominal pain.   Endocrine: Negative.    Genitourinary: Negative.    Musculoskeletal: Negative.    Skin: Negative.    Allergic/Immunologic: Negative.    Neurological: Negative.    Hematological: Negative.    Psychiatric/Behavioral: Negative.     All other systems reviewed and are negative.  /80   Pulse 66   Ht 157.5 cm (62\")   Wt 68 kg (150 lb)   SpO2 97%   BMI 27.44 kg/m²     Physical Exam  Vitals and nursing note reviewed.   Constitutional:       Appearance: She is well-developed.   HENT:      Head: Normocephalic and atraumatic.   Eyes:      General: No scleral icterus.     Pupils: Pupils are equal, round, and reactive to light.   Neck:      Thyroid: No thyromegaly.      Vascular: No carotid bruit or JVD.   Cardiovascular:      Rate and Rhythm: Normal rate and regular rhythm.      Pulses:           Carotid pulses are 2+ on the right side and 2+ on the left side.       Femoral pulses are 2+ on the right side and 2+ on the left side.       Popliteal pulses are 2+ on the right side and 2+ on the left side.        Dorsalis pedis pulses are 2+ on the right side and 2+ on the left side.        Posterior tibial pulses are 2+ on the right side and 2+ on the left side.      Heart sounds: Normal heart sounds.   Pulmonary:      Effort: Pulmonary effort is normal.      Breath sounds: Normal breath sounds.   Abdominal:      General: Bowel sounds are normal. There is no distension or abdominal bruit.      Palpations: Abdomen is soft. There is no mass.      Tenderness: There is no abdominal tenderness.   Musculoskeletal:         General: Normal range of motion.      Cervical back: Neck supple.   Lymphadenopathy:      Cervical: No cervical adenopathy.   Skin:     General: Skin is warm and dry.   Neurological:      Mental Status: She is alert and oriented to person, place, and " time.      Cranial Nerves: No cranial nerve deficit.      Sensory: No sensory deficit.     Diagnostic data:  Narrative & Impression   CT ANGIOGRAM ABDOMEN- 4/25/2023 8:13 AM CDT     HISTORY: weight loss and ?? celiac stenosis on CT; R63.4-Abnormal weight  loss      COMPARISON: 04/07/2023     DOSE LENGTH PRODUCT: 223 mGy cm. Automated exposure control was also  utilized to decrease patient radiation dose.     TECHNIQUE: Axial images of the abdomen performed following IV contrast.  2-D, 3-D and maximal intensity projection reconstructed images are  reviewed     FINDINGS:  There are moderate diffuse atherosclerotic changes. There are  high-grade stenosis of the proximal celiac trunk narrowing of  approximately 70% with mild poststenotic distention. Appropriate  opacification of the common hepatic, left gastric, and splenic arteries.  Catheter atherosclerotic changes of the splenic artery which does remain  patent. Mild atherosclerotic changes of the superior mesenteric artery  with no focal high-grade stenosis. Calcification at the origin of the  left renal artery resulting in less than 50% stenosis. Accessory left  renal artery identified. No prominent right renal artery stenosis.  Appropriate opacification of the proximal inferior mesenteric artery. No  aneurysm or dissection.     No suspicious focal liver splenic mass. No pancreatic cyst or mass.  Gallbladder unremarkable. No adrenal nodule. No dilated loops of bowel  seen within the upper abdomen. No free air or abscess.     Cardiomegaly. Mild basilar atelectasis.     Postoperative changes lower lumbar spine. No aggressive regional bony  lesions.     IMPRESSION:  1. 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 Superior and inferior mesenteric arteries appear  patent. Less than 50% stenosis of the proximal left renal artery with an  accessory left renal artery. No significant right renal artery stenosis.  No aneurysm or  dissection.  This report was finalized on 04/25/2023 09:25 by Dr. Vanessa Jones MD.         Patient Active Problem List   Diagnosis   • Garza's esophagus without dysplasia   • Gastroesophageal reflux disease without esophagitis   • Adenomatous colon polyp   • Pneumonia   • Empyema of pleura   • Hyperglycemia   • Esophageal dysphagia   • Weight loss   • Celiac artery stenosis   • Essential hypertension   • Mixed hyperlipidemia   • Bilateral carotid artery stenosis   • PAD (peripheral artery disease)        Diagnosis Plan   1. Celiac artery stenosis        2. Esophageal dysphagia        3. PAD (peripheral artery disease)        4. Bilateral carotid artery stenosis        5. Mixed hyperlipidemia        6. Essential hypertension            Plan: After thoroughly evaluating Carlee Vee, I believe the best course of action is to remain conservative from a vascular surgery standpoint.  We did have a lengthy discussion regarding her celiac stenosis in regards to either median arcuate ligament syndrome versus atherosclerotic stenosis.  She does not appear to be displaying any signs of mesenteric ischemia and is starting to improve with gaining weight back.  She does have stenosis of the celiac artery which looks like a combination of both soft plaque and external compression.  I would like to hold off at this time with any sort of intervention.  I would like for her to start taking her 81 mg baby aspirin daily again.  With regards to her lower extremities, I reassured her that her symptoms were related to neuropathy from her degenerative disc disease and diabetes.  Her feet are nice and warm with palpable pedal pulses.  She is also asymptomatic from a strokelike standpoint and her carotid duplex is less than 50% bilaterally.  I will see her back in 1 years time with an CHRISTIE and carotid duplex for continued surveillance.  If her symptomatology worsens at any time then we can always proceed with celiac artery stenting. I  did discuss vascular risk factors as they pertain to the progression of vascular disease including controlling hypertension and hyperlipidemia.  These risk factors are currently controlled and stable. The patient is to continue taking their medications as previously discussed.   This was all discussed in full with complete understanding.  Thank you for allowing me to participate in the care of your patient.  Please do not hesitate to call with any questions or concerns.  We will keep you aware of any further encounters with Carlee Vee.        Sincerely yours,         Geoffrey Gold, DO [place], Elinor OCHOA, APRN

## 2023-06-21 ENCOUNTER — TELEPHONE (OUTPATIENT)
Dept: FAMILY MEDICINE CLINIC | Age: 63
End: 2023-06-21

## 2023-07-17 DIAGNOSIS — F41.9 ANXIETY DISORDER, UNSPECIFIED TYPE: ICD-10-CM

## 2023-07-17 NOTE — TELEPHONE ENCOUNTER
----- Message from Bing Squires sent at 7/17/2023  8:47 AM CDT -----  Subject: Refill Request    QUESTIONS  Name of Medication? FLUoxetine (PROZAC) 20 MG capsule  Patient-reported dosage and instructions? takes once per day 20mg  How many days do you have left? 0  Preferred Pharmacy? [address] [id number]  Pharmacy phone number (if available)? [phone]  Additional Information for Provider? patient has appointment on 8/4 but   will not have enough to get her through till that appointment would like   reough to get her to that appointment  ---------------------------------------------------------------------------  --------------  [address]  What is the best way for the office to contact you? OK to leave message on   voicemail  Preferred Call Back Phone Number? [phone]  ---------------------------------------------------------------------------  --------------  SCRIPT ANSWERS  Relationship to Patient?  Self

## 2023-07-17 NOTE — TELEPHONE ENCOUNTER
PCP is out of the office for 1 week. Jag Alexusshelby called to request a refill on her medication.       Last office visit : 3/15/2023   Next office visit : 8/4/2023     Requested Prescriptions     Pending Prescriptions Disp Refills    FLUoxetine (PROZAC) 20 MG capsule 30 capsule 2     Sig: Take 1 capsule by mouth daily NEVER ABRUPTLY STOP            Anabel Pino, ABAD

## 2023-07-18 RX ORDER — FLUOXETINE HYDROCHLORIDE 20 MG/1
20 CAPSULE ORAL DAILY
Qty: 30 CAPSULE | Refills: 0 | Status: SHIPPED | OUTPATIENT
Start: 2023-07-18

## 2023-08-04 ENCOUNTER — OFFICE VISIT (OUTPATIENT)
Dept: FAMILY MEDICINE CLINIC | Age: 63
End: 2023-08-04
Payer: MEDICARE

## 2023-08-04 VITALS
BODY MASS INDEX: 30.4 KG/M2 | OXYGEN SATURATION: 98 % | HEIGHT: 62 IN | TEMPERATURE: 97.9 F | HEART RATE: 108 BPM | SYSTOLIC BLOOD PRESSURE: 150 MMHG | WEIGHT: 165.2 LBS | DIASTOLIC BLOOD PRESSURE: 88 MMHG

## 2023-08-04 DIAGNOSIS — I10 ESSENTIAL HYPERTENSION: ICD-10-CM

## 2023-08-04 DIAGNOSIS — E11.65 UNCONTROLLED TYPE 2 DIABETES MELLITUS WITH HYPERGLYCEMIA, WITH LONG-TERM CURRENT USE OF INSULIN (HCC): ICD-10-CM

## 2023-08-04 DIAGNOSIS — I77.1 CELIAC ARTERY STENOSIS (HCC): ICD-10-CM

## 2023-08-04 DIAGNOSIS — F41.9 ANXIETY DISORDER, UNSPECIFIED TYPE: ICD-10-CM

## 2023-08-04 DIAGNOSIS — R20.9 COLD RIGHT FOOT: Primary | ICD-10-CM

## 2023-08-04 DIAGNOSIS — R09.89 RIGHT CAROTID BRUIT: ICD-10-CM

## 2023-08-04 DIAGNOSIS — G62.9 NEUROPATHY: ICD-10-CM

## 2023-08-04 DIAGNOSIS — Z79.4 UNCONTROLLED TYPE 2 DIABETES MELLITUS WITH HYPERGLYCEMIA, WITH LONG-TERM CURRENT USE OF INSULIN (HCC): ICD-10-CM

## 2023-08-04 PROCEDURE — 3017F COLORECTAL CA SCREEN DOC REV: CPT | Performed by: NURSE PRACTITIONER

## 2023-08-04 PROCEDURE — 3077F SYST BP >= 140 MM HG: CPT | Performed by: NURSE PRACTITIONER

## 2023-08-04 PROCEDURE — G8417 CALC BMI ABV UP PARAM F/U: HCPCS | Performed by: NURSE PRACTITIONER

## 2023-08-04 PROCEDURE — G8427 DOCREV CUR MEDS BY ELIG CLIN: HCPCS | Performed by: NURSE PRACTITIONER

## 2023-08-04 PROCEDURE — 99214 OFFICE O/P EST MOD 30 MIN: CPT | Performed by: NURSE PRACTITIONER

## 2023-08-04 PROCEDURE — 3046F HEMOGLOBIN A1C LEVEL >9.0%: CPT | Performed by: NURSE PRACTITIONER

## 2023-08-04 PROCEDURE — 3079F DIAST BP 80-89 MM HG: CPT | Performed by: NURSE PRACTITIONER

## 2023-08-04 PROCEDURE — 1036F TOBACCO NON-USER: CPT | Performed by: NURSE PRACTITIONER

## 2023-08-04 PROCEDURE — 2022F DILAT RTA XM EVC RTNOPTHY: CPT | Performed by: NURSE PRACTITIONER

## 2023-08-04 RX ORDER — LOSARTAN POTASSIUM 25 MG/1
25 TABLET ORAL DAILY
Qty: 30 TABLET | Refills: 1 | Status: SHIPPED | OUTPATIENT
Start: 2023-08-04

## 2023-08-04 RX ORDER — GABAPENTIN 300 MG/1
CAPSULE ORAL
Qty: 60 CAPSULE | Refills: 2 | Status: SHIPPED | OUTPATIENT
Start: 2023-08-04 | End: 2023-11-14

## 2023-08-04 RX ORDER — FLUOXETINE HYDROCHLORIDE 20 MG/1
20 CAPSULE ORAL DAILY
Qty: 90 CAPSULE | Refills: 1 | Status: SHIPPED | OUTPATIENT
Start: 2023-08-04

## 2023-08-04 RX ORDER — LOSARTAN POTASSIUM 25 MG/1
25 TABLET ORAL DAILY
Qty: 90 TABLET | OUTPATIENT
Start: 2023-08-04

## 2023-08-04 RX ORDER — ROPINIROLE 0.25 MG/1
TABLET, FILM COATED ORAL
Qty: 180 TABLET | Refills: 2 | Status: SHIPPED | OUTPATIENT
Start: 2023-08-04

## 2023-08-04 ASSESSMENT — ENCOUNTER SYMPTOMS
RESPIRATORY NEGATIVE: 1
ROS SKIN COMMENTS: COLD FEET

## 2023-08-04 NOTE — TELEPHONE ENCOUNTER
Noé Flores called to request a refill on her medication.       Last office visit : 8/4/2023   Next office visit : Visit date not found     Requested Prescriptions     Pending Prescriptions Disp Refills    losartan (COZAAR) 25 MG tablet [Pharmacy Med Name: LOSARTAN 25MG TABLETS] 90 tablet      Sig: TAKE 1 TABLET BY MOUTH DAILY FOR BLOOD PRESSURE            Saige Corrales

## 2023-08-04 NOTE — PROGRESS NOTES
Dexcom G7 orders sent to [facility] through [facility] portal.
Follow-up (Patient presents for follow up on her diabetes.) and Medication Refill  Dexcom is needed---please check status, she is insulin requiring. Recheck BP  Plan as above  Further orders once lab reviewed. Diagnosis and orders for this visit are above. Please note that this chart was generated using dragon dictationsoftware. Although every effort was made to ensure the accuracy of this automated transcription, some errors in transcription may have occurred.

## 2023-08-11 DIAGNOSIS — Z79.4 UNCONTROLLED TYPE 2 DIABETES MELLITUS WITH HYPERGLYCEMIA, WITH LONG-TERM CURRENT USE OF INSULIN (HCC): ICD-10-CM

## 2023-08-11 DIAGNOSIS — Z00.00 MEDICARE ANNUAL WELLNESS VISIT, SUBSEQUENT: ICD-10-CM

## 2023-08-11 DIAGNOSIS — F41.9 ANXIETY DISORDER, UNSPECIFIED TYPE: ICD-10-CM

## 2023-08-11 DIAGNOSIS — I10 ESSENTIAL HYPERTENSION: ICD-10-CM

## 2023-08-11 DIAGNOSIS — E11.65 UNCONTROLLED TYPE 2 DIABETES MELLITUS WITH HYPERGLYCEMIA, WITH LONG-TERM CURRENT USE OF INSULIN (HCC): ICD-10-CM

## 2023-08-11 DIAGNOSIS — R00.0 TACHYCARDIA: ICD-10-CM

## 2023-08-11 LAB
ALBUMIN SERPL-MCNC: 4.5 G/DL (ref 3.5–5.2)
ALP SERPL-CCNC: 95 U/L (ref 35–104)
ALT SERPL-CCNC: 15 U/L (ref 5–33)
ANION GAP SERPL CALCULATED.3IONS-SCNC: 13 MMOL/L (ref 7–19)
AST SERPL-CCNC: 14 U/L (ref 5–32)
BASOPHILS # BLD: 0.1 K/UL (ref 0–0.2)
BASOPHILS NFR BLD: 0.8 % (ref 0–1)
BILIRUB SERPL-MCNC: 0.4 MG/DL (ref 0.2–1.2)
BILIRUB UR QL STRIP: NEGATIVE
BUN SERPL-MCNC: 15 MG/DL (ref 8–23)
CALCIUM SERPL-MCNC: 9.3 MG/DL (ref 8.8–10.2)
CHLORIDE SERPL-SCNC: 99 MMOL/L (ref 98–111)
CHOLEST SERPL-MCNC: 326 MG/DL (ref 160–199)
CLARITY UR: CLEAR
CO2 SERPL-SCNC: 24 MMOL/L (ref 22–29)
COLOR UR: YELLOW
CREAT SERPL-MCNC: 0.7 MG/DL (ref 0.5–0.9)
CREAT UR-MCNC: 155.4 MG/DL (ref 28–217)
EOSINOPHIL # BLD: 0.2 K/UL (ref 0–0.6)
EOSINOPHIL NFR BLD: 2.6 % (ref 0–5)
ERYTHROCYTE [DISTWIDTH] IN BLOOD BY AUTOMATED COUNT: 12.4 % (ref 11.5–14.5)
GLUCOSE SERPL-MCNC: 275 MG/DL (ref 74–109)
GLUCOSE UR STRIP.AUTO-MCNC: =>1000 MG/DL
HBA1C MFR BLD: 10.6 % (ref 4–6)
HCT VFR BLD AUTO: 41.6 % (ref 37–47)
HDLC SERPL-MCNC: 48 MG/DL (ref 65–121)
HGB BLD-MCNC: 14.1 G/DL (ref 12–16)
HGB UR STRIP.AUTO-MCNC: NEGATIVE MG/L
IMM GRANULOCYTES # BLD: 0 K/UL
KETONES UR STRIP.AUTO-MCNC: ABNORMAL MG/DL
LDLC SERPL CALC-MCNC: 218 MG/DL
LEUKOCYTE ESTERASE UR QL STRIP.AUTO: NEGATIVE
LYMPHOCYTES # BLD: 2.7 K/UL (ref 1.1–4.5)
LYMPHOCYTES NFR BLD: 37.2 % (ref 20–40)
MCH RBC QN AUTO: 30.9 PG (ref 27–31)
MCHC RBC AUTO-ENTMCNC: 33.9 G/DL (ref 33–37)
MCV RBC AUTO: 91 FL (ref 81–99)
MICROALBUMIN UR-MCNC: 5.9 MG/DL (ref 0–19)
MICROALBUMIN/CREAT UR-RTO: 38 MG/G
MONOCYTES # BLD: 0.3 K/UL (ref 0–0.9)
MONOCYTES NFR BLD: 4.4 % (ref 0–10)
NEUTROPHILS # BLD: 4 K/UL (ref 1.5–7.5)
NEUTS SEG NFR BLD: 54.7 % (ref 50–65)
NITRITE UR QL STRIP.AUTO: NEGATIVE
PH UR STRIP.AUTO: 5.5 [PH] (ref 5–8)
PLATELET # BLD AUTO: 186 K/UL (ref 130–400)
PMV BLD AUTO: 12.3 FL (ref 9.4–12.3)
POTASSIUM SERPL-SCNC: 4.1 MMOL/L (ref 3.5–5)
PROT SERPL-MCNC: 7.3 G/DL (ref 6.6–8.7)
PROT UR STRIP.AUTO-MCNC: ABNORMAL MG/DL
RBC # BLD AUTO: 4.57 M/UL (ref 4.2–5.4)
SODIUM SERPL-SCNC: 136 MMOL/L (ref 136–145)
SP GR UR STRIP.AUTO: 1.04 (ref 1–1.03)
TRIGL SERPL-MCNC: 300 MG/DL (ref 0–149)
TSH SERPL DL<=0.005 MIU/L-ACNC: 1.13 UIU/ML (ref 0.35–5.5)
UROBILINOGEN UR STRIP.AUTO-MCNC: 0.2 E.U./DL
WBC # BLD AUTO: 7.3 K/UL (ref 4.8–10.8)

## 2023-08-18 RX ORDER — INSULIN GLARGINE 100 [IU]/ML
20 INJECTION, SOLUTION SUBCUTANEOUS NIGHTLY
Qty: 5 ADJUSTABLE DOSE PRE-FILLED PEN SYRINGE | Refills: 0 | Status: SHIPPED | OUTPATIENT
Start: 2023-08-18

## 2023-08-18 RX ORDER — INSULIN ASPART 100 [IU]/ML
INJECTION, SOLUTION INTRAVENOUS; SUBCUTANEOUS
Qty: 5 ADJUSTABLE DOSE PRE-FILLED PEN SYRINGE | Refills: 3 | Status: SHIPPED | OUTPATIENT
Start: 2023-08-18

## 2023-09-15 ENCOUNTER — TELEPHONE (OUTPATIENT)
Dept: FAMILY MEDICINE CLINIC | Age: 63
End: 2023-09-15

## 2023-09-15 NOTE — TELEPHONE ENCOUNTER
Patient called and is requesting a dose increase on her Gabapentin. She also states that she is leaving for [place] in 1 week. Does she need to come in for this? ?

## 2023-09-26 NOTE — TELEPHONE ENCOUNTER
Patient aware of providers response. Patient will call and schedule an appointment when she gets back from her trip.

## 2023-10-16 DIAGNOSIS — I10 ESSENTIAL HYPERTENSION: ICD-10-CM

## 2023-10-17 NOTE — TELEPHONE ENCOUNTER
Peng Atilio called to request a refill on her medication.       Last office visit : 8/4/2023   Next office visit : Visit date not found     Requested Prescriptions     Pending Prescriptions Disp Refills    losartan (COZAAR) 25 MG tablet [Pharmacy Med Name: LOSARTAN 25MG TABLETS] 30 tablet 1     Sig: TAKE 1 TABLET BY MOUTH DAILY FOR BLOOD PRESSURE            Chip Killian

## 2023-10-18 DIAGNOSIS — I10 ESSENTIAL HYPERTENSION: ICD-10-CM

## 2023-10-18 RX ORDER — LOSARTAN POTASSIUM 25 MG/1
25 TABLET ORAL DAILY
Qty: 30 TABLET | Refills: 1 | Status: SHIPPED | OUTPATIENT
Start: 2023-10-18 | End: 2023-10-21

## 2023-10-19 NOTE — TELEPHONE ENCOUNTER
[facility] called to request a refill on her medication.       Last office visit : 8/4/2023   Next office visit : Visit date not found     Requested Prescriptions     Pending Prescriptions Disp Refills    losartan (COZAAR) 25 MG tablet [Pharmacy Med Name: LOSARTAN 25MG TABLETS] 90 tablet      Sig: TAKE 1 TABLET BY MOUTH DAILY FOR BLOOD PRESSURE            Ranjeet Gonzales

## 2023-10-21 RX ORDER — LOSARTAN POTASSIUM 25 MG/1
25 TABLET ORAL DAILY
Qty: 90 TABLET | Refills: 0 | Status: SHIPPED | OUTPATIENT
Start: 2023-10-21

## 2023-10-27 ENCOUNTER — OFFICE VISIT (OUTPATIENT)
Dept: FAMILY MEDICINE CLINIC | Age: 63
End: 2023-10-27
Payer: MEDICARE

## 2023-10-27 VITALS
SYSTOLIC BLOOD PRESSURE: 138 MMHG | DIASTOLIC BLOOD PRESSURE: 72 MMHG | HEIGHT: 61 IN | WEIGHT: 171 LBS | RESPIRATION RATE: 20 BRPM | HEART RATE: 84 BPM | OXYGEN SATURATION: 98 % | TEMPERATURE: 97.8 F | BODY MASS INDEX: 32.28 KG/M2

## 2023-10-27 DIAGNOSIS — M79.604 RIGHT LEG PAIN: ICD-10-CM

## 2023-10-27 DIAGNOSIS — I10 ESSENTIAL HYPERTENSION: ICD-10-CM

## 2023-10-27 DIAGNOSIS — K21.9 GASTROESOPHAGEAL REFLUX DISEASE WITHOUT ESOPHAGITIS: ICD-10-CM

## 2023-10-27 DIAGNOSIS — G62.9 NEUROPATHY: ICD-10-CM

## 2023-10-27 DIAGNOSIS — I83.91 VARICOSE VEINS OF RIGHT LOWER EXTREMITY, UNSPECIFIED WHETHER COMPLICATED: Primary | ICD-10-CM

## 2023-10-27 DIAGNOSIS — Z79.4 UNCONTROLLED TYPE 2 DIABETES MELLITUS WITH HYPERGLYCEMIA, WITH LONG-TERM CURRENT USE OF INSULIN (HCC): ICD-10-CM

## 2023-10-27 DIAGNOSIS — R13.14 PHARYNGOESOPHAGEAL DYSPHAGIA: ICD-10-CM

## 2023-10-27 DIAGNOSIS — E11.65 UNCONTROLLED TYPE 2 DIABETES MELLITUS WITH HYPERGLYCEMIA, WITH LONG-TERM CURRENT USE OF INSULIN (HCC): ICD-10-CM

## 2023-10-27 PROCEDURE — 99214 OFFICE O/P EST MOD 30 MIN: CPT | Performed by: NURSE PRACTITIONER

## 2023-10-27 PROCEDURE — 3078F DIAST BP <80 MM HG: CPT | Performed by: NURSE PRACTITIONER

## 2023-10-27 PROCEDURE — 2022F DILAT RTA XM EVC RTNOPTHY: CPT | Performed by: NURSE PRACTITIONER

## 2023-10-27 PROCEDURE — 1036F TOBACCO NON-USER: CPT | Performed by: NURSE PRACTITIONER

## 2023-10-27 PROCEDURE — 3017F COLORECTAL CA SCREEN DOC REV: CPT | Performed by: NURSE PRACTITIONER

## 2023-10-27 PROCEDURE — 3074F SYST BP LT 130 MM HG: CPT | Performed by: NURSE PRACTITIONER

## 2023-10-27 PROCEDURE — G8417 CALC BMI ABV UP PARAM F/U: HCPCS | Performed by: NURSE PRACTITIONER

## 2023-10-27 PROCEDURE — G8484 FLU IMMUNIZE NO ADMIN: HCPCS | Performed by: NURSE PRACTITIONER

## 2023-10-27 PROCEDURE — G8427 DOCREV CUR MEDS BY ELIG CLIN: HCPCS | Performed by: NURSE PRACTITIONER

## 2023-10-27 PROCEDURE — 3046F HEMOGLOBIN A1C LEVEL >9.0%: CPT | Performed by: NURSE PRACTITIONER

## 2023-10-27 RX ORDER — LOSARTAN POTASSIUM 25 MG/1
25 TABLET ORAL DAILY
Qty: 90 TABLET | Refills: 1 | Status: SHIPPED | OUTPATIENT
Start: 2023-10-27

## 2023-10-27 RX ORDER — PANTOPRAZOLE SODIUM 40 MG/1
40 TABLET, DELAYED RELEASE ORAL
Qty: 180 TABLET | Refills: 0 | Status: SHIPPED | OUTPATIENT
Start: 2023-10-27

## 2023-10-27 RX ORDER — GABAPENTIN 300 MG/1
CAPSULE ORAL
Qty: 120 CAPSULE | Refills: 2 | Status: SHIPPED | OUTPATIENT
Start: 2023-10-27 | End: 2024-02-06

## 2023-10-27 ASSESSMENT — ENCOUNTER SYMPTOMS: RESPIRATORY NEGATIVE: 1

## 2023-10-27 NOTE — PROGRESS NOTES
SUBJECTIVE:    Patient ID: Tello Weeks is a58 y.o. female. Tello Weeks is here today for Follow-up (Patient presents for diabetes follow up and she is back to using her dog insulin because she can't afford her insulin.)  . HPI:   HPI     Increased stress with passing of father and the legal issues that came thereafter. Bp is elevated  Has been adhering to treatment plan with the exception DM treatment with insulin. States getting it from [address] [place] [address] without script. Home glucose average is 150. Has had \"couple\" hypoglycemic episodes and is able to reverse with food. No waking up with lows. Walking at fast speed or long walk increases right posterior calf pain. No swelling reported. No redness. Does report that she has seen vascular but I am not able to see notes in regard to eval of the right leg. Has been taking gabapentin and is asking if we can increase dose. I had been targeting neuropathy with this treatment. She does have chronic back pain --ddd, endplate spurring. Past Medical History:   Diagnosis Date    Back pain 6/5/2014    Carotid artery stenosis     Depression 6/5/2014    Diabetes ([address])     GERD (gastroesophageal reflux disease)     Hyperlipidemia     Hypertension 6/5/2014    Morbidly obese ([address]) 7/15/2020    Type II or unspecified type diabetes mellitus without mention of complication, not stated as uncontrolled     Uncontrolled type 2 diabetes mellitus with hyperglycemia, with long-term current use of insulin ([address]) 11/9/2022     Prior to Visit Medications    Medication Sig Taking?  Authorizing Provider   pantoprazole (PROTONIX) 40 MG tablet Take 1 tablet by mouth 2 times daily (before meals) Yes Yoly Pelletier APRN   gabapentin (NEURONTIN) 300 MG capsule 2 po bid for leg pain Yes Yoly Pelletier APRN   losartan (COZAAR) 25 MG tablet Take 1 tablet by mouth daily For blood pressure Yes Yoly Pelletier APRN   Insulin Pen Needle 32G X 4 MM MISC 1 each by Does not apply route

## 2023-11-11 DIAGNOSIS — E78.2 MIXED HYPERLIPIDEMIA: ICD-10-CM

## 2023-11-13 ENCOUNTER — HOSPITAL ENCOUNTER (OUTPATIENT)
Dept: NON INVASIVE DIAGNOSTICS | Age: 63
Discharge: HOME | End: 2023-11-13
Payer: MEDICARE

## 2023-11-13 DIAGNOSIS — M79.604 RIGHT LEG PAIN: ICD-10-CM

## 2023-11-13 DIAGNOSIS — I83.91 VARICOSE VEINS OF RIGHT LOWER EXTREMITY, UNSPECIFIED WHETHER COMPLICATED: ICD-10-CM

## 2023-11-13 PROCEDURE — 93971 EXTREMITY STUDY: CPT

## 2023-11-13 RX ORDER — ROSUVASTATIN CALCIUM 20 MG/1
20 TABLET, COATED ORAL NIGHTLY
Qty: 30 TABLET | Refills: 2 | Status: SHIPPED | OUTPATIENT
Start: 2023-11-13

## 2023-11-13 NOTE — TELEPHONE ENCOUNTER
Savannah Avalos called to request a refill on her medication.       Last office visit : 10/27/2023   Next office visit : Visit date not found     Requested Prescriptions     Pending Prescriptions Disp Refills    rosuvastatin (CRESTOR) 20 MG tablet [Pharmacy Med Name: ROSUVASTATIN 20MG TABLETS] 30 tablet 2     Sig: TAKE 1 TABLET BY MOUTH AT BEDTIME            Estefani Wade MA

## 2024-01-03 RX ORDER — INSULIN GLARGINE 100 [IU]/ML
INJECTION, SOLUTION SUBCUTANEOUS
COMMUNITY
Start: 2020-09-21

## 2024-01-08 ENCOUNTER — TELEPHONE (OUTPATIENT)
Dept: VASCULAR SURGERY | Facility: CLINIC | Age: 64
End: 2024-01-08

## 2024-01-08 NOTE — TELEPHONE ENCOUNTER
Caller: Carlee Vee    Relationship to patient: Self    Best call back number: [phone]    Chief complaint: PT CALLING IN REQUESTING TO HAVE FOLLOW UP WITH DR PITT FOR LEG PAIN    PER PT SHE AHD SCAN 6 WEEKS AGO [facility] [facility] - NO RECORD IN PT CHART PLEASE CONTACT PT REGARDING FOLLOW UP TIMEFRAME    Type of visit: FOLLOW UP    Requested date: NA    If rescheduling, when is the original appointment: NA

## 2024-02-10 DIAGNOSIS — E78.2 MIXED HYPERLIPIDEMIA: ICD-10-CM

## 2024-02-10 DIAGNOSIS — F41.9 ANXIETY DISORDER, UNSPECIFIED TYPE: ICD-10-CM

## 2024-02-12 NOTE — TELEPHONE ENCOUNTER
Taylor Jacobs called to request a refill on her medication.      Last office visit : 10/27/2023   Next office visit : Visit date not found     Requested Prescriptions     Pending Prescriptions Disp Refills    FLUoxetine (PROZAC) 20 MG capsule [Pharmacy Med Name: FLUOXETINE 20MG CAPSULES] 90 capsule 1     Sig: TAKE 1 CAPSULE EVERY DAY, NEVER ABRUPTLY STOP    rosuvastatin (CRESTOR) 20 MG tablet [Pharmacy Med Name: ROSUVASTATIN 20MG TABLETS] 30 tablet 2     Sig: TAKE 1 TABLET BY MOUTH AT BEDTIME            Wilma Hanna MA

## 2024-02-13 RX ORDER — ROSUVASTATIN CALCIUM 20 MG/1
20 TABLET, COATED ORAL NIGHTLY
Qty: 30 TABLET | Refills: 2 | Status: SHIPPED | OUTPATIENT
Start: 2024-02-13

## 2024-02-13 RX ORDER — FLUOXETINE HYDROCHLORIDE 20 MG/1
CAPSULE ORAL
Qty: 90 CAPSULE | Refills: 1 | Status: SHIPPED | OUTPATIENT
Start: 2024-02-13

## 2024-05-02 NOTE — TELEPHONE ENCOUNTER
PHARMACY called to request a refill on her medication.      Last office visit : 10/27/2023   Next office visit : 5/1/2024     Requested Prescriptions     Pending Prescriptions Disp Refills    rOPINIRole (REQUIP) 0.25 MG tablet [Pharmacy Med Name: ROPINIROLE 0.25MG TABLETS] 180 tablet 2     Sig: TAKE 1 TABLET AT BEDTIME FOR 7 DAYS, THEN 2 NIGHTLY IF NEEDED            Mikayla Valera MA, CCMA

## 2024-05-02 NOTE — TELEPHONE ENCOUNTER
PHARMACY called to request a refill on her medication.      Last office visit : 10/27/2023   Next office visit : Visit date not found     Requested Prescriptions     Pending Prescriptions Disp Refills    rOPINIRole (REQUIP) 0.25 MG tablet [Pharmacy Med Name: ROPINIROLE 0.25MG TABLETS] 180 tablet 2     Sig: TAKE 1 TABLET AT BEDTIME FOR 7 DAYS, THEN 2 NIGHTLY IF NEEDED            Mikayla Valera MA, CCMA

## 2024-05-06 RX ORDER — ROPINIROLE 0.25 MG/1
TABLET, FILM COATED ORAL
Qty: 180 TABLET | Refills: 2 | Status: SHIPPED | OUTPATIENT
Start: 2024-05-06

## 2024-05-06 RX ORDER — ROPINIROLE 0.25 MG/1
TABLET, FILM COATED ORAL
Qty: 180 TABLET | Refills: 2 | OUTPATIENT
Start: 2024-05-06

## 2024-05-15 DIAGNOSIS — E78.2 MIXED HYPERLIPIDEMIA: ICD-10-CM

## 2024-05-15 RX ORDER — ROSUVASTATIN CALCIUM 20 MG/1
20 TABLET, COATED ORAL NIGHTLY
Qty: 30 TABLET | Refills: 2 | Status: SHIPPED | OUTPATIENT
Start: 2024-05-15

## 2024-05-15 RX ORDER — ROSUVASTATIN CALCIUM 20 MG/1
20 TABLET, COATED ORAL NIGHTLY
Qty: 30 TABLET | Refills: 2 | OUTPATIENT
Start: 2024-05-15

## 2024-05-15 NOTE — TELEPHONE ENCOUNTER
PHARMACY called to request a refill on her medication.      Last office visit : 10/27/2023   Next office visit : Visit date not found     Requested Prescriptions     Pending Prescriptions Disp Refills    rosuvastatin (CRESTOR) 20 MG tablet [Pharmacy Med Name: ROSUVASTATIN 20MG TABLETS] 30 tablet 2     Sig: TAKE 1 TABLET BY MOUTH AT BEDTIME            Mikayla Valera MA, [facility]

## 2024-05-15 NOTE — TELEPHONE ENCOUNTER
PHARMACY called to request a refill on her medication.      Last office visit : 10/27/2023   Next office visit : Visit date not found     Requested Prescriptions     Pending Prescriptions Disp Refills    rosuvastatin (CRESTOR) 20 MG tablet [Pharmacy Med Name: ROSUVASTATIN 20MG TABLETS] 30 tablet 2     Sig: TAKE 1 TABLET BY MOUTH AT BEDTIME            Mikayla Valera MA, [facility]

## 2024-05-21 ENCOUNTER — TELEPHONE (OUTPATIENT)
Dept: VASCULAR SURGERY | Facility: CLINIC | Age: 64
End: 2024-05-21
Payer: MEDICARE

## 2024-05-21 NOTE — TELEPHONE ENCOUNTER
HUB TO RELAY   Called patient to confirm appointment date and time. Patient is scheduled for 05/22/24 at 9:15 am. Patient also has testing scheduled at [facility] on 05/22/24 at 7:30 am. Patient did not answer left VM.

## 2024-05-22 ENCOUNTER — OFFICE VISIT (OUTPATIENT)
Dept: VASCULAR SURGERY | Facility: CLINIC | Age: 64
End: 2024-05-22
Payer: MEDICARE

## 2024-05-22 ENCOUNTER — HOSPITAL ENCOUNTER (OUTPATIENT)
Dept: ULTRASOUND IMAGING | Facility: HOSPITAL | Age: 64
Discharge: HOME | End: 2024-05-22
Payer: MEDICARE

## 2024-05-22 VITALS
SYSTOLIC BLOOD PRESSURE: 142 MMHG | HEIGHT: 62 IN | HEART RATE: 99 BPM | BODY MASS INDEX: 32.28 KG/M2 | OXYGEN SATURATION: 96 % | DIASTOLIC BLOOD PRESSURE: 78 MMHG | WEIGHT: 175.4 LBS

## 2024-05-22 DIAGNOSIS — I65.23 BILATERAL CAROTID ARTERY STENOSIS: ICD-10-CM

## 2024-05-22 DIAGNOSIS — Z51.81 ENCOUNTER FOR MONITORING ANTIPLATELET THERAPY: ICD-10-CM

## 2024-05-22 DIAGNOSIS — I10 ESSENTIAL HYPERTENSION: ICD-10-CM

## 2024-05-22 DIAGNOSIS — E78.2 MIXED HYPERLIPIDEMIA: ICD-10-CM

## 2024-05-22 DIAGNOSIS — Z79.02 ENCOUNTER FOR MONITORING ANTIPLATELET THERAPY: ICD-10-CM

## 2024-05-22 DIAGNOSIS — I73.9 PAD (PERIPHERAL ARTERY DISEASE): ICD-10-CM

## 2024-05-22 DIAGNOSIS — I73.9 PAD (PERIPHERAL ARTERY DISEASE): Primary | ICD-10-CM

## 2024-05-22 DIAGNOSIS — Z01.818 PREOP TESTING: ICD-10-CM

## 2024-05-22 DIAGNOSIS — I77.1 CELIAC ARTERY STENOSIS: ICD-10-CM

## 2024-05-22 PROCEDURE — 93880 EXTRACRANIAL BILAT STUDY: CPT

## 2024-05-22 PROCEDURE — 93923 UPR/LXTR ART STDY 3+ LVLS: CPT

## 2024-05-22 NOTE — LETTER
May 22, 2024     CLEMENTE Gonzalez  [address] [facility] [place] [address]    Patient: Carlee Vee   YOB: 1960   Date of Visit: 5/22/2024     Dear CLEMENTE Gonzalez:       Thank you for referring Carlee Vee to me for evaluation. Below are the relevant portions of my assessment and plan of care.    If you have questions, please do not hesitate to call me. I look forward to following Carlee along with you.         Sincerely,        CLEMENTE Argueta        CC: No Recipients    Nivia Dickreson APRN  05/22/24 0959  Sign when Signing Visit  5/22/2024       Elinor Richter APRN  [address] [facility] [place] [address]      Carlee Vee  1960    Chief Complaint   Patient presents with   • Celiac artery stenosis     HAS HAD TESTING DONE THIS AM,having  ear problems bilateral and right leg has gotten no better.Right leg painful to touch and hurts when walking       Dear Elinor Richter APRN     HPI  I had the pleasure of seeing your patient Carlee Vee in the office today.  Thank you kindly for this consultation.  As you recall, Carlee Vee is a 63 y.o.  female who you are currently following for difficulty swallowing and weight loss.  She reports previous weight loss of about 30 to 40 pounds unintentionally.  She did undergo an endoscopy recently.  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  She denies any symptoms of mesenteric ischemia.  She is having ear problems bilaterally.  She does continue to have neuropathy to her legs and right leg is painful to touch.  She did have noninvasive testing performed today, which I did review in office.      Review of Systems   Constitutional: Negative.    HENT:  Positive for ear pain.    Eyes: Negative.    Respiratory: Negative.     Cardiovascular: Negative.    Gastrointestinal: Negative.    Endocrine: Negative.    Genitourinary: Negative.    Musculoskeletal: Negative.    Skin: Negative.    Allergic/Immunologic: Negative.   "  Neurological: Negative.         Leg sensitive to touch   Hematological: Negative.    Psychiatric/Behavioral: Negative.          /78   Pulse 99   Ht 157.5 cm (62\")   Wt 79.6 kg (175 lb 6.4 oz)   SpO2 96%   BMI 32.08 kg/m²   Physical Exam  Vitals and nursing note reviewed.   Constitutional:       General: She is not in acute distress.     Appearance: Normal appearance. She is well-developed. She is obese. She is not diaphoretic.   HENT:      Head: Normocephalic and atraumatic.   Eyes:      General: No scleral icterus.     Pupils: Pupils are equal, round, and reactive to light.   Neck:      Thyroid: No thyromegaly.      Vascular: No carotid bruit or JVD.   Cardiovascular:      Rate and Rhythm: Normal rate and regular rhythm.      Heart sounds: Normal heart sounds and S2 normal. No murmur heard.     No friction rub. No gallop.   Pulmonary:      Effort: Pulmonary effort is normal.      Breath sounds: Normal breath sounds.   Abdominal:      General: Bowel sounds are normal.      Palpations: Abdomen is soft.   Musculoskeletal:         General: Normal range of motion.      Cervical back: Normal range of motion and neck supple.   Skin:     General: Skin is warm and dry.   Neurological:      Mental Status: She is alert and oriented to person, place, and time.      Cranial Nerves: No cranial nerve deficit.      Sensory: Sensory deficit present.   Psychiatric:         Mood and Affect: Mood normal.         Behavior: Behavior normal.         Thought Content: Thought content normal.         Judgment: Judgment normal.       Diagnostic data:  Noninvasive testing including a carotid duplex shows less than 50% carotid stenosis bilaterally with bilateral antegrade vertebral flow.  ABIs show moderate arterial insufficiency to the right lower extremity and mild to the left.      Patient Active Problem List   Diagnosis   • Garza's esophagus without dysplasia   • Gastroesophageal reflux disease without esophagitis   • " Adenomatous colon polyp   • Pneumonia   • Empyema of pleura   • Hyperglycemia   • Esophageal dysphagia   • Weight loss   • Celiac artery stenosis   • Essential hypertension   • Mixed hyperlipidemia   • Bilateral carotid artery stenosis   • PAD (peripheral artery disease)        Diagnosis Plan   1. PAD (peripheral artery disease)  CBC and Differential    Basic metabolic panel    APTT    Protime-INR    Case Request    ceFAZolin (ANCEF) 2,000 mg in sodium chloride 0.9 % 100 mL IVPB    Case Request      2. Bilateral carotid artery stenosis        3. Mixed hyperlipidemia        4. Essential hypertension        5. Celiac artery stenosis        6. Preop testing  CBC and Differential    Basic metabolic panel    APTT    Protime-INR    Case Request    ceFAZolin (ANCEF) 2,000 mg in sodium chloride 0.9 % 100 mL IVPB    Case Request      7. Encounter for monitoring antiplatelet therapy  APTT            Plan: After thoroughly evaluating Carlee Vee, I believe the best course of action is to proceed with a right lower extremity angiogram.  She does have claudication to her right lower extremity which is worsened.  Her ABIs show moderate arterial insufficiency to the right lower extremity and mild to the left.  Her carotid duplex shows less than 50% carotid stenosis bilaterally.  She also has neuropathy that I explained would not be improved by angiogram.  Risks of angiogram were discussed.  These include, but are not limited to, bleeding, infection, vessel damage, nerve damage, embolus, and loss of limb.  The patient understands these risks and wishes to proceed with procedure.   If her symptomatology worsens at any time then we can always proceed with celiac artery stenting. I did discuss vascular risk factors as they pertain to the progression of vascular disease including controlling her hypertension and hyperlipidemia.  Her blood pressure slightly elevated at 142/78.  She should continue her aspirin 81 mg daily and Crestor  10 mg daily in addition to her other medications.  This was all discussed in full with complete understanding.  Thank you for allowing me to participate in the care of your patient.  Please do not hesitate to call with any questions or concerns.  We will keep you aware of any further encounters with Carlee Vee.        Sincerely yours,         CLEMENTE Argueta Staci W, APRN

## 2024-05-22 NOTE — PROGRESS NOTES
"5/22/2024       Elinor Richter, APRN  [address] [facility] [place] [address]      Carlee Vee  1960    Chief Complaint   Patient presents with    Celiac artery stenosis     HAS HAD TESTING DONE THIS AM,having  ear problems bilateral and right leg has gotten no better.Right leg painful to touch and hurts when walking       Dear Elinor Richter, APRN     HPI  I had the pleasure of seeing your patient Carlee Vee in the office today.  Thank you kindly for this consultation.  As you recall, Carlee Vee is a 63 y.o.  female who you are currently following for difficulty swallowing and weight loss.  She reports previous weight loss of about 30 to 40 pounds unintentionally.  She did undergo an endoscopy recently.  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  She denies any symptoms of mesenteric ischemia.  She is having ear problems bilaterally.  She does continue to have neuropathy to her legs and right leg is painful to touch.  She did have noninvasive testing performed today, which I did review in office.      Review of Systems   Constitutional: Negative.    HENT:  Positive for ear pain.    Eyes: Negative.    Respiratory: Negative.     Cardiovascular: Negative.    Gastrointestinal: Negative.    Endocrine: Negative.    Genitourinary: Negative.    Musculoskeletal: Negative.    Skin: Negative.    Allergic/Immunologic: Negative.    Neurological: Negative.         Leg sensitive to touch   Hematological: Negative.    Psychiatric/Behavioral: Negative.          /78   Pulse 99   Ht 157.5 cm (62\")   Wt 79.6 kg (175 lb 6.4 oz)   SpO2 96%   BMI 32.08 kg/m²   Physical Exam  Vitals and nursing note reviewed.   Constitutional:       General: She is not in acute distress.     Appearance: Normal appearance. She is well-developed. She is obese. She is not diaphoretic.   HENT:      Head: Normocephalic and atraumatic.   Eyes:      General: No scleral icterus.     Pupils: Pupils are equal, round, " and reactive to light.   Neck:      Thyroid: No thyromegaly.      Vascular: No carotid bruit or JVD.   Cardiovascular:      Rate and Rhythm: Normal rate and regular rhythm.      Heart sounds: Normal heart sounds and S2 normal. No murmur heard.     No friction rub. No gallop.   Pulmonary:      Effort: Pulmonary effort is normal.      Breath sounds: Normal breath sounds.   Abdominal:      General: Bowel sounds are normal.      Palpations: Abdomen is soft.   Musculoskeletal:         General: Normal range of motion.      Cervical back: Normal range of motion and neck supple.   Skin:     General: Skin is warm and dry.   Neurological:      Mental Status: She is alert and oriented to person, place, and time.      Cranial Nerves: No cranial nerve deficit.      Sensory: Sensory deficit present.   Psychiatric:         Mood and Affect: Mood normal.         Behavior: Behavior normal.         Thought Content: Thought content normal.         Judgment: Judgment normal.       Diagnostic data:  Noninvasive testing including a carotid duplex shows less than 50% carotid stenosis bilaterally with bilateral antegrade vertebral flow.  ABIs show moderate arterial insufficiency to the right lower extremity and mild to the left.      Patient Active Problem List   Diagnosis    Garza's esophagus without dysplasia    Gastroesophageal reflux disease without esophagitis    Adenomatous colon polyp    Pneumonia    Empyema of pleura    Hyperglycemia    Esophageal dysphagia    Weight loss    Celiac artery stenosis    Essential hypertension    Mixed hyperlipidemia    Bilateral carotid artery stenosis    PAD (peripheral artery disease)        Diagnosis Plan   1. PAD (peripheral artery disease)  CBC and Differential    Basic metabolic panel    APTT    Protime-INR    Case Request    ceFAZolin (ANCEF) 2,000 mg in sodium chloride 0.9 % 100 mL IVPB    Case Request      2. Bilateral carotid artery stenosis        3. Mixed hyperlipidemia        4.  Essential hypertension        5. Celiac artery stenosis        6. Preop testing  CBC and Differential    Basic metabolic panel    APTT    Protime-INR    Case Request    ceFAZolin (ANCEF) 2,000 mg in sodium chloride 0.9 % 100 mL IVPB    Case Request      7. Encounter for monitoring antiplatelet therapy  APTT            Plan: After thoroughly evaluating Carlee Vee, I believe the best course of action is to proceed with a right lower extremity angiogram.  She does have claudication to her right lower extremity which is worsened.  Her ABIs show moderate arterial insufficiency to the right lower extremity and mild to the left.  Her carotid duplex shows less than 50% carotid stenosis bilaterally.  She also has neuropathy that I explained would not be improved by angiogram.  Risks of angiogram were discussed.  These include, but are not limited to, bleeding, infection, vessel damage, nerve damage, embolus, and loss of limb.  The patient understands these risks and wishes to proceed with procedure.   If her symptomatology worsens at any time then we can always proceed with celiac artery stenting. I did discuss vascular risk factors as they pertain to the progression of vascular disease including controlling her hypertension and hyperlipidemia.  Her blood pressure slightly elevated at 142/78.  She should continue her aspirin 81 mg daily and Crestor 10 mg daily in addition to her other medications.  This was all discussed in full with complete understanding.  Thank you for allowing me to participate in the care of your patient.  Please do not hesitate to call with any questions or concerns.  We will keep you aware of any further encounters with Carlee Vee.        Sincerely yours,         CLEMENTE Argueta Staci W, APRN

## 2024-05-28 ENCOUNTER — TELEPHONE (OUTPATIENT)
Dept: VASCULAR SURGERY | Facility: CLINIC | Age: 64
End: 2024-05-28
Payer: MEDICARE

## 2024-05-28 PROBLEM — Z01.818 PREOP TESTING: Status: ACTIVE | Noted: 2024-05-22

## 2024-05-28 NOTE — TELEPHONE ENCOUNTER
Pt expressed understanding of prework/surgery time and instruction for procedure scheduled 06/14/24 with Dr. Gold.  NPO after midnight.  Pt to continue aspirin and crestor uninterrupted prior to procedure. Pt will not take morning of.

## 2024-06-07 ENCOUNTER — PRE-ADMISSION TESTING (OUTPATIENT)
Dept: PREADMISSION TESTING | Facility: HOSPITAL | Age: 64
End: 2024-06-07
Payer: MEDICARE

## 2024-06-07 VITALS
OXYGEN SATURATION: 97 % | BODY MASS INDEX: 32.29 KG/M2 | HEIGHT: 62 IN | WEIGHT: 175.49 LBS | RESPIRATION RATE: 18 BRPM | DIASTOLIC BLOOD PRESSURE: 101 MMHG | HEART RATE: 96 BPM | SYSTOLIC BLOOD PRESSURE: 153 MMHG

## 2024-06-07 DIAGNOSIS — I73.9 PAD (PERIPHERAL ARTERY DISEASE): ICD-10-CM

## 2024-06-07 DIAGNOSIS — Z79.02 ENCOUNTER FOR MONITORING ANTIPLATELET THERAPY: ICD-10-CM

## 2024-06-07 DIAGNOSIS — Z51.81 ENCOUNTER FOR MONITORING ANTIPLATELET THERAPY: ICD-10-CM

## 2024-06-07 DIAGNOSIS — Z01.818 PREOP TESTING: ICD-10-CM

## 2024-06-07 LAB
ANION GAP SERPL CALCULATED.3IONS-SCNC: 9 MMOL/L (ref 5–15)
APTT PPP: 26.5 SECONDS (ref 24.5–36)
BASOPHILS # BLD AUTO: 0.05 10*3/MM3 (ref 0–0.2)
BASOPHILS NFR BLD AUTO: 0.6 % (ref 0–1.5)
BUN SERPL-MCNC: 15 MG/DL (ref 8–23)
BUN/CREAT SERPL: 25 (ref 7–25)
CALCIUM SPEC-SCNC: 9.5 MG/DL (ref 8.6–10.5)
CHLORIDE SERPL-SCNC: 98 MMOL/L (ref 98–107)
CO2 SERPL-SCNC: 26 MMOL/L (ref 22–29)
CREAT SERPL-MCNC: 0.6 MG/DL (ref 0.57–1)
DEPRECATED RDW RBC AUTO: 40.6 FL (ref 37–54)
EGFRCR SERPLBLD CKD-EPI 2021: 100.4 ML/MIN/1.73
EOSINOPHIL # BLD AUTO: 0.14 10*3/MM3 (ref 0–0.4)
EOSINOPHIL NFR BLD AUTO: 1.6 % (ref 0.3–6.2)
ERYTHROCYTE [DISTWIDTH] IN BLOOD BY AUTOMATED COUNT: 12.8 % (ref 12.3–15.4)
GLUCOSE SERPL-MCNC: 347 MG/DL (ref 65–99)
HCT VFR BLD AUTO: 42 % (ref 34–46.6)
HGB BLD-MCNC: 15 G/DL (ref 12–15.9)
IMM GRANULOCYTES # BLD AUTO: 0.03 10*3/MM3 (ref 0–0.05)
IMM GRANULOCYTES NFR BLD AUTO: 0.3 % (ref 0–0.5)
INR PPP: 0.92 (ref 0.91–1.09)
LYMPHOCYTES # BLD AUTO: 2.41 10*3/MM3 (ref 0.7–3.1)
LYMPHOCYTES NFR BLD AUTO: 26.9 % (ref 19.6–45.3)
MCH RBC QN AUTO: 31.2 PG (ref 26.6–33)
MCHC RBC AUTO-ENTMCNC: 35.7 G/DL (ref 31.5–35.7)
MCV RBC AUTO: 87.3 FL (ref 79–97)
MONOCYTES # BLD AUTO: 0.42 10*3/MM3 (ref 0.1–0.9)
MONOCYTES NFR BLD AUTO: 4.7 % (ref 5–12)
NEUTROPHILS NFR BLD AUTO: 5.92 10*3/MM3 (ref 1.7–7)
NEUTROPHILS NFR BLD AUTO: 65.9 % (ref 42.7–76)
NRBC BLD AUTO-RTO: 0 /100 WBC (ref 0–0.2)
PLATELET # BLD AUTO: 187 10*3/MM3 (ref 140–450)
PMV BLD AUTO: 12.2 FL (ref 6–12)
POTASSIUM SERPL-SCNC: 3.5 MMOL/L (ref 3.5–5.2)
PROTHROMBIN TIME: 12.8 SECONDS (ref 11.8–14.8)
RBC # BLD AUTO: 4.81 10*6/MM3 (ref 3.77–5.28)
SODIUM SERPL-SCNC: 133 MMOL/L (ref 136–145)
WBC NRBC COR # BLD AUTO: 8.97 10*3/MM3 (ref 3.4–10.8)

## 2024-06-07 PROCEDURE — 85610 PROTHROMBIN TIME: CPT

## 2024-06-07 PROCEDURE — 93005 ELECTROCARDIOGRAM TRACING: CPT

## 2024-06-07 PROCEDURE — 93010 ELECTROCARDIOGRAM REPORT: CPT | Performed by: EMERGENCY MEDICINE

## 2024-06-07 PROCEDURE — 80048 BASIC METABOLIC PNL TOTAL CA: CPT

## 2024-06-07 PROCEDURE — 85730 THROMBOPLASTIN TIME PARTIAL: CPT

## 2024-06-07 PROCEDURE — 85025 COMPLETE CBC W/AUTO DIFF WBC: CPT

## 2024-06-07 PROCEDURE — 36415 COLL VENOUS BLD VENIPUNCTURE: CPT

## 2024-06-07 RX ORDER — ROPINIROLE 0.25 MG/1
0.25 TABLET, FILM COATED ORAL NIGHTLY
COMMUNITY
Start: 2024-05-06

## 2024-06-07 RX ORDER — LOSARTAN POTASSIUM 25 MG/1
25 TABLET ORAL DAILY
COMMUNITY
Start: 2024-05-16

## 2024-06-07 RX ORDER — GABAPENTIN 300 MG/1
300 CAPSULE ORAL NIGHTLY PRN
COMMUNITY
Start: 2024-04-22

## 2024-06-07 NOTE — DISCHARGE INSTRUCTIONS

## 2024-06-09 LAB
QT INTERVAL: 392 MS
QTC INTERVAL: 474 MS

## 2024-06-10 ENCOUNTER — TELEPHONE (OUTPATIENT)
Dept: VASCULAR SURGERY | Facility: CLINIC | Age: 64
End: 2024-06-10
Payer: MEDICARE

## 2024-06-10 NOTE — H&P
6/10/2024          Elinor Richter, APRN  [address] [facility]  [place] [address]        Carlee Vee  1960          Chief Complaint   Patient presents with    Celiac artery stenosis       HAS HAD TESTING DONE THIS AM,having  ear problems bilateral and right leg has gotten no better.Right leg painful to touch and hurts when walking         Dear Elinor Richter, APRN      HPI  I had the pleasure of seeing your patient Carlee Vee in the office today.  Thank you kindly for this consultation.  As you recall, Carlee Vee is a 63 y.o.  female who you are currently following for difficulty swallowing and weight loss.  She reports previous weight loss of about 30 to 40 pounds unintentionally.  She did undergo an endoscopy recently.  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  She denies any symptoms of mesenteric ischemia.  She is having ear problems bilaterally.  She does continue to have neuropathy to her legs and right leg is painful to touch.  She did have noninvasive testing performed today, which I did review in office.      Past Medical History:   Diagnosis Date    Arthritis     Garza's esophagus     Depression     Diverticulosis     GERD (gastroesophageal reflux disease)     History of adenomatous polyp of colon     History of colon polyps     Hyperlipidemia     Hypertension     Kidney stone     PONV (postoperative nausea and vomiting)     Type 2 diabetes mellitus      Past Surgical History:   Procedure Laterality Date    BACK SURGERY      CATARACT EXTRACTION WITH INTRAOCULAR LENS IMPLANT Bilateral     COLONOSCOPY  10/18/2006    Thickened fold in the ascending colon-path shows hyperplastic polyp; Repeat 7 years    COLONOSCOPY N/A 07/24/2017    One 4mm tubular adenomatous polyp in the transverse colon; One 5mm hyperplastic polyp in the sigmoid colon; The examination was otherwise normal on direct and retroflexion views; Repeat 5 years    COLONOSCOPY N/A 03/23/2023    Diverticulosis  in the entire examined colon; One 5mm tubular adenomatous polyp in the ascending colon-Clip (MR conditional) was placed; One 12mm tubular adenomatous polyp in the transverse colon; Repeat 3 years    DILATATION AND CURETTAGE      ENDOSCOPY N/A 07/24/2017    Esophageal mucosal changes secondary to established short-segment Garza's disease-biopsied; Small HH; Normal stomach; Normal examined duodenum; Repeat 3 years    ENDOSCOPY  12/08/2011    Esophagitis-biopsied; Garza's-biopsied; HH; Repeat 3 years    ENDOSCOPY  10/27/2008    Garza's esophagus-biopsied; HH; Repeat 2 years    ENDOSCOPY  10/23/2006    HH; Probable Garza's esophagus-biopsied; Repeat 2 years    ENDOSCOPY N/A 08/21/2020    Small HH; Esophageal mucosal changes secondary to established long-segment Garza's disease-biopsied; Normal stomach; Normal examined duodenum; Repeat 3 years    ENDOSCOPY N/A 03/23/2023    Esophageal mucosal changes secondary to established long-segment Garza's disease-biopsied; Medium-sized HH; Normal stomach; Normal examined duodenum-biopsied; Repeat 3 years    HYSTERECTOMY      THORACOTOMY Left 03/27/2018    Procedure: THORACOTOMY, drainage of empyema and decortication;  Surgeon: Abel Stark MD;  Location: [facility] OR;  Service: Cardiothoracic    TUBAL ABDOMINAL LIGATION       Social History     Socioeconomic History    Marital status:    Tobacco Use    Smoking status: Former     Types: Cigarettes    Smokeless tobacco: Never   Vaping Use    Vaping status: Never Used   Substance and Sexual Activity    Alcohol use: Not Currently    Drug use: No    Sexual activity: Not Currently     Partners: Female     Birth control/protection: Tubal ligation, Hysterectomy     Family History   Problem Relation Age of Onset    Colon cancer Neg Hx     Colon polyps Neg Hx     Esophageal cancer Neg Hx     Liver cancer Neg Hx     Liver disease Neg Hx     Rectal cancer Neg Hx     Stomach cancer Neg Hx      Allergies   Allergen Reactions  "   Sulfa Antibiotics Hives     Current Outpatient Medications   Medication Instructions    amLODIPine (NORVASC) 10 mg, Oral, Daily    aspirin 81 mg, Oral, Daily    FLUoxetine (PROZAC) 20 mg, Oral, Daily    gabapentin (NEURONTIN) 300 mg, Oral, Nightly PRN    insulin aspart (novoLOG) 100 UNIT/ML injection Subcutaneous, 3 Times Daily Before Meals, 150 - 199 = 4 units<BR>200 - 249 = 8 units<BR>250 - 299 = 12 units<BR>300 - 349 = 16 units<BR>349 - 400 = 20 units<BR>> 400 = 24 units    insulin detemir (LEVEMIR) 17 Units, Subcutaneous, Every 12 Hours Scheduled    losartan (COZAAR) 25 mg, Oral, Daily, for blood pressure    pantoprazole (PROTONIX) 40 mg, Oral, 2 Times Daily    rOPINIRole (REQUIP) 0.25 mg, Oral, Nightly    rosuvastatin (CRESTOR) 10 mg, Oral, Nightly    vitamin D (ERGOCALCIFEROL) 50,000 Units, Oral, Weekly, Fridays         Review of Systems   Constitutional: Negative.    HENT:  Positive for ear pain.    Eyes: Negative.    Respiratory: Negative.     Cardiovascular: Negative.    Gastrointestinal: Negative.    Endocrine: Negative.    Genitourinary: Negative.    Musculoskeletal: Negative.    Skin: Negative.    Allergic/Immunologic: Negative.    Neurological: Negative.         Leg sensitive to touch   Hematological: Negative.    Psychiatric/Behavioral: Negative.           /78   Pulse 99   Ht 157.5 cm (62\")   Wt 79.6 kg (175 lb 6.4 oz)   SpO2 96%   BMI 32.08 kg/m²   Physical Exam  Vitals and nursing note reviewed.   Constitutional:       General: She is not in acute distress.     Appearance: Normal appearance. She is well-developed. She is obese. She is not diaphoretic.   HENT:      Head: Normocephalic and atraumatic.   Eyes:      General: No scleral icterus.     Pupils: Pupils are equal, round, and reactive to light.   Neck:      Thyroid: No thyromegaly.      Vascular: No carotid bruit or JVD.   Cardiovascular:      Rate and Rhythm: Normal rate and regular rhythm.      Heart sounds: Normal heart sounds " and S2 normal. No murmur heard.     No friction rub. No gallop.   Pulmonary:      Effort: Pulmonary effort is normal.      Breath sounds: Normal breath sounds.   Abdominal:      General: Bowel sounds are normal.      Palpations: Abdomen is soft.   Musculoskeletal:         General: Normal range of motion.      Cervical back: Normal range of motion and neck supple.   Skin:     General: Skin is warm and dry.   Neurological:      Mental Status: She is alert and oriented to person, place, and time.      Cranial Nerves: No cranial nerve deficit.      Sensory: Sensory deficit present.   Psychiatric:         Mood and Affect: Mood normal.         Behavior: Behavior normal.         Thought Content: Thought content normal.         Judgment: Judgment normal.         Diagnostic data:  Noninvasive testing including a carotid duplex shows less than 50% carotid stenosis bilaterally with bilateral antegrade vertebral flow.  ABIs show moderate arterial insufficiency to the right lower extremity and mild to the left.        Problem List       Patient Active Problem List   Diagnosis    Garza's esophagus without dysplasia    Gastroesophageal reflux disease without esophagitis    Adenomatous colon polyp    Pneumonia    Empyema of pleura    Hyperglycemia    Esophageal dysphagia    Weight loss    Celiac artery stenosis    Essential hypertension    Mixed hyperlipidemia    Bilateral carotid artery stenosis    PAD (peripheral artery disease)              Diagnosis Plan   1. PAD (peripheral artery disease)  CBC and Differential     Basic metabolic panel     APTT     Protime-INR     Case Request     ceFAZolin (ANCEF) 2,000 mg in sodium chloride 0.9 % 100 mL IVPB     Case Request       2. Bilateral carotid artery stenosis          3. Mixed hyperlipidemia          4. Essential hypertension          5. Celiac artery stenosis          6. Preop testing  CBC and Differential     Basic metabolic panel     APTT     Protime-INR     Case Request      ceFAZolin (ANCEF) 2,000 mg in sodium chloride 0.9 % 100 mL IVPB     Case Request       7. Encounter for monitoring antiplatelet therapy  APTT                Plan: After thoroughly evaluating Carlee Vee, I believe the best course of action is to proceed with a right lower extremity angiogram.  She does have claudication to her right lower extremity which is worsened.  Her ABIs show moderate arterial insufficiency to the right lower extremity and mild to the left.  Her carotid duplex shows less than 50% carotid stenosis bilaterally.  She also has neuropathy that I explained would not be improved by angiogram.  Risks of angiogram were discussed.  These include, but are not limited to, bleeding, infection, vessel damage, nerve damage, embolus, and loss of limb.  The patient understands these risks and wishes to proceed with procedure.   If her symptomatology worsens at any time then we can always proceed with celiac artery stenting. I did discuss vascular risk factors as they pertain to the progression of vascular disease including controlling her hypertension and hyperlipidemia.  Her blood pressure slightly elevated at 142/78.  She should continue her aspirin 81 mg daily and Crestor 10 mg daily in addition to her other medications.  This was all discussed in full with complete understanding.  Thank you for allowing me to participate in the care of your patient.  Please do not hesitate to call with any questions or concerns.  We will keep you aware of any further encounters with Carlee Vee.         Sincerely yours,           Geoffrey Gold, DO Richter, Elinor OCHOA, APRN

## 2024-06-12 ENCOUNTER — TELEPHONE (OUTPATIENT)
Dept: VASCULAR SURGERY | Facility: CLINIC | Age: 64
End: 2024-06-12
Payer: MEDICARE

## 2024-06-12 NOTE — TELEPHONE ENCOUNTER
Called patient to confirm surgery time and date of 06/13/24 at 9:00 am. Patient should continue her aspirin 81 mg daily and Crestor 10 mg daily in addition to her other medications. Patient will hold Aspirin the morning of the procedure. Patient did not answer left VM.     Spoke with patient she verbalized understanding and will call with any questions or concerns.

## 2024-06-13 ENCOUNTER — ANESTHESIA EVENT (OUTPATIENT)
Dept: PERIOP | Facility: HOSPITAL | Age: 64
End: 2024-06-13
Payer: MEDICARE

## 2024-06-13 ENCOUNTER — APPOINTMENT (OUTPATIENT)
Dept: INTERVENTIONAL RADIOLOGY/VASCULAR | Facility: HOSPITAL | Age: 64
End: 2024-06-13
Payer: MEDICARE

## 2024-06-13 ENCOUNTER — ANESTHESIA (OUTPATIENT)
Dept: PERIOP | Facility: HOSPITAL | Age: 64
End: 2024-06-13
Payer: MEDICARE

## 2024-06-13 ENCOUNTER — HOSPITAL ENCOUNTER (OUTPATIENT)
Facility: HOSPITAL | Age: 64
Setting detail: HOSPITAL OUTPATIENT SURGERY
Discharge: HOME | End: 2024-06-13
Attending: SURGERY | Admitting: SURGERY
Payer: MEDICARE

## 2024-06-13 VITALS
HEART RATE: 74 BPM | RESPIRATION RATE: 16 BRPM | DIASTOLIC BLOOD PRESSURE: 67 MMHG | SYSTOLIC BLOOD PRESSURE: 168 MMHG | OXYGEN SATURATION: 95 % | TEMPERATURE: 98.5 F

## 2024-06-13 DIAGNOSIS — Z01.818 PREOP TESTING: ICD-10-CM

## 2024-06-13 DIAGNOSIS — I73.9 PAD (PERIPHERAL ARTERY DISEASE): ICD-10-CM

## 2024-06-13 LAB
ABO GROUP BLD: NORMAL
BLD GP AB SCN SERPL QL: NEGATIVE
GLUCOSE BLDC GLUCOMTR-MCNC: 111 MG/DL (ref 70–130)
GLUCOSE BLDC GLUCOMTR-MCNC: 143 MG/DL (ref 70–130)
GLUCOSE BLDC GLUCOMTR-MCNC: 79 MG/DL (ref 70–130)
RH BLD: POSITIVE
T&S EXPIRATION DATE: NORMAL

## 2024-06-13 PROCEDURE — C1894 INTRO/SHEATH, NON-LASER: HCPCS | Performed by: SURGERY

## 2024-06-13 PROCEDURE — 25010000002 FENTANYL CITRATE (PF) 50 MCG/ML SOLUTION: Performed by: ANESTHESIOLOGY

## 2024-06-13 PROCEDURE — C1714 CATH, TRANS ATHERECTOMY, DIR: HCPCS | Performed by: SURGERY

## 2024-06-13 PROCEDURE — 75625 CONTRAST EXAM ABDOMINL AORTA: CPT

## 2024-06-13 PROCEDURE — 25010000002 ONDANSETRON PER 1 MG: Performed by: SURGERY

## 2024-06-13 PROCEDURE — 25010000002 MIDAZOLAM PER 1 MG: Performed by: ANESTHESIOLOGY

## 2024-06-13 PROCEDURE — 76000 FLUOROSCOPY <1 HR PHYS/QHP: CPT

## 2024-06-13 PROCEDURE — C1887 CATHETER, GUIDING: HCPCS | Performed by: SURGERY

## 2024-06-13 PROCEDURE — 25010000002 HEPARIN (PORCINE) PER 1000 UNITS: Performed by: NURSE ANESTHETIST, CERTIFIED REGISTERED

## 2024-06-13 PROCEDURE — 25810000003 LACTATED RINGERS PER 1000 ML: Performed by: SURGERY

## 2024-06-13 PROCEDURE — 86900 BLOOD TYPING SEROLOGIC ABO: CPT | Performed by: NURSE PRACTITIONER

## 2024-06-13 PROCEDURE — 82948 REAGENT STRIP/BLOOD GLUCOSE: CPT

## 2024-06-13 PROCEDURE — C1760 CLOSURE DEV, VASC: HCPCS | Performed by: SURGERY

## 2024-06-13 PROCEDURE — 86850 RBC ANTIBODY SCREEN: CPT | Performed by: NURSE PRACTITIONER

## 2024-06-13 PROCEDURE — 75625 CONTRAST EXAM ABDOMINL AORTA: CPT | Performed by: SURGERY

## 2024-06-13 PROCEDURE — C2623 CATH, TRANSLUMIN, DRUG-COAT: HCPCS | Performed by: SURGERY

## 2024-06-13 PROCEDURE — 25010000002 FENTANYL CITRATE (PF) 100 MCG/2ML SOLUTION: Performed by: NURSE ANESTHETIST, CERTIFIED REGISTERED

## 2024-06-13 PROCEDURE — 86901 BLOOD TYPING SEROLOGIC RH(D): CPT | Performed by: NURSE PRACTITIONER

## 2024-06-13 PROCEDURE — 75710 ARTERY X-RAYS ARM/LEG: CPT | Performed by: SURGERY

## 2024-06-13 PROCEDURE — 25010000002 CEFAZOLIN PER 500 MG: Performed by: NURSE PRACTITIONER

## 2024-06-13 PROCEDURE — 25010000002 PROPOFOL 1000 MG/100ML EMULSION: Performed by: NURSE ANESTHETIST, CERTIFIED REGISTERED

## 2024-06-13 PROCEDURE — C1884 EMBOLIZATION PROTECT SYST: HCPCS | Performed by: SURGERY

## 2024-06-13 PROCEDURE — 75710 ARTERY X-RAYS ARM/LEG: CPT

## 2024-06-13 PROCEDURE — C1769 GUIDE WIRE: HCPCS | Performed by: SURGERY

## 2024-06-13 PROCEDURE — 37225 PR REVSC OPN/PRQ FEM/POP W/ATHRC/ANGIOP SM VSL: CPT | Performed by: SURGERY

## 2024-06-13 RX ORDER — SODIUM CHLORIDE, SODIUM LACTATE, POTASSIUM CHLORIDE, CALCIUM CHLORIDE 600; 310; 30; 20 MG/100ML; MG/100ML; MG/100ML; MG/100ML
1000 INJECTION, SOLUTION INTRAVENOUS CONTINUOUS
Status: DISCONTINUED | OUTPATIENT
Start: 2024-06-13 | End: 2024-06-13 | Stop reason: HOSPADM

## 2024-06-13 RX ORDER — DROPERIDOL 2.5 MG/ML
0.62 INJECTION, SOLUTION INTRAMUSCULAR; INTRAVENOUS ONCE AS NEEDED
Status: DISCONTINUED | OUTPATIENT
Start: 2024-06-13 | End: 2024-06-13 | Stop reason: HOSPADM

## 2024-06-13 RX ORDER — CLOPIDOGREL BISULFATE 75 MG/1
75 TABLET ORAL DAILY
Qty: 30 TABLET | Refills: 5 | Status: SHIPPED | OUTPATIENT
Start: 2024-06-13 | End: 2024-12-10

## 2024-06-13 RX ORDER — HEPARIN SODIUM 1000 [USP'U]/ML
INJECTION, SOLUTION INTRAVENOUS; SUBCUTANEOUS AS NEEDED
Status: DISCONTINUED | OUTPATIENT
Start: 2024-06-13 | End: 2024-06-13 | Stop reason: SURG

## 2024-06-13 RX ORDER — CLOPIDOGREL BISULFATE 75 MG/1
150 TABLET ORAL ONCE
Status: COMPLETED | OUTPATIENT
Start: 2024-06-13 | End: 2024-06-13

## 2024-06-13 RX ORDER — FENTANYL CITRATE 50 UG/ML
INJECTION, SOLUTION INTRAMUSCULAR; INTRAVENOUS AS NEEDED
Status: DISCONTINUED | OUTPATIENT
Start: 2024-06-13 | End: 2024-06-13 | Stop reason: SURG

## 2024-06-13 RX ORDER — LABETALOL HYDROCHLORIDE 5 MG/ML
5 INJECTION, SOLUTION INTRAVENOUS
Status: DISCONTINUED | OUTPATIENT
Start: 2024-06-13 | End: 2024-06-13 | Stop reason: HOSPADM

## 2024-06-13 RX ORDER — FLUMAZENIL 0.1 MG/ML
0.2 INJECTION INTRAVENOUS AS NEEDED
Status: DISCONTINUED | OUTPATIENT
Start: 2024-06-13 | End: 2024-06-13 | Stop reason: HOSPADM

## 2024-06-13 RX ORDER — SODIUM CHLORIDE 0.9 % (FLUSH) 0.9 %
3 SYRINGE (ML) INJECTION EVERY 12 HOURS SCHEDULED
Status: DISCONTINUED | OUTPATIENT
Start: 2024-06-13 | End: 2024-06-13 | Stop reason: HOSPADM

## 2024-06-13 RX ORDER — HYDROCODONE BITARTRATE AND ACETAMINOPHEN 5; 325 MG/1; MG/1
1 TABLET ORAL EVERY 4 HOURS PRN
Status: DISCONTINUED | OUTPATIENT
Start: 2024-06-13 | End: 2024-06-13 | Stop reason: HOSPADM

## 2024-06-13 RX ORDER — SODIUM CHLORIDE, SODIUM LACTATE, POTASSIUM CHLORIDE, CALCIUM CHLORIDE 600; 310; 30; 20 MG/100ML; MG/100ML; MG/100ML; MG/100ML
100 INJECTION, SOLUTION INTRAVENOUS CONTINUOUS
Status: DISCONTINUED | OUTPATIENT
Start: 2024-06-13 | End: 2024-06-13 | Stop reason: HOSPADM

## 2024-06-13 RX ORDER — SODIUM CHLORIDE 9 MG/ML
40 INJECTION, SOLUTION INTRAVENOUS AS NEEDED
Status: DISCONTINUED | OUTPATIENT
Start: 2024-06-13 | End: 2024-06-13 | Stop reason: HOSPADM

## 2024-06-13 RX ORDER — PROPOFOL 10 MG/ML
INJECTION, EMULSION INTRAVENOUS CONTINUOUS PRN
Status: DISCONTINUED | OUTPATIENT
Start: 2024-06-13 | End: 2024-06-13 | Stop reason: SURG

## 2024-06-13 RX ORDER — NALOXONE HCL 0.4 MG/ML
0.4 VIAL (ML) INJECTION AS NEEDED
Status: DISCONTINUED | OUTPATIENT
Start: 2024-06-13 | End: 2024-06-13 | Stop reason: HOSPADM

## 2024-06-13 RX ORDER — HYDROCODONE BITARTRATE AND ACETAMINOPHEN 10; 325 MG/1; MG/1
1 TABLET ORAL EVERY 4 HOURS PRN
Status: DISCONTINUED | OUTPATIENT
Start: 2024-06-13 | End: 2024-06-13 | Stop reason: HOSPADM

## 2024-06-13 RX ORDER — MIDAZOLAM HYDROCHLORIDE 1 MG/ML
1 INJECTION INTRAMUSCULAR; INTRAVENOUS
Status: DISCONTINUED | OUTPATIENT
Start: 2024-06-13 | End: 2024-06-13 | Stop reason: HOSPADM

## 2024-06-13 RX ORDER — LIDOCAINE HYDROCHLORIDE 10 MG/ML
0.5 INJECTION, SOLUTION EPIDURAL; INFILTRATION; INTRACAUDAL; PERINEURAL ONCE AS NEEDED
Status: DISCONTINUED | OUTPATIENT
Start: 2024-06-13 | End: 2024-06-13 | Stop reason: HOSPADM

## 2024-06-13 RX ORDER — FENTANYL CITRATE 50 UG/ML
50 INJECTION, SOLUTION INTRAMUSCULAR; INTRAVENOUS
Status: DISCONTINUED | OUTPATIENT
Start: 2024-06-13 | End: 2024-06-13 | Stop reason: HOSPADM

## 2024-06-13 RX ORDER — ONDANSETRON 2 MG/ML
4 INJECTION INTRAMUSCULAR; INTRAVENOUS ONCE AS NEEDED
Status: DISCONTINUED | OUTPATIENT
Start: 2024-06-13 | End: 2024-06-13 | Stop reason: HOSPADM

## 2024-06-13 RX ORDER — MIDAZOLAM HYDROCHLORIDE 1 MG/ML
2 INJECTION INTRAMUSCULAR; INTRAVENOUS ONCE
Status: COMPLETED | OUTPATIENT
Start: 2024-06-13 | End: 2024-06-13

## 2024-06-13 RX ORDER — SODIUM CHLORIDE 0.9 % (FLUSH) 0.9 %
3-10 SYRINGE (ML) INJECTION AS NEEDED
Status: DISCONTINUED | OUTPATIENT
Start: 2024-06-13 | End: 2024-06-13 | Stop reason: HOSPADM

## 2024-06-13 RX ORDER — HYDROCODONE BITARTRATE AND ACETAMINOPHEN 5; 325 MG/1; MG/1
1 TABLET ORAL ONCE AS NEEDED
Status: DISCONTINUED | OUTPATIENT
Start: 2024-06-13 | End: 2024-06-13 | Stop reason: HOSPADM

## 2024-06-13 RX ORDER — SODIUM CHLORIDE 0.9 % (FLUSH) 0.9 %
3 SYRINGE (ML) INJECTION AS NEEDED
Status: DISCONTINUED | OUTPATIENT
Start: 2024-06-13 | End: 2024-06-13 | Stop reason: HOSPADM

## 2024-06-13 RX ORDER — ONDANSETRON 2 MG/ML
4 INJECTION INTRAMUSCULAR; INTRAVENOUS ONCE AS NEEDED
Status: COMPLETED | OUTPATIENT
Start: 2024-06-13 | End: 2024-06-13

## 2024-06-13 RX ADMIN — CLOPIDOGREL BISULFATE 150 MG: 75 TABLET ORAL at 14:49

## 2024-06-13 RX ADMIN — FENTANYL CITRATE 50 MCG: 50 INJECTION, SOLUTION INTRAMUSCULAR; INTRAVENOUS at 14:53

## 2024-06-13 RX ADMIN — ONDANSETRON 4 MG: 2 INJECTION INTRAMUSCULAR; INTRAVENOUS at 17:01

## 2024-06-13 RX ADMIN — FENTANYL CITRATE 100 MCG: 50 INJECTION, SOLUTION INTRAMUSCULAR; INTRAVENOUS at 13:01

## 2024-06-13 RX ADMIN — HYDROCODONE BITARTRATE AND ACETAMINOPHEN 1 TABLET: 10; 325 TABLET ORAL at 14:49

## 2024-06-13 RX ADMIN — SODIUM CHLORIDE, POTASSIUM CHLORIDE, SODIUM LACTATE AND CALCIUM CHLORIDE 1000 ML: 600; 310; 30; 20 INJECTION, SOLUTION INTRAVENOUS at 10:57

## 2024-06-13 RX ADMIN — PROPOFOL INJECTABLE EMULSION 125 MCG/KG/MIN: 10 INJECTION, EMULSION INTRAVENOUS at 13:02

## 2024-06-13 RX ADMIN — CEFAZOLIN 2 G: 2 INJECTION, POWDER, FOR SOLUTION INTRAMUSCULAR; INTRAVENOUS at 13:06

## 2024-06-13 RX ADMIN — HEPARIN SODIUM 1000 UNITS: 1000 INJECTION INTRAVENOUS; SUBCUTANEOUS at 13:22

## 2024-06-13 RX ADMIN — MIDAZOLAM 2 MG: 1 INJECTION INTRAMUSCULAR; INTRAVENOUS at 12:18

## 2024-06-13 NOTE — ANESTHESIA POSTPROCEDURE EVALUATION
Patient: Carlee Vee    Procedure Summary       Date: 06/13/24 Room / Location:  [facility] OR  /  [facility] OR    Anesthesia Start: 1255 Anesthesia Stop: 1407    Procedure: Right lower extremity angiogram, hawk athrectomy, balloon angioplasty, mynx closure (Right: Groin) Diagnosis:       PAD (peripheral artery disease)      Preop testing      (PAD (peripheral artery disease) [I73.9])      (Preop testing [Z01.818])    Surgeons: Geoffrey Gold DO Provider: Moncho Adair CRNA    Anesthesia Type: MAC ASA Status: 3            Anesthesia Type: MAC    Vitals  Vitals Value Taken Time   /71 06/13/24 1556   Temp 98.5 °F (36.9 °C) 06/13/24 1545   Pulse 66 06/13/24 1556   Resp 16 06/13/24 1556   SpO2 98 % 06/13/24 1556           Post Anesthesia Care and Evaluation    Patient location during evaluation: PACU  Patient participation: complete - patient participated  Level of consciousness: awake and alert  Pain management: adequate    Airway patency: patent  Anesthetic complications: No anesthetic complications    Cardiovascular status: acceptable  Respiratory status: acceptable  Hydration status: acceptable    Comments: Blood pressure 166/69, pulse 59, temperature 98.5 °F (36.9 °C), temperature source Temporal, resp. rate 16, SpO2 97%, not currently breastfeeding.    Pt discharged from PACU based on yokasta score >8

## 2024-06-13 NOTE — ANESTHESIA PREPROCEDURE EVALUATION
Anesthesia Evaluation     Patient summary reviewed   history of anesthetic complications:  PONV  NPO Solid Status: > 8 hours             Airway   Mallampati: II  Dental    (+) upper dentures and lower dentures    Pulmonary    (+) a smoker Former,  Cardiovascular   Exercise tolerance: poor (<4 METS)    (+) hypertension, PVD, hyperlipidemia,  carotid artery disease  (-) pacemaker, past MI, cardiac stents, CABG      Neuro/Psych- negative ROS  GI/Hepatic/Renal/Endo    (+) GERD, PUD, renal disease-, diabetes mellitus using insulin    Musculoskeletal     Abdominal    Substance History      OB/GYN          Other                          Anesthesia Plan    ASA 3     MAC     (Preop anxiolytic )  intravenous induction     Anesthetic plan, risks, benefits, and alternatives have been provided, discussed and informed consent has been obtained with: patient.        CODE STATUS:

## 2024-06-13 NOTE — OP NOTE
Carlee Vee  6/13/2024     PREOPERATIVE DIAGNOSIS: PAD (peripheral artery disease) [I73.9]  Preop testing [Z01.818]     POSTOPERATIVE DIAGNOSIS: Post-Op Diagnosis Codes:     * PAD (peripheral artery disease) [I73.9]     * Preop testing [Z01.818]     PROCEDURE PERFORMED:   1.  Introduction of catheter/sheath into the aorta  2.  Aortoiliac angiogram with right lower extremity runoff  3.  Contralateral cannulation of the right common iliac artery  4.  Crossing of a proximal/mid SFA chronic total occlusion  5.  Placement of a 6 mm spider distal embolic protection device in the popliteal artery  6.  Hawkone directional atherectomy of the proximal/mid superficial femoral artery  7.  Balloon angioplasty of the proximal/mid superficial femoral artery using a 5 x 250 mm Medtronic drug-coated balloon  8.  Retrieval of the spider distal embolic protection device  9.  Completion right lower extremity angiogram with radiographic supervision and interpretation  10.  Mynx closure of the left common femoral artery     SURGEON: Geoffrey Gold DO      ANESTHESIA: MAC    PREPARATION: Routine.    STAFF: Circulator: Elsa Tirado RN; Tatyana Giraldo RN  Scrub Person: Estuardo Mcneil; Vanessa Ohara  Assistant: Whitley Stark  Vascular Radiology Technician: Gayatri Nogueira    Estimated Blood Loss: minimal    SPECIMENS: None    COMPLICATIONS: None    INDICATIONS: Carlee Vee is a 63 y.o. female who reports previous weight loss of about 30 to 40 pounds unintentionally.  She did undergo an endoscopy recently.  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  She denies any symptoms of mesenteric ischemia.  She is having ear problems bilaterally.  She does continue to have neuropathy to her legs and right leg is painful to touch.  She did have noninvasive testing performed today, which I did review in office. The indications, risks, and possible complications of the procedure were explained to the patient,  who voiced understanding and wished to proceed with surgery.     PROCEDURE IN DETAIL: The patient was taken to the operating room and placed on the operating table in a supine position. After MAC anesthesia was obtained, the bilateral groins was prepped and draped in a sterile manner.  5 cc of 0.5% Marcaine plain was used to infiltrate the left groin for local anesthesia.  Using a micropuncture technique the left common femoral artery was cannulated and a microsheath was placed.  Advantage Glidewire was advanced into the aorta and a short 6 Faroese sheath was placed.  The patient was given 1000 units of intravenous heparin.  The Omni Flush catheter was advanced into the aorta and an aortoiliac angiogram was performed.  Findings are as follows:  1.  Patent renal arteries bilaterally without stenosis  2.  Patent aorta without stenosis  3.  Patent iliac systems bilaterally with mild disease present    Contralateral cannulation was established of the right common iliac artery.  The catheter was then brought down to the level of the femoral head.  Angiogram with runoff was performed.  Findings are as follows:  1.  Patent common femoral and profunda femoris arteries without stenosis  2.  Patent superficial femoral artery just past the takeoff with complete occlusion after that and then reconstitution of the distal SFA at the adductor hiatus  3.  Patent popliteal artery without stenosis  4.  Patent three-vessel runoff to the foot without stenosis    At this point, the decision was made to treat the SFA occlusive disease.  A 7 Faroese by 45 cm destination sheath was placed down into the common femoral artery.  The patient was given 4000 units of intravenous heparin.  With the help of the Churchill cross catheter, the SFA  was traversed.  An angiogram was performed distally to ensure that I was in true lumen.  A 6 mm spider distal embolic protection device was placed in the popliteal artery behind the knee.  [employer]  atherectomy was then performed of the proximal/mid SFA chronic total occlusion.  Multiple passes were made achieving significant luminal gain.  Next, the proximal/mid SFA  was balloon angioplastied with a 5 x 250 mm Medtronic drug-coated balloon.  The spider was then successfully retrieved.  Completion angiogram was performed which showed rapid flow down through the SFA and popliteal artery without any residual stenosis, dissection, or occlusion.  There was still maintained three-vessel runoff to the foot.  At this point, I felt the result was excellent and no further intervention was warranted.  The sheath and wire were removed.  A minx device was used to seal off the left common femoral artery.  Direct pressure was held for an additional 10 to 15 minutes to help ensure hemostasis.  Sterile dressings were applied. The patient tolerated the procedure well. Sponge and needle counts were correct. The patient was then awakened in the operating room and taken to the recovery room in good condition.    Geoffrey Gold, DO  Date: 6/13/2024 Time: 13:59 CDT    CC:Elinor Richter, CLEMENTE

## 2024-06-26 ENCOUNTER — TELEPHONE (OUTPATIENT)
Dept: VASCULAR SURGERY | Facility: CLINIC | Age: 64
End: 2024-06-26
Payer: MEDICARE

## 2024-06-27 ENCOUNTER — OFFICE VISIT (OUTPATIENT)
Dept: VASCULAR SURGERY | Facility: CLINIC | Age: 64
End: 2024-06-27
Payer: MEDICARE

## 2024-06-27 VITALS
WEIGHT: 172 LBS | BODY MASS INDEX: 31.65 KG/M2 | DIASTOLIC BLOOD PRESSURE: 82 MMHG | HEIGHT: 62 IN | HEART RATE: 92 BPM | OXYGEN SATURATION: 98 % | SYSTOLIC BLOOD PRESSURE: 162 MMHG

## 2024-06-27 DIAGNOSIS — Z01.818 PREOP TESTING: ICD-10-CM

## 2024-06-27 DIAGNOSIS — E78.2 MIXED HYPERLIPIDEMIA: ICD-10-CM

## 2024-06-27 DIAGNOSIS — I73.9 PAD (PERIPHERAL ARTERY DISEASE): Primary | ICD-10-CM

## 2024-06-27 DIAGNOSIS — I77.1 CELIAC ARTERY STENOSIS: ICD-10-CM

## 2024-06-27 DIAGNOSIS — I73.9 CLAUDICATION: ICD-10-CM

## 2024-06-27 DIAGNOSIS — I10 ESSENTIAL HYPERTENSION: ICD-10-CM

## 2024-06-27 DIAGNOSIS — Z79.02 ENCOUNTER FOR MONITORING ANTIPLATELET THERAPY: ICD-10-CM

## 2024-06-27 DIAGNOSIS — Z51.81 ENCOUNTER FOR MONITORING ANTIPLATELET THERAPY: ICD-10-CM

## 2024-06-27 NOTE — PROGRESS NOTES
"6/27/2024       Elinor Richter, APRN  [address] [facility]  [place] [address]      Carlee Vee  1960    Chief Complaint   Patient presents with    Post-op     2 week post op. Right angio done 6/13/24. Patient denies any issues postoperatively.        Dear Elinor Richter, APRN     HPI  I had the pleasure of seeing your patient Carlee Vee in the office today.  As you recall, Carlee Vee is a 63 y.o.  female who you are currently following for routine health maintenance.  Difficulty swallowing and weight loss. she did undergo right lower extremity angiogram with crossing proximal/mid SFA chronic total occlusion with atherectomy and balloon angioplasty on 6/13/2024.  Her left groin has healed.  She is maintained on aspirin, Plavix, and Crestor.  She is doing great and reports her claudication has resolved to her right lower extremity but continues to have to her left.    Review of Systems   Constitutional: Negative.    HENT: Negative.     Eyes: Negative.    Respiratory: Negative.     Cardiovascular: Negative.    Gastrointestinal: Negative.    Endocrine: Negative.    Genitourinary: Negative.    Musculoskeletal: Negative.    Skin: Negative.    Allergic/Immunologic: Negative.    Neurological: Negative.         Leg sensitive to touch   Hematological: Negative.    Psychiatric/Behavioral: Negative.     All other systems reviewed and are negative.       /82   Pulse 92   Ht 157.5 cm (62\")   Wt 78 kg (172 lb)   SpO2 98%   BMI 31.46 kg/m²   Physical Exam  Vitals and nursing note reviewed.   Constitutional:       General: She is not in acute distress.     Appearance: Normal appearance. She is well-developed. She is obese. She is not diaphoretic.   HENT:      Head: Normocephalic and atraumatic.   Eyes:      General: No scleral icterus.     Pupils: Pupils are equal, round, and reactive to light.   Neck:      Thyroid: No thyromegaly.      Vascular: No carotid bruit or JVD.   Cardiovascular:      Rate and Rhythm: " Normal rate and regular rhythm.      Heart sounds: Normal heart sounds and S2 normal. No murmur heard.     No friction rub. No gallop.      Comments: Left groin healed, palpable pulses right DP/PT  Pulmonary:      Effort: Pulmonary effort is normal.      Breath sounds: Normal breath sounds.   Abdominal:      General: Bowel sounds are normal.      Palpations: Abdomen is soft.   Musculoskeletal:         General: Normal range of motion.      Cervical back: Normal range of motion and neck supple.   Skin:     General: Skin is warm and dry.   Neurological:      Mental Status: She is alert and oriented to person, place, and time.      Cranial Nerves: No cranial nerve deficit.      Sensory: Sensory deficit present.   Psychiatric:         Mood and Affect: Mood normal.         Behavior: Behavior normal.         Thought Content: Thought content normal.         Judgment: Judgment normal.       History: PAD     Comments: Bilateral lower extremity arterial with multi-level pulse  volume recordings and segmental pressures were performed at rest and  stress.     The right ankle/brachial index is 0.57. The waveforms are biphasic  without dampening. These findings are consistent with moderate arterial  insufficiency of the right lower extremity at rest. There is likely  proximal SFA occlusive disease.     The left ankle/brachial index is 0.81. The waveforms are biphasic  without dampening. These findings are consistent with mild arterial  insufficiency of the left lower extremity at rest.    There is likely  proximal SFA occlusive disease.     IMPRESSION:  Impression:  1. Moderate arterial insufficiency of the right lower extremity at rest.  2. Mild arterial insufficiency of the left lower extremity at rest.           This report was signed and finalized on 5/22/2024 3:18 PM by Dr. Geoffrey Gold MD.    Patient Active Problem List   Diagnosis    Garza's esophagus without dysplasia    Gastroesophageal reflux disease without  esophagitis    Adenomatous colon polyp    Pneumonia    Empyema of pleura    Hyperglycemia    Esophageal dysphagia    Weight loss    Celiac artery stenosis    Essential hypertension    Mixed hyperlipidemia    Bilateral carotid artery stenosis    PAD (peripheral artery disease)    Preop testing        Diagnosis Plan   1. PAD (peripheral artery disease)  CBC and Differential    Basic metabolic panel    APTT    Protime-INR    Case Request    ceFAZolin (ANCEF) 2,000 mg in sodium chloride 0.9 % 100 mL IVPB    Case Request      2. Claudication        3. Preop testing  CBC and Differential    Basic metabolic panel    APTT    Protime-INR    Case Request    ceFAZolin (ANCEF) 2,000 mg in sodium chloride 0.9 % 100 mL IVPB    Case Request      4. Encounter for monitoring antiplatelet therapy  APTT      5. Essential hypertension        6. Mixed hyperlipidemia        7. Celiac artery stenosis                Plan: After thoroughly evaluating Carlee Vee, I am pleased to report he she is doing well status post right lower extremity angiogram.  Her left groin is healed and pulses are palpable.  She reports that her claudication to her right lower extremity has resolved.  She does have continued complaints of claudication to her left lower extremity and previous testing noted mild arterial insufficiency with likely disease in the SFA as well.  She would like to proceed with left lower extremity angiogram.  Risks of angiogram were discussed.  These include, but are not limited to, bleeding, infection, vessel damage, nerve damage, embolus, and loss of limb.  The patient understands these risks and wishes to proceed with procedure.  She is not having any symptoms of mesenteric ischemia at this time but if her symptomatology worsens at any time then we can always proceed with celiac artery stenting. I did discuss vascular risk factors as they pertain to the progression of vascular disease including controlling her hypertension and  hyperlipidemia.  Her blood pressure is elevated at 162/82.  She should continue her aspirin 81 mg daily, Plavix 75 mg daily, and Crestor 10 mg daily in addition to her other medications.  This was all discussed in full with complete understanding.  Thank you for allowing me to participate in the care of your patient.  Please do not hesitate to call with any questions or concerns.  We will keep you aware of any further encounters with Carlee Vee.        Sincerely yours,         CLEMENTE Argueta Staci W, APRN

## 2024-06-27 NOTE — LETTER
June 27, 2024     CLEMENTE Gonzalez  [address] [facility] [place] [address]    Patient: Carlee Vee   YOB: 1960   Date of Visit: 6/27/2024     Dear CLEMENTE Gonzalez:       Thank you for referring Carlee Vee to me for evaluation. Below are the relevant portions of my assessment and plan of care.    If you have questions, please do not hesitate to call me. I look forward to following Carlee along with you.         Sincerely,        CLEMENTE Argueta        CC: No Recipients    Nivia Dickerson APRN  06/27/24 1032  Sign when Signing Visit  6/27/2024       Elinor Richter APRN  [address] [facility] [place] [address]      Carlee Vee  1960    Chief Complaint   Patient presents with   • Post-op     2 week post op. Right angio done 6/13/24. Patient denies any issues postoperatively.        Dear Elinor Richter APRN     HPI  I had the pleasure of seeing your patient Carlee Vee in the office today.  As you recall, Carlee Vee is a 63 y.o.  female who you are currently following for routine health maintenance.  Difficulty swallowing and weight loss. she did undergo right lower extremity angiogram with crossing proximal/mid SFA chronic total occlusion with atherectomy and balloon angioplasty on 6/13/2024.  Her left groin has healed.  She is maintained on aspirin, Plavix, and Crestor.  She is doing great and reports her claudication has resolved to her right lower extremity but continues to have to her left.    Review of Systems   Constitutional: Negative.    HENT: Negative.     Eyes: Negative.    Respiratory: Negative.     Cardiovascular: Negative.    Gastrointestinal: Negative.    Endocrine: Negative.    Genitourinary: Negative.    Musculoskeletal: Negative.    Skin: Negative.    Allergic/Immunologic: Negative.    Neurological: Negative.         Leg sensitive to touch   Hematological: Negative.    Psychiatric/Behavioral: Negative.     All other systems reviewed and are negative.       BP  "162/82   Pulse 92   Ht 157.5 cm (62\")   Wt 78 kg (172 lb)   SpO2 98%   BMI 31.46 kg/m²   Physical Exam  Vitals and nursing note reviewed.   Constitutional:       General: She is not in acute distress.     Appearance: Normal appearance. She is well-developed. She is obese. She is not diaphoretic.   HENT:      Head: Normocephalic and atraumatic.   Eyes:      General: No scleral icterus.     Pupils: Pupils are equal, round, and reactive to light.   Neck:      Thyroid: No thyromegaly.      Vascular: No carotid bruit or JVD.   Cardiovascular:      Rate and Rhythm: Normal rate and regular rhythm.      Heart sounds: Normal heart sounds and S2 normal. No murmur heard.     No friction rub. No gallop.      Comments: Left groin healed, palpable pulses right DP/PT  Pulmonary:      Effort: Pulmonary effort is normal.      Breath sounds: Normal breath sounds.   Abdominal:      General: Bowel sounds are normal.      Palpations: Abdomen is soft.   Musculoskeletal:         General: Normal range of motion.      Cervical back: Normal range of motion and neck supple.   Skin:     General: Skin is warm and dry.   Neurological:      Mental Status: She is alert and oriented to person, place, and time.      Cranial Nerves: No cranial nerve deficit.      Sensory: Sensory deficit present.   Psychiatric:         Mood and Affect: Mood normal.         Behavior: Behavior normal.         Thought Content: Thought content normal.         Judgment: Judgment normal.       History: PAD     Comments: Bilateral lower extremity arterial with multi-level pulse  volume recordings and segmental pressures were performed at rest and  stress.     The right ankle/brachial index is 0.57. The waveforms are biphasic  without dampening. These findings are consistent with moderate arterial  insufficiency of the right lower extremity at rest. There is likely  proximal SFA occlusive disease.     The left ankle/brachial index is 0.81. The waveforms are " biphasic  without dampening. These findings are consistent with mild arterial  insufficiency of the left lower extremity at rest.    There is likely  proximal SFA occlusive disease.     IMPRESSION:  Impression:  1. Moderate arterial insufficiency of the right lower extremity at rest.  2. Mild arterial insufficiency of the left lower extremity at rest.           This report was signed and finalized on 5/22/2024 3:18 PM by Dr. Geoffrey Gold MD.    Patient Active Problem List   Diagnosis   • Garza's esophagus without dysplasia   • Gastroesophageal reflux disease without esophagitis   • Adenomatous colon polyp   • Pneumonia   • Empyema of pleura   • Hyperglycemia   • Esophageal dysphagia   • Weight loss   • Celiac artery stenosis   • Essential hypertension   • Mixed hyperlipidemia   • Bilateral carotid artery stenosis   • PAD (peripheral artery disease)   • Preop testing        Diagnosis Plan   1. PAD (peripheral artery disease)  CBC and Differential    Basic metabolic panel    APTT    Protime-INR    Case Request    ceFAZolin (ANCEF) 2,000 mg in sodium chloride 0.9 % 100 mL IVPB    Case Request      2. Claudication        3. Preop testing  CBC and Differential    Basic metabolic panel    APTT    Protime-INR    Case Request    ceFAZolin (ANCEF) 2,000 mg in sodium chloride 0.9 % 100 mL IVPB    Case Request      4. Encounter for monitoring antiplatelet therapy  APTT      5. Essential hypertension        6. Mixed hyperlipidemia        7. Celiac artery stenosis                Plan: After thoroughly evaluating Carlee Vee, I am pleased to report he she is doing well status post right lower extremity angiogram.  Her left groin is healed and pulses are palpable.  She reports that her claudication to her right lower extremity has resolved.  She does have continued complaints of claudication to her left lower extremity and previous testing noted mild arterial insufficiency with likely disease in the SFA as well.  She  would like to proceed with left lower extremity angiogram.  Risks of angiogram were discussed.  These include, but are not limited to, bleeding, infection, vessel damage, nerve damage, embolus, and loss of limb.  The patient understands these risks and wishes to proceed with procedure.  She is not having any symptoms of mesenteric ischemia at this time but if her symptomatology worsens at any time then we can always proceed with celiac artery stenting. I did discuss vascular risk factors as they pertain to the progression of vascular disease including controlling her hypertension and hyperlipidemia.  Her blood pressure is elevated at 162/82.  She should continue her aspirin 81 mg daily, Plavix 75 mg daily, and Crestor 10 mg daily in addition to her other medications.  This was all discussed in full with complete understanding.  Thank you for allowing me to participate in the care of your patient.  Please do not hesitate to call with any questions or concerns.  We will keep you aware of any further encounters with Carlee Vee.        Sincerely yours,         CLEMENTE Argueta Staci W, APRN

## 2024-06-28 ENCOUNTER — TELEPHONE (OUTPATIENT)
Dept: VASCULAR SURGERY | Facility: CLINIC | Age: 64
End: 2024-06-28
Payer: MEDICARE

## 2024-07-01 ENCOUNTER — TELEPHONE (OUTPATIENT)
Dept: VASCULAR SURGERY | Facility: CLINIC | Age: 64
End: 2024-07-01
Payer: MEDICARE

## 2024-07-01 DIAGNOSIS — Z79.4 UNCONTROLLED TYPE 2 DIABETES MELLITUS WITH HYPERGLYCEMIA, WITH LONG-TERM CURRENT USE OF INSULIN (HCC): ICD-10-CM

## 2024-07-01 DIAGNOSIS — E11.65 UNCONTROLLED TYPE 2 DIABETES MELLITUS WITH HYPERGLYCEMIA, WITH LONG-TERM CURRENT USE OF INSULIN (HCC): ICD-10-CM

## 2024-07-01 DIAGNOSIS — G62.9 NEUROPATHY: ICD-10-CM

## 2024-07-01 NOTE — TELEPHONE ENCOUNTER
PHARMACY called to request a refill on her medication.      Last office visit : 10/27/2023   Next office visit : Visit date not found     Requested Prescriptions     Pending Prescriptions Disp Refills    gabapentin (NEURONTIN) 300 MG capsule [Pharmacy Med Name: GABAPENTIN 300MG CAPSULES] 120 capsule      Sig: TAKE 2 CAPSULES BY MOUTH TWICE DAILY FOR LEG PAIN            Mikayla Valera MA, CCMA

## 2024-07-01 NOTE — TELEPHONE ENCOUNTER
Please see if she can f/u before refill? I haven't seen her in months and lab is overdue also.  If she will run out before appt, I can send enough until then.

## 2024-07-01 NOTE — TELEPHONE ENCOUNTER
Pt expressed understanding of prework/surgery time and instruction for procedure scheduled 08/02/24 with Dr. Gold.  NPO after midnight.  Pt to continue aspirin, plavix, and crestor uninterrupted prior to procedure.

## 2024-07-02 RX ORDER — GABAPENTIN 300 MG/1
CAPSULE ORAL
Qty: 120 CAPSULE | OUTPATIENT
Start: 2024-07-02

## 2024-07-02 NOTE — TELEPHONE ENCOUNTER
Contract -  03/15/2024 - no answer at [phone] - I left patient a voicemail message letting her know that an appointment was due to get more controlled medication refills as per state regulations.

## 2024-07-26 ENCOUNTER — PRE-ADMISSION TESTING (OUTPATIENT)
Dept: PREADMISSION TESTING | Facility: HOSPITAL | Age: 64
End: 2024-07-26
Payer: MEDICARE

## 2024-07-26 VITALS
DIASTOLIC BLOOD PRESSURE: 74 MMHG | HEIGHT: 62 IN | RESPIRATION RATE: 18 BRPM | BODY MASS INDEX: 33.19 KG/M2 | OXYGEN SATURATION: 97 % | WEIGHT: 180.34 LBS | HEART RATE: 101 BPM | SYSTOLIC BLOOD PRESSURE: 150 MMHG

## 2024-07-26 DIAGNOSIS — Z51.81 ENCOUNTER FOR MONITORING ANTIPLATELET THERAPY: ICD-10-CM

## 2024-07-26 DIAGNOSIS — Z79.02 ENCOUNTER FOR MONITORING ANTIPLATELET THERAPY: ICD-10-CM

## 2024-07-26 DIAGNOSIS — I73.9 PAD (PERIPHERAL ARTERY DISEASE): ICD-10-CM

## 2024-07-26 DIAGNOSIS — Z01.818 PREOP TESTING: ICD-10-CM

## 2024-07-26 LAB
ANION GAP SERPL CALCULATED.3IONS-SCNC: 11 MMOL/L (ref 5–15)
APTT PPP: 25.4 SECONDS (ref 24.5–36)
BASOPHILS # BLD AUTO: 0.04 10*3/MM3 (ref 0–0.2)
BASOPHILS NFR BLD AUTO: 0.5 % (ref 0–1.5)
BUN SERPL-MCNC: 16 MG/DL (ref 8–23)
BUN/CREAT SERPL: 24.6 (ref 7–25)
CALCIUM SPEC-SCNC: 9.2 MG/DL (ref 8.6–10.5)
CHLORIDE SERPL-SCNC: 101 MMOL/L (ref 98–107)
CO2 SERPL-SCNC: 26 MMOL/L (ref 22–29)
CREAT SERPL-MCNC: 0.65 MG/DL (ref 0.57–1)
DEPRECATED RDW RBC AUTO: 40.5 FL (ref 37–54)
EGFRCR SERPLBLD CKD-EPI 2021: 97.8 ML/MIN/1.73
EOSINOPHIL # BLD AUTO: 0.23 10*3/MM3 (ref 0–0.4)
EOSINOPHIL NFR BLD AUTO: 3.1 % (ref 0.3–6.2)
ERYTHROCYTE [DISTWIDTH] IN BLOOD BY AUTOMATED COUNT: 12.4 % (ref 12.3–15.4)
GLUCOSE SERPL-MCNC: 204 MG/DL (ref 65–99)
HCT VFR BLD AUTO: 38.3 % (ref 34–46.6)
HGB BLD-MCNC: 13.4 G/DL (ref 12–15.9)
IMM GRANULOCYTES # BLD AUTO: 0.01 10*3/MM3 (ref 0–0.05)
IMM GRANULOCYTES NFR BLD AUTO: 0.1 % (ref 0–0.5)
INR PPP: 0.86 (ref 0.91–1.09)
LYMPHOCYTES # BLD AUTO: 2.56 10*3/MM3 (ref 0.7–3.1)
LYMPHOCYTES NFR BLD AUTO: 34.3 % (ref 19.6–45.3)
MCH RBC QN AUTO: 31.4 PG (ref 26.6–33)
MCHC RBC AUTO-ENTMCNC: 35 G/DL (ref 31.5–35.7)
MCV RBC AUTO: 89.7 FL (ref 79–97)
MONOCYTES # BLD AUTO: 0.55 10*3/MM3 (ref 0.1–0.9)
MONOCYTES NFR BLD AUTO: 7.4 % (ref 5–12)
NEUTROPHILS NFR BLD AUTO: 4.07 10*3/MM3 (ref 1.7–7)
NEUTROPHILS NFR BLD AUTO: 54.6 % (ref 42.7–76)
NRBC BLD AUTO-RTO: 0 /100 WBC (ref 0–0.2)
PLATELET # BLD AUTO: 167 10*3/MM3 (ref 140–450)
PMV BLD AUTO: 12.4 FL (ref 6–12)
POTASSIUM SERPL-SCNC: 3.8 MMOL/L (ref 3.5–5.2)
PROTHROMBIN TIME: 12.1 SECONDS (ref 11.8–14.8)
RBC # BLD AUTO: 4.27 10*6/MM3 (ref 3.77–5.28)
SODIUM SERPL-SCNC: 138 MMOL/L (ref 136–145)
WBC NRBC COR # BLD AUTO: 7.46 10*3/MM3 (ref 3.4–10.8)

## 2024-07-26 PROCEDURE — 85025 COMPLETE CBC W/AUTO DIFF WBC: CPT

## 2024-07-26 PROCEDURE — 36415 COLL VENOUS BLD VENIPUNCTURE: CPT

## 2024-07-26 PROCEDURE — 85730 THROMBOPLASTIN TIME PARTIAL: CPT

## 2024-07-26 PROCEDURE — 80048 BASIC METABOLIC PNL TOTAL CA: CPT

## 2024-07-26 PROCEDURE — 85610 PROTHROMBIN TIME: CPT

## 2024-07-26 NOTE — DISCHARGE INSTRUCTIONS
Preparing for Surgery  Follow these instructions before the procedure:  Several days or weeks before your procedure  Ask your health care provider about:  Changing or stopping your regular medicines. This is especially important if you are taking diabetes medicines or blood thinners.  Taking medicines such as aspirin and ibuprofen. These medicines can thin your blood. Do not take these medicines unless your health care provider tells you to take them.  Taking over-the-counter medicines, vitamins, herbs, and supplements.    Contact your surgeon if you:  Develop a fever of more than 100.4°F (38°C) or other feelings of illness during the 48 hours before your surgery.  Have symptoms that get worse.  Have questions or concerns about your surgery.  If you are going home the same day of your surgery you will need to arrange for a responsible adult, age 18 years old or older, to drive you home from the hospital and stay with you for 24 hours. Verification of the  will be made prior to any procedure requiring sedation. You may not go home in a taxi or any form of public transportation by yourself.     Day before your procedure  Medication(s) you need to stop the day before your surgery: Losartan (Cozaar)    24 hours before your procedure DO NOT drink alcoholic beverages or smoke.  24 hours before your procedure STOP taking Erectile Dysfunction medication (i.e.,Cialis, Viagra)   You may be asked to shower with a germ-killing soap.  Day of your procedure   You may take the following medication(s) the morning of surgery with a sip of water: Pantoprazole (Protonix)      8 hours before your procedure STOP all food, any dairy products, and full liquids. This includes hard candy, chewing gum or mints. This is extremely important to prevent serious complications.     Up to 2 hours before your scheduled arrival time, you may have clear liquids no cream, powder, or pulp of any kind. Safe options are water, black coffee, plain  tea, soda, Gatorade/Powerade, clear broth, apple juice.    2 hours before your scheduled arrival time, STOP drinking clear liquids.    You may need to take another shower with a germ-killing soap before you leave home in the morning. Do not use perfumes, colognes, or body lotions.  Wear comfortable loose-fitting clothing.  Remove all jewelry including body piercing and rings, dark colored nail polish, and make up prior to arrival at the hospital. Leave all valuables at home.   Bring your hearing aids if you rely on them.  Do not wear contact lenses. If you wear eyeglasses remember to bring a case to store them in while you are in surgery.  Do not use denture adhesives since you will be asked to remove them during your surgery.    You do not need to bring your home medications into the hospital.   Bring your sleep apnea device with you on the day of your surgery (if this applies to you).  If you wear portable oxygen, bring it with you.   If you are staying overnight, you may bring a bag of items you may need such as slippers, robe and a change of clothes for your discharge. You may want to leave these items in the car until you are ready for them since your family will take your belongings when you leave the pre-operative area.  Arrive at the hospital as scheduled by the office. You will be asked to arrive 2 hours prior to your surgery time in order to prepare for your procedure.  When you arrive at the hospital  Go to the registration desk located at the main entrance of the hospital.  After registration is completed, you will be given a beeper and a sticker sheet. Take the stickers to Outpatient Surgery and place in the tray at the end of the desk to notify the staff that you have arrived and registered.   Return to the lobby to wait. You are not always called back according to the time of arrival but rather the time your doctor will be ready.  When your beeper lights up and vibrates proceed through the double  doors, under the stairs, and a member of the Outpatient Surgery staff will escort you to your preoperative room.       How to Use Chlorhexidine Before Surgery  Chlorhexidine gluconate (CHG) is a germ-killing (antiseptic) solution that is used to clean the skin. It can get rid of the bacteria that normally live on the skin and can keep them away for about 24 hours. To clean your skin with CHG, you may be given:  A CHG solution to use in the shower or as part of a sponge bath.  A prepackaged cloth that contains CHG.  Cleaning your skin with CHG may help lower the risk for infection:  While you are staying in the intensive care unit of the hospital.  If you have a vascular access, such as a central line, to provide short-term or long-term access to your veins.  If you have a catheter to drain urine from your bladder.  If you are on a ventilator. A ventilator is a machine that helps you breathe by moving air in and out of your lungs.  After surgery.  What are the risks?  Risks of using CHG include:  A skin reaction.  Hearing loss, if CHG gets in your ears and you have a perforated eardrum.  Eye injury, if CHG gets in your eyes and is not rinsed out.  The CHG product catching fire.  Make sure that you avoid smoking and flames after applying CHG to your skin.  Do not use CHG:  If you have a chlorhexidine allergy or have previously reacted to chlorhexidine.  On babies younger than 2 months of age.  How to use CHG solution  Use CHG only as told by your health care provider, and follow the instructions on the label.  Use the full amount of CHG as directed. Usually, this is one bottle.  During a shower    Follow these steps when using CHG solution during a shower (unless your health care provider gives you different instructions):  Start the shower.  Use your normal soap and shampoo to wash your face and hair.  Turn off the shower or move out of the shower stream.  Pour the CHG onto a clean washcloth. Do not use any type of  brush or rough-edged sponge.  Starting at your neck, lather your body down to your toes. Make sure you follow these instructions:  If you will be having surgery, pay special attention to the part of your body where you will be having surgery. Scrub this area for at least 1 minute.  Do not use CHG on your head or face. If the solution gets into your ears or eyes, rinse them well with water.  Avoid your genital area.  Avoid any areas of skin that have broken skin, cuts, or scrapes.  Scrub your back and under your arms. Make sure to wash skin folds.  Let the lather sit on your skin for 1-2 minutes or as long as told by your health care provider.  Thoroughly rinse your entire body in the shower. Make sure that all body creases and crevices are rinsed well.  Dry off with a clean towel. Do not put any substances on your body afterward--such as powder, lotion, or perfume--unless you are told to do so by your health care provider. Only use lotions that are recommended by the .  Put on clean clothes or pajamas.  If it is the night before your surgery, sleep in clean sheets.     During a sponge bath  Follow these steps when using CHG solution during a sponge bath (unless your health care provider gives you different instructions):  Use your normal soap and shampoo to wash your face and hair.  Pour the CHG onto a clean washcloth.  Starting at your neck, lather your body down to your toes. Make sure you follow these instructions:  If you will be having surgery, pay special attention to the part of your body where you will be having surgery. Scrub this area for at least 1 minute.  Do not use CHG on your head or face. If the solution gets into your ears or eyes, rinse them well with water.  Avoid your genital area.  Avoid any areas of skin that have broken skin, cuts, or scrapes.  Scrub your back and under your arms. Make sure to wash skin folds.  Let the lather sit on your skin for 1-2 minutes or as long as told by  your health care provider.  Using a different clean, wet washcloth, thoroughly rinse your entire body. Make sure that all body creases and crevices are rinsed well.  Dry off with a clean towel. Do not put any substances on your body afterward--such as powder, lotion, or perfume--unless you are told to do so by your health care provider. Only use lotions that are recommended by the .  Put on clean clothes or pajamas.  If it is the night before your surgery, sleep in clean sheets.  How to use CHG prepackaged cloths  Only use CHG cloths as told by your health care provider, and follow the instructions on the label.  Use the CHG cloth on clean, dry skin.  Do not use the CHG cloth on your head or face unless your health care provider tells you to.  When washing with the CHG cloth:  Avoid your genital area.  Avoid any areas of skin that have broken skin, cuts, or scrapes.  Before surgery    Follow these steps when using a CHG cloth to clean before surgery (unless your health care provider gives you different instructions):  Using the CHG cloth, vigorously scrub the part of your body where you will be having surgery. Scrub using a back-and-forth motion for 3 minutes. The area on your body should be completely wet with CHG when you are done scrubbing.  Do not rinse. Discard the cloth and let the area air-dry. Do not put any substances on the area afterward, such as powder, lotion, or perfume.  Put on clean clothes or pajamas.  If it is the night before your surgery, sleep in clean sheets.     For general bathing  Follow these steps when using CHG cloths for general bathing (unless your health care provider gives you different instructions).  Use a separate CHG cloth for each area of your body. Make sure you wash between any folds of skin and between your fingers and toes. Wash your body in the following order, switching to a new cloth after each step:  The front of your neck, shoulders, and chest.  Both of your  arms, under your arms, and your hands.  Your stomach and groin area, avoiding the genitals.  Your right leg and foot.  Your left leg and foot.  The back of your neck, your back, and your buttocks.  Do not rinse. Discard the cloth and let the area air-dry. Do not put any substances on your body afterward--such as powder, lotion, or perfume--unless you are told to do so by your health care provider. Only use lotions that are recommended by the .  Put on clean clothes or pajamas.  Contact a health care provider if:  Your skin gets irritated after scrubbing.  You have questions about using your solution or cloth.  You swallow any chlorhexidine. Call your local poison control center (1-784.977.2319 in the [place]  Get help right away if:  Your eyes itch badly, or they become very red or swollen.  Your skin itches badly and is red or swollen.  Your hearing changes.  You have trouble seeing.  You have swelling or tingling in your mouth or throat.  You have trouble breathing.  These symptoms may represent a serious problem that is an emergency. Do not wait to see if the symptoms will go away. Get medical help right away. Call your local emergency services (002 in the [place] Do not drive yourself to the hospital.  Summary  Chlorhexidine gluconate (CHG) is a germ-killing (antiseptic) solution that is used to clean the skin. Cleaning your skin with CHG may help to lower your risk for infection.  You may be given CHG to use for bathing. It may be in a bottle or in a prepackaged cloth to use on your skin. Carefully follow your health care provider's instructions and the instructions on the product label.  Do not use CHG if you have a chlorhexidine allergy.  Contact your health care provider if your skin gets irritated after scrubbing.  This information is not intended to replace advice given to you by your health care provider. Make sure you discuss any questions you have with your health care provider.  Document  Revised: 04/17/2023 Document Reviewed: 02/28/2022  Elsevier Patient Education © 2023 [employer]

## 2024-07-26 NOTE — H&P
7/25/2024          Elinor Richter, APRN  [address] [place] [address]        Carlee Vee  1960          Chief Complaint   Patient presents with    Post-op       2 week post op. Right angio done 6/13/24. Patient denies any issues postoperatively.          Dear Elinor Richter, APRN      HPI  I had the pleasure of seeing your patient Carlee Vee in the office today.  As you recall, Carlee Vee is a 63 y.o.  female who you are currently following for routine health maintenance.  Difficulty swallowing and weight loss. she did undergo right lower extremity angiogram with crossing proximal/mid SFA chronic total occlusion with atherectomy and balloon angioplasty on 6/13/2024.  Her left groin has healed.  She is maintained on aspirin, Plavix, and Crestor.  She is doing great and reports her claudication has resolved to her right lower extremity but continues to have to her left.    Past Medical History:   Diagnosis Date    Arthritis     Garza's esophagus     Depression     Diverticulosis     GERD (gastroesophageal reflux disease)     History of adenomatous polyp of colon     History of colon polyps     Hyperlipidemia     Hypertension     Kidney stone     PONV (postoperative nausea and vomiting)     Type 2 diabetes mellitus      Past Surgical History:   Procedure Laterality Date    AORTAGRAM Right 6/13/2024    Procedure: Right lower extremity angiogram, hawk athrectomy, balloon angioplasty, mynx closure;  Surgeon: Geoffrey Gold DO;  Location: [facility] OR;  Service: Vascular;  Laterality: Right;    BACK SURGERY      CATARACT EXTRACTION WITH INTRAOCULAR LENS IMPLANT Bilateral     COLONOSCOPY  10/18/2006    Thickened fold in the ascending colon-path shows hyperplastic polyp; Repeat 7 years    COLONOSCOPY N/A 07/24/2017    One 4mm tubular adenomatous polyp in the transverse colon; One 5mm hyperplastic polyp in the sigmoid colon; The examination was otherwise normal on direct and retroflexion views;  Repeat 5 years    COLONOSCOPY N/A 03/23/2023    Diverticulosis in the entire examined colon; One 5mm tubular adenomatous polyp in the ascending colon-Clip (MR conditional) was placed; One 12mm tubular adenomatous polyp in the transverse colon; Repeat 3 years    DILATATION AND CURETTAGE      ENDOSCOPY N/A 07/24/2017    Esophageal mucosal changes secondary to established short-segment Garza's disease-biopsied; Small HH; Normal stomach; Normal examined duodenum; Repeat 3 years    ENDOSCOPY  12/08/2011    Esophagitis-biopsied; Garza's-biopsied; HH; Repeat 3 years    ENDOSCOPY  10/27/2008    Garza's esophagus-biopsied; HH; Repeat 2 years    ENDOSCOPY  10/23/2006    HH; Probable Garza's esophagus-biopsied; Repeat 2 years    ENDOSCOPY N/A 08/21/2020    Small HH; Esophageal mucosal changes secondary to established long-segment Garza's disease-biopsied; Normal stomach; Normal examined duodenum; Repeat 3 years    ENDOSCOPY N/A 03/23/2023    Esophageal mucosal changes secondary to established long-segment Garza's disease-biopsied; Medium-sized HH; Normal stomach; Normal examined duodenum-biopsied; Repeat 3 years    HYSTERECTOMY      THORACOTOMY Left 03/27/2018    Procedure: THORACOTOMY, drainage of empyema and decortication;  Surgeon: Abel Stark MD;  Location: [facility] OR;  Service: Cardiothoracic    TUBAL ABDOMINAL LIGATION       Social History     Socioeconomic History    Marital status:    Tobacco Use    Smoking status: Former     Types: Cigarettes    Smokeless tobacco: Never   Vaping Use    Vaping status: Never Used   Substance and Sexual Activity    Alcohol use: Not Currently    Drug use: No    Sexual activity: Not Currently     Partners: Female     Birth control/protection: Tubal ligation, Hysterectomy     Family History   Problem Relation Age of Onset    Colon cancer Neg Hx     Colon polyps Neg Hx     Esophageal cancer Neg Hx     Liver cancer Neg Hx     Liver disease Neg Hx     Rectal cancer Neg Hx  "    Stomach cancer Neg Hx      Allergies   Allergen Reactions    Sulfa Antibiotics Hives     Current Outpatient Medications   Medication Instructions    amLODIPine (NORVASC) 10 mg, Oral, Daily    aspirin 81 mg, Oral, Daily    clopidogrel (PLAVIX) 75 mg, Oral, Daily    FLUoxetine (PROZAC) 20 mg, Oral, Daily    gabapentin (NEURONTIN) 300 mg, Oral, Nightly PRN    insulin aspart (novoLOG) 100 UNIT/ML injection Subcutaneous, 3 Times Daily Before Meals, 150 - 199 = 4 units<BR>200 - 249 = 8 units<BR>250 - 299 = 12 units<BR>300 - 349 = 16 units<BR>349 - 400 = 20 units<BR>> 400 = 24 units    insulin detemir (LEVEMIR) 17 Units, Subcutaneous, Every 12 Hours Scheduled    losartan (COZAAR) 25 mg, Oral, Daily, for blood pressure    pantoprazole (PROTONIX) 40 mg, Oral, 2 Times Daily    rOPINIRole (REQUIP) 0.25 mg, Oral, Nightly    rosuvastatin (CRESTOR) 10 mg, Oral, Nightly    vitamin D (ERGOCALCIFEROL) 50,000 Units, Oral, Weekly, Fridays         Review of Systems   Constitutional: Negative.    HENT: Negative.     Eyes: Negative.    Respiratory: Negative.     Cardiovascular: Negative.    Gastrointestinal: Negative.    Endocrine: Negative.    Genitourinary: Negative.    Musculoskeletal: Negative.    Skin: Negative.    Allergic/Immunologic: Negative.    Neurological: Negative.         Leg sensitive to touch   Hematological: Negative.    Psychiatric/Behavioral: Negative.     All other systems reviewed and are negative.        /82   Pulse 92   Ht 157.5 cm (62\")   Wt 78 kg (172 lb)   SpO2 98%   BMI 31.46 kg/m²   Physical Exam  Vitals and nursing note reviewed.   Constitutional:       General: She is not in acute distress.     Appearance: Normal appearance. She is well-developed. She is obese. She is not diaphoretic.   HENT:      Head: Normocephalic and atraumatic.   Eyes:      General: No scleral icterus.     Pupils: Pupils are equal, round, and reactive to light.   Neck:      Thyroid: No thyromegaly.      Vascular: No " carotid bruit or JVD.   Cardiovascular:      Rate and Rhythm: Normal rate and regular rhythm.      Heart sounds: Normal heart sounds and S2 normal. No murmur heard.     No friction rub. No gallop.      Comments: Left groin healed, palpable pulses right DP/PT  Pulmonary:      Effort: Pulmonary effort is normal.      Breath sounds: Normal breath sounds.   Abdominal:      General: Bowel sounds are normal.      Palpations: Abdomen is soft.   Musculoskeletal:         General: Normal range of motion.      Cervical back: Normal range of motion and neck supple.   Skin:     General: Skin is warm and dry.   Neurological:      Mental Status: She is alert and oriented to person, place, and time.      Cranial Nerves: No cranial nerve deficit.      Sensory: Sensory deficit present.   Psychiatric:         Mood and Affect: Mood normal.         Behavior: Behavior normal.         Thought Content: Thought content normal.         Judgment: Judgment normal.         History: PAD     Comments: Bilateral lower extremity arterial with multi-level pulse  volume recordings and segmental pressures were performed at rest and  stress.     The right ankle/brachial index is 0.57. The waveforms are biphasic  without dampening. These findings are consistent with moderate arterial  insufficiency of the right lower extremity at rest. There is likely  proximal SFA occlusive disease.     The left ankle/brachial index is 0.81. The waveforms are biphasic  without dampening. These findings are consistent with mild arterial  insufficiency of the left lower extremity at rest.    There is likely  proximal SFA occlusive disease.     IMPRESSION:  Impression:  1. Moderate arterial insufficiency of the right lower extremity at rest.  2. Mild arterial insufficiency of the left lower extremity at rest.           This report was signed and finalized on 5/22/2024 3:18 PM by Dr. Geoffrey Gold MD.     Problem List       Patient Active Problem List   Diagnosis     Garza's esophagus without dysplasia    Gastroesophageal reflux disease without esophagitis    Adenomatous colon polyp    Pneumonia    Empyema of pleura    Hyperglycemia    Esophageal dysphagia    Weight loss    Celiac artery stenosis    Essential hypertension    Mixed hyperlipidemia    Bilateral carotid artery stenosis    PAD (peripheral artery disease)    Preop testing              Diagnosis Plan   1. PAD (peripheral artery disease)  CBC and Differential     Basic metabolic panel     APTT     Protime-INR     Case Request     ceFAZolin (ANCEF) 2,000 mg in sodium chloride 0.9 % 100 mL IVPB     Case Request       2. Claudication          3. Preop testing  CBC and Differential     Basic metabolic panel     APTT     Protime-INR     Case Request     ceFAZolin (ANCEF) 2,000 mg in sodium chloride 0.9 % 100 mL IVPB     Case Request       4. Encounter for monitoring antiplatelet therapy  APTT       5. Essential hypertension          6. Mixed hyperlipidemia          7. Celiac artery stenosis                      Plan: After thoroughly evaluating Carlee Vee, I am pleased to report he she is doing well status post right lower extremity angiogram.  Her left groin is healed and pulses are palpable.  She reports that her claudication to her right lower extremity has resolved.  She does have continued complaints of claudication to her left lower extremity and previous testing noted mild arterial insufficiency with likely disease in the SFA as well.  She would like to proceed with left lower extremity angiogram.  Risks of angiogram were discussed.  These include, but are not limited to, bleeding, infection, vessel damage, nerve damage, embolus, and loss of limb.  The patient understands these risks and wishes to proceed with procedure.  She is not having any symptoms of mesenteric ischemia at this time but if her symptomatology worsens at any time then we can always proceed with celiac artery stenting. I did discuss vascular  risk factors as they pertain to the progression of vascular disease including controlling her hypertension and hyperlipidemia.  Her blood pressure is elevated at 162/82.  She should continue her aspirin 81 mg daily, Plavix 75 mg daily, and Crestor 10 mg daily in addition to her other medications.  This was all discussed in full with complete understanding.  Thank you for allowing me to participate in the care of your patient.  Please do not hesitate to call with any questions or concerns.  We will keep you aware of any further encounters with Carlee Vee.         Sincerely yours,           Geoffrey Gold, DO Richter, Elinor OCHOA, APRN

## 2024-08-01 ENCOUNTER — TELEPHONE (OUTPATIENT)
Dept: VASCULAR SURGERY | Facility: CLINIC | Age: 64
End: 2024-08-01
Payer: MEDICARE

## 2024-08-01 NOTE — TELEPHONE ENCOUNTER
Pt spouse expressed understanding of arrival time of 9 am for procedure scheduled with Dr. Gold on 08/02/24.  Pt advised NPO after midnight.  Pt verified has continued aspirin, plavix, and crestor uninterrupted prior to procedure.  Pt will not take in am prior to procedure.

## 2024-08-02 ENCOUNTER — ANESTHESIA (OUTPATIENT)
Dept: PERIOP | Facility: HOSPITAL | Age: 64
End: 2024-08-02
Payer: MEDICARE

## 2024-08-02 ENCOUNTER — ANESTHESIA EVENT (OUTPATIENT)
Dept: PERIOP | Facility: HOSPITAL | Age: 64
End: 2024-08-02
Payer: MEDICARE

## 2024-08-02 ENCOUNTER — APPOINTMENT (OUTPATIENT)
Dept: INTERVENTIONAL RADIOLOGY/VASCULAR | Facility: HOSPITAL | Age: 64
End: 2024-08-02
Payer: MEDICARE

## 2024-08-02 ENCOUNTER — HOSPITAL ENCOUNTER (OUTPATIENT)
Facility: HOSPITAL | Age: 64
Setting detail: HOSPITAL OUTPATIENT SURGERY
Discharge: HOME | End: 2024-08-02
Attending: SURGERY | Admitting: SURGERY
Payer: MEDICARE

## 2024-08-02 VITALS
TEMPERATURE: 97 F | HEART RATE: 74 BPM | DIASTOLIC BLOOD PRESSURE: 57 MMHG | SYSTOLIC BLOOD PRESSURE: 136 MMHG | RESPIRATION RATE: 18 BRPM | OXYGEN SATURATION: 93 %

## 2024-08-02 DIAGNOSIS — I73.9 PAD (PERIPHERAL ARTERY DISEASE): ICD-10-CM

## 2024-08-02 DIAGNOSIS — Z01.818 PREOP TESTING: ICD-10-CM

## 2024-08-02 LAB
ABO GROUP BLD: NORMAL
BLD GP AB SCN SERPL QL: NEGATIVE
GLUCOSE BLDC GLUCOMTR-MCNC: 132 MG/DL (ref 70–130)
GLUCOSE BLDC GLUCOMTR-MCNC: 279 MG/DL (ref 70–130)
RH BLD: POSITIVE
T&S EXPIRATION DATE: NORMAL

## 2024-08-02 PROCEDURE — 86901 BLOOD TYPING SEROLOGIC RH(D): CPT | Performed by: NURSE PRACTITIONER

## 2024-08-02 PROCEDURE — 75710 ARTERY X-RAYS ARM/LEG: CPT

## 2024-08-02 PROCEDURE — C1894 INTRO/SHEATH, NON-LASER: HCPCS | Performed by: SURGERY

## 2024-08-02 PROCEDURE — 25810000003 LACTATED RINGERS PER 1000 ML: Performed by: SURGERY

## 2024-08-02 PROCEDURE — 25010000002 HEPARIN (PORCINE) PER 1000 UNITS: Performed by: NURSE ANESTHETIST, CERTIFIED REGISTERED

## 2024-08-02 PROCEDURE — 86900 BLOOD TYPING SEROLOGIC ABO: CPT | Performed by: NURSE PRACTITIONER

## 2024-08-02 PROCEDURE — 75625 CONTRAST EXAM ABDOMINL AORTA: CPT | Performed by: SURGERY

## 2024-08-02 PROCEDURE — 36245 INS CATH ABD/L-EXT ART 1ST: CPT | Performed by: SURGERY

## 2024-08-02 PROCEDURE — 25010000002 PROPOFOL 10 MG/ML EMULSION: Performed by: NURSE ANESTHETIST, CERTIFIED REGISTERED

## 2024-08-02 PROCEDURE — 75625 CONTRAST EXAM ABDOMINL AORTA: CPT

## 2024-08-02 PROCEDURE — 86850 RBC ANTIBODY SCREEN: CPT | Performed by: NURSE PRACTITIONER

## 2024-08-02 PROCEDURE — 25010000002 FENTANYL CITRATE (PF) 100 MCG/2ML SOLUTION: Performed by: NURSE ANESTHETIST, CERTIFIED REGISTERED

## 2024-08-02 PROCEDURE — 25010000002 MIDAZOLAM PER 1MG: Performed by: ANESTHESIOLOGY

## 2024-08-02 PROCEDURE — 75710 ARTERY X-RAYS ARM/LEG: CPT | Performed by: SURGERY

## 2024-08-02 PROCEDURE — C1769 GUIDE WIRE: HCPCS | Performed by: SURGERY

## 2024-08-02 PROCEDURE — C1760 CLOSURE DEV, VASC: HCPCS | Performed by: SURGERY

## 2024-08-02 PROCEDURE — 25010000002 HEPARIN (PORCINE) PER 1000 UNITS: Performed by: SURGERY

## 2024-08-02 PROCEDURE — 25010000002 BUPIVACAINE (PF) 0.5 % SOLUTION: Performed by: SURGERY

## 2024-08-02 PROCEDURE — 25010000002 ONDANSETRON PER 1 MG: Performed by: SURGERY

## 2024-08-02 PROCEDURE — 76000 FLUOROSCOPY <1 HR PHYS/QHP: CPT

## 2024-08-02 PROCEDURE — 76937 US GUIDE VASCULAR ACCESS: CPT | Performed by: SURGERY

## 2024-08-02 PROCEDURE — 25510000001 IOPAMIDOL 61 % SOLUTION: Performed by: SURGERY

## 2024-08-02 PROCEDURE — 82948 REAGENT STRIP/BLOOD GLUCOSE: CPT

## 2024-08-02 PROCEDURE — 25010000002 PROPOFOL 1000 MG/100ML EMULSION: Performed by: NURSE ANESTHETIST, CERTIFIED REGISTERED

## 2024-08-02 PROCEDURE — 25010000002 CEFAZOLIN PER 500 MG: Performed by: NURSE PRACTITIONER

## 2024-08-02 PROCEDURE — C1887 CATHETER, GUIDING: HCPCS | Performed by: SURGERY

## 2024-08-02 RX ORDER — HYDROCODONE BITARTRATE AND ACETAMINOPHEN 5; 325 MG/1; MG/1
1 TABLET ORAL EVERY 4 HOURS PRN
Status: DISCONTINUED | OUTPATIENT
Start: 2024-08-02 | End: 2024-08-02 | Stop reason: HOSPADM

## 2024-08-02 RX ORDER — PROPOFOL 10 MG/ML
INJECTION, EMULSION INTRAVENOUS CONTINUOUS PRN
Status: DISCONTINUED | OUTPATIENT
Start: 2024-08-02 | End: 2024-08-05 | Stop reason: SURG

## 2024-08-02 RX ORDER — FENTANYL CITRATE 50 UG/ML
50 INJECTION, SOLUTION INTRAMUSCULAR; INTRAVENOUS
Status: DISCONTINUED | OUTPATIENT
Start: 2024-08-02 | End: 2024-08-02 | Stop reason: HOSPADM

## 2024-08-02 RX ORDER — LIDOCAINE HYDROCHLORIDE 20 MG/ML
INJECTION, SOLUTION EPIDURAL; INFILTRATION; INTRACAUDAL; PERINEURAL AS NEEDED
Status: DISCONTINUED | OUTPATIENT
Start: 2024-08-02 | End: 2024-08-05 | Stop reason: SURG

## 2024-08-02 RX ORDER — DROPERIDOL 2.5 MG/ML
0.62 INJECTION, SOLUTION INTRAMUSCULAR; INTRAVENOUS ONCE AS NEEDED
Status: DISCONTINUED | OUTPATIENT
Start: 2024-08-02 | End: 2024-08-02 | Stop reason: HOSPADM

## 2024-08-02 RX ORDER — LABETALOL HYDROCHLORIDE 5 MG/ML
5 INJECTION, SOLUTION INTRAVENOUS
Status: DISCONTINUED | OUTPATIENT
Start: 2024-08-02 | End: 2024-08-02 | Stop reason: HOSPADM

## 2024-08-02 RX ORDER — ONDANSETRON 2 MG/ML
4 INJECTION INTRAMUSCULAR; INTRAVENOUS ONCE AS NEEDED
Status: COMPLETED | OUTPATIENT
Start: 2024-08-02 | End: 2024-08-02

## 2024-08-02 RX ORDER — HEPARIN SODIUM 1000 [USP'U]/ML
INJECTION, SOLUTION INTRAVENOUS; SUBCUTANEOUS AS NEEDED
Status: DISCONTINUED | OUTPATIENT
Start: 2024-08-02 | End: 2024-08-05 | Stop reason: SURG

## 2024-08-02 RX ORDER — SODIUM CHLORIDE, SODIUM LACTATE, POTASSIUM CHLORIDE, CALCIUM CHLORIDE 600; 310; 30; 20 MG/100ML; MG/100ML; MG/100ML; MG/100ML
100 INJECTION, SOLUTION INTRAVENOUS CONTINUOUS
Status: DISCONTINUED | OUTPATIENT
Start: 2024-08-02 | End: 2024-08-02 | Stop reason: HOSPADM

## 2024-08-02 RX ORDER — MIDAZOLAM HYDROCHLORIDE 2 MG/2ML
1 INJECTION, SOLUTION INTRAMUSCULAR; INTRAVENOUS
Status: COMPLETED | OUTPATIENT
Start: 2024-08-02 | End: 2024-08-02

## 2024-08-02 RX ORDER — ONDANSETRON 2 MG/ML
4 INJECTION INTRAMUSCULAR; INTRAVENOUS ONCE AS NEEDED
Status: DISCONTINUED | OUTPATIENT
Start: 2024-08-02 | End: 2024-08-02 | Stop reason: HOSPADM

## 2024-08-02 RX ORDER — HYDROCODONE BITARTRATE AND ACETAMINOPHEN 10; 325 MG/1; MG/1
1 TABLET ORAL EVERY 4 HOURS PRN
Status: DISCONTINUED | OUTPATIENT
Start: 2024-08-02 | End: 2024-08-02 | Stop reason: HOSPADM

## 2024-08-02 RX ORDER — SODIUM CHLORIDE 0.9 % (FLUSH) 0.9 %
3 SYRINGE (ML) INJECTION EVERY 12 HOURS SCHEDULED
Status: DISCONTINUED | OUTPATIENT
Start: 2024-08-02 | End: 2024-08-02 | Stop reason: HOSPADM

## 2024-08-02 RX ORDER — NALOXONE HCL 0.4 MG/ML
0.4 VIAL (ML) INJECTION AS NEEDED
Status: DISCONTINUED | OUTPATIENT
Start: 2024-08-02 | End: 2024-08-02 | Stop reason: HOSPADM

## 2024-08-02 RX ORDER — PROPOFOL 10 MG/ML
VIAL (ML) INTRAVENOUS AS NEEDED
Status: DISCONTINUED | OUTPATIENT
Start: 2024-08-02 | End: 2024-08-05 | Stop reason: SURG

## 2024-08-02 RX ORDER — HYDROCODONE BITARTRATE AND ACETAMINOPHEN 5; 325 MG/1; MG/1
1 TABLET ORAL ONCE AS NEEDED
Status: DISCONTINUED | OUTPATIENT
Start: 2024-08-02 | End: 2024-08-02 | Stop reason: HOSPADM

## 2024-08-02 RX ORDER — SODIUM CHLORIDE, SODIUM LACTATE, POTASSIUM CHLORIDE, CALCIUM CHLORIDE 600; 310; 30; 20 MG/100ML; MG/100ML; MG/100ML; MG/100ML
1000 INJECTION, SOLUTION INTRAVENOUS CONTINUOUS
Status: DISCONTINUED | OUTPATIENT
Start: 2024-08-02 | End: 2024-08-02 | Stop reason: HOSPADM

## 2024-08-02 RX ORDER — IBUPROFEN 600 MG/1
600 TABLET ORAL EVERY 6 HOURS PRN
Status: DISCONTINUED | OUTPATIENT
Start: 2024-08-02 | End: 2024-08-02 | Stop reason: HOSPADM

## 2024-08-02 RX ORDER — ACETAMINOPHEN 500 MG
1000 TABLET ORAL ONCE
Status: COMPLETED | OUTPATIENT
Start: 2024-08-02 | End: 2024-08-02

## 2024-08-02 RX ORDER — MIDAZOLAM HYDROCHLORIDE 2 MG/2ML
0.5 INJECTION, SOLUTION INTRAMUSCULAR; INTRAVENOUS
Status: DISCONTINUED | OUTPATIENT
Start: 2024-08-02 | End: 2024-08-02 | Stop reason: HOSPADM

## 2024-08-02 RX ORDER — SODIUM CHLORIDE 0.9 % (FLUSH) 0.9 %
3 SYRINGE (ML) INJECTION AS NEEDED
Status: DISCONTINUED | OUTPATIENT
Start: 2024-08-02 | End: 2024-08-02 | Stop reason: HOSPADM

## 2024-08-02 RX ORDER — FENTANYL CITRATE 50 UG/ML
INJECTION, SOLUTION INTRAMUSCULAR; INTRAVENOUS AS NEEDED
Status: DISCONTINUED | OUTPATIENT
Start: 2024-08-02 | End: 2024-08-05 | Stop reason: SURG

## 2024-08-02 RX ORDER — LIDOCAINE HYDROCHLORIDE 10 MG/ML
0.5 INJECTION, SOLUTION EPIDURAL; INFILTRATION; INTRACAUDAL; PERINEURAL ONCE AS NEEDED
Status: DISCONTINUED | OUTPATIENT
Start: 2024-08-02 | End: 2024-08-02 | Stop reason: HOSPADM

## 2024-08-02 RX ORDER — SODIUM CHLORIDE 0.9 % (FLUSH) 0.9 %
3-10 SYRINGE (ML) INJECTION AS NEEDED
Status: DISCONTINUED | OUTPATIENT
Start: 2024-08-02 | End: 2024-08-02 | Stop reason: HOSPADM

## 2024-08-02 RX ORDER — FLUMAZENIL 0.1 MG/ML
0.2 INJECTION INTRAVENOUS AS NEEDED
Status: DISCONTINUED | OUTPATIENT
Start: 2024-08-02 | End: 2024-08-02 | Stop reason: HOSPADM

## 2024-08-02 RX ORDER — BUPIVACAINE HYDROCHLORIDE 5 MG/ML
INJECTION, SOLUTION EPIDURAL; INTRACAUDAL AS NEEDED
Status: DISCONTINUED | OUTPATIENT
Start: 2024-08-02 | End: 2024-08-02 | Stop reason: HOSPADM

## 2024-08-02 RX ORDER — SODIUM CHLORIDE 9 MG/ML
40 INJECTION, SOLUTION INTRAVENOUS AS NEEDED
Status: DISCONTINUED | OUTPATIENT
Start: 2024-08-02 | End: 2024-08-02 | Stop reason: HOSPADM

## 2024-08-02 RX ADMIN — SODIUM CHLORIDE, POTASSIUM CHLORIDE, SODIUM LACTATE AND CALCIUM CHLORIDE 1000 ML: 600; 310; 30; 20 INJECTION, SOLUTION INTRAVENOUS at 09:28

## 2024-08-02 RX ADMIN — MIDAZOLAM HYDROCHLORIDE 1 MG: 1 INJECTION, SOLUTION INTRAMUSCULAR; INTRAVENOUS at 11:56

## 2024-08-02 RX ADMIN — PROPOFOL 50 MG: 10 INJECTION, EMULSION INTRAVENOUS at 12:28

## 2024-08-02 RX ADMIN — ACETAMINOPHEN 1000 MG: 500 TABLET, FILM COATED ORAL at 11:37

## 2024-08-02 RX ADMIN — FENTANYL CITRATE 100 MCG: 50 INJECTION, SOLUTION INTRAMUSCULAR; INTRAVENOUS at 12:28

## 2024-08-02 RX ADMIN — HEPARIN SODIUM 1000 UNITS: 1000 INJECTION, SOLUTION INTRAVENOUS; SUBCUTANEOUS at 12:45

## 2024-08-02 RX ADMIN — MIDAZOLAM HYDROCHLORIDE 1 MG: 1 INJECTION, SOLUTION INTRAMUSCULAR; INTRAVENOUS at 11:38

## 2024-08-02 RX ADMIN — LIDOCAINE HYDROCHLORIDE 40 MG: 20 INJECTION, SOLUTION EPIDURAL; INFILTRATION; INTRACAUDAL; PERINEURAL at 12:28

## 2024-08-02 RX ADMIN — PROPOFOL 125 MCG/KG/MIN: 10 INJECTION, EMULSION INTRAVENOUS at 12:28

## 2024-08-02 RX ADMIN — CEFAZOLIN 2000 MG: 2 INJECTION, POWDER, FOR SOLUTION INTRAMUSCULAR; INTRAVENOUS at 12:30

## 2024-08-02 RX ADMIN — ONDANSETRON 4 MG: 2 INJECTION INTRAMUSCULAR; INTRAVENOUS at 15:10

## 2024-08-02 NOTE — OP NOTE
Carlee Vee  8/2/2024     PREOPERATIVE DIAGNOSIS: PAD (peripheral artery disease) [I73.9]  Preop testing [Z01.818]     POSTOPERATIVE DIAGNOSIS: Post-Op Diagnosis Codes:     * PAD (peripheral artery disease) [I73.9]     * Preop testing [Z01.818]     PROCEDURE PERFORMED:   1.  Ultrasound-guided cannulation of the right common femoral artery  2.  Introduction of catheter/sheath into the aorta  3.  Aortoiliac angiogram with left lower extremity runoff with radiographic supervision and interpretation  4.  Contralateral cannulation of the left common iliac artery  5.  Mynx closure of the right common femoral artery       SURGEON: Geoffrey Gold DO      ANESTHESIA: MAC    PREPARATION: Routine.    STAFF: Circulator: Elsa Tirado RN  Scrub Person: Vanessa Ohara  Assistant: Whitley Stark  Vascular Radiology Technician: Lois Gracia    Estimated Blood Loss: minimal    SPECIMENS: None    COMPLICATIONS: None    INDICATIONS: Carlee Vee is a 64 y.o. female who did undergo right lower extremity angiogram with crossing proximal/mid SFA chronic total occlusion with atherectomy and balloon angioplasty on 6/13/2024. Her left groin has healed. She is maintained on aspirin, Plavix, and Crestor. She is doing great and reports her claudication has resolved to her right lower extremity but continues to have to her left. The indications, risks, and possible complications of the procedure were explained to the patient, who voiced understanding and wished to proceed with surgery.     PROCEDURE IN DETAIL: The patient was taken to the operating room and placed on the operating table in a supine position. After MAC anesthesia was obtained, the bilateral groins was prepped and draped in a sterile manner.  5 cc of 0.5% Marcaine plain was used infiltrate the right groin for local anesthesia.  Using a micropuncture technique the right common femoral artery was cannulated and a microsheath was placed.  Advantage Glidewire was  advanced into the aorta and a short 6 Occitan sheath was placed.  Patient was given 1000 units of intravenous heparin.  The Omni Flush catheter was advanced to the aorta and an aortoiliac angiogram was performed.  Findings are as follows:  1.  Patent renal arteries bilaterally without stenosis   2.  Patent aorta without stenosis   3.  Patent iliac systems bilaterally without stenosis     Contralateral cannulation was established of the left common iliac artery.  The catheter was then brought down to the level of the femoral head.  An angiogram with runoff was performed.  Findings are as follows:   1.  Patent common femoral, profunda femoris, and SFA without stenosis   2.  Patent popliteal artery without stenosis   3.  Patent three-vessel runoff to the foot without stenosis.  The distal anterior tibial artery became atretic as it entered the dorsalis pedis artery.      At this point, I felt no further intervention was warranted.  The sheath and wire were removed.  A minx device was used to seal off the right common femoral artery.  Direct pressure was held for an additional 10 to 15 minutes to help ensure hemostasis.  Sterile dressings were applied. The patient tolerated the procedure well. Sponge and needle counts were correct. The patient was then awakened in the operating room and taken to the recovery room in good condition.    Geoffrey Gold,   Date: 8/2/2024 Time: 12:57 CDT    CC:Elinor Richter, CLEMENTE

## 2024-08-02 NOTE — ANESTHESIA POSTPROCEDURE EVALUATION
Patient: Carlee Vee    Procedure Summary       Date: 08/02/24 Room / Location:  PAD OR  /  [facility] HYBR[facility] OR    Anesthesia Start: 1225 Anesthesia Stop:     Procedure: Left lower extremity angiogram, mynx closure (Left: Groin) Diagnosis:       PAD (peripheral artery disease)      Preop testing      (PAD (peripheral artery disease) [I73.9])      (Preop testing [Z01.818])    Surgeons: Geoffrey Gold DO Provider: Vasyl Miguel CRNA    Anesthesia Type: MAC ASA Status: 3            Anesthesia Type: MAC    Vitals  Vitals Value Taken Time   /69 08/02/24 1442   Temp 97 °F (36.1 °C) 08/02/24 1440   Pulse 0 08/02/24 1443   Resp 22 08/02/24 1440   SpO2 94 % 08/02/24 1443   Vitals shown include unfiled device data.        Post Anesthesia Care and Evaluation    Patient location during evaluation: PACU  Patient participation: complete - patient participated  Level of consciousness: awake and alert  Pain management: adequate    Airway patency: patent  Anesthetic complications: No anesthetic complications  PONV Status: none  Cardiovascular status: acceptable and hemodynamically stable  Respiratory status: acceptable  Hydration status: acceptable    Comments: Blood pressure 136/68, pulse 73, temperature 97 °F (36.1 °C), temperature source Temporal, resp. rate 18, SpO2 92%, not currently breastfeeding.    Patient discharged from PACU based upon Adrian score. Please see RN notes for further details

## 2024-08-02 NOTE — ANESTHESIA PREPROCEDURE EVALUATION
Anesthesia Evaluation     Patient summary reviewed   history of anesthetic complications:  PONV  NPO Solid Status: > 8 hours  NPO Liquid Status: > 8 hours           Airway   Mallampati: II  No difficulty expected  Dental    (+) upper dentures and lower dentures    Pulmonary    (+) a smoker Former,  Cardiovascular   Exercise tolerance: poor (<4 METS)    (+) hypertension, PVD, hyperlipidemia,  carotid artery disease  (-) pacemaker, past MI, CAD, cardiac stents, CABG      Neuro/Psych- negative ROS  GI/Hepatic/Renal/Endo    (+) GERD, PUD, renal disease- stones, diabetes mellitus type 2 using insulin    Musculoskeletal     Abdominal    Substance History      OB/GYN          Other                          Anesthesia Plan    ASA 3     MAC     (Preop anxiolytic )  intravenous induction     Anesthetic plan, risks, benefits, and alternatives have been provided, discussed and informed consent has been obtained with: patient.        CODE STATUS:

## 2024-08-13 DIAGNOSIS — I10 ESSENTIAL HYPERTENSION: ICD-10-CM

## 2024-08-13 DIAGNOSIS — Z79.4 UNCONTROLLED TYPE 2 DIABETES MELLITUS WITH HYPERGLYCEMIA, WITH LONG-TERM CURRENT USE OF INSULIN (HCC): ICD-10-CM

## 2024-08-13 DIAGNOSIS — K22.70 BARRETT'S ESOPHAGUS WITHOUT DYSPLASIA: ICD-10-CM

## 2024-08-13 DIAGNOSIS — E11.65 UNCONTROLLED TYPE 2 DIABETES MELLITUS WITH HYPERGLYCEMIA, WITH LONG-TERM CURRENT USE OF INSULIN (HCC): ICD-10-CM

## 2024-08-13 DIAGNOSIS — E78.2 MIXED HYPERLIPIDEMIA: ICD-10-CM

## 2024-08-13 DIAGNOSIS — F41.9 ANXIETY DISORDER, UNSPECIFIED TYPE: ICD-10-CM

## 2024-08-13 RX ORDER — ROSUVASTATIN CALCIUM 20 MG/1
20 TABLET, COATED ORAL NIGHTLY
Qty: 30 TABLET | Refills: 0 | Status: SHIPPED | OUTPATIENT
Start: 2024-08-13

## 2024-08-13 RX ORDER — ROSUVASTATIN CALCIUM 20 MG/1
20 TABLET, COATED ORAL NIGHTLY
Qty: 30 TABLET | Refills: 2 | Status: SHIPPED | OUTPATIENT
Start: 2024-08-13

## 2024-08-13 RX ORDER — LOSARTAN POTASSIUM 25 MG/1
25 TABLET ORAL DAILY
Qty: 30 TABLET | Refills: 0 | Status: SHIPPED | OUTPATIENT
Start: 2024-08-13

## 2024-08-13 RX ORDER — PANTOPRAZOLE SODIUM 40 MG/1
40 TABLET, DELAYED RELEASE ORAL 2 TIMES DAILY
Qty: 180 TABLET | Refills: 0 | Status: SHIPPED | OUTPATIENT
Start: 2024-08-13

## 2024-08-13 RX ORDER — FLUOXETINE HYDROCHLORIDE 20 MG/1
CAPSULE ORAL
Qty: 30 CAPSULE | Refills: 0 | Status: SHIPPED | OUTPATIENT
Start: 2024-08-13

## 2024-08-13 NOTE — TELEPHONE ENCOUNTER
Taylor Jacobs called to request a refill on her medication.      Last office visit : 10/27/2023   Next office visit : 8/13/2024     Requested Prescriptions     Pending Prescriptions Disp Refills    losartan (COZAAR) 25 MG tablet [Pharmacy Med Name: LOSARTAN 25MG TABLETS] 30 tablet 0     Sig: TAKE 1 TABLET BY MOUTH DAILY FOR BLOOD PRESSURE    FLUoxetine (PROZAC) 20 MG capsule [Pharmacy Med Name: FLUOXETINE 20MG CAPSULES] 30 capsule 0     Sig: TAKE ONE CAPSULE BY MOUTH ONCE DAILY, DO NOT ABRUPTLY STOP    rosuvastatin (CRESTOR) 20 MG tablet [Pharmacy Med Name: ROSUVASTATIN 20MG TABLETS] 30 tablet 0     Sig: TAKE 1 TABLET BY MOUTH AT BEDTIME            Jessica Cook LPN

## 2024-08-13 NOTE — TELEPHONE ENCOUNTER
Rx Refill Note  Requested Prescriptions     Pending Prescriptions Disp Refills    pantoprazole (PROTONIX) 40 MG EC tablet [Pharmacy Med Name: PANTOPRAZOLE 40MG TABLETS] 180 tablet 3     Sig: TAKE 1 TABLET BY MOUTH TWICE DAILY      Last office visit with prescribing clinician: 7/17/2023     Last telemedicine visit with prescribing clinician: Visit date not found     Next office visit with prescribing clinician: Visit date not found     Pt due for yearly OV-pended 1 90-day supply to [facility] to last until pt can schedule appt. I also placed note to pharmacy that pt needs to schedule OV for more refills after this.                           Would you like a call back once the refill request has been completed: [] Yes [] No    If the office needs to give you a call back, can they leave a voicemail: [] Yes [] No    Fadumo Dumont MA  08/13/24, 08:59 CDT

## 2024-08-13 NOTE — TELEPHONE ENCOUNTER
Taylor Jacobs called to request a refill on her medication.      Last office visit : 10/27/2023   Next office visit : 8/26/2024     Requested Prescriptions     Pending Prescriptions Disp Refills    rosuvastatin (CRESTOR) 20 MG tablet [Pharmacy Med Name: ROSUVASTATIN 20MG TABLETS] 30 tablet 2     Sig: TAKE 1 TABLET BY MOUTH AT BEDTIME            Jessica Cook LPN

## 2024-08-15 ENCOUNTER — TELEPHONE (OUTPATIENT)
Dept: VASCULAR SURGERY | Facility: CLINIC | Age: 64
End: 2024-08-15
Payer: MEDICARE

## 2024-08-15 NOTE — TELEPHONE ENCOUNTER
Call placed to patient with no answer, voicemail left reminding patient of appointment on 8/16/2024, requested call back.

## 2024-08-16 ENCOUNTER — OFFICE VISIT (OUTPATIENT)
Dept: VASCULAR SURGERY | Facility: CLINIC | Age: 64
End: 2024-08-16
Payer: MEDICARE

## 2024-08-16 VITALS
WEIGHT: 170 LBS | OXYGEN SATURATION: 98 % | SYSTOLIC BLOOD PRESSURE: 136 MMHG | BODY MASS INDEX: 31.28 KG/M2 | HEIGHT: 62 IN | HEART RATE: 91 BPM | DIASTOLIC BLOOD PRESSURE: 78 MMHG

## 2024-08-16 DIAGNOSIS — E78.2 MIXED HYPERLIPIDEMIA: ICD-10-CM

## 2024-08-16 DIAGNOSIS — I10 ESSENTIAL HYPERTENSION: ICD-10-CM

## 2024-08-16 DIAGNOSIS — I65.23 BILATERAL CAROTID ARTERY STENOSIS: ICD-10-CM

## 2024-08-16 DIAGNOSIS — I73.9 PAD (PERIPHERAL ARTERY DISEASE): Primary | ICD-10-CM

## 2024-08-16 PROCEDURE — 1160F RVW MEDS BY RX/DR IN RCRD: CPT | Performed by: NURSE PRACTITIONER

## 2024-08-16 PROCEDURE — 3075F SYST BP GE 130 - 139MM HG: CPT | Performed by: NURSE PRACTITIONER

## 2024-08-16 PROCEDURE — 1159F MED LIST DOCD IN RCRD: CPT | Performed by: NURSE PRACTITIONER

## 2024-08-16 PROCEDURE — 3078F DIAST BP <80 MM HG: CPT | Performed by: NURSE PRACTITIONER

## 2024-08-16 PROCEDURE — 99214 OFFICE O/P EST MOD 30 MIN: CPT | Performed by: NURSE PRACTITIONER

## 2024-08-16 RX ORDER — CHOLECALCIFEROL (VITAMIN D3) 25 MCG
4 TABLET ORAL DAILY
COMMUNITY

## 2024-08-16 NOTE — PROGRESS NOTES
"8/19/2024       Elinor Richter, APRN  [address] [facility]  [place] [address]      Carlee Vee  1960    Chief Complaint   Patient presents with    Follow-up     2 week post op. No issues stated. Left Angio done 8/2/24.        Dear Elinor Richter, CLEMENTE       I had the pleasure of seeing your patient Carlee Vee in the office today.  As you recall, Carlee Vee is a 64 y.o.  female who you are currently following for routine health maintenance.  She is here for 2-week follow-up after undergoing left lower extremity.  She is doing well and has no complaints at this time.  She did undergo right lower extremity angiogram with crossing proximal/mid SFA chronic total occlusion with arthrectomy and balloon angioplasty on 6/13/2024. She is doing good and reports no claudication to her right lower extremity.   She is maintained on aspirin, Plavix, and Crestor.      Review of Systems   Constitutional: Negative.  Negative for diaphoresis and fever.   HENT: Negative.     Eyes: Negative.    Respiratory: Negative.  Negative for shortness of breath and wheezing.    Cardiovascular: Negative.  Negative for leg swelling.   Gastrointestinal: Negative.  Negative for abdominal pain.   Endocrine: Negative.    Genitourinary: Negative.    Musculoskeletal: Negative.  Negative for gait problem.   Skin: Negative.    Allergic/Immunologic: Negative.    Neurological: Negative.  Negative for dizziness and numbness.   Hematological: Negative.    Psychiatric/Behavioral: Negative.          /78   Pulse 91   Ht 157.5 cm (62\")   Wt 77.1 kg (170 lb)   SpO2 98%   BMI 31.09 kg/m²       Physical Exam  Vitals and nursing note reviewed.   Constitutional:       General: She is not in acute distress.     Appearance: Normal appearance. She is obese. She is not diaphoretic.   HENT:      Head: Normocephalic. No right periorbital erythema or left periorbital erythema.      Nose: Nose normal.   Eyes:      General: No scleral icterus.     Pupils: " Pupils are equal.   Cardiovascular:      Rate and Rhythm: Normal rate and regular rhythm.      Pulses: Normal pulses.           Femoral pulses are 2+ on the right side and 2+ on the left side.       Popliteal pulses are 2+ on the right side and 2+ on the left side.        Dorsalis pedis pulses are 2+ on the right side and 2+ on the left side.        Posterior tibial pulses are 2+ on the right side and 2+ on the left side.      Heart sounds: Normal heart sounds. No murmur heard.     Comments: Right groin incision healed.  Pulmonary:      Effort: Pulmonary effort is normal. No respiratory distress.      Breath sounds: Normal breath sounds.   Abdominal:      General: Bowel sounds are normal. There is no distension.      Palpations: Abdomen is soft.      Tenderness: There is no abdominal tenderness. There is no guarding.   Musculoskeletal:         General: No swelling or tenderness. Normal range of motion.      Cervical back: Normal range of motion and neck supple.      Right lower leg: No edema.      Left lower leg: No edema.   Feet:      Right foot:      Skin integrity: Skin integrity normal.      Left foot:      Skin integrity: Skin integrity normal.   Skin:     General: Skin is warm and dry.      Findings: No erythema or rash.   Neurological:      General: No focal deficit present.      Mental Status: She is alert and oriented to person, place, and time. Mental status is at baseline.      Cranial Nerves: No cranial nerve deficit.      Gait: Gait normal.   Psychiatric:         Attention and Perception: Attention normal.         Mood and Affect: Mood normal.         Behavior: Behavior normal.         Thought Content: Thought content normal.         Judgment: Judgment normal.           Patient Active Problem List   Diagnosis    Garza's esophagus without dysplasia    Gastroesophageal reflux disease without esophagitis    Adenomatous colon polyp    Pneumonia    Empyema of pleura    Hyperglycemia    Esophageal dysphagia     Weight loss    Celiac artery stenosis    Essential hypertension    Mixed hyperlipidemia    Bilateral carotid artery stenosis    PAD (peripheral artery disease)    Preop testing        Diagnosis Plan   1. PAD (peripheral artery disease)  US Ankle / Brachial Indices Extremity Complete      2. Bilateral carotid artery stenosis  US Carotid Bilateral      3. Essential hypertension        4. Mixed hyperlipidemia                  Plan: After thoroughly evaluating Carlee Vee, I I believe the best course of action is to remain conservative from vascular surgery standpoint.  Overall, she is doing well and denies any claudication or ischemia symptoms.  Her right groin incision is healed.  She denies any strokelike symptoms.  She is maintained on aspirin 81 mg daily, Plavix 75 mg daily, and Crestor 10 mg nightly.  We will see her back in 6 months with noninvasive testing to include ABIs and carotid duplex for continued surveillance.  I did discuss vascular risk factors as they pertain to the progression of vascular disease including controlling her hypertension and hyperlipidemia.  Her blood pressures currently stable today in office.  Her last lipid panel was from 1 year ago and it showed total cholesterol 326, triglycerides 300, HDL 48, and .  She should continue all other medications as previously planned.  This was all discussed in full with complete understanding.  Thank you for allowing me to participate in the care of your patient.  Please do not hesitate to call with any questions or concerns.  We will keep you aware of any further encounters with Carlee Vee.        Sincerely yours,         Dawna Baker, Elinor Mckeon APRN

## 2024-08-19 ENCOUNTER — TELEPHONE (OUTPATIENT)
Dept: VASCULAR SURGERY | Facility: CLINIC | Age: 64
End: 2024-08-19
Payer: MEDICARE

## 2024-08-19 NOTE — TELEPHONE ENCOUNTER
Call placed to patient with no answer, voicemail left explaining she would need to speak with pcp for refills of Gabapentin

## 2024-11-11 DIAGNOSIS — K22.70 BARRETT'S ESOPHAGUS WITHOUT DYSPLASIA: ICD-10-CM

## 2024-11-11 RX ORDER — PANTOPRAZOLE SODIUM 40 MG/1
40 TABLET, DELAYED RELEASE ORAL 2 TIMES DAILY
Qty: 180 TABLET | Refills: 0 | OUTPATIENT
Start: 2024-11-11

## (undated) DEVICE — SUT SILK 2/0 FS BLK 18IN 685G

## (undated) DEVICE — FRCP BIOP ENDO CAPTURAPRO SPK SERR 2.8MM 230CM

## (undated) DEVICE — CUFF,BP,DISP,1 TUBE,ADULT,HP: Brand: MEDLINE

## (undated) DEVICE — GLOVE SURG SZ 7 CRM LTX FREE POLYISOPRENE POLYMER BEAD ANTI

## (undated) DEVICE — DRSNG SURESITE WNDW 4X4.5

## (undated) DEVICE — DEV EPS SPIDERFX 6MM OTW320/RX190CM

## (undated) DEVICE — Device: Brand: DEFENDO AIR/WATER/SUCTION AND BIOPSY VALVE

## (undated) DEVICE — PINNACLE R/O II INTRODUCER SHEATH WITH RADIOPAQUE MARKER: Brand: PINNACLE

## (undated) DEVICE — ELECTRD BLD EXT EDGE 1P COAT 6.5IN STRL

## (undated) DEVICE — FRCP BX RADJAW4 NDL 2.8 240 STD OG

## (undated) DEVICE — COVER,MAYO STAND,STERILE: Brand: MEDLINE

## (undated) DEVICE — PAD ENDOVASCULAR: Brand: MEDLINE INDUSTRIES, INC.

## (undated) DEVICE — YANKAUER,BULB TIP WITH VENT: Brand: ARGYLE

## (undated) DEVICE — CONMED SCOPE SAVER BITE BLOCK, 20X27 MM: Brand: SCOPE SAVER

## (undated) DEVICE — TBG SMPL FLTR LINE NASL 02/C02 A/ BX/100

## (undated) DEVICE — THE CHANNEL CLEANING BRUSH IS A NYLON FLEXI BRUSH ATTACHED TO A FLEXIBLE PLASTIC SHEATH DESIGNED TO SAFELY REMOVE DEBRIS FROM FLEXIBLE ENDOSCOPES.

## (undated) DEVICE — GLOVE SURG SZ 75 CRM LTX FREE POLYISOPRENE POLYMER BEAD ANTI

## (undated) DEVICE — ANTIBACTERIAL UNDYED BRAIDED (POLYGLACTIN 910), SYNTHETIC ABSORBABLE SUTURE: Brand: COATED VICRYL

## (undated) DEVICE — CATH FLSH OMNI SFT 5F 90CM

## (undated) DEVICE — MODEL AT P65, P/N 701554-001KIT CONTENTS: HAND CONTROLLER, 3-WAY HIGH-PRESSURE STOPCOCK WITH ROTATING END AND PREMIUM HIGH-PRESSURE TUBING: Brand: ANGIOTOUCH® KIT

## (undated) DEVICE — RADIFOCUS GLIDEWIRE ADVANTAGE GUIDEWIRE: Brand: GLIDEWIRE ADVANTAGE

## (undated) DEVICE — BANDAGE,GAUZE,4.5"X4.1YD,STERILE,LF: Brand: MEDLINE

## (undated) DEVICE — SENSR O2 OXIMAX FNGR A/ 18IN NONSTR

## (undated) DEVICE — DRAPE,UTILITY,TAPE,15X26,STERILE: Brand: MEDLINE

## (undated) DEVICE — APPL CHLORAPREP W/TINT 26ML ORNG

## (undated) DEVICE — MINOR CDS: Brand: MEDLINE INDUSTRIES, INC.

## (undated) DEVICE — ELECTRODE,ECG,STRESS,FOAM,50PK: Brand: MEDLINE

## (undated) DEVICE — SKIN AFFIX SURG ADHESIVE 72/CS 0.55ML: Brand: MEDLINE

## (undated) DEVICE — SPONGE,LAP,12"X12",XR,ST,5/PK,40PK/CS: Brand: MEDLINE

## (undated) DEVICE — PK TURNOVER RM ADV

## (undated) DEVICE — ST MIC/INTRO ACC SHRP/NDL TUNG/TP NITNL 5F 45CM 7CM

## (undated) DEVICE — DRAPE PELV MICROVASIVE STD SHT W/ FLD PCH NONFENESTRATED

## (undated) DEVICE — SUT PROLN 4/0 RB1 D/A 36IN 8557H

## (undated) DEVICE — DESTINATION RENAL GUIDING SHEATH: Brand: DESTINATION

## (undated) DEVICE — NAVICROSS SUPPORT CATHETER: Brand: NAVICROSS

## (undated) DEVICE — CVR HNDL LIGHT RIGID

## (undated) DEVICE — A2000 MULTI-USE SYRINGE KIT, P/N 701277-003KIT CONTENTS: 100ML CONTRAST RESERVOIR AND TUBING WITH CONTRAST SPIKE AND CLAMP: Brand: A2000 MULTI-USE SYRINGE KIT

## (undated) DEVICE — SPNG GZ WOVN 4X4IN 12PLY 10/BX STRL

## (undated) DEVICE — ENDOGATOR AUXILIARY WATER JET CONNECTOR: Brand: ENDOGATOR

## (undated) DEVICE — INTENDED FOR TISSUE SEPARATION, AND OTHER PROCEDURES THAT REQUIRE A SHARP SURGICAL BLADE TO PUNCTURE OR CUT.: Brand: BARD-PARKER ®  SAFETY SCALPED

## (undated) DEVICE — CLTH CLENS READYCLEANSE PERI CARE PK/5

## (undated) DEVICE — SUT SILK 2/0 SUTUPAK TIES 24IN SA75H

## (undated) DEVICE — DRAPE,UTILITY,XL,4/PK,STERILE: Brand: MEDLINE

## (undated) DEVICE — MODEL BT2000 P/N 700287-012KIT CONTENTS: MANIFOLD WITH SALINE AND CONTRAST PORTS, SALINE TUBING WITH SPIKE AND HAND SYRINGE, TRANSDUCER: Brand: BT2000 AUTOMATED MANIFOLD KIT

## (undated) DEVICE — GLV SURG BIOGEL LTX PF 7 1/2

## (undated) DEVICE — 3M™ IOBAN™ 2 ANTIMICROBIAL INCISE DRAPE 6651EZ: Brand: IOBAN™ 2

## (undated) DEVICE — TOWEL,OR,DSP,ST,BLUE,DLX,10/PK,8PK/CS: Brand: MEDLINE

## (undated) DEVICE — Device

## (undated) DEVICE — DEV CLS VASC MYNXCONTROL 6FTO7F

## (undated) DEVICE — IRRIGATION ONLY CANAL TIP

## (undated) DEVICE — INTENDED FOR TISSUE SEPARATION, AND OTHER PROCEDURES THAT REQUIRE A SHARP SURGICAL BLADE TO PUNCTURE OR CUT.: Brand: BARD-PARKER ® STAINLESS STEEL BLADES

## (undated) DEVICE — AMBU AURA-I U SIZE 4, DISPOSABLE LARYNGEAL MASK: Brand: AURA-I

## (undated) DEVICE — SUT SILK 0 SUTUPAK TIES 24IN SA76G

## (undated) DEVICE — VAGINAL PREP TRAY: Brand: MEDLINE INDUSTRIES, INC.

## (undated) DEVICE — GLV SURG TRIUMPH MICRO PF LTX 7.5 STRL

## (undated) DEVICE — 32 FR STRAIGHT – SOFT PVC CATHETER: Brand: PVC THORACIC CATHETERS

## (undated) DEVICE — KT NDL GUIDE STRL 18GA

## (undated) DEVICE — UTILITY MARKER W/MED LABELS: Brand: MEDLINE

## (undated) DEVICE — SPNG DISSCT CHRRY 3/8IN STRL PK/5

## (undated) DEVICE — SUT VIC 0 CTX 27IN VCP364H

## (undated) DEVICE — SNAR POLYP SENSATION MICRO OVL 13 240X40

## (undated) DEVICE — SUT VIC 1 XLH 27IN VCP583G

## (undated) DEVICE — SUT PROLN 5/0 RB1 D/A 36IN 8556H

## (undated) DEVICE — DRSNG TELFA PAD NONADH STR 1S 3X8IN

## (undated) DEVICE — MSK O2 MD CONCENTR A/ LF 7FT 1P/U

## (undated) DEVICE — OCEAN DRAIN, DUAL, IN-LINE, STOP: Brand: OCEAN

## (undated) DEVICE — ATHERECTOMY H1-LX HAWKONE 7F EXT TIP US: Brand: HAWKONE™

## (undated) DEVICE — SUT SILK 3/0 SUTUPAK TIES 24IN SA74H

## (undated) DEVICE — PENCIL SMK EVAC 10 FT BLADE ELECTRD ROCKER FOR TELSCP

## (undated) DEVICE — GLV SURG BIOGEL LTX PF 6 1/2

## (undated) DEVICE — SNAR POLYP CAPTIVATOR RND STFF 2.4 240CM 10MM 1P/U

## (undated) DEVICE — MASK,OXYGEN,MED CONC,ADLT,7' TUB, UC: Brand: PENDING

## (undated) DEVICE — ROYAL SILK SURGICAL GOWN, XXL: Brand: CONVERTORS

## (undated) DEVICE — THE SINGLE USE ETRAP – POLYP TRAP IS USED FOR SUCTION RETRIEVAL OF ENDOSCOPICALLY REMOVED POLYPS.: Brand: ETRAP

## (undated) DEVICE — 32 FR RIGHT ANGLE – SOFT PVC CATHETER: Brand: PVC THORACIC CATHETERS

## (undated) DEVICE — GLOVE SURG SZ 7 L12IN FNGR THK79MIL GRN LTX FREE

## (undated) DEVICE — PAD MAJOR: Brand: MEDLINE INDUSTRIES, INC.

## (undated) DEVICE — INFLATION DEVICE: Brand: ENCORE™ 26

## (undated) DEVICE — GOWN,PREVENTION PLUS,XL,ST,24/CS: Brand: MEDLINE